# Patient Record
Sex: MALE | Race: WHITE | NOT HISPANIC OR LATINO | URBAN - METROPOLITAN AREA
[De-identification: names, ages, dates, MRNs, and addresses within clinical notes are randomized per-mention and may not be internally consistent; named-entity substitution may affect disease eponyms.]

---

## 2016-06-28 RX ORDER — CARBAMAZEPINE 200 MG
2 TABLET ORAL
Qty: 0 | Refills: 0 | COMMUNITY
Start: 2016-06-28

## 2017-01-02 ENCOUNTER — EMERGENCY (EMERGENCY)
Facility: HOSPITAL | Age: 52
LOS: 1 days | Discharge: HOME | End: 2017-01-02
Attending: EMERGENCY MEDICINE | Admitting: EMERGENCY MEDICINE
Payer: MEDICARE

## 2017-01-02 VITALS
RESPIRATION RATE: 18 BRPM | DIASTOLIC BLOOD PRESSURE: 74 MMHG | HEART RATE: 100 BPM | SYSTOLIC BLOOD PRESSURE: 123 MMHG | WEIGHT: 188.05 LBS | TEMPERATURE: 99 F | OXYGEN SATURATION: 98 % | HEIGHT: 63 IN

## 2017-01-02 DIAGNOSIS — D64.9 ANEMIA, UNSPECIFIED: ICD-10-CM

## 2017-01-02 DIAGNOSIS — G40.909 EPILEPSY, UNSPECIFIED, NOT INTRACTABLE, WITHOUT STATUS EPILEPTICUS: ICD-10-CM

## 2017-01-02 DIAGNOSIS — R56.9 UNSPECIFIED CONVULSIONS: ICD-10-CM

## 2017-01-02 DIAGNOSIS — Z79.82 LONG TERM (CURRENT) USE OF ASPIRIN: ICD-10-CM

## 2017-01-02 DIAGNOSIS — F72 SEVERE INTELLECTUAL DISABILITIES: ICD-10-CM

## 2017-01-02 DIAGNOSIS — M81.0 AGE-RELATED OSTEOPOROSIS WITHOUT CURRENT PATHOLOGICAL FRACTURE: ICD-10-CM

## 2017-01-02 DIAGNOSIS — R32 UNSPECIFIED URINARY INCONTINENCE: ICD-10-CM

## 2017-01-02 PROCEDURE — 99285 EMERGENCY DEPT VISIT HI MDM: CPT | Mod: 25

## 2017-01-02 NOTE — ED PROVIDER NOTE - PROGRESS NOTE DETAILS
pt reevalauted, no seizures witnessed in the ER, pt is therapeutic in phenytoin but needs tegretol, pt to be d/c home, follow up with neuro and return if any symptmoms sergio

## 2017-01-02 NOTE — ED ADULT NURSE NOTE - PMH
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
## 2017-01-02 NOTE — ED PROVIDER NOTE - OBJECTIVE STATEMENT
52yo male bib ems s/p 2 seizures tonite. according to the nurse at the nursing home, pt had witnessed seizure twice in the bed, pt had refused his meds today, no fever, no vomiting, no other change in behavior. last seizure was march 2016

## 2017-01-03 LAB
ANION GAP SERPL CALC-SCNC: 10 MMOL/L — SIGNIFICANT CHANGE UP (ref 5–17)
BUN SERPL-MCNC: 19 MG/DL — SIGNIFICANT CHANGE UP (ref 7–23)
CALCIUM SERPL-MCNC: 8.5 MG/DL — SIGNIFICANT CHANGE UP (ref 8.5–10.1)
CARBAMAZEPINE SERPL-MCNC: 1 UG/ML — LOW (ref 4–12)
CHLORIDE SERPL-SCNC: 105 MMOL/L — SIGNIFICANT CHANGE UP (ref 96–108)
CO2 SERPL-SCNC: 25 MMOL/L — SIGNIFICANT CHANGE UP (ref 22–31)
CREAT SERPL-MCNC: 0.91 MG/DL — SIGNIFICANT CHANGE UP (ref 0.5–1.3)
GLUCOSE SERPL-MCNC: 86 MG/DL — SIGNIFICANT CHANGE UP (ref 70–99)
HCT VFR BLD CALC: 42.3 % — SIGNIFICANT CHANGE UP (ref 39–50)
HGB BLD-MCNC: 14.1 G/DL — SIGNIFICANT CHANGE UP (ref 13–17)
MCHC RBC-ENTMCNC: 30.3 PG — SIGNIFICANT CHANGE UP (ref 27–34)
MCHC RBC-ENTMCNC: 33.3 GM/DL — SIGNIFICANT CHANGE UP (ref 32–36)
MCV RBC AUTO: 90.8 FL — SIGNIFICANT CHANGE UP (ref 80–100)
PHENYTOIN FREE SERPL-MCNC: 24.6 UG/ML — HIGH (ref 10–20)
PLATELET # BLD AUTO: 301 K/UL — SIGNIFICANT CHANGE UP (ref 150–400)
POTASSIUM SERPL-MCNC: 4.7 MMOL/L — SIGNIFICANT CHANGE UP (ref 3.5–5.3)
POTASSIUM SERPL-SCNC: 4.7 MMOL/L — SIGNIFICANT CHANGE UP (ref 3.5–5.3)
RBC # BLD: 4.66 M/UL — SIGNIFICANT CHANGE UP (ref 4.2–5.8)
RBC # FLD: 12 % — SIGNIFICANT CHANGE UP (ref 10.3–14.5)
SODIUM SERPL-SCNC: 140 MMOL/L — SIGNIFICANT CHANGE UP (ref 135–145)
WBC # BLD: 6.9 K/UL — SIGNIFICANT CHANGE UP (ref 3.8–10.5)
WBC # FLD AUTO: 6.9 K/UL — SIGNIFICANT CHANGE UP (ref 3.8–10.5)

## 2017-01-03 PROCEDURE — 80048 BASIC METABOLIC PNL TOTAL CA: CPT

## 2017-01-03 PROCEDURE — 80185 ASSAY OF PHENYTOIN TOTAL: CPT

## 2017-01-03 PROCEDURE — 80156 ASSAY CARBAMAZEPINE TOTAL: CPT

## 2017-01-03 PROCEDURE — 99283 EMERGENCY DEPT VISIT LOW MDM: CPT

## 2017-01-03 PROCEDURE — 85027 COMPLETE CBC AUTOMATED: CPT

## 2017-01-03 RX ORDER — CARBAMAZEPINE 200 MG
600 TABLET ORAL ONCE
Qty: 0 | Refills: 0 | Status: COMPLETED | OUTPATIENT
Start: 2017-01-03 | End: 2017-01-03

## 2017-01-03 RX ADMIN — Medication 600 MILLIGRAM(S): at 02:00

## 2017-01-15 ENCOUNTER — EMERGENCY (EMERGENCY)
Facility: HOSPITAL | Age: 52
LOS: 1 days | Discharge: HOME | End: 2017-01-15
Attending: EMERGENCY MEDICINE | Admitting: EMERGENCY MEDICINE
Payer: MEDICARE

## 2017-01-15 VITALS
OXYGEN SATURATION: 98 % | SYSTOLIC BLOOD PRESSURE: 138 MMHG | RESPIRATION RATE: 16 BRPM | HEART RATE: 88 BPM | DIASTOLIC BLOOD PRESSURE: 65 MMHG

## 2017-01-15 VITALS
WEIGHT: 179.9 LBS | OXYGEN SATURATION: 97 % | HEART RATE: 92 BPM | SYSTOLIC BLOOD PRESSURE: 112 MMHG | TEMPERATURE: 99 F | RESPIRATION RATE: 14 BRPM | DIASTOLIC BLOOD PRESSURE: 78 MMHG

## 2017-01-15 DIAGNOSIS — Z91.018 ALLERGY TO OTHER FOODS: ICD-10-CM

## 2017-01-15 DIAGNOSIS — R56.9 UNSPECIFIED CONVULSIONS: ICD-10-CM

## 2017-01-15 DIAGNOSIS — Z91.5 PERSONAL HISTORY OF SELF-HARM: ICD-10-CM

## 2017-01-15 DIAGNOSIS — G40.909 EPILEPSY, UNSPECIFIED, NOT INTRACTABLE, WITHOUT STATUS EPILEPTICUS: ICD-10-CM

## 2017-01-15 DIAGNOSIS — F03.90 UNSPECIFIED DEMENTIA, UNSPECIFIED SEVERITY, WITHOUT BEHAVIORAL DISTURBANCE, PSYCHOTIC DISTURBANCE, MOOD DISTURBANCE, AND ANXIETY: ICD-10-CM

## 2017-01-15 DIAGNOSIS — Z88.8 ALLERGY STATUS TO OTHER DRUGS, MEDICAMENTS AND BIOLOGICAL SUBSTANCES STATUS: ICD-10-CM

## 2017-01-15 DIAGNOSIS — Z79.82 LONG TERM (CURRENT) USE OF ASPIRIN: ICD-10-CM

## 2017-01-15 DIAGNOSIS — F72 SEVERE INTELLECTUAL DISABILITIES: ICD-10-CM

## 2017-01-15 LAB
ALBUMIN SERPL ELPH-MCNC: 3.5 G/DL — SIGNIFICANT CHANGE UP (ref 3.3–5)
ALP SERPL-CCNC: 88 U/L — SIGNIFICANT CHANGE UP (ref 40–120)
ALT FLD-CCNC: 30 U/L — SIGNIFICANT CHANGE UP (ref 12–78)
ANION GAP SERPL CALC-SCNC: 7 MMOL/L — SIGNIFICANT CHANGE UP (ref 5–17)
AST SERPL-CCNC: 22 U/L — SIGNIFICANT CHANGE UP (ref 15–37)
BASOPHILS # BLD AUTO: 0 K/UL — SIGNIFICANT CHANGE UP (ref 0–0.2)
BASOPHILS NFR BLD AUTO: 0.6 % — SIGNIFICANT CHANGE UP (ref 0–2)
BILIRUB SERPL-MCNC: 0.3 MG/DL — SIGNIFICANT CHANGE UP (ref 0.2–1.2)
BUN SERPL-MCNC: 18 MG/DL — SIGNIFICANT CHANGE UP (ref 7–23)
CALCIUM SERPL-MCNC: 8.5 MG/DL — SIGNIFICANT CHANGE UP (ref 8.5–10.1)
CARBAMAZEPINE SERPL-MCNC: 5.2 UG/ML — SIGNIFICANT CHANGE UP (ref 4–12)
CHLORIDE SERPL-SCNC: 103 MMOL/L — SIGNIFICANT CHANGE UP (ref 96–108)
CO2 SERPL-SCNC: 28 MMOL/L — SIGNIFICANT CHANGE UP (ref 22–31)
CREAT SERPL-MCNC: 0.7 MG/DL — SIGNIFICANT CHANGE UP (ref 0.5–1.3)
EOSINOPHIL # BLD AUTO: 0 K/UL — SIGNIFICANT CHANGE UP (ref 0–0.5)
EOSINOPHIL NFR BLD AUTO: 0.2 % — SIGNIFICANT CHANGE UP (ref 0–6)
GLUCOSE SERPL-MCNC: 123 MG/DL — HIGH (ref 70–99)
HCT VFR BLD CALC: 43.6 % — SIGNIFICANT CHANGE UP (ref 39–50)
HGB BLD-MCNC: 14.1 G/DL — SIGNIFICANT CHANGE UP (ref 13–17)
LYMPHOCYTES # BLD AUTO: 1 K/UL — SIGNIFICANT CHANGE UP (ref 1–3.3)
LYMPHOCYTES # BLD AUTO: 14.9 % — SIGNIFICANT CHANGE UP (ref 13–44)
MCHC RBC-ENTMCNC: 29.6 PG — SIGNIFICANT CHANGE UP (ref 27–34)
MCHC RBC-ENTMCNC: 32.4 GM/DL — SIGNIFICANT CHANGE UP (ref 32–36)
MCV RBC AUTO: 91.5 FL — SIGNIFICANT CHANGE UP (ref 80–100)
MONOCYTES # BLD AUTO: 0.4 K/UL — SIGNIFICANT CHANGE UP (ref 0–0.9)
MONOCYTES NFR BLD AUTO: 6.3 % — SIGNIFICANT CHANGE UP (ref 1–9)
NEUTROPHILS # BLD AUTO: 5 K/UL — SIGNIFICANT CHANGE UP (ref 1.8–7.4)
NEUTROPHILS NFR BLD AUTO: 78 % — HIGH (ref 43–77)
PHENYTOIN FREE SERPL-MCNC: 6.2 UG/ML — LOW (ref 10–20)
PLATELET # BLD AUTO: 284 K/UL — SIGNIFICANT CHANGE UP (ref 150–400)
POTASSIUM SERPL-MCNC: 3.8 MMOL/L — SIGNIFICANT CHANGE UP (ref 3.5–5.3)
POTASSIUM SERPL-SCNC: 3.8 MMOL/L — SIGNIFICANT CHANGE UP (ref 3.5–5.3)
PROT SERPL-MCNC: 8.2 G/DL — SIGNIFICANT CHANGE UP (ref 6–8.3)
RBC # BLD: 4.77 M/UL — SIGNIFICANT CHANGE UP (ref 4.2–5.8)
RBC # FLD: 11.9 % — SIGNIFICANT CHANGE UP (ref 10.3–14.5)
SODIUM SERPL-SCNC: 138 MMOL/L — SIGNIFICANT CHANGE UP (ref 135–145)
WBC # BLD: 6.5 K/UL — SIGNIFICANT CHANGE UP (ref 3.8–10.5)
WBC # FLD AUTO: 6.5 K/UL — SIGNIFICANT CHANGE UP (ref 3.8–10.5)

## 2017-01-15 PROCEDURE — 99284 EMERGENCY DEPT VISIT MOD MDM: CPT

## 2017-01-15 PROCEDURE — 80053 COMPREHEN METABOLIC PANEL: CPT

## 2017-01-15 PROCEDURE — 80156 ASSAY CARBAMAZEPINE TOTAL: CPT

## 2017-01-15 PROCEDURE — 80185 ASSAY OF PHENYTOIN TOTAL: CPT

## 2017-01-15 PROCEDURE — 99282 EMERGENCY DEPT VISIT SF MDM: CPT

## 2017-01-15 PROCEDURE — 85027 COMPLETE CBC AUTOMATED: CPT

## 2017-01-15 RX ORDER — CARBAMAZEPINE 200 MG
400 TABLET ORAL ONCE
Qty: 0 | Refills: 0 | Status: COMPLETED | OUTPATIENT
Start: 2017-01-15 | End: 2017-01-15

## 2017-01-15 RX ORDER — LEVETIRACETAM 250 MG/1
1000 TABLET, FILM COATED ORAL ONCE
Qty: 0 | Refills: 0 | Status: COMPLETED | OUTPATIENT
Start: 2017-01-15 | End: 2017-01-15

## 2017-01-15 RX ADMIN — LEVETIRACETAM 1000 MILLIGRAM(S): 250 TABLET, FILM COATED ORAL at 11:42

## 2017-01-15 RX ADMIN — Medication 400 MILLIGRAM(S): at 11:28

## 2017-01-15 RX ADMIN — Medication 300 MILLIGRAM(S): at 11:28

## 2017-01-15 NOTE — ED PROVIDER NOTE - PROGRESS NOTE DETAILS
pt took all his meds here will dc pt was being dischargd when the nurse from the group home called and asked for the routine labs to be drawn.  we will butterfly them out so that he has them for next doctors appointment

## 2017-01-15 NOTE — ED PROVIDER NOTE - OBJECTIVE STATEMENT
Pt is a 50 [facility] self abusive seizure do osteoarthritis not ambulatory able to stand to transition is now resisting taking his medications because of new management.  he has had a few seizures as a result so they brought pt to er for medication admin  (keppra dilantin and depakote)

## 2017-01-15 NOTE — ED PROVIDER NOTE - MUSCULOSKELETAL, MLM
Spine appears normal, range of motion is not limited, no muscle or joint tenderness contracted legs swings at me during exam fullly clothed exam limited for safety

## 2017-01-16 ENCOUNTER — EMERGENCY (EMERGENCY)
Facility: HOSPITAL | Age: 52
LOS: 1 days | Discharge: HOME | End: 2017-01-16
Attending: EMERGENCY MEDICINE | Admitting: EMERGENCY MEDICINE
Payer: MEDICARE

## 2017-01-16 VITALS
HEART RATE: 82 BPM | DIASTOLIC BLOOD PRESSURE: 61 MMHG | RESPIRATION RATE: 16 BRPM | SYSTOLIC BLOOD PRESSURE: 120 MMHG | TEMPERATURE: 98 F | OXYGEN SATURATION: 97 %

## 2017-01-16 VITALS
DIASTOLIC BLOOD PRESSURE: 72 MMHG | SYSTOLIC BLOOD PRESSURE: 120 MMHG | RESPIRATION RATE: 16 BRPM | HEART RATE: 86 BPM | TEMPERATURE: 98 F | OXYGEN SATURATION: 98 %

## 2017-01-16 DIAGNOSIS — M81.0 AGE-RELATED OSTEOPOROSIS WITHOUT CURRENT PATHOLOGICAL FRACTURE: ICD-10-CM

## 2017-01-16 DIAGNOSIS — Z91.19 PATIENT'S NONCOMPLIANCE WITH OTHER MEDICAL TREATMENT AND REGIMEN: ICD-10-CM

## 2017-01-16 DIAGNOSIS — G40.909 EPILEPSY, UNSPECIFIED, NOT INTRACTABLE, WITHOUT STATUS EPILEPTICUS: ICD-10-CM

## 2017-01-16 DIAGNOSIS — R56.9 UNSPECIFIED CONVULSIONS: ICD-10-CM

## 2017-01-16 DIAGNOSIS — Z79.82 LONG TERM (CURRENT) USE OF ASPIRIN: ICD-10-CM

## 2017-01-16 DIAGNOSIS — F72 SEVERE INTELLECTUAL DISABILITIES: ICD-10-CM

## 2017-01-16 LAB
ALBUMIN SERPL ELPH-MCNC: 3.5 G/DL — SIGNIFICANT CHANGE UP (ref 3.3–5)
ALP SERPL-CCNC: 90 U/L — SIGNIFICANT CHANGE UP (ref 40–120)
ALT FLD-CCNC: 47 U/L — SIGNIFICANT CHANGE UP (ref 12–78)
ANION GAP SERPL CALC-SCNC: 6 MMOL/L — SIGNIFICANT CHANGE UP (ref 5–17)
APPEARANCE UR: CLEAR — SIGNIFICANT CHANGE UP
APTT BLD: 33.2 SEC — SIGNIFICANT CHANGE UP (ref 27.5–37.4)
AST SERPL-CCNC: 30 U/L — SIGNIFICANT CHANGE UP (ref 15–37)
BACTERIA # UR AUTO: ABNORMAL
BASOPHILS # BLD AUTO: 0 K/UL — SIGNIFICANT CHANGE UP (ref 0–0.2)
BASOPHILS NFR BLD AUTO: 0.5 % — SIGNIFICANT CHANGE UP (ref 0–2)
BILIRUB SERPL-MCNC: 0.3 MG/DL — SIGNIFICANT CHANGE UP (ref 0.2–1.2)
BILIRUB UR-MCNC: NEGATIVE — SIGNIFICANT CHANGE UP
BUN SERPL-MCNC: 21 MG/DL — SIGNIFICANT CHANGE UP (ref 7–23)
CALCIUM SERPL-MCNC: 8.7 MG/DL — SIGNIFICANT CHANGE UP (ref 8.5–10.1)
CARBAMAZEPINE SERPL-MCNC: 1.2 UG/ML — LOW (ref 4–12)
CHLORIDE SERPL-SCNC: 102 MMOL/L — SIGNIFICANT CHANGE UP (ref 96–108)
CO2 SERPL-SCNC: 30 MMOL/L — SIGNIFICANT CHANGE UP (ref 22–31)
COLOR SPEC: YELLOW — SIGNIFICANT CHANGE UP
COMMENT - URINE: SIGNIFICANT CHANGE UP
COMMENT - URINE: SIGNIFICANT CHANGE UP
CREAT SERPL-MCNC: 0.74 MG/DL — SIGNIFICANT CHANGE UP (ref 0.5–1.3)
DIFF PNL FLD: ABNORMAL
EOSINOPHIL # BLD AUTO: 0 K/UL — SIGNIFICANT CHANGE UP (ref 0–0.5)
EOSINOPHIL NFR BLD AUTO: 0.5 % — SIGNIFICANT CHANGE UP (ref 0–6)
EPI CELLS # UR: SIGNIFICANT CHANGE UP
GLUCOSE SERPL-MCNC: 128 MG/DL — HIGH (ref 70–99)
GLUCOSE UR QL: NEGATIVE — SIGNIFICANT CHANGE UP
HCT VFR BLD CALC: 41.5 % — SIGNIFICANT CHANGE UP (ref 39–50)
HGB BLD-MCNC: 14 G/DL — SIGNIFICANT CHANGE UP (ref 13–17)
INR BLD: 1.15 RATIO — SIGNIFICANT CHANGE UP (ref 0.88–1.16)
KETONES UR-MCNC: NEGATIVE — SIGNIFICANT CHANGE UP
LEUKOCYTE ESTERASE UR-ACNC: ABNORMAL
LYMPHOCYTES # BLD AUTO: 0.8 K/UL — LOW (ref 1–3.3)
LYMPHOCYTES # BLD AUTO: 14.9 % — SIGNIFICANT CHANGE UP (ref 13–44)
MCHC RBC-ENTMCNC: 30.2 PG — SIGNIFICANT CHANGE UP (ref 27–34)
MCHC RBC-ENTMCNC: 33.6 GM/DL — SIGNIFICANT CHANGE UP (ref 32–36)
MCV RBC AUTO: 89.7 FL — SIGNIFICANT CHANGE UP (ref 80–100)
MONOCYTES # BLD AUTO: 0.4 K/UL — SIGNIFICANT CHANGE UP (ref 0–0.9)
MONOCYTES NFR BLD AUTO: 6.6 % — SIGNIFICANT CHANGE UP (ref 1–9)
NEUTROPHILS # BLD AUTO: 4.2 K/UL — SIGNIFICANT CHANGE UP (ref 1.8–7.4)
NEUTROPHILS NFR BLD AUTO: 77.5 % — HIGH (ref 43–77)
NITRITE UR-MCNC: NEGATIVE — SIGNIFICANT CHANGE UP
PH UR: 5 — SIGNIFICANT CHANGE UP (ref 4.8–8)
PHENYTOIN FREE SERPL-MCNC: 3.1 UG/ML — LOW (ref 10–20)
PLATELET # BLD AUTO: 293 K/UL — SIGNIFICANT CHANGE UP (ref 150–400)
POTASSIUM SERPL-MCNC: 4 MMOL/L — SIGNIFICANT CHANGE UP (ref 3.5–5.3)
POTASSIUM SERPL-SCNC: 4 MMOL/L — SIGNIFICANT CHANGE UP (ref 3.5–5.3)
PROT SERPL-MCNC: 8.1 G/DL — SIGNIFICANT CHANGE UP (ref 6–8.3)
PROT UR-MCNC: 25 MG/DL
PROTHROM AB SERPL-ACNC: 12.8 SEC — SIGNIFICANT CHANGE UP (ref 10–13.1)
RBC # BLD: 4.63 M/UL — SIGNIFICANT CHANGE UP (ref 4.2–5.8)
RBC # FLD: 11.8 % — SIGNIFICANT CHANGE UP (ref 10.3–14.5)
RBC CASTS # UR COMP ASSIST: SIGNIFICANT CHANGE UP /HPF (ref 0–4)
SODIUM SERPL-SCNC: 138 MMOL/L — SIGNIFICANT CHANGE UP (ref 135–145)
SP GR SPEC: 1.01 — SIGNIFICANT CHANGE UP (ref 1.01–1.02)
UROBILINOGEN FLD QL: 1
WBC # BLD: 5.5 K/UL — SIGNIFICANT CHANGE UP (ref 3.8–10.5)
WBC # FLD AUTO: 5.5 K/UL — SIGNIFICANT CHANGE UP (ref 3.8–10.5)
WBC UR QL: SIGNIFICANT CHANGE UP

## 2017-01-16 PROCEDURE — 85730 THROMBOPLASTIN TIME PARTIAL: CPT

## 2017-01-16 PROCEDURE — 87086 URINE CULTURE/COLONY COUNT: CPT

## 2017-01-16 PROCEDURE — 96374 THER/PROPH/DIAG INJ IV PUSH: CPT

## 2017-01-16 PROCEDURE — 85027 COMPLETE CBC AUTOMATED: CPT

## 2017-01-16 PROCEDURE — 85610 PROTHROMBIN TIME: CPT

## 2017-01-16 PROCEDURE — 80177 DRUG SCRN QUAN LEVETIRACETAM: CPT

## 2017-01-16 PROCEDURE — 80156 ASSAY CARBAMAZEPINE TOTAL: CPT

## 2017-01-16 PROCEDURE — 71045 X-RAY EXAM CHEST 1 VIEW: CPT

## 2017-01-16 PROCEDURE — 81001 URINALYSIS AUTO W/SCOPE: CPT

## 2017-01-16 PROCEDURE — 80185 ASSAY OF PHENYTOIN TOTAL: CPT

## 2017-01-16 PROCEDURE — 99284 EMERGENCY DEPT VISIT MOD MDM: CPT

## 2017-01-16 PROCEDURE — 80053 COMPREHEN METABOLIC PANEL: CPT

## 2017-01-16 PROCEDURE — 99284 EMERGENCY DEPT VISIT MOD MDM: CPT | Mod: 25

## 2017-01-16 PROCEDURE — 71010: CPT | Mod: 26

## 2017-01-16 RX ORDER — CARBAMAZEPINE 200 MG
400 TABLET ORAL ONCE
Qty: 0 | Refills: 0 | Status: COMPLETED | OUTPATIENT
Start: 2017-01-16 | End: 2017-01-16

## 2017-01-16 RX ORDER — SODIUM CHLORIDE 9 MG/ML
3 INJECTION INTRAMUSCULAR; INTRAVENOUS; SUBCUTANEOUS ONCE
Qty: 0 | Refills: 0 | Status: COMPLETED | OUTPATIENT
Start: 2017-01-16 | End: 2017-01-16

## 2017-01-16 RX ORDER — LEVETIRACETAM 250 MG/1
1000 TABLET, FILM COATED ORAL ONCE
Qty: 0 | Refills: 0 | Status: COMPLETED | OUTPATIENT
Start: 2017-01-16 | End: 2017-01-16

## 2017-01-16 RX ADMIN — LEVETIRACETAM 440 MILLIGRAM(S): 250 TABLET, FILM COATED ORAL at 13:33

## 2017-01-16 RX ADMIN — SODIUM CHLORIDE 3 MILLILITER(S): 9 INJECTION INTRAMUSCULAR; INTRAVENOUS; SUBCUTANEOUS at 13:22

## 2017-01-16 RX ADMIN — Medication 300 MILLIGRAM(S): at 13:46

## 2017-01-16 RX ADMIN — Medication 400 MILLIGRAM(S): at 13:34

## 2017-01-16 NOTE — ED PROVIDER NOTE - OBJECTIVE STATEMENT
52 yo M p/w from [facility] - pt has not been compliant with taking his medication at [facility] - as a result pt has sz yesterday x 3. Pt seen in ed and given meds. Pt was not given any further meds at [facility] - now has again had seizure today. no known fever. No vomiting / diarrhea. Pt otherwise in nl state of health.

## 2017-01-16 NOTE — ED ADULT NURSE NOTE - OBJECTIVE STATEMENT
pt from group home, nonverbal, pt not taking meds at group home and having seizures, pt seen in yesterday in ED for same, pt alert and at baseline, maew, follows simple commands

## 2017-01-16 NOTE — ED PROVIDER NOTE - ENMT, MLM
Airway patent, Nasal mucosa clear. Mouth with normal mucosa. Throat has no vesicles, no oropharyngeal exudates and uvula is midline. MM  Moist. Non-toxic, well appearing. Neck supple. no signs of trauma.

## 2017-01-16 NOTE — ED PROVIDER NOTE - CONDUCTED A DETAILED DISCUSSION WITH PATIENT AND/OR GUARDIAN REGARDING, MDM
return to ED if symptoms worsen, persist or questions arise/lab results/radiology results/need for outpatient follow-up

## 2017-01-16 NOTE — ED PROVIDER NOTE - PROGRESS NOTE DETAILS
Attempted to call Dr Yesenia Angel - number states to call [phone] - That number states to call [phone]. That states to call [phone] - No way to get person on phone . Unable to contact. Rn able to give pt meds PO without issue. Daryl Bonilla - at [facility] - told her how to give meds. She states ok to sent pt back.  Daryl Jones - will deal with admin tomorrow. Pt doing well, no acute changes, pt will fu as outpt with MD.

## 2017-01-17 LAB
CULTURE RESULTS: NO GROWTH — SIGNIFICANT CHANGE UP
SPECIMEN SOURCE: SIGNIFICANT CHANGE UP

## 2017-01-18 LAB — LEVETIRACETAM SERPL-MCNC: 28 MCG/ML — SIGNIFICANT CHANGE UP (ref 12–46)

## 2017-01-25 ENCOUNTER — EMERGENCY (EMERGENCY)
Facility: HOSPITAL | Age: 52
LOS: 1 days | Discharge: HOME | End: 2017-01-25
Attending: EMERGENCY MEDICINE | Admitting: EMERGENCY MEDICINE
Payer: MEDICARE

## 2017-01-25 VITALS
DIASTOLIC BLOOD PRESSURE: 81 MMHG | OXYGEN SATURATION: 99 % | TEMPERATURE: 98 F | HEART RATE: 81 BPM | RESPIRATION RATE: 18 BRPM | SYSTOLIC BLOOD PRESSURE: 115 MMHG

## 2017-01-25 VITALS
SYSTOLIC BLOOD PRESSURE: 130 MMHG | HEIGHT: 67 IN | HEART RATE: 80 BPM | TEMPERATURE: 98 F | OXYGEN SATURATION: 95 % | RESPIRATION RATE: 15 BRPM | WEIGHT: 149.91 LBS | DIASTOLIC BLOOD PRESSURE: 80 MMHG

## 2017-01-25 DIAGNOSIS — F89 UNSPECIFIED DISORDER OF PSYCHOLOGICAL DEVELOPMENT: ICD-10-CM

## 2017-01-25 DIAGNOSIS — Z79.82 LONG TERM (CURRENT) USE OF ASPIRIN: ICD-10-CM

## 2017-01-25 DIAGNOSIS — G40.909 EPILEPSY, UNSPECIFIED, NOT INTRACTABLE, WITHOUT STATUS EPILEPTICUS: ICD-10-CM

## 2017-01-25 DIAGNOSIS — R56.9 UNSPECIFIED CONVULSIONS: ICD-10-CM

## 2017-01-25 DIAGNOSIS — F72 SEVERE INTELLECTUAL DISABILITIES: ICD-10-CM

## 2017-01-25 DIAGNOSIS — M81.0 AGE-RELATED OSTEOPOROSIS WITHOUT CURRENT PATHOLOGICAL FRACTURE: ICD-10-CM

## 2017-01-25 DIAGNOSIS — D64.9 ANEMIA, UNSPECIFIED: ICD-10-CM

## 2017-01-25 DIAGNOSIS — Z91.19 PATIENT'S NONCOMPLIANCE WITH OTHER MEDICAL TREATMENT AND REGIMEN: ICD-10-CM

## 2017-01-25 LAB
ALBUMIN SERPL ELPH-MCNC: 3.3 G/DL — SIGNIFICANT CHANGE UP (ref 3.3–5)
ALP SERPL-CCNC: 80 U/L — SIGNIFICANT CHANGE UP (ref 40–120)
ALT FLD-CCNC: 21 U/L — SIGNIFICANT CHANGE UP (ref 12–78)
ANION GAP SERPL CALC-SCNC: 11 MMOL/L — SIGNIFICANT CHANGE UP (ref 5–17)
AST SERPL-CCNC: 17 U/L — SIGNIFICANT CHANGE UP (ref 15–37)
BASOPHILS # BLD AUTO: 0.1 K/UL — SIGNIFICANT CHANGE UP (ref 0–0.2)
BASOPHILS NFR BLD AUTO: 0.8 % — SIGNIFICANT CHANGE UP (ref 0–2)
BILIRUB SERPL-MCNC: 0.2 MG/DL — SIGNIFICANT CHANGE UP (ref 0.2–1.2)
BUN SERPL-MCNC: 20 MG/DL — SIGNIFICANT CHANGE UP (ref 7–23)
CALCIUM SERPL-MCNC: 8.9 MG/DL — SIGNIFICANT CHANGE UP (ref 8.5–10.1)
CHLORIDE SERPL-SCNC: 105 MMOL/L — SIGNIFICANT CHANGE UP (ref 96–108)
CO2 SERPL-SCNC: 23 MMOL/L — SIGNIFICANT CHANGE UP (ref 22–31)
CREAT SERPL-MCNC: 0.71 MG/DL — SIGNIFICANT CHANGE UP (ref 0.5–1.3)
EOSINOPHIL # BLD AUTO: 0.1 K/UL — SIGNIFICANT CHANGE UP (ref 0–0.5)
EOSINOPHIL NFR BLD AUTO: 1.1 % — SIGNIFICANT CHANGE UP (ref 0–6)
GLUCOSE SERPL-MCNC: 106 MG/DL — HIGH (ref 70–99)
HCT VFR BLD CALC: 41.1 % — SIGNIFICANT CHANGE UP (ref 39–50)
HGB BLD-MCNC: 13.1 G/DL — SIGNIFICANT CHANGE UP (ref 13–17)
LYMPHOCYTES # BLD AUTO: 0.7 K/UL — LOW (ref 1–3.3)
LYMPHOCYTES # BLD AUTO: 11.5 % — LOW (ref 13–44)
MCHC RBC-ENTMCNC: 29.8 PG — SIGNIFICANT CHANGE UP (ref 27–34)
MCHC RBC-ENTMCNC: 31.8 GM/DL — LOW (ref 32–36)
MCV RBC AUTO: 93.8 FL — SIGNIFICANT CHANGE UP (ref 80–100)
MONOCYTES # BLD AUTO: 0.2 K/UL — SIGNIFICANT CHANGE UP (ref 0–0.9)
MONOCYTES NFR BLD AUTO: 3.7 % — SIGNIFICANT CHANGE UP (ref 1–9)
NEUTROPHILS # BLD AUTO: 5.1 K/UL — SIGNIFICANT CHANGE UP (ref 1.8–7.4)
NEUTROPHILS NFR BLD AUTO: 82.8 % — HIGH (ref 43–77)
PHENYTOIN FREE SERPL-MCNC: 1.9 UG/ML — LOW (ref 10–20)
PLATELET # BLD AUTO: 272 K/UL — SIGNIFICANT CHANGE UP (ref 150–400)
POTASSIUM SERPL-MCNC: 3.9 MMOL/L — SIGNIFICANT CHANGE UP (ref 3.5–5.3)
POTASSIUM SERPL-SCNC: 3.9 MMOL/L — SIGNIFICANT CHANGE UP (ref 3.5–5.3)
PROT SERPL-MCNC: 7.9 G/DL — SIGNIFICANT CHANGE UP (ref 6–8.3)
RBC # BLD: 4.39 M/UL — SIGNIFICANT CHANGE UP (ref 4.2–5.8)
RBC # FLD: 12 % — SIGNIFICANT CHANGE UP (ref 10.3–14.5)
SODIUM SERPL-SCNC: 139 MMOL/L — SIGNIFICANT CHANGE UP (ref 135–145)
WBC # BLD: 6.2 K/UL — SIGNIFICANT CHANGE UP (ref 3.8–10.5)
WBC # FLD AUTO: 6.2 K/UL — SIGNIFICANT CHANGE UP (ref 3.8–10.5)

## 2017-01-25 PROCEDURE — 71045 X-RAY EXAM CHEST 1 VIEW: CPT

## 2017-01-25 PROCEDURE — 96365 THER/PROPH/DIAG IV INF INIT: CPT

## 2017-01-25 PROCEDURE — 80177 DRUG SCRN QUAN LEVETIRACETAM: CPT

## 2017-01-25 PROCEDURE — 80185 ASSAY OF PHENYTOIN TOTAL: CPT

## 2017-01-25 PROCEDURE — 99284 EMERGENCY DEPT VISIT MOD MDM: CPT | Mod: 25

## 2017-01-25 PROCEDURE — 80053 COMPREHEN METABOLIC PANEL: CPT

## 2017-01-25 PROCEDURE — 99284 EMERGENCY DEPT VISIT MOD MDM: CPT

## 2017-01-25 PROCEDURE — 71010: CPT | Mod: 26

## 2017-01-25 PROCEDURE — 85027 COMPLETE CBC AUTOMATED: CPT

## 2017-01-25 RX ADMIN — Medication 540 MILLIGRAM(S): at 15:34

## 2017-01-25 NOTE — ED PROVIDER NOTE - PLAN OF CARE
Return to the ED for any new or worsening symptoms  Take your medication as prescribed  Follow up with your neurologist as scheduled

## 2017-01-25 NOTE — ED ADULT NURSE NOTE - OBJECTIVE STATEMENT
Pt. received alert/awake and nonverbal w/ chief complaint of multiple seizures as per aide. Aide states pt. had 4 seizures while sitting this morning that lasted 2 min each. Aide denies fall or head trauma. Aide states pt. refused 1/24/2017 night medications, but took all of AM meds. Pt. placed on seizures precautions.

## 2017-01-25 NOTE — ED PROVIDER NOTE - PROGRESS NOTE DETAILS
spoke with Dr. Barajas requesting 1 gm of Dilantin and pt can be sent home with continued home medications.

## 2017-01-25 NOTE — ED PROVIDER NOTE - OBJECTIVE STATEMENT
Pt is a 50 yo male who presents to the ED with a cc of seizure like activity. Pt suffers from developmental delay and is unable to provide history.  Pt has a known seizure disorder. Staff reports that pt refused his medication last night for his seizure disorder.  Today he did take his medication but had 4 seizure like episodes this morning last 1-2 min each.  Staff reports mild shaking and stiff body

## 2017-01-25 NOTE — ED PROVIDER NOTE - CARE PLAN
Principal Discharge DX:	Seizure disorder  Instructions for follow-up, activity and diet:	Return to the ED for any new or worsening symptoms  Take your medication as prescribed  Follow up with your neurologist as scheduled  Secondary Diagnosis:	Medical non-compliance

## 2017-01-27 LAB — LEVETIRACETAM SERPL-MCNC: <2 MCG/ML — LOW (ref 12–46)

## 2017-02-07 ENCOUNTER — OUTPATIENT (OUTPATIENT)
Dept: OUTPATIENT SERVICES | Facility: HOSPITAL | Age: 52
LOS: 1 days | End: 2017-02-07

## 2017-02-15 ENCOUNTER — EMERGENCY (EMERGENCY)
Facility: HOSPITAL | Age: 52
LOS: 1 days | Discharge: HOME | End: 2017-02-15
Attending: EMERGENCY MEDICINE | Admitting: EMERGENCY MEDICINE
Payer: MEDICARE

## 2017-02-15 VITALS
OXYGEN SATURATION: 95 % | WEIGHT: 179.9 LBS | SYSTOLIC BLOOD PRESSURE: 107 MMHG | DIASTOLIC BLOOD PRESSURE: 63 MMHG | RESPIRATION RATE: 16 BRPM | TEMPERATURE: 97 F | HEART RATE: 91 BPM

## 2017-02-15 VITALS
TEMPERATURE: 98 F | SYSTOLIC BLOOD PRESSURE: 118 MMHG | RESPIRATION RATE: 16 BRPM | DIASTOLIC BLOOD PRESSURE: 62 MMHG | HEART RATE: 84 BPM | OXYGEN SATURATION: 97 %

## 2017-02-15 DIAGNOSIS — R05 COUGH: ICD-10-CM

## 2017-02-15 DIAGNOSIS — M81.0 AGE-RELATED OSTEOPOROSIS WITHOUT CURRENT PATHOLOGICAL FRACTURE: ICD-10-CM

## 2017-02-15 DIAGNOSIS — F72 SEVERE INTELLECTUAL DISABILITIES: ICD-10-CM

## 2017-02-15 DIAGNOSIS — G40.909 EPILEPSY, UNSPECIFIED, NOT INTRACTABLE, WITHOUT STATUS EPILEPTICUS: ICD-10-CM

## 2017-02-15 LAB
ALBUMIN SERPL ELPH-MCNC: 2.7 G/DL — LOW (ref 3.3–5)
ALP SERPL-CCNC: 53 U/L — SIGNIFICANT CHANGE UP (ref 40–120)
ALT FLD-CCNC: 15 U/L — SIGNIFICANT CHANGE UP (ref 12–78)
ANION GAP SERPL CALC-SCNC: 11 MMOL/L — SIGNIFICANT CHANGE UP (ref 5–17)
APPEARANCE UR: CLEAR — SIGNIFICANT CHANGE UP
APTT BLD: 25.3 SEC — LOW (ref 27.5–37.4)
AST SERPL-CCNC: 29 U/L — SIGNIFICANT CHANGE UP (ref 15–37)
BASOPHILS # BLD AUTO: 0.1 K/UL — SIGNIFICANT CHANGE UP (ref 0–0.2)
BASOPHILS NFR BLD AUTO: 1.3 % — SIGNIFICANT CHANGE UP (ref 0–2)
BILIRUB SERPL-MCNC: 0.2 MG/DL — SIGNIFICANT CHANGE UP (ref 0.2–1.2)
BILIRUB UR-MCNC: NEGATIVE — SIGNIFICANT CHANGE UP
BUN SERPL-MCNC: 12 MG/DL — SIGNIFICANT CHANGE UP (ref 7–23)
CALCIUM SERPL-MCNC: 8.1 MG/DL — LOW (ref 8.5–10.1)
CHLORIDE SERPL-SCNC: 95 MMOL/L — LOW (ref 96–108)
CO2 SERPL-SCNC: 28 MMOL/L — SIGNIFICANT CHANGE UP (ref 22–31)
COLOR SPEC: YELLOW — SIGNIFICANT CHANGE UP
CREAT SERPL-MCNC: 0.59 MG/DL — SIGNIFICANT CHANGE UP (ref 0.5–1.3)
DIFF PNL FLD: NEGATIVE — SIGNIFICANT CHANGE UP
EOSINOPHIL # BLD AUTO: 0.1 K/UL — SIGNIFICANT CHANGE UP (ref 0–0.5)
EOSINOPHIL NFR BLD AUTO: 0.8 % — SIGNIFICANT CHANGE UP (ref 0–6)
GLUCOSE SERPL-MCNC: 105 MG/DL — HIGH (ref 70–99)
GLUCOSE UR QL: NEGATIVE — SIGNIFICANT CHANGE UP
HCT VFR BLD CALC: 38.2 % — LOW (ref 39–50)
HGB BLD-MCNC: 12.7 G/DL — LOW (ref 13–17)
INR BLD: 1.15 RATIO — SIGNIFICANT CHANGE UP (ref 0.88–1.16)
KETONES UR-MCNC: NEGATIVE — SIGNIFICANT CHANGE UP
LACTATE SERPL-SCNC: 1.4 MMOL/L — SIGNIFICANT CHANGE UP (ref 0.7–2)
LEUKOCYTE ESTERASE UR-ACNC: NEGATIVE — SIGNIFICANT CHANGE UP
LIDOCAIN IGE QN: 86 U/L — SIGNIFICANT CHANGE UP (ref 73–393)
LYMPHOCYTES # BLD AUTO: 1.1 K/UL — SIGNIFICANT CHANGE UP (ref 1–3.3)
LYMPHOCYTES # BLD AUTO: 14.8 % — SIGNIFICANT CHANGE UP (ref 13–44)
MCHC RBC-ENTMCNC: 30.3 PG — SIGNIFICANT CHANGE UP (ref 27–34)
MCHC RBC-ENTMCNC: 33.2 GM/DL — SIGNIFICANT CHANGE UP (ref 32–36)
MCV RBC AUTO: 91.5 FL — SIGNIFICANT CHANGE UP (ref 80–100)
MONOCYTES # BLD AUTO: 0.7 K/UL — SIGNIFICANT CHANGE UP (ref 0–0.9)
MONOCYTES NFR BLD AUTO: 9.5 % — HIGH (ref 1–9)
NEUTROPHILS # BLD AUTO: 5.3 K/UL — SIGNIFICANT CHANGE UP (ref 1.8–7.4)
NEUTROPHILS NFR BLD AUTO: 73.6 % — SIGNIFICANT CHANGE UP (ref 43–77)
NITRITE UR-MCNC: NEGATIVE — SIGNIFICANT CHANGE UP
PH UR: 8 — SIGNIFICANT CHANGE UP (ref 4.8–8)
PLATELET # BLD AUTO: 244 K/UL — SIGNIFICANT CHANGE UP (ref 150–400)
POTASSIUM SERPL-MCNC: 4.7 MMOL/L — SIGNIFICANT CHANGE UP (ref 3.5–5.3)
POTASSIUM SERPL-SCNC: 4.7 MMOL/L — SIGNIFICANT CHANGE UP (ref 3.5–5.3)
PROT SERPL-MCNC: 6.9 G/DL — SIGNIFICANT CHANGE UP (ref 6–8.3)
PROT UR-MCNC: NEGATIVE — SIGNIFICANT CHANGE UP
PROTHROM AB SERPL-ACNC: 12.8 SEC — SIGNIFICANT CHANGE UP (ref 10–13.1)
RBC # BLD: 4.18 M/UL — LOW (ref 4.2–5.8)
RBC # FLD: 12.4 % — SIGNIFICANT CHANGE UP (ref 10.3–14.5)
SODIUM SERPL-SCNC: 134 MMOL/L — LOW (ref 135–145)
SP GR SPEC: 1.01 — SIGNIFICANT CHANGE UP (ref 1.01–1.02)
UROBILINOGEN FLD QL: NEGATIVE — SIGNIFICANT CHANGE UP
WBC # BLD: 7.2 K/UL — SIGNIFICANT CHANGE UP (ref 3.8–10.5)
WBC # FLD AUTO: 7.2 K/UL — SIGNIFICANT CHANGE UP (ref 3.8–10.5)

## 2017-02-15 PROCEDURE — 71010: CPT | Mod: 26

## 2017-02-15 PROCEDURE — 99284 EMERGENCY DEPT VISIT MOD MDM: CPT

## 2017-02-15 RX ORDER — CIPROFLOXACIN LACTATE 400MG/40ML
1 VIAL (ML) INTRAVENOUS
Qty: 7 | Refills: 0 | OUTPATIENT
Start: 2017-02-15 | End: 2017-02-22

## 2017-02-15 NOTE — ED PROVIDER NOTE - OBJECTIVE STATEMENT
52 yo male from [facility] BIB aide c/o cough, vomited once today sent in to r/o aspiration PNA.  Otherwise, well appearing, acting baseline as per aide who knows him well.

## 2017-02-15 NOTE — ED ADULT NURSE NOTE - OBJECTIVE STATEMENT
Pt presents to the ED with the caregiver s/p possible aspiration and cough, no respiratory distress noted, skin warm and dry, + sensation, + capillary refill < 2 sec

## 2017-02-16 ENCOUNTER — INPATIENT (INPATIENT)
Facility: HOSPITAL | Age: 52
LOS: 5 days | Discharge: NURSING, REHAB OR LONG-TERM CARE | DRG: 871 | End: 2017-02-22
Attending: FAMILY MEDICINE | Admitting: FAMILY MEDICINE
Payer: MEDICARE

## 2017-02-16 VITALS
DIASTOLIC BLOOD PRESSURE: 90 MMHG | WEIGHT: 149.91 LBS | TEMPERATURE: 99 F | HEIGHT: 71 IN | RESPIRATION RATE: 16 BRPM | HEART RATE: 69 BPM | SYSTOLIC BLOOD PRESSURE: 115 MMHG | OXYGEN SATURATION: 97 %

## 2017-02-16 DIAGNOSIS — J21.0 ACUTE BRONCHIOLITIS DUE TO RESPIRATORY SYNCYTIAL VIRUS: ICD-10-CM

## 2017-02-16 LAB
ALBUMIN SERPL ELPH-MCNC: 3 G/DL — LOW (ref 3.3–5)
ALP SERPL-CCNC: 57 U/L — SIGNIFICANT CHANGE UP (ref 40–120)
ALT FLD-CCNC: 18 U/L — SIGNIFICANT CHANGE UP (ref 12–78)
ANION GAP SERPL CALC-SCNC: 11 MMOL/L — SIGNIFICANT CHANGE UP (ref 5–17)
APPEARANCE UR: CLEAR — SIGNIFICANT CHANGE UP
AST SERPL-CCNC: 24 U/L — SIGNIFICANT CHANGE UP (ref 15–37)
BACTERIA # UR AUTO: ABNORMAL
BASOPHILS # BLD AUTO: 0.1 K/UL — SIGNIFICANT CHANGE UP (ref 0–0.2)
BASOPHILS NFR BLD AUTO: 1.4 % — SIGNIFICANT CHANGE UP (ref 0–2)
BILIRUB SERPL-MCNC: 0.2 MG/DL — SIGNIFICANT CHANGE UP (ref 0.2–1.2)
BILIRUB UR-MCNC: NEGATIVE — SIGNIFICANT CHANGE UP
BUN SERPL-MCNC: 20 MG/DL — SIGNIFICANT CHANGE UP (ref 7–23)
CALCIUM SERPL-MCNC: 7.9 MG/DL — LOW (ref 8.5–10.1)
CHLORIDE SERPL-SCNC: 102 MMOL/L — SIGNIFICANT CHANGE UP (ref 96–108)
CO2 SERPL-SCNC: 22 MMOL/L — SIGNIFICANT CHANGE UP (ref 22–31)
COLOR SPEC: YELLOW — SIGNIFICANT CHANGE UP
CREAT SERPL-MCNC: 0.84 MG/DL — SIGNIFICANT CHANGE UP (ref 0.5–1.3)
CULTURE RESULTS: NO GROWTH — SIGNIFICANT CHANGE UP
DIFF PNL FLD: ABNORMAL
EOSINOPHIL # BLD AUTO: 0 K/UL — SIGNIFICANT CHANGE UP (ref 0–0.5)
EOSINOPHIL NFR BLD AUTO: 0.1 % — SIGNIFICANT CHANGE UP (ref 0–6)
EPI CELLS # UR: SIGNIFICANT CHANGE UP
GLUCOSE SERPL-MCNC: 94 MG/DL — SIGNIFICANT CHANGE UP (ref 70–99)
GLUCOSE UR QL: NEGATIVE — SIGNIFICANT CHANGE UP
HCT VFR BLD CALC: 39.3 % — SIGNIFICANT CHANGE UP (ref 39–50)
HGB BLD-MCNC: 13 G/DL — SIGNIFICANT CHANGE UP (ref 13–17)
KETONES UR-MCNC: ABNORMAL
LACTATE SERPL-SCNC: 1 MMOL/L — SIGNIFICANT CHANGE UP (ref 0.7–2)
LEUKOCYTE ESTERASE UR-ACNC: NEGATIVE — SIGNIFICANT CHANGE UP
LYMPHOCYTES # BLD AUTO: 1 K/UL — SIGNIFICANT CHANGE UP (ref 1–3.3)
LYMPHOCYTES # BLD AUTO: 12.9 % — LOW (ref 13–44)
MCHC RBC-ENTMCNC: 30.1 PG — SIGNIFICANT CHANGE UP (ref 27–34)
MCHC RBC-ENTMCNC: 33.2 GM/DL — SIGNIFICANT CHANGE UP (ref 32–36)
MCV RBC AUTO: 90.8 FL — SIGNIFICANT CHANGE UP (ref 80–100)
MONOCYTES # BLD AUTO: 1 K/UL — HIGH (ref 0–0.9)
MONOCYTES NFR BLD AUTO: 12.2 % — HIGH (ref 1–9)
NEUTROPHILS # BLD AUTO: 6 K/UL — SIGNIFICANT CHANGE UP (ref 1.8–7.4)
NEUTROPHILS NFR BLD AUTO: 73.4 % — SIGNIFICANT CHANGE UP (ref 43–77)
NITRITE UR-MCNC: NEGATIVE — SIGNIFICANT CHANGE UP
PH UR: 6.5 — SIGNIFICANT CHANGE UP (ref 4.8–8)
PLATELET # BLD AUTO: 180 K/UL — SIGNIFICANT CHANGE UP (ref 150–400)
POTASSIUM SERPL-MCNC: 4.2 MMOL/L — SIGNIFICANT CHANGE UP (ref 3.5–5.3)
POTASSIUM SERPL-SCNC: 4.2 MMOL/L — SIGNIFICANT CHANGE UP (ref 3.5–5.3)
PROT SERPL-MCNC: 8 G/DL — SIGNIFICANT CHANGE UP (ref 6–8.3)
PROT UR-MCNC: 25 MG/DL
RAPID RVP RESULT: DETECTED
RBC # BLD: 4.33 M/UL — SIGNIFICANT CHANGE UP (ref 4.2–5.8)
RBC # FLD: 12.5 % — SIGNIFICANT CHANGE UP (ref 10.3–14.5)
RBC CASTS # UR COMP ASSIST: ABNORMAL /HPF (ref 0–4)
RSV RNA SPEC QL NAA+PROBE: DETECTED
SODIUM SERPL-SCNC: 135 MMOL/L — SIGNIFICANT CHANGE UP (ref 135–145)
SP GR SPEC: 1.01 — SIGNIFICANT CHANGE UP (ref 1.01–1.02)
SPECIMEN SOURCE: SIGNIFICANT CHANGE UP
UROBILINOGEN FLD QL: NEGATIVE — SIGNIFICANT CHANGE UP
WBC # BLD: 8.1 K/UL — SIGNIFICANT CHANGE UP (ref 3.8–10.5)
WBC # FLD AUTO: 8.1 K/UL — SIGNIFICANT CHANGE UP (ref 3.8–10.5)
WBC UR QL: SIGNIFICANT CHANGE UP

## 2017-02-16 PROCEDURE — 71010: CPT | Mod: 26

## 2017-02-16 PROCEDURE — 71250 CT THORAX DX C-: CPT | Mod: 26

## 2017-02-16 PROCEDURE — 99285 EMERGENCY DEPT VISIT HI MDM: CPT

## 2017-02-16 RX ORDER — BENZTROPINE MESYLATE 1 MG
1 TABLET ORAL
Qty: 0 | Refills: 0 | Status: DISCONTINUED | OUTPATIENT
Start: 2017-02-16 | End: 2017-02-22

## 2017-02-16 RX ORDER — TOBRAMYCIN AND DEXAMETHASONE 1; 3 MG/ML; MG/ML
1 SUSPENSION/ DROPS OPHTHALMIC
Qty: 0 | Refills: 0 | Status: DISCONTINUED | OUTPATIENT
Start: 2017-02-16 | End: 2017-02-21

## 2017-02-16 RX ORDER — AZITHROMYCIN 500 MG/1
500 TABLET, FILM COATED ORAL ONCE
Qty: 0 | Refills: 0 | Status: COMPLETED | OUTPATIENT
Start: 2017-02-16 | End: 2017-02-16

## 2017-02-16 RX ORDER — HALOPERIDOL DECANOATE 100 MG/ML
5 INJECTION INTRAMUSCULAR ONCE
Qty: 0 | Refills: 0 | Status: COMPLETED | OUTPATIENT
Start: 2017-02-16 | End: 2017-02-16

## 2017-02-16 RX ORDER — AZITHROMYCIN 500 MG/1
500 TABLET, FILM COATED ORAL EVERY 24 HOURS
Qty: 0 | Refills: 0 | Status: COMPLETED | OUTPATIENT
Start: 2017-02-17 | End: 2017-02-19

## 2017-02-16 RX ORDER — CARBAMAZEPINE 200 MG
600 TABLET ORAL
Qty: 0 | Refills: 0 | Status: DISCONTINUED | OUTPATIENT
Start: 2017-02-16 | End: 2017-02-22

## 2017-02-16 RX ORDER — RISPERIDONE 4 MG/1
3 TABLET ORAL AT BEDTIME
Qty: 0 | Refills: 0 | Status: DISCONTINUED | OUTPATIENT
Start: 2017-02-16 | End: 2017-02-22

## 2017-02-16 RX ORDER — FOLIC ACID 0.8 MG
1 TABLET ORAL DAILY
Qty: 0 | Refills: 0 | Status: DISCONTINUED | OUTPATIENT
Start: 2017-02-16 | End: 2017-02-22

## 2017-02-16 RX ORDER — ENOXAPARIN SODIUM 100 MG/ML
40 INJECTION SUBCUTANEOUS DAILY
Qty: 0 | Refills: 0 | Status: DISCONTINUED | OUTPATIENT
Start: 2017-02-16 | End: 2017-02-22

## 2017-02-16 RX ORDER — PIPERACILLIN AND TAZOBACTAM 4; .5 G/20ML; G/20ML
3.38 INJECTION, POWDER, LYOPHILIZED, FOR SOLUTION INTRAVENOUS ONCE
Qty: 0 | Refills: 0 | Status: COMPLETED | OUTPATIENT
Start: 2017-02-16 | End: 2017-02-16

## 2017-02-16 RX ORDER — FAMOTIDINE 10 MG/ML
20 INJECTION INTRAVENOUS DAILY
Qty: 0 | Refills: 0 | Status: DISCONTINUED | OUTPATIENT
Start: 2017-02-16 | End: 2017-02-22

## 2017-02-16 RX ORDER — ACETAMINOPHEN 500 MG
650 TABLET ORAL ONCE
Qty: 0 | Refills: 0 | Status: COMPLETED | OUTPATIENT
Start: 2017-02-16 | End: 2017-02-16

## 2017-02-16 RX ORDER — IPRATROPIUM/ALBUTEROL SULFATE 18-103MCG
3 AEROSOL WITH ADAPTER (GRAM) INHALATION EVERY 12 HOURS
Qty: 0 | Refills: 0 | Status: DISCONTINUED | OUTPATIENT
Start: 2017-02-16 | End: 2017-02-22

## 2017-02-16 RX ORDER — LEVETIRACETAM 250 MG/1
2000 TABLET, FILM COATED ORAL
Qty: 0 | Refills: 0 | Status: DISCONTINUED | OUTPATIENT
Start: 2017-02-16 | End: 2017-02-20

## 2017-02-16 RX ORDER — SODIUM CHLORIDE 9 MG/ML
1000 INJECTION INTRAMUSCULAR; INTRAVENOUS; SUBCUTANEOUS
Qty: 0 | Refills: 0 | Status: DISCONTINUED | OUTPATIENT
Start: 2017-02-16 | End: 2017-02-21

## 2017-02-16 RX ORDER — ASPIRIN/CALCIUM CARB/MAGNESIUM 324 MG
81 TABLET ORAL DAILY
Qty: 0 | Refills: 0 | Status: DISCONTINUED | OUTPATIENT
Start: 2017-02-16 | End: 2017-02-22

## 2017-02-16 RX ORDER — SODIUM CHLORIDE 9 MG/ML
1000 INJECTION INTRAMUSCULAR; INTRAVENOUS; SUBCUTANEOUS ONCE
Qty: 0 | Refills: 0 | Status: COMPLETED | OUTPATIENT
Start: 2017-02-16 | End: 2017-02-16

## 2017-02-16 RX ADMIN — PIPERACILLIN AND TAZOBACTAM 200 GRAM(S): 4; .5 INJECTION, POWDER, LYOPHILIZED, FOR SOLUTION INTRAVENOUS at 21:12

## 2017-02-16 RX ADMIN — Medication 650 MILLIGRAM(S): at 20:35

## 2017-02-16 RX ADMIN — AZITHROMYCIN 255 MILLIGRAM(S): 500 TABLET, FILM COATED ORAL at 23:08

## 2017-02-16 RX ADMIN — SODIUM CHLORIDE 1000 MILLILITER(S): 9 INJECTION INTRAMUSCULAR; INTRAVENOUS; SUBCUTANEOUS at 20:24

## 2017-02-16 NOTE — ED ADULT NURSE REASSESSMENT NOTE - NS ED NURSE REASSESS COMMENT FT1
patient accepted from day shift awake and non-verbal.  patient has unproductive cough with audible crackles.  patient pending lab results.  rectal temp of 101.8 discovered.  RN will continue to monitor patient closely.

## 2017-02-16 NOTE — ED PROVIDER NOTE - OBJECTIVE STATEMENT
51 male from group home with home health aides at the bedside sent for evaluation of fever 101.5 and cough, started yesterday, information obtained from chart.

## 2017-02-17 DIAGNOSIS — Z01.20 ENCOUNTER FOR DENTAL EXAMINATION AND CLEANING WITHOUT ABNORMAL FINDINGS: ICD-10-CM

## 2017-02-17 DIAGNOSIS — J20.9 ACUTE BRONCHITIS, UNSPECIFIED: ICD-10-CM

## 2017-02-17 LAB
ALBUMIN SERPL ELPH-MCNC: 2.6 G/DL — LOW (ref 3.3–5)
ALP SERPL-CCNC: 45 U/L — SIGNIFICANT CHANGE UP (ref 40–120)
ALT FLD-CCNC: 18 U/L — SIGNIFICANT CHANGE UP (ref 12–78)
ANION GAP SERPL CALC-SCNC: 8 MMOL/L — SIGNIFICANT CHANGE UP (ref 5–17)
AST SERPL-CCNC: 31 U/L — SIGNIFICANT CHANGE UP (ref 15–37)
BASOPHILS # BLD AUTO: 0.1 K/UL — SIGNIFICANT CHANGE UP (ref 0–0.2)
BASOPHILS NFR BLD AUTO: 1.1 % — SIGNIFICANT CHANGE UP (ref 0–2)
BILIRUB SERPL-MCNC: 0.2 MG/DL — SIGNIFICANT CHANGE UP (ref 0.2–1.2)
BUN SERPL-MCNC: 16 MG/DL — SIGNIFICANT CHANGE UP (ref 7–23)
CALCIUM SERPL-MCNC: 7.1 MG/DL — LOW (ref 8.5–10.1)
CHLORIDE SERPL-SCNC: 102 MMOL/L — SIGNIFICANT CHANGE UP (ref 96–108)
CO2 SERPL-SCNC: 26 MMOL/L — SIGNIFICANT CHANGE UP (ref 22–31)
CREAT SERPL-MCNC: 0.62 MG/DL — SIGNIFICANT CHANGE UP (ref 0.5–1.3)
CULTURE RESULTS: NO GROWTH — SIGNIFICANT CHANGE UP
EOSINOPHIL # BLD AUTO: 0 K/UL — SIGNIFICANT CHANGE UP (ref 0–0.5)
EOSINOPHIL NFR BLD AUTO: 0.4 % — SIGNIFICANT CHANGE UP (ref 0–6)
GLUCOSE SERPL-MCNC: 122 MG/DL — HIGH (ref 70–99)
HCT VFR BLD CALC: 37.5 % — LOW (ref 39–50)
HGB BLD-MCNC: 12.2 G/DL — LOW (ref 13–17)
LYMPHOCYTES # BLD AUTO: 1.1 K/UL — SIGNIFICANT CHANGE UP (ref 1–3.3)
LYMPHOCYTES # BLD AUTO: 17.4 % — SIGNIFICANT CHANGE UP (ref 13–44)
MCHC RBC-ENTMCNC: 29.4 PG — SIGNIFICANT CHANGE UP (ref 27–34)
MCHC RBC-ENTMCNC: 32.6 GM/DL — SIGNIFICANT CHANGE UP (ref 32–36)
MCV RBC AUTO: 90 FL — SIGNIFICANT CHANGE UP (ref 80–100)
MONOCYTES # BLD AUTO: 1 K/UL — HIGH (ref 0–0.9)
MONOCYTES NFR BLD AUTO: 15.8 % — HIGH (ref 1–9)
NEUTROPHILS # BLD AUTO: 4.1 K/UL — SIGNIFICANT CHANGE UP (ref 1.8–7.4)
NEUTROPHILS NFR BLD AUTO: 65.2 % — SIGNIFICANT CHANGE UP (ref 43–77)
PLATELET # BLD AUTO: 173 K/UL — SIGNIFICANT CHANGE UP (ref 150–400)
POTASSIUM SERPL-MCNC: 3.9 MMOL/L — SIGNIFICANT CHANGE UP (ref 3.5–5.3)
POTASSIUM SERPL-SCNC: 3.9 MMOL/L — SIGNIFICANT CHANGE UP (ref 3.5–5.3)
PROT SERPL-MCNC: 6.9 G/DL — SIGNIFICANT CHANGE UP (ref 6–8.3)
RBC # BLD: 4.17 M/UL — LOW (ref 4.2–5.8)
RBC # FLD: 12 % — SIGNIFICANT CHANGE UP (ref 10.3–14.5)
SODIUM SERPL-SCNC: 136 MMOL/L — SIGNIFICANT CHANGE UP (ref 135–145)
SPECIMEN SOURCE: SIGNIFICANT CHANGE UP
WBC # BLD: 6.3 K/UL — SIGNIFICANT CHANGE UP (ref 3.8–10.5)
WBC # FLD AUTO: 6.3 K/UL — SIGNIFICANT CHANGE UP (ref 3.8–10.5)

## 2017-02-17 RX ORDER — IBUPROFEN 200 MG
600 TABLET ORAL EVERY 12 HOURS
Qty: 0 | Refills: 0 | Status: DISCONTINUED | OUTPATIENT
Start: 2017-02-17 | End: 2017-02-22

## 2017-02-17 RX ORDER — ACETAMINOPHEN 500 MG
650 TABLET ORAL EVERY 6 HOURS
Qty: 0 | Refills: 0 | Status: DISCONTINUED | OUTPATIENT
Start: 2017-02-17 | End: 2017-02-22

## 2017-02-17 RX ADMIN — Medication 600 MILLIGRAM(S): at 18:02

## 2017-02-17 RX ADMIN — LEVETIRACETAM 2000 MILLIGRAM(S): 250 TABLET, FILM COATED ORAL at 17:59

## 2017-02-17 RX ADMIN — Medication 81 MILLIGRAM(S): at 14:26

## 2017-02-17 RX ADMIN — Medication 650 MILLIGRAM(S): at 02:14

## 2017-02-17 RX ADMIN — TOBRAMYCIN AND DEXAMETHASONE 1 APPLICATION(S): 1; 3 SUSPENSION/ DROPS OPHTHALMIC at 06:09

## 2017-02-17 RX ADMIN — Medication 100 MILLIGRAM(S): at 06:09

## 2017-02-17 RX ADMIN — Medication 100 MILLIGRAM(S): at 14:28

## 2017-02-17 RX ADMIN — SODIUM CHLORIDE 100 MILLILITER(S): 9 INJECTION INTRAMUSCULAR; INTRAVENOUS; SUBCUTANEOUS at 22:04

## 2017-02-17 RX ADMIN — Medication 3 MILLILITER(S): at 07:45

## 2017-02-17 RX ADMIN — Medication 1 MILLIGRAM(S): at 00:25

## 2017-02-17 RX ADMIN — TOBRAMYCIN AND DEXAMETHASONE 1 APPLICATION(S): 1; 3 SUSPENSION/ DROPS OPHTHALMIC at 17:59

## 2017-02-17 RX ADMIN — Medication 600 MILLIGRAM(S): at 15:20

## 2017-02-17 RX ADMIN — SODIUM CHLORIDE 100 MILLILITER(S): 9 INJECTION INTRAMUSCULAR; INTRAVENOUS; SUBCUTANEOUS at 09:39

## 2017-02-17 RX ADMIN — Medication 600 MILLIGRAM(S): at 15:23

## 2017-02-17 RX ADMIN — Medication 100 MILLIGRAM(S): at 18:01

## 2017-02-17 RX ADMIN — Medication 1 MILLIGRAM(S): at 06:09

## 2017-02-17 RX ADMIN — RISPERIDONE 3 MILLIGRAM(S): 4 TABLET ORAL at 21:16

## 2017-02-17 RX ADMIN — Medication 100 MILLIGRAM(S): at 00:25

## 2017-02-17 RX ADMIN — Medication 1 MILLIGRAM(S): at 14:34

## 2017-02-17 RX ADMIN — Medication 1 MILLIGRAM(S): at 17:59

## 2017-02-17 RX ADMIN — Medication 650 MILLIGRAM(S): at 14:33

## 2017-02-17 RX ADMIN — LEVETIRACETAM 2000 MILLIGRAM(S): 250 TABLET, FILM COATED ORAL at 06:09

## 2017-02-17 RX ADMIN — ENOXAPARIN SODIUM 40 MILLIGRAM(S): 100 INJECTION SUBCUTANEOUS at 14:27

## 2017-02-17 RX ADMIN — FAMOTIDINE 20 MILLIGRAM(S): 10 INJECTION INTRAVENOUS at 14:27

## 2017-02-17 RX ADMIN — Medication 600 MILLIGRAM(S): at 01:02

## 2017-02-17 RX ADMIN — Medication 1 MILLIGRAM(S): at 14:26

## 2017-02-17 RX ADMIN — Medication 600 MILLIGRAM(S): at 06:09

## 2017-02-17 NOTE — PATIENT PROFILE ADULT. - VISION (WITH CORRECTIVE LENSES IF THE PATIENT USUALLY WEARS THEM):
Normal vision: sees adequately in most situations; can see medication labels, newsprint/unable to assess

## 2017-02-18 LAB
ALBUMIN SERPL ELPH-MCNC: 2.4 G/DL — LOW (ref 3.3–5)
ALP SERPL-CCNC: 42 U/L — SIGNIFICANT CHANGE UP (ref 40–120)
ALT FLD-CCNC: 20 U/L — SIGNIFICANT CHANGE UP (ref 12–78)
ANION GAP SERPL CALC-SCNC: 9 MMOL/L — SIGNIFICANT CHANGE UP (ref 5–17)
AST SERPL-CCNC: 32 U/L — SIGNIFICANT CHANGE UP (ref 15–37)
BASOPHILS NFR BLD AUTO: 1 % — SIGNIFICANT CHANGE UP (ref 0–2)
BILIRUB SERPL-MCNC: 0.3 MG/DL — SIGNIFICANT CHANGE UP (ref 0.2–1.2)
BUN SERPL-MCNC: 11 MG/DL — SIGNIFICANT CHANGE UP (ref 7–23)
CALCIUM SERPL-MCNC: 7 MG/DL — LOW (ref 8.5–10.1)
CHLORIDE SERPL-SCNC: 102 MMOL/L — SIGNIFICANT CHANGE UP (ref 96–108)
CO2 SERPL-SCNC: 25 MMOL/L — SIGNIFICANT CHANGE UP (ref 22–31)
CREAT SERPL-MCNC: 0.55 MG/DL — SIGNIFICANT CHANGE UP (ref 0.5–1.3)
GLUCOSE SERPL-MCNC: 86 MG/DL — SIGNIFICANT CHANGE UP (ref 70–99)
HCT VFR BLD CALC: 31.3 % — LOW (ref 39–50)
HGB BLD-MCNC: 10.9 G/DL — LOW (ref 13–17)
LYMPHOCYTES # BLD AUTO: 19 % — SIGNIFICANT CHANGE UP (ref 13–44)
MACROCYTES BLD QL: SLIGHT — SIGNIFICANT CHANGE UP
MCHC RBC-ENTMCNC: 31 PG — SIGNIFICANT CHANGE UP (ref 27–34)
MCHC RBC-ENTMCNC: 34.8 GM/DL — SIGNIFICANT CHANGE UP (ref 32–36)
MCV RBC AUTO: 89.1 FL — SIGNIFICANT CHANGE UP (ref 80–100)
MICROCYTES BLD QL: SLIGHT — SIGNIFICANT CHANGE UP
MONOCYTES NFR BLD AUTO: 14 % — HIGH (ref 1–9)
NEUTROPHILS NFR BLD AUTO: 55 % — SIGNIFICANT CHANGE UP (ref 43–77)
NEUTS BAND # BLD: 10 % — HIGH (ref 0–8)
PLAT MORPH BLD: NORMAL — SIGNIFICANT CHANGE UP
PLATELET # BLD AUTO: 151 K/UL — SIGNIFICANT CHANGE UP (ref 150–400)
POTASSIUM SERPL-MCNC: 3.7 MMOL/L — SIGNIFICANT CHANGE UP (ref 3.5–5.3)
POTASSIUM SERPL-SCNC: 3.7 MMOL/L — SIGNIFICANT CHANGE UP (ref 3.5–5.3)
PROT SERPL-MCNC: 6.5 G/DL — SIGNIFICANT CHANGE UP (ref 6–8.3)
RBC # BLD: 3.52 M/UL — LOW (ref 4.2–5.8)
RBC # FLD: 11.8 % — SIGNIFICANT CHANGE UP (ref 10.3–14.5)
RBC BLD AUTO: SIGNIFICANT CHANGE UP
SODIUM SERPL-SCNC: 136 MMOL/L — SIGNIFICANT CHANGE UP (ref 135–145)
VARIANT LYMPHS # BLD: 1 % — SIGNIFICANT CHANGE UP (ref 0–6)
WBC # BLD: 6.1 K/UL — SIGNIFICANT CHANGE UP (ref 3.8–10.5)
WBC # FLD AUTO: 6.1 K/UL — SIGNIFICANT CHANGE UP (ref 3.8–10.5)

## 2017-02-18 PROCEDURE — 71010: CPT | Mod: 26

## 2017-02-18 RX ORDER — ACETAMINOPHEN 500 MG
650 TABLET ORAL EVERY 4 HOURS
Qty: 0 | Refills: 0 | Status: DISCONTINUED | OUTPATIENT
Start: 2017-02-18 | End: 2017-02-22

## 2017-02-18 RX ORDER — PIPERACILLIN AND TAZOBACTAM 4; .5 G/20ML; G/20ML
3.38 INJECTION, POWDER, LYOPHILIZED, FOR SOLUTION INTRAVENOUS ONCE
Qty: 0 | Refills: 0 | Status: COMPLETED | OUTPATIENT
Start: 2017-02-18 | End: 2017-02-18

## 2017-02-18 RX ORDER — ACETAMINOPHEN 500 MG
650 TABLET ORAL ONCE
Qty: 0 | Refills: 0 | Status: COMPLETED | OUTPATIENT
Start: 2017-02-18 | End: 2017-02-18

## 2017-02-18 RX ADMIN — Medication 100 MILLIGRAM(S): at 00:02

## 2017-02-18 RX ADMIN — Medication 1 MILLIGRAM(S): at 11:39

## 2017-02-18 RX ADMIN — Medication 600 MILLIGRAM(S): at 06:10

## 2017-02-18 RX ADMIN — Medication 600 MILLIGRAM(S): at 13:06

## 2017-02-18 RX ADMIN — Medication 650 MILLIGRAM(S): at 13:06

## 2017-02-18 RX ADMIN — AZITHROMYCIN 500 MILLIGRAM(S): 500 TABLET, FILM COATED ORAL at 22:14

## 2017-02-18 RX ADMIN — AZITHROMYCIN 500 MILLIGRAM(S): 500 TABLET, FILM COATED ORAL at 00:02

## 2017-02-18 RX ADMIN — Medication 600 MILLIGRAM(S): at 02:35

## 2017-02-18 RX ADMIN — TOBRAMYCIN AND DEXAMETHASONE 1 APPLICATION(S): 1; 3 SUSPENSION/ DROPS OPHTHALMIC at 06:09

## 2017-02-18 RX ADMIN — Medication 100 MILLIGRAM(S): at 06:09

## 2017-02-18 RX ADMIN — Medication 100 MILLIGRAM(S): at 18:53

## 2017-02-18 RX ADMIN — Medication 650 MILLIGRAM(S): at 19:05

## 2017-02-18 RX ADMIN — Medication 81 MILLIGRAM(S): at 11:39

## 2017-02-18 RX ADMIN — Medication 3 MILLILITER(S): at 07:58

## 2017-02-18 RX ADMIN — LEVETIRACETAM 2000 MILLIGRAM(S): 250 TABLET, FILM COATED ORAL at 06:09

## 2017-02-18 RX ADMIN — PIPERACILLIN AND TAZOBACTAM 200 GRAM(S): 4; .5 INJECTION, POWDER, LYOPHILIZED, FOR SOLUTION INTRAVENOUS at 13:59

## 2017-02-18 RX ADMIN — LEVETIRACETAM 2000 MILLIGRAM(S): 250 TABLET, FILM COATED ORAL at 18:53

## 2017-02-18 RX ADMIN — Medication 1 MILLIGRAM(S): at 13:06

## 2017-02-18 RX ADMIN — Medication 600 MILLIGRAM(S): at 13:30

## 2017-02-18 RX ADMIN — TOBRAMYCIN AND DEXAMETHASONE 1 APPLICATION(S): 1; 3 SUSPENSION/ DROPS OPHTHALMIC at 18:53

## 2017-02-18 RX ADMIN — Medication 100 MILLIGRAM(S): at 11:39

## 2017-02-18 RX ADMIN — Medication 1 MILLIGRAM(S): at 18:33

## 2017-02-18 RX ADMIN — ENOXAPARIN SODIUM 40 MILLIGRAM(S): 100 INJECTION SUBCUTANEOUS at 11:39

## 2017-02-18 RX ADMIN — Medication 40 MILLIGRAM(S): at 13:59

## 2017-02-18 RX ADMIN — Medication 1 MILLIGRAM(S): at 06:10

## 2017-02-18 RX ADMIN — Medication 600 MILLIGRAM(S): at 03:40

## 2017-02-18 RX ADMIN — Medication 600 MILLIGRAM(S): at 18:32

## 2017-02-18 RX ADMIN — Medication 1 MILLIGRAM(S): at 00:02

## 2017-02-18 RX ADMIN — FAMOTIDINE 20 MILLIGRAM(S): 10 INJECTION INTRAVENOUS at 11:39

## 2017-02-18 RX ADMIN — RISPERIDONE 3 MILLIGRAM(S): 4 TABLET ORAL at 22:14

## 2017-02-19 LAB
ALBUMIN SERPL ELPH-MCNC: 2.4 G/DL — LOW (ref 3.3–5)
ALP SERPL-CCNC: 43 U/L — SIGNIFICANT CHANGE UP (ref 40–120)
ALT FLD-CCNC: 23 U/L — SIGNIFICANT CHANGE UP (ref 12–78)
ANION GAP SERPL CALC-SCNC: 8 MMOL/L — SIGNIFICANT CHANGE UP (ref 5–17)
AST SERPL-CCNC: 31 U/L — SIGNIFICANT CHANGE UP (ref 15–37)
BILIRUB SERPL-MCNC: 0.3 MG/DL — SIGNIFICANT CHANGE UP (ref 0.2–1.2)
BUN SERPL-MCNC: 10 MG/DL — SIGNIFICANT CHANGE UP (ref 7–23)
CALCIUM SERPL-MCNC: 7.4 MG/DL — LOW (ref 8.5–10.1)
CHLORIDE SERPL-SCNC: 103 MMOL/L — SIGNIFICANT CHANGE UP (ref 96–108)
CO2 SERPL-SCNC: 25 MMOL/L — SIGNIFICANT CHANGE UP (ref 22–31)
CREAT SERPL-MCNC: 0.49 MG/DL — LOW (ref 0.5–1.3)
GLUCOSE SERPL-MCNC: 88 MG/DL — SIGNIFICANT CHANGE UP (ref 70–99)
HCT VFR BLD CALC: 32.2 % — LOW (ref 39–50)
HGB BLD-MCNC: 10.7 G/DL — LOW (ref 13–17)
INR BLD: 1.09 RATIO — SIGNIFICANT CHANGE UP (ref 0.88–1.16)
MAGNESIUM SERPL-MCNC: 2.4 MG/DL — SIGNIFICANT CHANGE UP (ref 1.8–2.4)
MCHC RBC-ENTMCNC: 29.4 PG — SIGNIFICANT CHANGE UP (ref 27–34)
MCHC RBC-ENTMCNC: 33.2 GM/DL — SIGNIFICANT CHANGE UP (ref 32–36)
MCV RBC AUTO: 88.8 FL — SIGNIFICANT CHANGE UP (ref 80–100)
PHOSPHATE SERPL-MCNC: 2.3 MG/DL — LOW (ref 2.5–4.5)
PLATELET # BLD AUTO: 141 K/UL — LOW (ref 150–400)
POTASSIUM SERPL-MCNC: 3.7 MMOL/L — SIGNIFICANT CHANGE UP (ref 3.5–5.3)
POTASSIUM SERPL-SCNC: 3.7 MMOL/L — SIGNIFICANT CHANGE UP (ref 3.5–5.3)
PROCALCITONIN SERPL-MCNC: <0.05 — SIGNIFICANT CHANGE UP (ref 0–0.05)
PROT SERPL-MCNC: 6.6 G/DL — SIGNIFICANT CHANGE UP (ref 6–8.3)
PROTHROM AB SERPL-ACNC: 12.1 SEC — SIGNIFICANT CHANGE UP (ref 10–13.1)
RBC # BLD: 3.63 M/UL — LOW (ref 4.2–5.8)
RBC # FLD: 12 % — SIGNIFICANT CHANGE UP (ref 10.3–14.5)
SODIUM SERPL-SCNC: 136 MMOL/L — SIGNIFICANT CHANGE UP (ref 135–145)
WBC # BLD: 5.3 K/UL — SIGNIFICANT CHANGE UP (ref 3.8–10.5)
WBC # FLD AUTO: 5.3 K/UL — SIGNIFICANT CHANGE UP (ref 3.8–10.5)

## 2017-02-19 RX ADMIN — Medication 100 MILLIGRAM(S): at 06:01

## 2017-02-19 RX ADMIN — Medication 600 MILLIGRAM(S): at 06:02

## 2017-02-19 RX ADMIN — FAMOTIDINE 20 MILLIGRAM(S): 10 INJECTION INTRAVENOUS at 12:21

## 2017-02-19 RX ADMIN — Medication 600 MILLIGRAM(S): at 01:18

## 2017-02-19 RX ADMIN — Medication 100 MILLIGRAM(S): at 23:12

## 2017-02-19 RX ADMIN — Medication 1 MILLIGRAM(S): at 12:21

## 2017-02-19 RX ADMIN — SODIUM CHLORIDE 100 MILLILITER(S): 9 INJECTION INTRAMUSCULAR; INTRAVENOUS; SUBCUTANEOUS at 22:00

## 2017-02-19 RX ADMIN — Medication 600 MILLIGRAM(S): at 14:20

## 2017-02-19 RX ADMIN — RISPERIDONE 3 MILLIGRAM(S): 4 TABLET ORAL at 21:19

## 2017-02-19 RX ADMIN — AZITHROMYCIN 500 MILLIGRAM(S): 500 TABLET, FILM COATED ORAL at 23:12

## 2017-02-19 RX ADMIN — Medication 100 MILLIGRAM(S): at 00:47

## 2017-02-19 RX ADMIN — Medication 600 MILLIGRAM(S): at 23:12

## 2017-02-19 RX ADMIN — Medication 1 MILLIGRAM(S): at 23:12

## 2017-02-19 RX ADMIN — LEVETIRACETAM 2000 MILLIGRAM(S): 250 TABLET, FILM COATED ORAL at 06:02

## 2017-02-19 RX ADMIN — Medication 1 MILLIGRAM(S): at 06:02

## 2017-02-19 RX ADMIN — Medication 1 MILLIGRAM(S): at 00:48

## 2017-02-19 RX ADMIN — ENOXAPARIN SODIUM 40 MILLIGRAM(S): 100 INJECTION SUBCUTANEOUS at 12:20

## 2017-02-19 RX ADMIN — Medication 100 MILLIGRAM(S): at 12:21

## 2017-02-19 RX ADMIN — Medication 81 MILLIGRAM(S): at 12:21

## 2017-02-19 RX ADMIN — Medication 600 MILLIGRAM(S): at 00:47

## 2017-02-19 RX ADMIN — TOBRAMYCIN AND DEXAMETHASONE 1 APPLICATION(S): 1; 3 SUSPENSION/ DROPS OPHTHALMIC at 06:01

## 2017-02-19 RX ADMIN — Medication 600 MILLIGRAM(S): at 12:50

## 2017-02-19 RX ADMIN — SODIUM CHLORIDE 100 MILLILITER(S): 9 INJECTION INTRAMUSCULAR; INTRAVENOUS; SUBCUTANEOUS at 00:53

## 2017-02-20 LAB
CULTURE RESULTS: NO GROWTH — SIGNIFICANT CHANGE UP
CULTURE RESULTS: SIGNIFICANT CHANGE UP
CULTURE RESULTS: SIGNIFICANT CHANGE UP
SPECIMEN SOURCE: SIGNIFICANT CHANGE UP

## 2017-02-20 RX ORDER — LEVETIRACETAM 250 MG/1
2000 TABLET, FILM COATED ORAL EVERY 12 HOURS
Qty: 0 | Refills: 0 | Status: DISCONTINUED | OUTPATIENT
Start: 2017-02-20 | End: 2017-02-21

## 2017-02-20 RX ADMIN — Medication 81 MILLIGRAM(S): at 14:13

## 2017-02-20 RX ADMIN — LEVETIRACETAM 480 MILLIGRAM(S): 250 TABLET, FILM COATED ORAL at 05:42

## 2017-02-20 RX ADMIN — Medication 1 MILLIGRAM(S): at 14:12

## 2017-02-20 RX ADMIN — Medication 1 MILLIGRAM(S): at 23:13

## 2017-02-20 RX ADMIN — FAMOTIDINE 20 MILLIGRAM(S): 10 INJECTION INTRAVENOUS at 14:12

## 2017-02-20 RX ADMIN — Medication 104 MILLIGRAM(S): at 05:42

## 2017-02-20 RX ADMIN — LEVETIRACETAM 480 MILLIGRAM(S): 250 TABLET, FILM COATED ORAL at 17:12

## 2017-02-20 RX ADMIN — ENOXAPARIN SODIUM 40 MILLIGRAM(S): 100 INJECTION SUBCUTANEOUS at 14:13

## 2017-02-20 RX ADMIN — TOBRAMYCIN AND DEXAMETHASONE 1 APPLICATION(S): 1; 3 SUSPENSION/ DROPS OPHTHALMIC at 17:12

## 2017-02-20 RX ADMIN — Medication 1 MILLIGRAM(S): at 14:13

## 2017-02-20 RX ADMIN — Medication 200 MILLIGRAM(S): at 17:13

## 2017-02-20 RX ADMIN — Medication 0.5 MILLIGRAM(S): at 03:27

## 2017-02-20 RX ADMIN — RISPERIDONE 3 MILLIGRAM(S): 4 TABLET ORAL at 23:12

## 2017-02-20 RX ADMIN — Medication 1 MILLIGRAM(S): at 17:12

## 2017-02-20 RX ADMIN — Medication 600 MILLIGRAM(S): at 00:00

## 2017-02-20 RX ADMIN — Medication 3 MILLILITER(S): at 07:25

## 2017-02-20 RX ADMIN — Medication 100 MILLIGRAM(S): at 14:12

## 2017-02-20 RX ADMIN — Medication 600 MILLIGRAM(S): at 17:12

## 2017-02-21 LAB
ALBUMIN SERPL ELPH-MCNC: 2.5 G/DL — LOW (ref 3.3–5)
ALP SERPL-CCNC: 56 U/L — SIGNIFICANT CHANGE UP (ref 40–120)
ALT FLD-CCNC: 31 U/L — SIGNIFICANT CHANGE UP (ref 12–78)
ANION GAP SERPL CALC-SCNC: 12 MMOL/L — SIGNIFICANT CHANGE UP (ref 5–17)
AST SERPL-CCNC: 31 U/L — SIGNIFICANT CHANGE UP (ref 15–37)
BASOPHILS NFR BLD AUTO: 1 % — SIGNIFICANT CHANGE UP (ref 0–2)
BILIRUB SERPL-MCNC: 0.4 MG/DL — SIGNIFICANT CHANGE UP (ref 0.2–1.2)
BUN SERPL-MCNC: 11 MG/DL — SIGNIFICANT CHANGE UP (ref 7–23)
CALCIUM SERPL-MCNC: 8 MG/DL — LOW (ref 8.5–10.1)
CHLORIDE SERPL-SCNC: 104 MMOL/L — SIGNIFICANT CHANGE UP (ref 96–108)
CO2 SERPL-SCNC: 20 MMOL/L — LOW (ref 22–31)
CREAT SERPL-MCNC: 0.59 MG/DL — SIGNIFICANT CHANGE UP (ref 0.5–1.3)
EOSINOPHIL NFR BLD AUTO: 1 % — SIGNIFICANT CHANGE UP (ref 0–6)
GLUCOSE SERPL-MCNC: 93 MG/DL — SIGNIFICANT CHANGE UP (ref 70–99)
HCT VFR BLD CALC: 34.2 % — LOW (ref 39–50)
HGB BLD-MCNC: 10.1 G/DL — LOW (ref 13–17)
LYMPHOCYTES # BLD AUTO: 22 % — SIGNIFICANT CHANGE UP (ref 13–44)
MCHC RBC-ENTMCNC: 26.7 PG — LOW (ref 27–34)
MCHC RBC-ENTMCNC: 29.5 GM/DL — LOW (ref 32–36)
MCV RBC AUTO: 90.6 FL — SIGNIFICANT CHANGE UP (ref 80–100)
MONOCYTES NFR BLD AUTO: 13 % — HIGH (ref 1–9)
NEUTROPHILS NFR BLD AUTO: 61 % — SIGNIFICANT CHANGE UP (ref 43–77)
NEUTS BAND # BLD: 2 % — SIGNIFICANT CHANGE UP (ref 0–8)
PLAT MORPH BLD: NORMAL — SIGNIFICANT CHANGE UP
PLATELET # BLD AUTO: 206 K/UL — SIGNIFICANT CHANGE UP (ref 150–400)
POTASSIUM SERPL-MCNC: 3.7 MMOL/L — SIGNIFICANT CHANGE UP (ref 3.5–5.3)
POTASSIUM SERPL-SCNC: 3.7 MMOL/L — SIGNIFICANT CHANGE UP (ref 3.5–5.3)
PROT SERPL-MCNC: 7 G/DL — SIGNIFICANT CHANGE UP (ref 6–8.3)
RBC # BLD: 3.78 M/UL — LOW (ref 4.2–5.8)
RBC # FLD: 11.8 % — SIGNIFICANT CHANGE UP (ref 10.3–14.5)
RBC BLD AUTO: SIGNIFICANT CHANGE UP
SODIUM SERPL-SCNC: 136 MMOL/L — SIGNIFICANT CHANGE UP (ref 135–145)
WBC # BLD: 6.2 K/UL — SIGNIFICANT CHANGE UP (ref 3.8–10.5)
WBC # FLD AUTO: 6.2 K/UL — SIGNIFICANT CHANGE UP (ref 3.8–10.5)

## 2017-02-21 RX ORDER — LEVETIRACETAM 250 MG/1
2000 TABLET, FILM COATED ORAL EVERY 12 HOURS
Qty: 0 | Refills: 0 | Status: DISCONTINUED | OUTPATIENT
Start: 2017-02-21 | End: 2017-02-22

## 2017-02-21 RX ADMIN — Medication 600 MILLIGRAM(S): at 02:15

## 2017-02-21 RX ADMIN — Medication 1 MILLIGRAM(S): at 18:44

## 2017-02-21 RX ADMIN — RISPERIDONE 3 MILLIGRAM(S): 4 TABLET ORAL at 21:25

## 2017-02-21 RX ADMIN — Medication 600 MILLIGRAM(S): at 18:44

## 2017-02-21 RX ADMIN — LEVETIRACETAM 480 MILLIGRAM(S): 250 TABLET, FILM COATED ORAL at 07:24

## 2017-02-21 RX ADMIN — Medication 200 MILLIGRAM(S): at 18:44

## 2017-02-21 RX ADMIN — LEVETIRACETAM 480 MILLIGRAM(S): 250 TABLET, FILM COATED ORAL at 18:39

## 2017-02-21 RX ADMIN — Medication 600 MILLIGRAM(S): at 01:15

## 2017-02-21 RX ADMIN — SODIUM CHLORIDE 100 MILLILITER(S): 9 INJECTION INTRAMUSCULAR; INTRAVENOUS; SUBCUTANEOUS at 06:22

## 2017-02-21 RX ADMIN — TOBRAMYCIN AND DEXAMETHASONE 1 APPLICATION(S): 1; 3 SUSPENSION/ DROPS OPHTHALMIC at 06:21

## 2017-02-21 RX ADMIN — Medication 1 MILLIGRAM(S): at 23:13

## 2017-02-21 RX ADMIN — TOBRAMYCIN AND DEXAMETHASONE 1 APPLICATION(S): 1; 3 SUSPENSION/ DROPS OPHTHALMIC at 18:36

## 2017-02-21 RX ADMIN — ENOXAPARIN SODIUM 40 MILLIGRAM(S): 100 INJECTION SUBCUTANEOUS at 11:54

## 2017-02-22 ENCOUNTER — TRANSCRIPTION ENCOUNTER (OUTPATIENT)
Age: 52
End: 2017-02-22

## 2017-02-22 VITALS — OXYGEN SATURATION: 96 %

## 2017-02-22 LAB
CULTURE RESULTS: SIGNIFICANT CHANGE UP
CULTURE RESULTS: SIGNIFICANT CHANGE UP
SPECIMEN SOURCE: SIGNIFICANT CHANGE UP
SPECIMEN SOURCE: SIGNIFICANT CHANGE UP

## 2017-02-22 PROCEDURE — 84100 ASSAY OF PHOSPHORUS: CPT

## 2017-02-22 PROCEDURE — 87486 CHLMYD PNEUM DNA AMP PROBE: CPT

## 2017-02-22 PROCEDURE — 81001 URINALYSIS AUTO W/SCOPE: CPT

## 2017-02-22 PROCEDURE — 87798 DETECT AGENT NOS DNA AMP: CPT

## 2017-02-22 PROCEDURE — 71250 CT THORAX DX C-: CPT

## 2017-02-22 PROCEDURE — 96367 TX/PROPH/DG ADDL SEQ IV INF: CPT

## 2017-02-22 PROCEDURE — 83605 ASSAY OF LACTIC ACID: CPT

## 2017-02-22 PROCEDURE — 81003 URINALYSIS AUTO W/O SCOPE: CPT

## 2017-02-22 PROCEDURE — 85610 PROTHROMBIN TIME: CPT

## 2017-02-22 PROCEDURE — 94640 AIRWAY INHALATION TREATMENT: CPT

## 2017-02-22 PROCEDURE — 87581 M.PNEUMON DNA AMP PROBE: CPT

## 2017-02-22 PROCEDURE — 85027 COMPLETE CBC AUTOMATED: CPT

## 2017-02-22 PROCEDURE — 96365 THER/PROPH/DIAG IV INF INIT: CPT

## 2017-02-22 PROCEDURE — 94760 N-INVAS EAR/PLS OXIMETRY 1: CPT

## 2017-02-22 PROCEDURE — 85730 THROMBOPLASTIN TIME PARTIAL: CPT

## 2017-02-22 PROCEDURE — 84145 PROCALCITONIN (PCT): CPT

## 2017-02-22 PROCEDURE — 87633 RESP VIRUS 12-25 TARGETS: CPT

## 2017-02-22 PROCEDURE — 99284 EMERGENCY DEPT VISIT MOD MDM: CPT | Mod: 25

## 2017-02-22 PROCEDURE — 80053 COMPREHEN METABOLIC PANEL: CPT

## 2017-02-22 PROCEDURE — 83735 ASSAY OF MAGNESIUM: CPT

## 2017-02-22 PROCEDURE — 87086 URINE CULTURE/COLONY COUNT: CPT

## 2017-02-22 PROCEDURE — 99285 EMERGENCY DEPT VISIT HI MDM: CPT | Mod: 25

## 2017-02-22 PROCEDURE — 71045 X-RAY EXAM CHEST 1 VIEW: CPT

## 2017-02-22 PROCEDURE — 83690 ASSAY OF LIPASE: CPT

## 2017-02-22 PROCEDURE — 87040 BLOOD CULTURE FOR BACTERIA: CPT

## 2017-02-22 RX ADMIN — Medication 600 MILLIGRAM(S): at 06:17

## 2017-02-22 RX ADMIN — Medication 200 MILLIGRAM(S): at 06:17

## 2017-02-22 RX ADMIN — LEVETIRACETAM 480 MILLIGRAM(S): 250 TABLET, FILM COATED ORAL at 06:24

## 2017-02-22 RX ADMIN — Medication 600 MILLIGRAM(S): at 01:00

## 2017-02-22 RX ADMIN — Medication 1 MILLIGRAM(S): at 06:17

## 2017-02-22 RX ADMIN — Medication 600 MILLIGRAM(S): at 00:14

## 2017-02-22 NOTE — DISCHARGE NOTE ADULT - MEDICATION SUMMARY - MEDICATIONS TO TAKE
I will START or STAY ON the medications listed below when I get home from the hospital:    aspirin 81 mg oral tablet  -- 1 tab(s) by mouth once a day  -- Indication: For RSV (acute bronchiolitis due to respiratory syncytial virus)    phenytoin 100 mg oral capsule, extended release  -- 1 cap(s) by mouth 4 times a day  -- Indication: For RSV (acute bronchiolitis due to respiratory syncytial virus)    levETIRAcetam 1000 mg oral tablet  -- 2 tab(s) by mouth 2 times a day  -- Indication: For RSV (acute bronchiolitis due to respiratory syncytial virus)    carBAMazepine 200 mg oral tablet  -- 3 tab(s) by mouth 2 times a day  -- Indication: For RSV (acute bronchiolitis due to respiratory syncytial virus)    Cogentin  -- 1 milligram(s) by mouth 4 times a day  -- Indication: For RSV (acute bronchiolitis due to respiratory syncytial virus)    risperiDONE 3 mg oral tablet  -- 1 tab(s) by mouth once a day (at bedtime)  -- Indication: For RSV (acute bronchiolitis due to respiratory syncytial virus)    Pepcid 20 mg oral tablet  -- 1 tab(s) by mouth once a day  -- It is very important that you take or use this exactly as directed.  Do not skip doses or discontinue unless directed by your doctor.  Obtain medical advice before taking any non-prescription drugs as some may affect the action of this medication.    -- Indication: For RSV (acute bronchiolitis due to respiratory syncytial virus)    folic acid 1 mg oral tablet  -- 1 tab(s) by mouth once a day  -- Indication: For RSV (acute bronchiolitis due to respiratory syncytial virus)

## 2017-02-22 NOTE — DISCHARGE NOTE ADULT - PATIENT PORTAL LINK FT
“You can access the Follow[facility] Patient Portal, offered by [facility], by registering with the following website: [email or web address]”

## 2017-02-22 NOTE — DISCHARGE NOTE ADULT - HOSPITAL COURSE
pt came in fevers,,,found to be rsv positive with bl infiltrates ,treated for aspiration /viral pn,,,left conjunctiva was injected ,eye drops placed both eyes,,,all clear,,,back to group home on all previous meds,,stable

## 2017-02-22 NOTE — DISCHARGE NOTE ADULT - CARE PLAN
Principal Discharge DX:	RSV (acute bronchiolitis due to respiratory syncytial virus)  Goal:	given supportive care,,,fluids antabiotics  Instructions for follow-up, activity and diet:	completed antabiotics for viral/aspiration pn  all meds as before hospital stay  Secondary Diagnosis:	Aspiration pneumonia of both lungs, unspecified aspiration pneumonia type, unspecified part of lung  Goal:	completed course in the hospital with antabiotics  Secondary Diagnosis:	Acute bacterial conjunctivitis of left eye  Goal:	completed eye drops in the spital,all clear

## 2017-02-22 NOTE — DISCHARGE NOTE ADULT - MEDICATION SUMMARY - MEDICATIONS TO CHANGE
I will SWITCH the dose or number of times a day I take the medications listed below when I get home from the hospital:    Pepcid 20 mg oral tablet  -- 1 tab(s) by mouth once a day  -- It is very important that you take or use this exactly as directed.  Do not skip doses or discontinue unless directed by your doctor.  Obtain medical advice before taking any non-prescription drugs as some may affect the action of this medication.    aspirin 81 mg oral tablet  -- 1 tab(s) by mouth once a day    Cogentin  -- 1 milligram(s) by mouth 4 times a day    carBAMazepine 200 mg oral tablet  -- 3 tab(s) by mouth 2 times a day    risperiDONE 3 mg oral tablet  -- 1 tab(s) by mouth once a day (at bedtime)    folic acid 1 mg oral tablet  -- 1 tab(s) by mouth once a day    phenytoin 100 mg oral capsule, extended release  -- 1 cap(s) by mouth 4 times a day    levETIRAcetam 1000 mg oral tablet  -- 2 tab(s) by mouth 2 times a day    dexamethasone-tobramycin 0.1%-0.3% ophthalmic ointment  -- 1 application to each affected eye 2 times a day    ocular lubricant ophthalmic solution  -- 1 drop(s) to each affected eye every 8 hours, As needed, Dry Eyes or irritation    Levaquin 500 mg oral tablet  -- 1 tab(s) by mouth once a day  -- Avoid prolonged or excessive exposure to direct and/or artificial sunlight while taking this medication.  Do not take dairy products, antacids, or iron preparations within one hour of this medication.  Finish all this medication unless otherwise directed by prescriber.  May cause drowsiness or dizziness.  Medication should be taken with plenty of water.

## 2017-02-22 NOTE — DISCHARGE NOTE ADULT - PLAN OF CARE
given supportive care,,,fluids antabiotics completed antabiotics for viral/aspiration pn  all meds as before hospital stay completed course in the hospital with hermila completed eye drops in the spital,all clear

## 2017-02-22 NOTE — DISCHARGE NOTE ADULT - SECONDARY DIAGNOSIS.
Aspiration pneumonia of both lungs, unspecified aspiration pneumonia type, unspecified part of lung Acute bacterial conjunctivitis of left eye

## 2017-02-22 NOTE — DISCHARGE NOTE ADULT - ADDITIONAL INSTRUCTIONS
all medications the same before the hospital  diet the same before the hospital  no change treatments  kirt completed eye drops and iv antabiotics

## 2017-02-23 LAB
CULTURE RESULTS: SIGNIFICANT CHANGE UP
SPECIMEN SOURCE: SIGNIFICANT CHANGE UP

## 2017-02-24 DIAGNOSIS — G40.909 EPILEPSY, UNSPECIFIED, NOT INTRACTABLE, WITHOUT STATUS EPILEPTICUS: ICD-10-CM

## 2017-02-24 DIAGNOSIS — I25.10 ATHEROSCLEROTIC HEART DISEASE OF NATIVE CORONARY ARTERY WITHOUT ANGINA PECTORIS: ICD-10-CM

## 2017-02-24 DIAGNOSIS — H26.9 UNSPECIFIED CATARACT: ICD-10-CM

## 2017-02-24 DIAGNOSIS — J20.5 ACUTE BRONCHITIS DUE TO RESPIRATORY SYNCYTIAL VIRUS: ICD-10-CM

## 2017-02-24 DIAGNOSIS — J69.0 PNEUMONITIS DUE TO INHALATION OF FOOD AND VOMIT: ICD-10-CM

## 2017-02-24 DIAGNOSIS — Z88.8 ALLERGY STATUS TO OTHER DRUGS, MEDICAMENTS AND BIOLOGICAL SUBSTANCES STATUS: ICD-10-CM

## 2017-02-24 DIAGNOSIS — D64.9 ANEMIA, UNSPECIFIED: ICD-10-CM

## 2017-02-24 DIAGNOSIS — Z79.82 LONG TERM (CURRENT) USE OF ASPIRIN: ICD-10-CM

## 2017-02-24 DIAGNOSIS — R32 UNSPECIFIED URINARY INCONTINENCE: ICD-10-CM

## 2017-02-24 DIAGNOSIS — Z79.2 LONG TERM (CURRENT) USE OF ANTIBIOTICS: ICD-10-CM

## 2017-02-24 DIAGNOSIS — F72 SEVERE INTELLECTUAL DISABILITIES: ICD-10-CM

## 2017-02-24 DIAGNOSIS — R13.10 DYSPHAGIA, UNSPECIFIED: ICD-10-CM

## 2017-02-24 DIAGNOSIS — M81.0 AGE-RELATED OSTEOPOROSIS WITHOUT CURRENT PATHOLOGICAL FRACTURE: ICD-10-CM

## 2017-02-24 DIAGNOSIS — K64.8 OTHER HEMORRHOIDS: ICD-10-CM

## 2017-02-24 DIAGNOSIS — Z91.018 ALLERGY TO OTHER FOODS: ICD-10-CM

## 2017-02-24 DIAGNOSIS — K29.70 GASTRITIS, UNSPECIFIED, WITHOUT BLEEDING: ICD-10-CM

## 2017-02-24 DIAGNOSIS — J44.9 CHRONIC OBSTRUCTIVE PULMONARY DISEASE, UNSPECIFIED: ICD-10-CM

## 2017-02-24 DIAGNOSIS — H10.32 UNSPECIFIED ACUTE CONJUNCTIVITIS, LEFT EYE: ICD-10-CM

## 2017-03-01 DIAGNOSIS — A41.9 SEPSIS, UNSPECIFIED ORGANISM: ICD-10-CM

## 2017-03-01 DIAGNOSIS — J12.1 RESPIRATORY SYNCYTIAL VIRUS PNEUMONIA: ICD-10-CM

## 2017-04-06 ENCOUNTER — INPATIENT (INPATIENT)
Facility: HOSPITAL | Age: 52
LOS: 2 days | Discharge: HOME | DRG: 101 | End: 2017-04-09
Attending: INTERNAL MEDICINE | Admitting: HOSPITALIST
Payer: MEDICARE

## 2017-04-06 VITALS
HEIGHT: 63 IN | OXYGEN SATURATION: 97 % | HEART RATE: 88 BPM | TEMPERATURE: 98 F | WEIGHT: 177.03 LBS | RESPIRATION RATE: 14 BRPM | SYSTOLIC BLOOD PRESSURE: 108 MMHG | DIASTOLIC BLOOD PRESSURE: 70 MMHG

## 2017-04-06 LAB
ALBUMIN SERPL ELPH-MCNC: 3 G/DL — LOW (ref 3.3–5)
ALP SERPL-CCNC: 44 U/L — SIGNIFICANT CHANGE UP (ref 40–120)
ALT FLD-CCNC: 22 U/L — SIGNIFICANT CHANGE UP (ref 12–78)
ANION GAP SERPL CALC-SCNC: 9 MMOL/L — SIGNIFICANT CHANGE UP (ref 5–17)
AST SERPL-CCNC: 26 U/L — SIGNIFICANT CHANGE UP (ref 15–37)
BASOPHILS # BLD AUTO: 0.1 K/UL — SIGNIFICANT CHANGE UP (ref 0–0.2)
BASOPHILS NFR BLD AUTO: 1.1 % — SIGNIFICANT CHANGE UP (ref 0–2)
BILIRUB SERPL-MCNC: 0.1 MG/DL — LOW (ref 0.2–1.2)
BUN SERPL-MCNC: 23 MG/DL — SIGNIFICANT CHANGE UP (ref 7–23)
CALCIUM SERPL-MCNC: 8.3 MG/DL — LOW (ref 8.5–10.1)
CARBAMAZEPINE SERPL-MCNC: 4.9 UG/ML — SIGNIFICANT CHANGE UP (ref 4–12)
CHLORIDE SERPL-SCNC: 105 MMOL/L — SIGNIFICANT CHANGE UP (ref 96–108)
CO2 SERPL-SCNC: 27 MMOL/L — SIGNIFICANT CHANGE UP (ref 22–31)
CREAT SERPL-MCNC: 0.79 MG/DL — SIGNIFICANT CHANGE UP (ref 0.5–1.3)
EOSINOPHIL # BLD AUTO: 0.1 K/UL — SIGNIFICANT CHANGE UP (ref 0–0.5)
EOSINOPHIL NFR BLD AUTO: 1.4 % — SIGNIFICANT CHANGE UP (ref 0–6)
GLUCOSE SERPL-MCNC: 100 MG/DL — HIGH (ref 70–99)
HCT VFR BLD CALC: 39.6 % — SIGNIFICANT CHANGE UP (ref 39–50)
HGB BLD-MCNC: 12.8 G/DL — LOW (ref 13–17)
LYMPHOCYTES # BLD AUTO: 1.4 K/UL — SIGNIFICANT CHANGE UP (ref 1–3.3)
LYMPHOCYTES # BLD AUTO: 28 % — SIGNIFICANT CHANGE UP (ref 13–44)
MCHC RBC-ENTMCNC: 30.6 PG — SIGNIFICANT CHANGE UP (ref 27–34)
MCHC RBC-ENTMCNC: 32.4 GM/DL — SIGNIFICANT CHANGE UP (ref 32–36)
MCV RBC AUTO: 94.2 FL — SIGNIFICANT CHANGE UP (ref 80–100)
MONOCYTES # BLD AUTO: 0.5 K/UL — SIGNIFICANT CHANGE UP (ref 0–0.9)
MONOCYTES NFR BLD AUTO: 9.3 % — HIGH (ref 1–9)
NEUTROPHILS # BLD AUTO: 3.1 K/UL — SIGNIFICANT CHANGE UP (ref 1.8–7.4)
NEUTROPHILS NFR BLD AUTO: 60.1 % — SIGNIFICANT CHANGE UP (ref 43–77)
PHENYTOIN FREE SERPL-MCNC: 0.6 UG/ML — LOW (ref 10–20)
PLATELET # BLD AUTO: 230 K/UL — SIGNIFICANT CHANGE UP (ref 150–400)
POTASSIUM SERPL-MCNC: 4.3 MMOL/L — SIGNIFICANT CHANGE UP (ref 3.5–5.3)
POTASSIUM SERPL-SCNC: 4.3 MMOL/L — SIGNIFICANT CHANGE UP (ref 3.5–5.3)
PROT SERPL-MCNC: 7.2 G/DL — SIGNIFICANT CHANGE UP (ref 6–8.3)
RBC # BLD: 4.2 M/UL — SIGNIFICANT CHANGE UP (ref 4.2–5.8)
RBC # FLD: 12.7 % — SIGNIFICANT CHANGE UP (ref 10.3–14.5)
SODIUM SERPL-SCNC: 141 MMOL/L — SIGNIFICANT CHANGE UP (ref 135–145)
VALPROATE SERPL-MCNC: 39 UG/ML — LOW (ref 50–100)
WBC # BLD: 5.1 K/UL — SIGNIFICANT CHANGE UP (ref 3.8–10.5)
WBC # FLD AUTO: 5.1 K/UL — SIGNIFICANT CHANGE UP (ref 3.8–10.5)

## 2017-04-06 RX ORDER — VALPROIC ACID (AS SODIUM SALT) 250 MG/5ML
500 SOLUTION, ORAL ORAL ONCE
Qty: 0 | Refills: 0 | Status: COMPLETED | OUTPATIENT
Start: 2017-04-06 | End: 2017-04-06

## 2017-04-06 RX ADMIN — Medication 27.5 MILLIGRAM(S): at 22:27

## 2017-04-06 RX ADMIN — Medication 2 MILLIGRAM(S): at 22:27

## 2017-04-06 RX ADMIN — Medication 200 MILLIGRAM(S): at 23:40

## 2017-04-06 RX ADMIN — Medication 2 MILLIGRAM(S): at 18:22

## 2017-04-06 NOTE — ED ADULT NURSE NOTE - OBJECTIVE STATEMENT
Pt arrived from group home c/o "2 seizures today" Pt alert, baseline mental status, cooperative, calm. No distress. VSS. IV started labs drawn and sent pt tolerated well. Seizure pads in place. Caregiver from group home at bedside. Pt moving all extremities. Resting on stretcher awaiting results.

## 2017-04-06 NOTE — ED PROVIDER NOTE - PROGRESS NOTE DETAILS
had 45 second general tonic clonic seizure, given ativan, seizure resolved discussed case with Dr. Sosa, recommend depakote IV 500mg now and 200 dilantin PO now, may discharge change depakote to 750mg and dilantin 200mg at night. [facility] to hospitalist Dr Acevedo notified

## 2017-04-06 NOTE — ED PROVIDER NOTE - OBJECTIVE STATEMENT
53 yo male hx of seizure disorder 51 yo male hx of seizure disorder from [facility] BIB aides c/o two episodes of general clonic tonic seizures at 12:15pm and 3:04pm.  Was given cogentin as per aide.  Currently acting baseline  as per aides who know him well.  Current seizure regimen is keppra 1g 2 tabs twice a day.  Dilantin 100mg once a day, Tegretol 400mg XR once in the AM, two tabs at night.  Here for further evaluation.

## 2017-04-07 DIAGNOSIS — G40.909 EPILEPSY, UNSPECIFIED, NOT INTRACTABLE, WITHOUT STATUS EPILEPTICUS: ICD-10-CM

## 2017-04-07 DIAGNOSIS — F44.5 CONVERSION DISORDER WITH SEIZURES OR CONVULSIONS: ICD-10-CM

## 2017-04-07 DIAGNOSIS — Z41.8 ENCOUNTER FOR OTHER PROCEDURES FOR PURPOSES OTHER THAN REMEDYING HEALTH STATE: ICD-10-CM

## 2017-04-07 DIAGNOSIS — H26.9 UNSPECIFIED CATARACT: ICD-10-CM

## 2017-04-07 DIAGNOSIS — K21.9 GASTRO-ESOPHAGEAL REFLUX DISEASE WITHOUT ESOPHAGITIS: ICD-10-CM

## 2017-04-07 LAB
ANION GAP SERPL CALC-SCNC: 10 MMOL/L — SIGNIFICANT CHANGE UP (ref 5–17)
BASOPHILS # BLD AUTO: 0.1 K/UL — SIGNIFICANT CHANGE UP (ref 0–0.2)
BASOPHILS NFR BLD AUTO: 1 % — SIGNIFICANT CHANGE UP (ref 0–2)
BUN SERPL-MCNC: 14 MG/DL — SIGNIFICANT CHANGE UP (ref 7–23)
CALCIUM SERPL-MCNC: 8.2 MG/DL — LOW (ref 8.5–10.1)
CHLORIDE SERPL-SCNC: 105 MMOL/L — SIGNIFICANT CHANGE UP (ref 96–108)
CO2 SERPL-SCNC: 26 MMOL/L — SIGNIFICANT CHANGE UP (ref 22–31)
CREAT SERPL-MCNC: 0.64 MG/DL — SIGNIFICANT CHANGE UP (ref 0.5–1.3)
EOSINOPHIL # BLD AUTO: 0.2 K/UL — SIGNIFICANT CHANGE UP (ref 0–0.5)
EOSINOPHIL NFR BLD AUTO: 3.9 % — SIGNIFICANT CHANGE UP (ref 0–6)
GLUCOSE SERPL-MCNC: 87 MG/DL — SIGNIFICANT CHANGE UP (ref 70–99)
HCT VFR BLD CALC: 39.8 % — SIGNIFICANT CHANGE UP (ref 39–50)
HGB BLD-MCNC: 13.1 G/DL — SIGNIFICANT CHANGE UP (ref 13–17)
LYMPHOCYTES # BLD AUTO: 1.8 K/UL — SIGNIFICANT CHANGE UP (ref 1–3.3)
LYMPHOCYTES # BLD AUTO: 31.3 % — SIGNIFICANT CHANGE UP (ref 13–44)
MCHC RBC-ENTMCNC: 31 PG — SIGNIFICANT CHANGE UP (ref 27–34)
MCHC RBC-ENTMCNC: 32.9 GM/DL — SIGNIFICANT CHANGE UP (ref 32–36)
MCV RBC AUTO: 94.2 FL — SIGNIFICANT CHANGE UP (ref 80–100)
MONOCYTES # BLD AUTO: 0.7 K/UL — SIGNIFICANT CHANGE UP (ref 0–0.9)
MONOCYTES NFR BLD AUTO: 11.7 % — HIGH (ref 1–9)
NEUTROPHILS # BLD AUTO: 3 K/UL — SIGNIFICANT CHANGE UP (ref 1.8–7.4)
NEUTROPHILS NFR BLD AUTO: 52.2 % — SIGNIFICANT CHANGE UP (ref 43–77)
PHENYTOIN FREE SERPL-MCNC: 12.8 UG/ML — SIGNIFICANT CHANGE UP (ref 10–20)
PLATELET # BLD AUTO: 194 K/UL — SIGNIFICANT CHANGE UP (ref 150–400)
POTASSIUM SERPL-MCNC: 3.6 MMOL/L — SIGNIFICANT CHANGE UP (ref 3.5–5.3)
POTASSIUM SERPL-SCNC: 3.6 MMOL/L — SIGNIFICANT CHANGE UP (ref 3.5–5.3)
RBC # BLD: 4.22 M/UL — SIGNIFICANT CHANGE UP (ref 4.2–5.8)
RBC # FLD: 12.8 % — SIGNIFICANT CHANGE UP (ref 10.3–14.5)
SODIUM SERPL-SCNC: 141 MMOL/L — SIGNIFICANT CHANGE UP (ref 135–145)
VALPROATE SERPL-MCNC: 24 UG/ML — LOW (ref 50–100)
WBC # BLD: 5.7 K/UL — SIGNIFICANT CHANGE UP (ref 3.8–10.5)
WBC # FLD AUTO: 5.7 K/UL — SIGNIFICANT CHANGE UP (ref 3.8–10.5)

## 2017-04-07 PROCEDURE — 99223 1ST HOSP IP/OBS HIGH 75: CPT | Mod: AI,GC

## 2017-04-07 PROCEDURE — 99285 EMERGENCY DEPT VISIT HI MDM: CPT

## 2017-04-07 RX ORDER — CARBAMAZEPINE 200 MG
200 TABLET ORAL ONCE
Qty: 0 | Refills: 0 | Status: COMPLETED | OUTPATIENT
Start: 2017-04-07 | End: 2017-04-07

## 2017-04-07 RX ORDER — PANTOPRAZOLE SODIUM 20 MG/1
40 TABLET, DELAYED RELEASE ORAL
Qty: 0 | Refills: 0 | Status: DISCONTINUED | OUTPATIENT
Start: 2017-04-07 | End: 2017-04-09

## 2017-04-07 RX ORDER — FOSPHENYTOIN 50 MG/ML
500 INJECTION INTRAMUSCULAR; INTRAVENOUS ONCE
Qty: 0 | Refills: 0 | Status: DISCONTINUED | OUTPATIENT
Start: 2017-04-07 | End: 2017-04-07

## 2017-04-07 RX ORDER — RISPERIDONE 4 MG/1
1 TABLET ORAL DAILY
Qty: 0 | Refills: 0 | Status: DISCONTINUED | OUTPATIENT
Start: 2017-04-07 | End: 2017-04-09

## 2017-04-07 RX ORDER — DIVALPROEX SODIUM 500 MG/1
500 TABLET, DELAYED RELEASE ORAL ONCE
Qty: 0 | Refills: 0 | Status: COMPLETED | OUTPATIENT
Start: 2017-04-07 | End: 2017-04-07

## 2017-04-07 RX ORDER — DIVALPROEX SODIUM 500 MG/1
750 TABLET, DELAYED RELEASE ORAL DAILY
Qty: 0 | Refills: 0 | Status: DISCONTINUED | OUTPATIENT
Start: 2017-04-07 | End: 2017-04-07

## 2017-04-07 RX ORDER — CARBAMAZEPINE 200 MG
400 TABLET ORAL AT BEDTIME
Qty: 0 | Refills: 0 | Status: DISCONTINUED | OUTPATIENT
Start: 2017-04-07 | End: 2017-04-08

## 2017-04-07 RX ORDER — DIVALPROEX SODIUM 500 MG/1
750 TABLET, DELAYED RELEASE ORAL AT BEDTIME
Qty: 0 | Refills: 0 | Status: DISCONTINUED | OUTPATIENT
Start: 2017-04-07 | End: 2017-04-07

## 2017-04-07 RX ORDER — THIOTHIXENE 5 MG
5 CAPSULE ORAL AT BEDTIME
Qty: 0 | Refills: 0 | Status: DISCONTINUED | OUTPATIENT
Start: 2017-04-07 | End: 2017-04-09

## 2017-04-07 RX ORDER — ENOXAPARIN SODIUM 100 MG/ML
40 INJECTION SUBCUTANEOUS EVERY 24 HOURS
Qty: 0 | Refills: 0 | Status: DISCONTINUED | OUTPATIENT
Start: 2017-04-07 | End: 2017-04-09

## 2017-04-07 RX ORDER — DIVALPROEX SODIUM 500 MG/1
750 TABLET, DELAYED RELEASE ORAL
Qty: 0 | Refills: 0 | Status: DISCONTINUED | OUTPATIENT
Start: 2017-04-07 | End: 2017-04-09

## 2017-04-07 RX ORDER — DIVALPROEX SODIUM 500 MG/1
700 TABLET, DELAYED RELEASE ORAL ONCE
Qty: 0 | Refills: 0 | Status: DISCONTINUED | OUTPATIENT
Start: 2017-04-07 | End: 2017-04-07

## 2017-04-07 RX ORDER — SENNA PLUS 8.6 MG/1
2 TABLET ORAL AT BEDTIME
Qty: 0 | Refills: 0 | Status: DISCONTINUED | OUTPATIENT
Start: 2017-04-07 | End: 2017-04-09

## 2017-04-07 RX ORDER — DIVALPROEX SODIUM 500 MG/1
500 TABLET, DELAYED RELEASE ORAL DAILY
Qty: 0 | Refills: 0 | Status: DISCONTINUED | OUTPATIENT
Start: 2017-04-07 | End: 2017-04-07

## 2017-04-07 RX ORDER — BENZTROPINE MESYLATE 1 MG
1 TABLET ORAL
Qty: 0 | Refills: 0 | Status: DISCONTINUED | OUTPATIENT
Start: 2017-04-07 | End: 2017-04-09

## 2017-04-07 RX ORDER — RISPERIDONE 4 MG/1
3 TABLET ORAL AT BEDTIME
Qty: 0 | Refills: 0 | Status: DISCONTINUED | OUTPATIENT
Start: 2017-04-07 | End: 2017-04-09

## 2017-04-07 RX ORDER — CARBAMAZEPINE 200 MG
400 TABLET ORAL DAILY
Qty: 0 | Refills: 0 | Status: DISCONTINUED | OUTPATIENT
Start: 2017-04-07 | End: 2017-04-09

## 2017-04-07 RX ORDER — LEVETIRACETAM 250 MG/1
2000 TABLET, FILM COATED ORAL
Qty: 0 | Refills: 0 | Status: DISCONTINUED | OUTPATIENT
Start: 2017-04-07 | End: 2017-04-09

## 2017-04-07 RX ADMIN — Medication 200 MILLIGRAM(S): at 03:23

## 2017-04-07 RX ADMIN — Medication 1 MILLIGRAM(S): at 05:38

## 2017-04-07 RX ADMIN — RISPERIDONE 3 MILLIGRAM(S): 4 TABLET ORAL at 22:16

## 2017-04-07 RX ADMIN — Medication 540 MILLIGRAM(S): at 03:23

## 2017-04-07 RX ADMIN — LEVETIRACETAM 2000 MILLIGRAM(S): 250 TABLET, FILM COATED ORAL at 18:07

## 2017-04-07 RX ADMIN — ENOXAPARIN SODIUM 40 MILLIGRAM(S): 100 INJECTION SUBCUTANEOUS at 05:22

## 2017-04-07 RX ADMIN — DIVALPROEX SODIUM 750 MILLIGRAM(S): 500 TABLET, DELAYED RELEASE ORAL at 18:06

## 2017-04-07 RX ADMIN — Medication 2 MILLIGRAM(S): at 01:16

## 2017-04-07 RX ADMIN — LEVETIRACETAM 2000 MILLIGRAM(S): 250 TABLET, FILM COATED ORAL at 05:38

## 2017-04-07 RX ADMIN — Medication 1 MILLIGRAM(S): at 18:07

## 2017-04-07 RX ADMIN — Medication 2 MILLIGRAM(S): at 02:00

## 2017-04-07 RX ADMIN — Medication 1 MILLIGRAM(S): at 12:52

## 2017-04-07 RX ADMIN — RISPERIDONE 1 MILLIGRAM(S): 4 TABLET ORAL at 12:52

## 2017-04-07 RX ADMIN — Medication 200 MILLIGRAM(S): at 22:16

## 2017-04-07 RX ADMIN — Medication 400 MILLIGRAM(S): at 22:15

## 2017-04-07 RX ADMIN — Medication 5 MILLIGRAM(S): at 22:16

## 2017-04-07 RX ADMIN — PANTOPRAZOLE SODIUM 40 MILLIGRAM(S): 20 TABLET, DELAYED RELEASE ORAL at 05:38

## 2017-04-07 RX ADMIN — Medication 400 MILLIGRAM(S): at 12:52

## 2017-04-07 NOTE — SWALLOW BEDSIDE ASSESSMENT ADULT - COMMENTS
51 yo male PMHx Seizure disorder, MRDD, Anemia, osteoporosis presented  from [facility] for episode of seizure.dilantin 200mg at night but pt had 3 more episode of seizure while in the ED.  Pt seen awake, alert, non verbal c group home aide bedside  Pt c adequate labial seal, premature spillage of liquids 2 rapid rate. timely trigger of swallow, clear breath sounds pre/post deglutition

## 2017-04-07 NOTE — PROGRESS NOTE ADULT - ATTENDING COMMENTS
pt seen and examine see above plan and managements agree  with resident / review all meds , pt need to be  seizure free before dc and check Depakote level , dilantin level. pt was not taking his meds properly at gp home ,   will fu repeat check mag level . speech evaluation today . pt seen and examine see above plan and managements agree  with resident / review all meds , pt need to be  seizure free before dc and check Depakote level , dilantin level. pt was not taking his meds properly at gp home ,  fever sec to stress / seizure .   will fu repeat check mag level . speech evaluation today . pt seen and examine see above plan and managements agree  with resident / review all meds , pt need to be  seizure free before dc and check Depakote level , dilantin level. pt was not taking his meds properly at gp home ,  fever sec to stress / seizure .   hx dysphagia cont diet  .will fu repeat check mag level . speech evaluation today .

## 2017-04-07 NOTE — H&P ADULT - ATTENDING COMMENTS
51 yo male PMHx Seizure disorder, MRDD, Anemia, osteoporosis being admitted for multiple episode of seizure.  Pt had persistent breakthrough seizures. Admitted to monitor for further seizures.  Neuro consulted.

## 2017-04-07 NOTE — H&P ADULT - HISTORY OF PRESENT ILLNESS
51 yo male PMHx Seizure disorder, MRDD, Anemia, osteoporosis presented  from [facility] for episode of seizure. As per Aide at bedside, patient had two episodes of general clonic tonic seizures at 12:15pm and 3:04pm lasting 1-2minuts, pt was given Cogentin at [facility] and then sent to ED for further evaluation. Pt last episode of seizure was three weeks ago that was triggered by pt refusing to take his medication. Patient has 3 more episode of seizure in the ED.     In the ED, patient was found to have valproic acid level 39, phenytoin level 0.6, given ativan, tegretol, dilantin, valproate with improvement. patien   Seen by Dr. Sosa who recommend depakote IV 500mg stat and 200mg dilantin and was supposed to discharge on depakote to 750 and dilantin 200mg at night. 51 yo male PMHx Seizure disorder, MRDD, Anemia, osteoporosis presented  from [facility] for episode of seizure. As per Aide at bedside, patient had two episodes of general clonic tonic seizures at 12:15pm and 3:04pm lasting 1-2minuts, pt was given Cogentin at [facility] and then sent to ED for further evaluation. Pt last episode of seizure was three weeks ago that was triggered by pt refusing to take his medication. Patient has 3 more episode of seizure in the ED.     In the ED, patient was found to have valproic acid level 39, phenytoin level 0.6, given ativan, tegretol, dilantin, valproate with improvement. patien   Seen by Dr. Sosa who recommend depakote IV 500mg stat and 200mg dilantin and was supposed to discharge on depakote to 750 and dilantin 200mg at night but pt had 3 more episode of seizure while in the ED.

## 2017-04-07 NOTE — ED ADULT NURSE REASSESSMENT NOTE - NS ED NURSE REASSESS COMMENT FT1
pt having a seizure O2 on via NRB and Ativan given seizure last approx 30 seconds
Pt had seizure, no injury, caregivers at bedside. Dr Lr at bedside, Ativan given per order. Pt moved to bed 13, pt on cardiac monitor, continuous pulse on in place, suction as bedside.
incontinence care given   seizure precautions maintained   awaiting bed assignment
pt having a seizure, o2 on via NRB, ativan given as ordered
pt's aide came out of the room stating the pt was having a seizure. Pt removed his NRB off his face and opened his eyes and looked at RN. Pt did not appear like he was having a seizure. MD notified . As per aide, pt had 7 seizures here today

## 2017-04-07 NOTE — PROGRESS NOTE ADULT - SUBJECTIVE AND OBJECTIVE BOX
Chief Complaint: Seizure    HPI:  51 yo male PMHx Seizure disorder, MRDD, Anemia, osteoporosis presented  from [facility] for episode of seizure. As per Aide at bedside, patient had two episodes of general clonic tonic seizures at 12:15pm and 3:04pm lasting 1-2minuts, pt was given Cogentin at [facility] and then sent to ED for further evaluation. Pt last episode of seizure was three weeks ago that was triggered by pt refusing to take his medication.     In the ED, patient was found to have valproic acid level 39, phenytoin level 0.6, given ativan, tegretol, dilantin, valproate with improvement. patien   Seen by Dr. Sosa who recommend depakote IV 500mg stat and 200mg dilantin and was supposed to discharge on depakote to 750 and dilantin 200mg at night but pt had 3 more episode of seizure while in the ED. (07 Apr 2017 04:29). Admitted for persistent seizure.       REVIEW OF SYSTEMS:    unable to assess as pt is nonverbal.    T(C): 37.6, Max: 37.6 (04-07 @ 03:00)  HR: 76 (75 - 95)  BP: 128/74 (124/74 - 140/90)  RR: 16 (16 - 16)  SpO2: 98% (97% - 99%)  Wt(kg): --    PHYSICAL EXAM:     GENERAL: no acute distress  HEENT: NC/AT, EOMI, neck supple, MMM  RESPIRATORY: LCTAB/L, no rhonchi, rales, or wheezing  CARDIOVASCULAR: RRR, no murmurs, gallops, rubs  ABDOMINAL: soft, non-tender, non-distended, positive bowel sounds   EXTREMITIES: no clubbing, cyanosis, or edema  NEUROLOGICAL: alert and oriented x 3, non-focal  SKIN: no rashes or lesions   MUSCULOSKELETAL: no gross joint deformity  : intact                        13.1   5.7   )-----------( 194      ( 07 Apr 2017 08:45 )             39.8     04-07    141  |  105  |  14  ----------------------------<  87  3.6   |  26  |  0.64    Ca    8.2<L>      07 Apr 2017 08:45    TPro  7.2  /  Alb  3.0<L>  /  TBili  0.1<L>  /  DBili  x   /  AST  26  /  ALT  22  /  AlkPhos  44  04-06          MEDICATIONS  (STANDING):  levETIRAcetam 2000milliGRAM(s) Oral two times a day  risperiDONE   Tablet 3milliGRAM(s) Oral at bedtime  risperiDONE   Tablet 1milliGRAM(s) Oral daily  thiothixene 5milliGRAM(s) Oral at bedtime  benztropine 1milliGRAM(s) Oral four times a day  carBAMazepine XR Tablet 400milliGRAM(s) Oral at bedtime  carBAMazepine XR Tablet 400milliGRAM(s) Oral daily  phenytoin   Capsule 200milliGRAM(s) Oral at bedtime  enoxaparin Injectable 40milliGRAM(s) SubCutaneous every 24 hours  pantoprazole    Tablet 40milliGRAM(s) Oral before breakfast  diVALproex Sprinkle 750milliGRAM(s) Oral two times a day

## 2017-04-07 NOTE — H&P ADULT - ASSESSMENT
51 yo male PMHx Seizure disorder, MRDD, Anemia, osteoporosis presented  from [facility] for episode of seizure being admitted for multiple episode of seizure.

## 2017-04-07 NOTE — H&P ADULT - PROBLEM SELECTOR PLAN 1
-admit to F  -continue benztropine 1mg four times daily, Tegretol XR 400mg BID, Depakote sprinkle 500mg daily and 750mg at bedtime, Keppra 2000mg BID, Dilantin 200mg daily, seizure precaution, aspiration precaution, fall protocol.  -f/u dr. Sosa recs appreciated.

## 2017-04-07 NOTE — H&P ADULT - RS GEN PE MLT RESP DETAILS PC
clear to auscultation bilaterally/no rhonchi/no rales/no intercostal retractions/no wheezes/respirations non-labored

## 2017-04-07 NOTE — SWALLOW BEDSIDE ASSESSMENT ADULT - SWALLOW EVAL: RECOMMENDED FEEDING/EATING TECHNIQUES
hard swallow w/ each bite or sip/crush medication (when feasible)/maintain upright posture during/after eating for 30 mins/alternate food with liquid/position upright (90 degrees)/oral hygiene

## 2017-04-07 NOTE — PROGRESS NOTE ADULT - SUBJECTIVE AND OBJECTIVE BOX
Neurology follow up note    TIGRE [id number]      Interval History:    seen with aid doing well     MEDICATIONS    levETIRAcetam 2000milliGRAM(s) Oral two times a day  risperiDONE   Tablet 3milliGRAM(s) Oral at bedtime  risperiDONE   Tablet 1milliGRAM(s) Oral daily  thiothixene 5milliGRAM(s) Oral at bedtime  benztropine 1milliGRAM(s) Oral four times a day  carBAMazepine XR Tablet 400milliGRAM(s) Oral at bedtime  carBAMazepine XR Tablet 400milliGRAM(s) Oral daily  diVALproex Sprinkle 500milliGRAM(s) Oral daily  diVALproex Sprinkle 750milliGRAM(s) Oral at bedtime  phenytoin   Capsule 200milliGRAM(s) Oral at bedtime  enoxaparin Injectable 40milliGRAM(s) SubCutaneous every 24 hours  bisacodyl 5milliGRAM(s) Oral daily PRN  senna 2Tablet(s) Oral at bedtime PRN  pantoprazole    Tablet 40milliGRAM(s) Oral before breakfast      Allergies    Lamisil (Unknown)  peas, carrots, corn, beans (Diarrhea)  strawberry (Unknown)    Intolerances        Height (cm): 160 (04-06 @ 16:58)  Weight (kg): 80.3 (04-06 @ 16:58)  BMI (kg/m2): 31.4 (04-06 @ 16:58)    Vital Signs Last 24 Hrs  T(C): 37.6, Max: 37.8 (04-06 @ 21:07)  T(F): 99.6, Max: 100.1 (04-06 @ 21:07)  HR: 76 (75 - 95)  BP: 128/74 (108/70 - 140/90)  BP(mean): --  RR: 16 (14 - 16)  SpO2: 98% (97% - 99%)      REVIEW OF SYSTEMS: Non Verbal     On Neurological Examination:    Mental Status - Patient is alert       Does not follow commands    Speech -    Non Verbal                         Cranial Nerves - Pupils 3 mm equal and reactive to light,   extraocular eye movements intact.   smile symmetric  intact bilateral NLF    Motor Exam -     With stimuli positive movement of all 4 extremities    Muscle tone - is normal all over.  No asymmetry is seen.          GENERAL Exam: Nontoxic , No Acute Distress   	  HEENT:  normocephalic, atraumatic  		  LUNGS: Clear bilaterally  	  HEART: Normal S1S2   No murmur RRR        	  GI/ ABDOMEN:  Soft  Non tender    EXTREMITIES:   No Edema  No Clubbing  No Cyanosis No Edema    MUSCULOSKELETAL: Normal Range of Motion  	   SKIN: Normal  No Ecchymosis               LABS:  CBC Full  -  ( 06 Apr 2017 18:11 )  WBC Count : 5.1 K/uL  Hemoglobin : 12.8 g/dL  Hematocrit : 39.6 %  Platelet Count - Automated : 230 K/uL  Mean Cell Volume : 94.2 fl  Mean Cell Hemoglobin : 30.6 pg  Mean Cell Hemoglobin Concentration : 32.4 gm/dL  Auto Neutrophil # : 3.1 K/uL  Auto Lymphocyte # : 1.4 K/uL  Auto Monocyte # : 0.5 K/uL  Auto Eosinophil # : 0.1 K/uL  Auto Basophil # : 0.1 K/uL  Auto Neutrophil % : 60.1 %  Auto Lymphocyte % : 28.0 %  Auto Monocyte % : 9.3 %  Auto Eosinophil % : 1.4 %  Auto Basophil % : 1.1 %      04-06    141  |  105  |  23  ----------------------------<  100<H>  4.3   |  27  |  0.79    Ca    8.3<L>      06 Apr 2017 18:11    TPro  7.2  /  Alb  3.0<L>  /  TBili  0.1<L>  /  DBili  x   /  AST  26  /  ALT  22  /  AlkPhos  44  04-06    Hemoglobin A1C:     LIVER FUNCTIONS - ( 06 Apr 2017 18:11 )  Alb: 3.0 g/dL / Pro: 7.2 g/dL / ALK PHOS: 44 U/L / ALT: 22 U/L / AST: 26 U/L / GGT: x           Vitamin B12         RADIOLOGY                32 minutes spent on total encounter; more than 50% of the visit was spent counseling and/or coordinating care by the attending physician.

## 2017-04-08 DIAGNOSIS — Z02.9 ENCOUNTER FOR ADMINISTRATIVE EXAMINATIONS, UNSPECIFIED: ICD-10-CM

## 2017-04-08 DIAGNOSIS — F79 UNSPECIFIED INTELLECTUAL DISABILITIES: ICD-10-CM

## 2017-04-08 DIAGNOSIS — R13.10 DYSPHAGIA, UNSPECIFIED: ICD-10-CM

## 2017-04-08 LAB
ALBUMIN SERPL ELPH-MCNC: 3 G/DL — LOW (ref 3.3–5)
ALP SERPL-CCNC: 50 U/L — SIGNIFICANT CHANGE UP (ref 40–120)
ALT FLD-CCNC: 21 U/L — SIGNIFICANT CHANGE UP (ref 12–78)
ANION GAP SERPL CALC-SCNC: 8 MMOL/L — SIGNIFICANT CHANGE UP (ref 5–17)
AST SERPL-CCNC: 20 U/L — SIGNIFICANT CHANGE UP (ref 15–37)
BILIRUB SERPL-MCNC: 0.4 MG/DL — SIGNIFICANT CHANGE UP (ref 0.2–1.2)
BUN SERPL-MCNC: 13 MG/DL — SIGNIFICANT CHANGE UP (ref 7–23)
CALCIUM SERPL-MCNC: 8.5 MG/DL — SIGNIFICANT CHANGE UP (ref 8.5–10.1)
CHLORIDE SERPL-SCNC: 102 MMOL/L — SIGNIFICANT CHANGE UP (ref 96–108)
CO2 SERPL-SCNC: 27 MMOL/L — SIGNIFICANT CHANGE UP (ref 22–31)
CREAT SERPL-MCNC: 0.77 MG/DL — SIGNIFICANT CHANGE UP (ref 0.5–1.3)
GLUCOSE SERPL-MCNC: 79 MG/DL — SIGNIFICANT CHANGE UP (ref 70–99)
HCT VFR BLD CALC: 40.5 % — SIGNIFICANT CHANGE UP (ref 39–50)
HGB BLD-MCNC: 13.5 G/DL — SIGNIFICANT CHANGE UP (ref 13–17)
LEVETIRACETAM SERPL-MCNC: 6 MCG/ML — LOW (ref 12–46)
MAGNESIUM SERPL-MCNC: 1.9 MG/DL — SIGNIFICANT CHANGE UP (ref 1.8–2.4)
MCHC RBC-ENTMCNC: 31.2 PG — SIGNIFICANT CHANGE UP (ref 27–34)
MCHC RBC-ENTMCNC: 33.2 GM/DL — SIGNIFICANT CHANGE UP (ref 32–36)
MCV RBC AUTO: 93.8 FL — SIGNIFICANT CHANGE UP (ref 80–100)
PHENYTOIN FREE SERPL-MCNC: 8.7 UG/ML — LOW (ref 10–20)
PHOSPHATE SERPL-MCNC: 3.9 MG/DL — SIGNIFICANT CHANGE UP (ref 2.5–4.5)
PLATELET # BLD AUTO: 182 K/UL — SIGNIFICANT CHANGE UP (ref 150–400)
POTASSIUM SERPL-MCNC: 4.1 MMOL/L — SIGNIFICANT CHANGE UP (ref 3.5–5.3)
POTASSIUM SERPL-SCNC: 4.1 MMOL/L — SIGNIFICANT CHANGE UP (ref 3.5–5.3)
PROT SERPL-MCNC: 7.3 G/DL — SIGNIFICANT CHANGE UP (ref 6–8.3)
RBC # BLD: 4.32 M/UL — SIGNIFICANT CHANGE UP (ref 4.2–5.8)
RBC # FLD: 13 % — SIGNIFICANT CHANGE UP (ref 10.3–14.5)
SODIUM SERPL-SCNC: 137 MMOL/L — SIGNIFICANT CHANGE UP (ref 135–145)
VALPROATE SERPL-MCNC: 75 UG/ML — SIGNIFICANT CHANGE UP (ref 50–100)
WBC # BLD: 5.9 K/UL — SIGNIFICANT CHANGE UP (ref 3.8–10.5)
WBC # FLD AUTO: 5.9 K/UL — SIGNIFICANT CHANGE UP (ref 3.8–10.5)

## 2017-04-08 PROCEDURE — 99233 SBSQ HOSP IP/OBS HIGH 50: CPT

## 2017-04-08 RX ORDER — DIVALPROEX SODIUM 500 MG/1
6 TABLET, DELAYED RELEASE ORAL
Qty: 0 | Refills: 0 | COMMUNITY
Start: 2017-04-08

## 2017-04-08 RX ORDER — CARBAMAZEPINE 200 MG
800 TABLET ORAL AT BEDTIME
Qty: 0 | Refills: 0 | Status: DISCONTINUED | OUTPATIENT
Start: 2017-04-08 | End: 2017-04-09

## 2017-04-08 RX ORDER — DIVALPROEX SODIUM 500 MG/1
4 TABLET, DELAYED RELEASE ORAL
Qty: 0 | Refills: 0 | COMMUNITY

## 2017-04-08 RX ORDER — THIOTHIXENE 5 MG
5 CAPSULE ORAL
Qty: 0 | Refills: 0 | COMMUNITY

## 2017-04-08 RX ORDER — SODIUM CHLORIDE 9 MG/ML
2 INJECTION INTRAMUSCULAR; INTRAVENOUS; SUBCUTANEOUS
Qty: 0 | Refills: 0 | COMMUNITY

## 2017-04-08 RX ORDER — DIVALPROEX SODIUM 500 MG/1
6 TABLET, DELAYED RELEASE ORAL
Qty: 0 | Refills: 0 | COMMUNITY

## 2017-04-08 RX ORDER — BENZTROPINE MESYLATE 1 MG
1 TABLET ORAL
Qty: 0 | Refills: 0 | COMMUNITY

## 2017-04-08 RX ADMIN — DIVALPROEX SODIUM 750 MILLIGRAM(S): 500 TABLET, DELAYED RELEASE ORAL at 05:41

## 2017-04-08 RX ADMIN — LEVETIRACETAM 2000 MILLIGRAM(S): 250 TABLET, FILM COATED ORAL at 18:40

## 2017-04-08 RX ADMIN — Medication 1 MILLIGRAM(S): at 12:12

## 2017-04-08 RX ADMIN — Medication 400 MILLIGRAM(S): at 12:12

## 2017-04-08 RX ADMIN — Medication 1 MILLIGRAM(S): at 18:44

## 2017-04-08 RX ADMIN — PANTOPRAZOLE SODIUM 40 MILLIGRAM(S): 20 TABLET, DELAYED RELEASE ORAL at 05:42

## 2017-04-08 RX ADMIN — Medication 200 MILLIGRAM(S): at 21:31

## 2017-04-08 RX ADMIN — DIVALPROEX SODIUM 750 MILLIGRAM(S): 500 TABLET, DELAYED RELEASE ORAL at 18:43

## 2017-04-08 RX ADMIN — Medication 1 MILLIGRAM(S): at 00:33

## 2017-04-08 RX ADMIN — RISPERIDONE 1 MILLIGRAM(S): 4 TABLET ORAL at 12:12

## 2017-04-08 RX ADMIN — Medication 1 MILLIGRAM(S): at 05:39

## 2017-04-08 RX ADMIN — LEVETIRACETAM 2000 MILLIGRAM(S): 250 TABLET, FILM COATED ORAL at 05:42

## 2017-04-08 RX ADMIN — Medication 5 MILLIGRAM(S): at 21:31

## 2017-04-08 RX ADMIN — Medication 800 MILLIGRAM(S): at 21:31

## 2017-04-08 RX ADMIN — RISPERIDONE 3 MILLIGRAM(S): 4 TABLET ORAL at 21:31

## 2017-04-08 RX ADMIN — ENOXAPARIN SODIUM 40 MILLIGRAM(S): 100 INJECTION SUBCUTANEOUS at 05:39

## 2017-04-08 NOTE — PROGRESS NOTE ADULT - ASSESSMENT
51 yo male PMHx Seizure disorder, MRDD, Anemia, osteoporosis presented  from [facility] for episode of seizure being admitted for multiple episodes of seizure.

## 2017-04-08 NOTE — PROGRESS NOTE ADULT - SUBJECTIVE AND OBJECTIVE BOX
Neurology follow up note    TIGRE [id number]      Interval History:    Patient feels ok no new complaints.    MEDICATIONS    levETIRAcetam 2000milliGRAM(s) Oral two times a day  risperiDONE   Tablet 3milliGRAM(s) Oral at bedtime  risperiDONE   Tablet 1milliGRAM(s) Oral daily  thiothixene 5milliGRAM(s) Oral at bedtime  benztropine 1milliGRAM(s) Oral four times a day  carBAMazepine XR Tablet 400milliGRAM(s) Oral daily  phenytoin   Capsule 200milliGRAM(s) Oral at bedtime  enoxaparin Injectable 40milliGRAM(s) SubCutaneous every 24 hours  bisacodyl 5milliGRAM(s) Oral daily PRN  senna 2Tablet(s) Oral at bedtime PRN  pantoprazole    Tablet 40milliGRAM(s) Oral before breakfast  diVALproex Sprinkle 750milliGRAM(s) Oral two times a day  carBAMazepine XR Tablet 800milliGRAM(s) Oral at bedtime      Allergies    Lamisil (Unknown)  peas, carrots, corn, beans (Diarrhea)  strawberry (Unknown)    Intolerances            Vital Signs Last 24 Hrs  T(C): 36.7, Max: 36.7 (04-07 @ 18:20)  T(F): 98, Max: 98 (04-07 @ 18:20)  HR: 72 (68 - 83)  BP: 107/71 (97/60 - 141/67)  BP(mean): --  RR: 16 (16 - 16)  SpO2: 99% (97% - 99%)      REVIEW OF SYSTEMS: Non Verbal     On Neurological Examination:    Mental Status - Patient is alert       Does not follow commands    Speech -    Non Verbal                         Cranial Nerves - Pupils 3 mm equal and reactive to light,   extraocular eye movements intact.   smile symmetric  intact bilateral NLF    Motor Exam -     With stimuli positive movement of all 4 extremities    Muscle tone - is normal all over.  No asymmetry is seen.          GENERAL Exam: Nontoxic , No Acute Distress   	  HEENT:  normocephalic, atraumatic  		  LUNGS: Clear bilaterally  	  HEART: Normal S1S2   No murmur RRR        	  GI/ ABDOMEN:  Soft  Non tender    EXTREMITIES:   No Edema  No Clubbing  No Cyanosis No Edema    MUSCULOSKELETAL: Normal Range of Motion  	   SKIN: Normal  No Ecchymosis                  LABS:  CBC Full  -  ( 08 Apr 2017 08:36 )  WBC Count : 5.9 K/uL  Hemoglobin : 13.5 g/dL  Hematocrit : 40.5 %  Platelet Count - Automated : 182 K/uL  Mean Cell Volume : 93.8 fl  Mean Cell Hemoglobin : 31.2 pg  Mean Cell Hemoglobin Concentration : 33.2 gm/dL  Auto Neutrophil # : x  Auto Lymphocyte # : x  Auto Monocyte # : x  Auto Eosinophil # : x  Auto Basophil # : x  Auto Neutrophil % : x  Auto Lymphocyte % : x  Auto Monocyte % : x  Auto Eosinophil % : x  Auto Basophil % : x      04-08    137  |  102  |  13  ----------------------------<  79  4.1   |  27  |  0.77    Ca    8.5      08 Apr 2017 08:36  Phos  3.9     04-08  Mg     1.9     04-08    TPro  7.3  /  Alb  3.0<L>  /  TBili  0.4  /  DBili  x   /  AST  20  /  ALT  21  /  AlkPhos  50  04-08    Hemoglobin A1C:     LIVER FUNCTIONS - ( 08 Apr 2017 08:36 )  Alb: 3.0 g/dL / Pro: 7.3 g/dL / ALK PHOS: 50 U/L / ALT: 21 U/L / AST: 20 U/L / GGT: x           Vitamin B12         RADIOLOGY          .

## 2017-04-08 NOTE — PROGRESS NOTE ADULT - SUBJECTIVE AND OBJECTIVE BOX
HPI:  53 yo male PMHx Seizure disorder, MRDD, Anemia, osteoporosis presented  [facility] seizures. Had 2 at group home and 3 more after arrival to ED. Likely secondary to patient's poor compliance at group home. Patient's anticonvulsants given at regular doses and Dilantin dosage increased based on level, and patient has remained seizure free since arrival to the general medical floor. Per aide at bedside, patient at baseline and no seizure activity overnight.       REVIEW OF SYSTEMS: unable to obtain    VITAL SIGNS:  Vital Signs Last 24 Hrs  T(C): 36.8, Max: 36.8 (04-08 @ 13:38)  T(F): 98.2, Max: 98.2 (04-08 @ 13:38)  HR: 79 (68 - 83)  BP: 94/58 (94/58 - 141/67)  BP(mean): --  RR: 17 (16 - 17)  SpO2: 97% (97% - 99%)      PHYSICAL EXAM:     GENERAL: no acute distress  HEENT: NC/AT, EOMI, neck supple, MMM  RESPIRATORY: LCTAB/L, no rhonchi, rales, or wheezing  CARDIOVASCULAR: RRR, no murmurs, gallops, rubs  ABDOMINAL: soft, non-tender, non-distended, positive bowel sounds   EXTREMITIES: no clubbing, cyanosis, or edema  NEUROLOGICAL: Somnolent but arousable  SKIN: no rashes or lesions   MUSCULOSKELETAL: no gross joint deformity                          13.5   5.9   )-----------( 182      ( 08 Apr 2017 08:36 )             40.5     04-08    137  |  102  |  13  ----------------------------<  79  4.1   |  27  |  0.77    Ca    8.5      08 Apr 2017 08:36  Phos  3.9     04-08  Mg     1.9     04-08    TPro  7.3  /  Alb  3.0<L>  /  TBili  0.4  /  DBili  x   /  AST  20  /  ALT  21  /  AlkPhos  50  04-08          MEDICATIONS  (STANDING):  levETIRAcetam 2000milliGRAM(s) Oral two times a day  risperiDONE   Tablet 3milliGRAM(s) Oral at bedtime  risperiDONE   Tablet 1milliGRAM(s) Oral daily  thiothixene 5milliGRAM(s) Oral at bedtime  benztropine 1milliGRAM(s) Oral four times a day  carBAMazepine XR Tablet 400milliGRAM(s) Oral daily  phenytoin   Capsule 200milliGRAM(s) Oral at bedtime  enoxaparin Injectable 40milliGRAM(s) SubCutaneous every 24 hours  pantoprazole    Tablet 40milliGRAM(s) Oral before breakfast  diVALproex Sprinkle 750milliGRAM(s) Oral two times a day  carBAMazepine XR Tablet 800milliGRAM(s) Oral at bedtime

## 2017-04-08 NOTE — PROGRESS NOTE ADULT - PROBLEM SELECTOR PLAN 6
Patient medically stable for d/c. Social work notified and will get approval from [facility] for patient to return.

## 2017-04-09 ENCOUNTER — TRANSCRIPTION ENCOUNTER (OUTPATIENT)
Age: 52
End: 2017-04-09

## 2017-04-09 VITALS
HEART RATE: 71 BPM | RESPIRATION RATE: 17 BRPM | TEMPERATURE: 98 F | DIASTOLIC BLOOD PRESSURE: 68 MMHG | SYSTOLIC BLOOD PRESSURE: 103 MMHG | OXYGEN SATURATION: 98 %

## 2017-04-09 PROCEDURE — 84100 ASSAY OF PHOSPHORUS: CPT

## 2017-04-09 PROCEDURE — 96375 TX/PRO/DX INJ NEW DRUG ADDON: CPT

## 2017-04-09 PROCEDURE — 85027 COMPLETE CBC AUTOMATED: CPT

## 2017-04-09 PROCEDURE — 96365 THER/PROPH/DIAG IV INF INIT: CPT

## 2017-04-09 PROCEDURE — 80053 COMPREHEN METABOLIC PANEL: CPT

## 2017-04-09 PROCEDURE — 99239 HOSP IP/OBS DSCHRG MGMT >30: CPT

## 2017-04-09 PROCEDURE — 96372 THER/PROPH/DIAG INJ SC/IM: CPT | Mod: 59

## 2017-04-09 PROCEDURE — 80164 ASSAY DIPROPYLACETIC ACD TOT: CPT

## 2017-04-09 PROCEDURE — 80185 ASSAY OF PHENYTOIN TOTAL: CPT

## 2017-04-09 PROCEDURE — 80048 BASIC METABOLIC PNL TOTAL CA: CPT

## 2017-04-09 PROCEDURE — 80156 ASSAY CARBAMAZEPINE TOTAL: CPT

## 2017-04-09 PROCEDURE — 96376 TX/PRO/DX INJ SAME DRUG ADON: CPT

## 2017-04-09 PROCEDURE — 80177 DRUG SCRN QUAN LEVETIRACETAM: CPT

## 2017-04-09 PROCEDURE — 83735 ASSAY OF MAGNESIUM: CPT

## 2017-04-09 PROCEDURE — 99285 EMERGENCY DEPT VISIT HI MDM: CPT | Mod: 25

## 2017-04-09 RX ADMIN — DIVALPROEX SODIUM 750 MILLIGRAM(S): 500 TABLET, DELAYED RELEASE ORAL at 05:38

## 2017-04-09 RX ADMIN — RISPERIDONE 1 MILLIGRAM(S): 4 TABLET ORAL at 11:34

## 2017-04-09 RX ADMIN — Medication 1 MILLIGRAM(S): at 11:34

## 2017-04-09 RX ADMIN — LEVETIRACETAM 2000 MILLIGRAM(S): 250 TABLET, FILM COATED ORAL at 05:34

## 2017-04-09 RX ADMIN — ENOXAPARIN SODIUM 40 MILLIGRAM(S): 100 INJECTION SUBCUTANEOUS at 05:38

## 2017-04-09 RX ADMIN — Medication 1 MILLIGRAM(S): at 00:58

## 2017-04-09 RX ADMIN — Medication 1 MILLIGRAM(S): at 05:34

## 2017-04-09 RX ADMIN — PANTOPRAZOLE SODIUM 40 MILLIGRAM(S): 20 TABLET, DELAYED RELEASE ORAL at 05:34

## 2017-04-09 RX ADMIN — Medication 400 MILLIGRAM(S): at 11:34

## 2017-04-09 NOTE — DISCHARGE NOTE ADULT - HOSPITAL COURSE
53 yo male PMHx Seizure disorder, MRDD, Anemia, osteoporosis presented  from [facility] for episode of seizure. As per Aide at bedside, patient had two episodes of general clonic tonic seizures at 12:15pm and 3:04pm lasting 1-2minuts, pt was given Cogentin at [facility] and then sent to ED for further evaluation. Pt last episode of seizure was three weeks ago that was triggered by pt refusing to take his medication. Patient has 3 more episode of seizure in the ED.     In the ED, patient was found to have valproic acid level 39, phenytoin level 0.6, given ativan, tegretol, dilantin, valproate with improvement. Seen by Dr. Sosa who recommend depakote IV 500mg stat and 200mg dilantin and was supposed to discharge on depakote to 750 and dilantin 200mg at night but pt had 3 more episode of seizure while in the ED.         Patient had dose of dilantin increased to 200 mg at bedtime. He remained seizure free following the aforementioned seizures.     Patient was cleared for discharge back to group home. A prescription was sent to [facility] for the increased dosage of Dilantin.     Patient seen and examined on day of discharge:  VS: T98.1, HR71, /68, RR17, F8srh85%RA  Gen: Awake, alert, minimally interactive  HEENT: NC/AT, neck supple, MMM  Resp: LCTAB/L, no R/R/W  Card: RRR, no M/G/R  Abd: soft, NT, ND, +BS  Ext: no C/C/E  Neuro: non-focal    Discharge plan discussed with .    Time spent: 35 minutes.

## 2017-04-09 NOTE — PROGRESS NOTE ADULT - PROBLEM SELECTOR PROBLEM 3
GERD (gastroesophageal reflux disease)
GERD (gastroesophageal reflux disease)
Need for prophylactic measure
Need for prophylactic measure
GERD (gastroesophageal reflux disease)

## 2017-04-09 NOTE — DISCHARGE NOTE ADULT - CARE PLAN
Principal Discharge DX:	Seizure disorder  Goal:	prevention of seizure activity  Instructions for follow-up, activity and diet:	Continue seizure meds as prescribed. A new prescription has been sent to [facility] for the increased strength of Phenytoin. It is very important patient take meds as prescribed, and if he is not, his primary care physician should be notified promptly so that alternative plans for administration can be made. He should not miss doses.     Additionally, he should have medication levels checked again soon after discharge.  Secondary Diagnosis:	Cataract  Instructions for follow-up, activity and diet:	Supportive care  Secondary Diagnosis:	Dysphagia  Instructions for follow-up, activity and diet:	Mechanical soft diet with nectar thick liquids.  Secondary Diagnosis:	Mental retardation  Instructions for follow-up, activity and diet:	Supportive care in group home. May resume all activities as previously involved. Principal Discharge DX:	Seizure disorder  Goal:	prevention of seizure activity  Instructions for follow-up, activity and diet:	Continue seizure meds as prescribed. A new prescription has been sent to [facility] for the increased strength of Phenytoin. It is very important patient take meds as prescribed, and if he is not, his primary care physician should be notified promptly so that alternative plans for administration can be made. He should not miss doses.     Additionally, he should have medication levels checked again soon after discharge.  Secondary Diagnosis:	Cataract  Instructions for follow-up, activity and diet:	Supportive care  Secondary Diagnosis:	Dysphagia  Instructions for follow-up, activity and diet:	Mechanical soft diet with nectar thick liquids.  Secondary Diagnosis:	Mental retardation  Instructions for follow-up, activity and diet:	Supportive care in group home. May resume all activities as previously involved.

## 2017-04-09 NOTE — DISCHARGE NOTE ADULT - INSTRUCTIONS
Patient may resume previous activities, medications (with the exceptions noted in this document...Phenytoin increased to 200 mg at bedtime)    He should have a mechanical soft diet with nectar thick liquids at all times.

## 2017-04-09 NOTE — PROGRESS NOTE ADULT - PROBLEM SELECTOR PLAN 1
follow up levels of depakote and dilantin for now continue current medication at current doses  adjust as needed  continue other AED  ativan 1mg ivp q6h prn for seizures  seizure precautions  neurology wise no new seizures ok for discharge planning
follow up levels of depakote and dilantin for now continue current medication at current doses  adjust as needed  no new seizures  ok to discharge from neurology   continue other AED  ativan 1mg ivp q6h prn for seizures  seizure precautions  neurology wise no new seizures ok for discharge planning
Persistent seizures, valproic acid level and phenytoin level low in ED  Continue Benztropine, Tegretol XR 400mg BID, Depakote sprinkle 500mg daily and 750mg at bedtime, Keppra 2000mg BID, Dilantin 200mg daily  F/u Depakote and dilantin levels  Continue ativan PRN for seizures  F/u Neurology Dr. Sosa recommendations  Seizure precaution, aspiration precaution, fall protocol.
Persistent seizures, valproic acid level and phenytoin level low in ED  Continue Benztropine, Tegretol XR 400mg BID, Depakote sprinkle 750mg BID, Keppra 2000mg BID, Dilantin 200mg qHS  Continue ativan PRN for seizures  Patient seizure free since arrival to [facility].   Stable for d/c to group home per Neuro.   Seizure precaution, aspiration precaution, fall protocol.
follow up levels of depakote and dilantin   adjust as needed  continue other AED  ativan 1mg ivp q6h prn for seizures  seizure precautions

## 2017-04-09 NOTE — DISCHARGE NOTE ADULT - CONDITIONS AT DISCHARGE
instructions given to staff from [facility], patient sent home in his own wheelchair with discharge instructions

## 2017-04-09 NOTE — DISCHARGE NOTE ADULT - PLAN OF CARE
prevention of seizure activity Continue seizure meds as prescribed. A new prescription has been sent to [facility] for the increased strength of Phenytoin. It is very important patient take meds as prescribed, and if he is not, his primary care physician should be notified promptly so that alternative plans for administration can be made. He should not miss doses.     Additionally, he should have medication levels checked again soon after discharge. Supportive care Mechanical soft diet with nectar thick liquids. Supportive care in group home. May resume all activities as previously involved.

## 2017-04-09 NOTE — PROGRESS NOTE ADULT - SUBJECTIVE AND OBJECTIVE BOX
Neurology follow up note    TIGRE [id number]      Interval History:    seen with aid no new events had breakfast     MEDICATIONS    levETIRAcetam 2000milliGRAM(s) Oral two times a day  risperiDONE   Tablet 3milliGRAM(s) Oral at bedtime  risperiDONE   Tablet 1milliGRAM(s) Oral daily  thiothixene 5milliGRAM(s) Oral at bedtime  benztropine 1milliGRAM(s) Oral four times a day  carBAMazepine XR Tablet 400milliGRAM(s) Oral daily  phenytoin   Capsule 200milliGRAM(s) Oral at bedtime  enoxaparin Injectable 40milliGRAM(s) SubCutaneous every 24 hours  bisacodyl 5milliGRAM(s) Oral daily PRN  senna 2Tablet(s) Oral at bedtime PRN  pantoprazole    Tablet 40milliGRAM(s) Oral before breakfast  diVALproex Sprinkle 750milliGRAM(s) Oral two times a day  carBAMazepine XR Tablet 800milliGRAM(s) Oral at bedtime      Allergies    Lamisil (Unknown)  peas, carrots, corn, beans (Diarrhea)  strawberry (Unknown)    Intolerances            Vital Signs Last 24 Hrs  T(C): 36.7, Max: 36.8 (04-08 @ 13:38)  T(F): 98.1, Max: 98.2 (04-08 @ 13:38)  HR: 71 (71 - 80)  BP: 103/68 (94/58 - 114/72)  BP(mean): --  RR: 17 (17 - 17)  SpO2: 98% (97% - 98%)      REVIEW OF SYSTEMS: Non Verbal     On Neurological Examination:    Mental Status - Patient is alert       Does not follow commands    Speech -    Non Verbal                         Cranial Nerves - Pupils 3 mm equal and reactive to light,   extraocular eye movements intact.   smile symmetric  intact bilateral NLF    Motor Exam -     With stimuli positive movement of all 4 extremities    Muscle tone - is normal all over.  No asymmetry is seen.          GENERAL Exam: Nontoxic , No Acute Distress   	  HEENT:  normocephalic, atraumatic  		  LUNGS: Clear bilaterally  	  HEART: Normal S1S2   No murmur RRR        	  GI/ ABDOMEN:  Soft  Non tender    EXTREMITIES:   No Edema  No Clubbing  No Cyanosis No Edema    MUSCULOSKELETAL: Normal Range of Motion  	   SKIN: Normal  No Ecchymosis                  LABS:  CBC Full  -  ( 08 Apr 2017 08:36 )  WBC Count : 5.9 K/uL  Hemoglobin : 13.5 g/dL  Hematocrit : 40.5 %  Platelet Count - Automated : 182 K/uL  Mean Cell Volume : 93.8 fl  Mean Cell Hemoglobin : 31.2 pg  Mean Cell Hemoglobin Concentration : 33.2 gm/dL  Auto Neutrophil # : x  Auto Lymphocyte # : x  Auto Monocyte # : x  Auto Eosinophil # : x  Auto Basophil # : x  Auto Neutrophil % : x  Auto Lymphocyte % : x  Auto Monocyte % : x  Auto Eosinophil % : x  Auto Basophil % : x      04-08    137  |  102  |  13  ----------------------------<  79  4.1   |  27  |  0.77    Ca    8.5      08 Apr 2017 08:36  Phos  3.9     04-08  Mg     1.9     04-08    TPro  7.3  /  Alb  3.0<L>  /  TBili  0.4  /  DBili  x   /  AST  20  /  ALT  21  /  AlkPhos  50  04-08    Hemoglobin A1C:     LIVER FUNCTIONS - ( 08 Apr 2017 08:36 )  Alb: 3.0 g/dL / Pro: 7.3 g/dL / ALK PHOS: 50 U/L / ALT: 21 U/L / AST: 20 U/L / GGT: x           Vitamin B12         RADIOLOGY          Physical therapy evaluation.  OOB to chair/ambulation with assistance only.  Advanced care planning was discussed with family.  Pain is accessed and addressed.  Pt was screened for signs of clinical depression. Appropriate referral made.  Risk of falls accessed. Fall prevention and plan of care was discussed with family.  Pt is screened for tobacco and alcohol use. Counseling was done for smoking and alcohol cessation.  Use of narcotic pain meds was dicussed. Pt is advised to use narcotic meds wisely and to refrain from over using them.  Plan of care was discussed with family. Questions answered.  Would continue to follow.  32 minutes spent on total encounter; more than 50% of the visit was spent counseling and/or coordinating care by the attending physician.

## 2017-04-09 NOTE — PROGRESS NOTE ADULT - PROBLEM SELECTOR PROBLEM 2
Cataract
Cataract
GERD (gastroesophageal reflux disease)
GERD (gastroesophageal reflux disease)
Cataract

## 2017-04-09 NOTE — DISCHARGE NOTE ADULT - MEDICATION SUMMARY - MEDICATIONS TO TAKE
I will START or STAY ON the medications listed below when I get home from the hospital:    phenytoin 200 mg oral capsule, extended release  -- 1 cap(s) by mouth once a day (at bedtime)  -- Indication: For Seizure disorder    divalproex sodium 125 mg oral delayed release capsule  -- 6 cap(s) by mouth 2 times a day  -- Indication: For Seizure disorder    levETIRAcetam 1000 mg oral tablet  -- 2 tab(s) by mouth 2 times a day  -- Indication: For Seizure disorder    carBAMazepine 400 mg oral tablet, extended release  -- 2 tab(s) by mouth once a day (at bedtime)  -- Indication: For Seizure disorder    carBAMazepine 400 mg oral tablet, extended release  -- 1 tab(s) by mouth once a day  -- Indication: For Seizure disorder    clonazePAM 1 mg oral tablet  -- 1  by mouth 2 times a day  -- Indication: For Behavior    benztropine 1 mg oral tablet  -- 1 tab(s) by mouth 4 times a day  -- Indication: For Seizure disorder    RisperDAL 1 mg oral tablet  -- 1 tab(s) by mouth once a day  -- Indication: For Behavior    thiothixene 5 mg oral capsule  -- 1 cap(s) by mouth once a day (at bedtime)  -- Indication: For Behavior    risperiDONE 3 mg oral tablet  -- 1 tab(s) by mouth once a day (at bedtime)  -- Indication: For Behavior    Senokot S 50 mg-8.6 mg oral tablet  -- 1 dose(s) by mouth once a day (at bedtime)  -- Indication: For Constipation    bisacodyl 10 mg rectal suppository  -- 1 suppository(ies) rectally once a day (if no bowel movement on 3rd day)  -- Indication: For Constipation    sodium chloride 1 g oral tablet  -- 2 gram(s) by mouth once a day  -- Indication: For Hyponatremia    folic acid 1 mg oral tablet  -- 1 tab(s) by mouth once a day  -- Indication: For Supplement

## 2017-04-09 NOTE — DISCHARGE NOTE ADULT - MEDICATION SUMMARY - MEDICATIONS TO CHANGE
I will SWITCH the dose or number of times a day I take the medications listed below when I get home from the hospital:    phenytoin 100 mg oral capsule, extended release  -- 1 cap(s) by mouth once a day (at bedtime)

## 2017-04-12 DIAGNOSIS — H26.9 UNSPECIFIED CATARACT: ICD-10-CM

## 2017-04-12 DIAGNOSIS — D64.9 ANEMIA, UNSPECIFIED: ICD-10-CM

## 2017-04-12 DIAGNOSIS — F72 SEVERE INTELLECTUAL DISABILITIES: ICD-10-CM

## 2017-04-12 DIAGNOSIS — R56.9 UNSPECIFIED CONVULSIONS: ICD-10-CM

## 2017-04-12 DIAGNOSIS — K21.9 GASTRO-ESOPHAGEAL REFLUX DISEASE WITHOUT ESOPHAGITIS: ICD-10-CM

## 2017-04-12 DIAGNOSIS — R13.10 DYSPHAGIA, UNSPECIFIED: ICD-10-CM

## 2017-04-12 DIAGNOSIS — G40.909 EPILEPSY, UNSPECIFIED, NOT INTRACTABLE, WITHOUT STATUS EPILEPTICUS: ICD-10-CM

## 2017-04-12 DIAGNOSIS — M81.0 AGE-RELATED OSTEOPOROSIS WITHOUT CURRENT PATHOLOGICAL FRACTURE: ICD-10-CM

## 2017-04-28 ENCOUNTER — EMERGENCY (EMERGENCY)
Facility: HOSPITAL | Age: 52
LOS: 1 days | Discharge: HOME | End: 2017-04-28
Attending: EMERGENCY MEDICINE | Admitting: EMERGENCY MEDICINE
Payer: MEDICARE

## 2017-04-28 VITALS
TEMPERATURE: 98 F | DIASTOLIC BLOOD PRESSURE: 71 MMHG | HEART RATE: 87 BPM | OXYGEN SATURATION: 95 % | SYSTOLIC BLOOD PRESSURE: 113 MMHG | HEIGHT: 67 IN | WEIGHT: 199.96 LBS | RESPIRATION RATE: 14 BRPM

## 2017-04-28 VITALS
DIASTOLIC BLOOD PRESSURE: 74 MMHG | RESPIRATION RATE: 14 BRPM | OXYGEN SATURATION: 95 % | SYSTOLIC BLOOD PRESSURE: 114 MMHG | HEART RATE: 86 BPM

## 2017-04-28 DIAGNOSIS — G40.909 EPILEPSY, UNSPECIFIED, NOT INTRACTABLE, WITHOUT STATUS EPILEPTICUS: ICD-10-CM

## 2017-04-28 DIAGNOSIS — Z91.018 ALLERGY TO OTHER FOODS: ICD-10-CM

## 2017-04-28 DIAGNOSIS — Z88.8 ALLERGY STATUS TO OTHER DRUGS, MEDICAMENTS AND BIOLOGICAL SUBSTANCES STATUS: ICD-10-CM

## 2017-04-28 LAB
ANION GAP SERPL CALC-SCNC: 13 MMOL/L — SIGNIFICANT CHANGE UP (ref 5–17)
BUN SERPL-MCNC: 17 MG/DL — SIGNIFICANT CHANGE UP (ref 7–23)
CALCIUM SERPL-MCNC: 8.7 MG/DL — SIGNIFICANT CHANGE UP (ref 8.5–10.1)
CHLORIDE SERPL-SCNC: 102 MMOL/L — SIGNIFICANT CHANGE UP (ref 96–108)
CO2 SERPL-SCNC: 22 MMOL/L — SIGNIFICANT CHANGE UP (ref 22–31)
CREAT SERPL-MCNC: 0.88 MG/DL — SIGNIFICANT CHANGE UP (ref 0.5–1.3)
GLUCOSE SERPL-MCNC: 109 MG/DL — HIGH (ref 70–99)
HCT VFR BLD CALC: 41.8 % — SIGNIFICANT CHANGE UP (ref 39–50)
HGB BLD-MCNC: 13.9 G/DL — SIGNIFICANT CHANGE UP (ref 13–17)
MCHC RBC-ENTMCNC: 31 PG — SIGNIFICANT CHANGE UP (ref 27–34)
MCHC RBC-ENTMCNC: 33.1 GM/DL — SIGNIFICANT CHANGE UP (ref 32–36)
MCV RBC AUTO: 93.7 FL — SIGNIFICANT CHANGE UP (ref 80–100)
PHENYTOIN FREE SERPL-MCNC: 4.3 UG/ML — LOW (ref 10–20)
PLATELET # BLD AUTO: 252 K/UL — SIGNIFICANT CHANGE UP (ref 150–400)
POTASSIUM SERPL-MCNC: 3.6 MMOL/L — SIGNIFICANT CHANGE UP (ref 3.5–5.3)
POTASSIUM SERPL-SCNC: 3.6 MMOL/L — SIGNIFICANT CHANGE UP (ref 3.5–5.3)
RBC # BLD: 4.46 M/UL — SIGNIFICANT CHANGE UP (ref 4.2–5.8)
RBC # FLD: 13.1 % — SIGNIFICANT CHANGE UP (ref 10.3–14.5)
SODIUM SERPL-SCNC: 137 MMOL/L — SIGNIFICANT CHANGE UP (ref 135–145)
VALPROATE SERPL-MCNC: 44 UG/ML — LOW (ref 50–100)
WBC # BLD: 4.9 K/UL — SIGNIFICANT CHANGE UP (ref 3.8–10.5)
WBC # FLD AUTO: 4.9 K/UL — SIGNIFICANT CHANGE UP (ref 3.8–10.5)

## 2017-04-28 PROCEDURE — 96361 HYDRATE IV INFUSION ADD-ON: CPT

## 2017-04-28 PROCEDURE — 80164 ASSAY DIPROPYLACETIC ACD TOT: CPT

## 2017-04-28 PROCEDURE — 99284 EMERGENCY DEPT VISIT MOD MDM: CPT | Mod: 25

## 2017-04-28 PROCEDURE — 80185 ASSAY OF PHENYTOIN TOTAL: CPT

## 2017-04-28 PROCEDURE — 99284 EMERGENCY DEPT VISIT MOD MDM: CPT

## 2017-04-28 PROCEDURE — 96374 THER/PROPH/DIAG INJ IV PUSH: CPT

## 2017-04-28 PROCEDURE — 80048 BASIC METABOLIC PNL TOTAL CA: CPT

## 2017-04-28 PROCEDURE — 85027 COMPLETE CBC AUTOMATED: CPT

## 2017-04-28 RX ORDER — SODIUM CHLORIDE 9 MG/ML
1000 INJECTION INTRAMUSCULAR; INTRAVENOUS; SUBCUTANEOUS ONCE
Qty: 0 | Refills: 0 | Status: COMPLETED | OUTPATIENT
Start: 2017-04-28 | End: 2017-04-28

## 2017-04-28 RX ADMIN — Medication 1 MILLIGRAM(S): at 18:55

## 2017-04-28 RX ADMIN — SODIUM CHLORIDE 1000 MILLILITER(S): 9 INJECTION INTRAMUSCULAR; INTRAVENOUS; SUBCUTANEOUS at 19:20

## 2017-04-28 NOTE — ED ADULT NURSE REASSESSMENT NOTE - NS ED NURSE REASSESS COMMENT FT1
Pt had incontinent episode of urine and stool.  Pt cleaned & dried, new diaper and gown given.  Pt to be D/C back to facility, awaiting van pickup.  Asked caregiver to advise house to send new clothes; will also check with OR for disposable scrubs.

## 2017-04-28 NOTE — ED ADULT NURSE NOTE - OBJECTIVE STATEMENT
Pt to ED via ambulance sent by facility for evaluation of seizures.  Pt has history of MR/DD, nonverbal.  Per paperwork from facility and report from caregiver at bedside, pt had generalized seizure with full body shaking for several minutes.

## 2017-04-28 NOTE — ED ADULT TRIAGE NOTE - AS TEMP SITE
Nikolay Kapadia Md  [address], [address] [facility]       06/25/20        Patient: Cj Goodwin   YOB: 1991   Date of Visit: 6/25/2020       Dear  Dr. Samia Bacon MD,      Thank you skin

## 2017-04-28 NOTE — ED PROVIDER NOTE - OBJECTIVE STATEMENT
51 y/o M with h/o MR and seizure d/o sent in from [facility] s/p seizure today.  Per aide - pt had episode of shaking of his extremities while seated in a chair.  Did not fall out of the chair. Resolved on its own after 1-2 minutes.  Currently USOH.

## 2017-04-29 ENCOUNTER — EMERGENCY (EMERGENCY)
Facility: HOSPITAL | Age: 52
LOS: 1 days | Discharge: HOME | End: 2017-04-29
Attending: EMERGENCY MEDICINE | Admitting: EMERGENCY MEDICINE
Payer: MEDICARE

## 2017-04-29 VITALS
SYSTOLIC BLOOD PRESSURE: 124 MMHG | WEIGHT: 164.91 LBS | TEMPERATURE: 98 F | HEART RATE: 89 BPM | DIASTOLIC BLOOD PRESSURE: 81 MMHG | OXYGEN SATURATION: 95 % | RESPIRATION RATE: 16 BRPM

## 2017-04-29 VITALS
OXYGEN SATURATION: 96 % | HEART RATE: 80 BPM | DIASTOLIC BLOOD PRESSURE: 78 MMHG | TEMPERATURE: 98 F | SYSTOLIC BLOOD PRESSURE: 117 MMHG | RESPIRATION RATE: 14 BRPM

## 2017-04-29 DIAGNOSIS — F03.90 UNSPECIFIED DEMENTIA, UNSPECIFIED SEVERITY, WITHOUT BEHAVIORAL DISTURBANCE, PSYCHOTIC DISTURBANCE, MOOD DISTURBANCE, AND ANXIETY: ICD-10-CM

## 2017-04-29 DIAGNOSIS — G40.909 EPILEPSY, UNSPECIFIED, NOT INTRACTABLE, WITHOUT STATUS EPILEPTICUS: ICD-10-CM

## 2017-04-29 DIAGNOSIS — Z91.018 ALLERGY TO OTHER FOODS: ICD-10-CM

## 2017-04-29 DIAGNOSIS — Z88.8 ALLERGY STATUS TO OTHER DRUGS, MEDICAMENTS AND BIOLOGICAL SUBSTANCES STATUS: ICD-10-CM

## 2017-04-29 LAB
ANION GAP SERPL CALC-SCNC: 9 MMOL/L — SIGNIFICANT CHANGE UP (ref 5–17)
BUN SERPL-MCNC: 14 MG/DL — SIGNIFICANT CHANGE UP (ref 7–23)
CALCIUM SERPL-MCNC: 8.6 MG/DL — SIGNIFICANT CHANGE UP (ref 8.5–10.1)
CARBAMAZEPINE SERPL-MCNC: 4 UG/ML — SIGNIFICANT CHANGE UP (ref 4–12)
CHLORIDE SERPL-SCNC: 106 MMOL/L — SIGNIFICANT CHANGE UP (ref 96–108)
CO2 SERPL-SCNC: 26 MMOL/L — SIGNIFICANT CHANGE UP (ref 22–31)
CREAT SERPL-MCNC: 0.97 MG/DL — SIGNIFICANT CHANGE UP (ref 0.5–1.3)
GLUCOSE SERPL-MCNC: 93 MG/DL — SIGNIFICANT CHANGE UP (ref 70–99)
HCT VFR BLD CALC: 40.9 % — SIGNIFICANT CHANGE UP (ref 39–50)
HGB BLD-MCNC: 13.3 G/DL — SIGNIFICANT CHANGE UP (ref 13–17)
MCHC RBC-ENTMCNC: 30.2 PG — SIGNIFICANT CHANGE UP (ref 27–34)
MCHC RBC-ENTMCNC: 32.4 GM/DL — SIGNIFICANT CHANGE UP (ref 32–36)
MCV RBC AUTO: 93 FL — SIGNIFICANT CHANGE UP (ref 80–100)
PHENYTOIN FREE SERPL-MCNC: 3.6 UG/ML — LOW (ref 10–20)
PLATELET # BLD AUTO: 238 K/UL — SIGNIFICANT CHANGE UP (ref 150–400)
POTASSIUM SERPL-MCNC: 4.4 MMOL/L — SIGNIFICANT CHANGE UP (ref 3.5–5.3)
POTASSIUM SERPL-SCNC: 4.4 MMOL/L — SIGNIFICANT CHANGE UP (ref 3.5–5.3)
RBC # BLD: 4.4 M/UL — SIGNIFICANT CHANGE UP (ref 4.2–5.8)
RBC # FLD: 12.9 % — SIGNIFICANT CHANGE UP (ref 10.3–14.5)
SODIUM SERPL-SCNC: 141 MMOL/L — SIGNIFICANT CHANGE UP (ref 135–145)
VALPROATE SERPL-MCNC: 40 UG/ML — LOW (ref 50–100)
WBC # BLD: 6.5 K/UL — SIGNIFICANT CHANGE UP (ref 3.8–10.5)
WBC # FLD AUTO: 6.5 K/UL — SIGNIFICANT CHANGE UP (ref 3.8–10.5)

## 2017-04-29 PROCEDURE — 36416 COLLJ CAPILLARY BLOOD SPEC: CPT

## 2017-04-29 PROCEDURE — 80164 ASSAY DIPROPYLACETIC ACD TOT: CPT

## 2017-04-29 PROCEDURE — 36000 PLACE NEEDLE IN VEIN: CPT

## 2017-04-29 PROCEDURE — 99284 EMERGENCY DEPT VISIT MOD MDM: CPT

## 2017-04-29 PROCEDURE — 99283 EMERGENCY DEPT VISIT LOW MDM: CPT

## 2017-04-29 PROCEDURE — 80048 BASIC METABOLIC PNL TOTAL CA: CPT

## 2017-04-29 PROCEDURE — 80156 ASSAY CARBAMAZEPINE TOTAL: CPT

## 2017-04-29 PROCEDURE — 80185 ASSAY OF PHENYTOIN TOTAL: CPT

## 2017-04-29 PROCEDURE — 85027 COMPLETE CBC AUTOMATED: CPT

## 2017-04-29 RX ORDER — CARBAMAZEPINE 200 MG
400 TABLET ORAL ONCE
Qty: 0 | Refills: 0 | Status: COMPLETED | OUTPATIENT
Start: 2017-04-29 | End: 2017-04-29

## 2017-04-29 RX ADMIN — Medication 400 MILLIGRAM(S): at 12:44

## 2017-04-29 RX ADMIN — Medication 200 MILLIGRAM(S): at 12:16

## 2017-04-29 NOTE — ED PROVIDER NOTE - OBJECTIVE STATEMENT
51 yo white male from [facility] for seizure again this morning. Seen here yesterday for same and noted to have low anticonvulsant levels. Per aide, patient at baseline at this time and has no H/O recent fever, cough or other infectious symptomatology.

## 2017-04-29 NOTE — ED ADULT NURSE NOTE - OBJECTIVE STATEMENT
SEBASTIÁN pt with caregiver at bedside, presenting to ED for evaluation s/p seizure. pt sitting on stretcher, appears drowsy. will continue to monitor. seizure precaution ongoing.

## 2017-05-02 RX ORDER — CARBAMAZEPINE 200 MG
2 TABLET ORAL
Qty: 0 | Refills: 0 | COMMUNITY

## 2017-05-02 RX ORDER — CARBAMAZEPINE 200 MG
3 TABLET ORAL
Qty: 30 | Refills: 0 | OUTPATIENT
Start: 2017-05-02 | End: 2017-05-12

## 2017-05-04 ENCOUNTER — EMERGENCY (EMERGENCY)
Facility: HOSPITAL | Age: 52
LOS: 0 days | Discharge: HOME | End: 2017-05-04
Attending: EMERGENCY MEDICINE | Admitting: EMERGENCY MEDICINE
Payer: MEDICARE

## 2017-05-04 VITALS
HEART RATE: 86 BPM | WEIGHT: 177.91 LBS | RESPIRATION RATE: 17 BRPM | DIASTOLIC BLOOD PRESSURE: 80 MMHG | SYSTOLIC BLOOD PRESSURE: 119 MMHG | OXYGEN SATURATION: 97 % | TEMPERATURE: 98 F

## 2017-05-04 VITALS
DIASTOLIC BLOOD PRESSURE: 65 MMHG | RESPIRATION RATE: 18 BRPM | HEART RATE: 73 BPM | OXYGEN SATURATION: 98 % | SYSTOLIC BLOOD PRESSURE: 118 MMHG | TEMPERATURE: 99 F

## 2017-05-04 DIAGNOSIS — G40.909 EPILEPSY, UNSPECIFIED, NOT INTRACTABLE, WITHOUT STATUS EPILEPTICUS: ICD-10-CM

## 2017-05-04 LAB
ALBUMIN SERPL ELPH-MCNC: 3 G/DL — LOW (ref 3.3–5)
ALP SERPL-CCNC: 57 U/L — SIGNIFICANT CHANGE UP (ref 40–120)
ALT FLD-CCNC: 20 U/L — SIGNIFICANT CHANGE UP (ref 12–78)
ANION GAP SERPL CALC-SCNC: 6 MMOL/L — SIGNIFICANT CHANGE UP (ref 5–17)
AST SERPL-CCNC: 15 U/L — SIGNIFICANT CHANGE UP (ref 15–37)
BASOPHILS # BLD AUTO: 0.1 K/UL — SIGNIFICANT CHANGE UP (ref 0–0.2)
BASOPHILS NFR BLD AUTO: 1.8 % — SIGNIFICANT CHANGE UP (ref 0–2)
BILIRUB SERPL-MCNC: 0.3 MG/DL — SIGNIFICANT CHANGE UP (ref 0.2–1.2)
BUN SERPL-MCNC: 19 MG/DL — SIGNIFICANT CHANGE UP (ref 7–23)
CALCIUM SERPL-MCNC: 8.6 MG/DL — SIGNIFICANT CHANGE UP (ref 8.5–10.1)
CARBAMAZEPINE SERPL-MCNC: 2 UG/ML — LOW (ref 4–12)
CHLORIDE SERPL-SCNC: 107 MMOL/L — SIGNIFICANT CHANGE UP (ref 96–108)
CO2 SERPL-SCNC: 27 MMOL/L — SIGNIFICANT CHANGE UP (ref 22–31)
CREAT SERPL-MCNC: 0.66 MG/DL — SIGNIFICANT CHANGE UP (ref 0.5–1.3)
EOSINOPHIL # BLD AUTO: 0.2 K/UL — SIGNIFICANT CHANGE UP (ref 0–0.5)
EOSINOPHIL NFR BLD AUTO: 3.3 % — SIGNIFICANT CHANGE UP (ref 0–6)
GLUCOSE SERPL-MCNC: 88 MG/DL — SIGNIFICANT CHANGE UP (ref 70–99)
HCT VFR BLD CALC: 40.5 % — SIGNIFICANT CHANGE UP (ref 39–50)
HGB BLD-MCNC: 13.3 G/DL — SIGNIFICANT CHANGE UP (ref 13–17)
LYMPHOCYTES # BLD AUTO: 1.5 K/UL — SIGNIFICANT CHANGE UP (ref 1–3.3)
LYMPHOCYTES # BLD AUTO: 29.9 % — SIGNIFICANT CHANGE UP (ref 13–44)
MCHC RBC-ENTMCNC: 30 PG — SIGNIFICANT CHANGE UP (ref 27–34)
MCHC RBC-ENTMCNC: 32.9 GM/DL — SIGNIFICANT CHANGE UP (ref 32–36)
MCV RBC AUTO: 91.1 FL — SIGNIFICANT CHANGE UP (ref 80–100)
MONOCYTES # BLD AUTO: 0.5 K/UL — SIGNIFICANT CHANGE UP (ref 0–0.9)
MONOCYTES NFR BLD AUTO: 9.7 % — SIGNIFICANT CHANGE UP (ref 2–14)
NEUTROPHILS # BLD AUTO: 2.8 K/UL — SIGNIFICANT CHANGE UP (ref 1.8–7.4)
NEUTROPHILS NFR BLD AUTO: 55.3 % — SIGNIFICANT CHANGE UP (ref 43–77)
PHENYTOIN FREE SERPL-MCNC: 3.9 UG/ML — LOW (ref 10–20)
PLATELET # BLD AUTO: 180 K/UL — SIGNIFICANT CHANGE UP (ref 150–400)
POTASSIUM SERPL-MCNC: 4 MMOL/L — SIGNIFICANT CHANGE UP (ref 3.5–5.3)
POTASSIUM SERPL-SCNC: 4 MMOL/L — SIGNIFICANT CHANGE UP (ref 3.5–5.3)
PROT SERPL-MCNC: 7.3 GM/DL — SIGNIFICANT CHANGE UP (ref 6–8.3)
RBC # BLD: 4.45 M/UL — SIGNIFICANT CHANGE UP (ref 4.2–5.8)
RBC # FLD: 13.1 % — SIGNIFICANT CHANGE UP (ref 10.3–14.5)
SODIUM SERPL-SCNC: 140 MMOL/L — SIGNIFICANT CHANGE UP (ref 135–145)
WBC # BLD: 5.1 K/UL — SIGNIFICANT CHANGE UP (ref 3.8–10.5)
WBC # FLD AUTO: 5.1 K/UL — SIGNIFICANT CHANGE UP (ref 3.8–10.5)

## 2017-05-04 PROCEDURE — 99283 EMERGENCY DEPT VISIT LOW MDM: CPT | Mod: 25

## 2017-05-04 RX ORDER — FOSPHENYTOIN 50 MG/ML
1200 INJECTION INTRAMUSCULAR; INTRAVENOUS ONCE
Qty: 0 | Refills: 0 | Status: COMPLETED | OUTPATIENT
Start: 2017-05-04 | End: 2017-05-04

## 2017-05-04 RX ORDER — CARBAMAZEPINE 200 MG
600 TABLET ORAL ONCE
Qty: 0 | Refills: 0 | Status: COMPLETED | OUTPATIENT
Start: 2017-05-04 | End: 2017-05-04

## 2017-05-04 RX ADMIN — Medication 2 MILLIGRAM(S): at 07:35

## 2017-05-04 RX ADMIN — FOSPHENYTOIN 248 MILLIGRAM(S) PE: 50 INJECTION INTRAMUSCULAR; INTRAVENOUS at 09:47

## 2017-05-04 RX ADMIN — Medication 600 MILLIGRAM(S): at 09:56

## 2017-05-04 NOTE — ED PROVIDER NOTE - OBJECTIVE STATEMENT
53 y/o male with PMHx of seizure disorder presents to the ED from group home s/p 2 seizures tonight lasting 1 minute each as documented by the group home nurse. Pt is non verbal, unable to obtain full hx.

## 2017-05-04 NOTE — ED PROVIDER NOTE - CONSTITUTIONAL, MLM
normal... Well appearing, well nourished, awake, alert, at his baseline and in no apparent distress.

## 2017-05-04 NOTE — ED PROVIDER NOTE - NS ED MD SCRIBE ATTENDING SCRIBE SECTIONS
RESULTS/VITAL SIGNS( Pullset)/PHYSICAL EXAM/DISPOSITION/PAST MEDICAL/SURGICAL/SOCIAL HISTORY/PROGRESS NOTE/HISTORY OF PRESENT ILLNESS/REVIEW OF SYSTEMS

## 2017-05-04 NOTE — ED ADULT NURSE NOTE - CHPI ED SYMPTOMS NEG
no weakness/no blurred vision/no change in level of consciousness/no vomiting/no confusion/no dizziness/no numbness/no fever/no nausea

## 2017-05-04 NOTE — ED PROVIDER NOTE - DETAILS:
I, Dee Echols, performed the initial face to face bedside interview with this patient regarding history of present illness, review of symptoms and relevant past medical, social and family history.  I completed an independent physical examination.  I was the initial provider who evaluated this patient. The history, relevant review of systems, past medical and surgical history, medical decision making, and physical examination was documented by the scribe in my presence and I attest to the accuracy of the documentation.

## 2017-05-04 NOTE — ED PROVIDER NOTE - PROGRESS NOTE DETAILS
so Dr. Su, labs and dispo likely d/c AS:  dilantin and tegretol levels both subtherapeutic,  will load with fosphenytoin and give dose of tegretol Attending Payne, pt with seizure.  pt aide at bedside.  Plan ativan and monitor. Attending Payne, [phone] Ton gave report of pt status and will need contact PMD to discuss increasing seizure medication. Attending Payne, pt easily arousable, no more seizure.  plan d/c.

## 2017-05-05 LAB — LEVETIRACETAM SERPL-MCNC: 28.8 MCG/ML — SIGNIFICANT CHANGE UP (ref 12–46)

## 2017-06-09 ENCOUNTER — EMERGENCY (EMERGENCY)
Facility: HOSPITAL | Age: 52
LOS: 1 days | Discharge: HOME | End: 2017-06-09
Attending: INTERNAL MEDICINE | Admitting: INTERNAL MEDICINE
Payer: MEDICARE

## 2017-06-09 VITALS
TEMPERATURE: 98 F | SYSTOLIC BLOOD PRESSURE: 126 MMHG | RESPIRATION RATE: 16 BRPM | OXYGEN SATURATION: 96 % | HEART RATE: 89 BPM | DIASTOLIC BLOOD PRESSURE: 79 MMHG

## 2017-06-09 VITALS
HEART RATE: 88 BPM | OXYGEN SATURATION: 95 % | WEIGHT: 169.98 LBS | DIASTOLIC BLOOD PRESSURE: 101 MMHG | SYSTOLIC BLOOD PRESSURE: 144 MMHG | RESPIRATION RATE: 16 BRPM | HEIGHT: 63 IN | TEMPERATURE: 97 F

## 2017-06-09 DIAGNOSIS — Z91.010 ALLERGY TO PEANUTS: ICD-10-CM

## 2017-06-09 DIAGNOSIS — Z98.890 OTHER SPECIFIED POSTPROCEDURAL STATES: ICD-10-CM

## 2017-06-09 DIAGNOSIS — J40 BRONCHITIS, NOT SPECIFIED AS ACUTE OR CHRONIC: ICD-10-CM

## 2017-06-09 DIAGNOSIS — Z88.8 ALLERGY STATUS TO OTHER DRUGS, MEDICAMENTS AND BIOLOGICAL SUBSTANCES STATUS: ICD-10-CM

## 2017-06-09 DIAGNOSIS — Z91.018 ALLERGY TO OTHER FOODS: ICD-10-CM

## 2017-06-09 LAB
ALBUMIN SERPL ELPH-MCNC: 3.2 G/DL — LOW (ref 3.3–5)
ALP SERPL-CCNC: 45 U/L — SIGNIFICANT CHANGE UP (ref 40–120)
ALT FLD-CCNC: 16 U/L — SIGNIFICANT CHANGE UP (ref 12–78)
ANION GAP SERPL CALC-SCNC: 8 MMOL/L — SIGNIFICANT CHANGE UP (ref 5–17)
AST SERPL-CCNC: 16 U/L — SIGNIFICANT CHANGE UP (ref 15–37)
BILIRUB SERPL-MCNC: 0.2 MG/DL — SIGNIFICANT CHANGE UP (ref 0.2–1.2)
BUN SERPL-MCNC: 23 MG/DL — SIGNIFICANT CHANGE UP (ref 7–23)
CALCIUM SERPL-MCNC: 8.6 MG/DL — SIGNIFICANT CHANGE UP (ref 8.5–10.1)
CHLORIDE SERPL-SCNC: 100 MMOL/L — SIGNIFICANT CHANGE UP (ref 96–108)
CO2 SERPL-SCNC: 28 MMOL/L — SIGNIFICANT CHANGE UP (ref 22–31)
CREAT SERPL-MCNC: 0.87 MG/DL — SIGNIFICANT CHANGE UP (ref 0.5–1.3)
GLUCOSE SERPL-MCNC: 95 MG/DL — SIGNIFICANT CHANGE UP (ref 70–99)
HCT VFR BLD CALC: 39 % — SIGNIFICANT CHANGE UP (ref 39–50)
HGB BLD-MCNC: 13.2 G/DL — SIGNIFICANT CHANGE UP (ref 13–17)
LACTATE SERPL-SCNC: 2 MMOL/L — SIGNIFICANT CHANGE UP (ref 0.7–2)
MCHC RBC-ENTMCNC: 31 PG — SIGNIFICANT CHANGE UP (ref 27–34)
MCHC RBC-ENTMCNC: 33.7 GM/DL — SIGNIFICANT CHANGE UP (ref 32–36)
MCV RBC AUTO: 92 FL — SIGNIFICANT CHANGE UP (ref 80–100)
PLATELET # BLD AUTO: 280 K/UL — SIGNIFICANT CHANGE UP (ref 150–400)
POTASSIUM SERPL-MCNC: 4 MMOL/L — SIGNIFICANT CHANGE UP (ref 3.5–5.3)
POTASSIUM SERPL-SCNC: 4 MMOL/L — SIGNIFICANT CHANGE UP (ref 3.5–5.3)
PROT SERPL-MCNC: 7.7 G/DL — SIGNIFICANT CHANGE UP (ref 6–8.3)
RBC # BLD: 4.24 M/UL — SIGNIFICANT CHANGE UP (ref 4.2–5.8)
RBC # FLD: 12.3 % — SIGNIFICANT CHANGE UP (ref 10.3–14.5)
SODIUM SERPL-SCNC: 136 MMOL/L — SIGNIFICANT CHANGE UP (ref 135–145)
WBC # BLD: 12.2 K/UL — HIGH (ref 3.8–10.5)
WBC # FLD AUTO: 12.2 K/UL — HIGH (ref 3.8–10.5)

## 2017-06-09 PROCEDURE — 83605 ASSAY OF LACTIC ACID: CPT

## 2017-06-09 PROCEDURE — 99285 EMERGENCY DEPT VISIT HI MDM: CPT | Mod: 25

## 2017-06-09 PROCEDURE — 99284 EMERGENCY DEPT VISIT MOD MDM: CPT | Mod: 25

## 2017-06-09 PROCEDURE — 96375 TX/PRO/DX INJ NEW DRUG ADDON: CPT

## 2017-06-09 PROCEDURE — 71045 X-RAY EXAM CHEST 1 VIEW: CPT

## 2017-06-09 PROCEDURE — 71250 CT THORAX DX C-: CPT | Mod: 26

## 2017-06-09 PROCEDURE — 85027 COMPLETE CBC AUTOMATED: CPT

## 2017-06-09 PROCEDURE — 71010: CPT | Mod: 26

## 2017-06-09 PROCEDURE — 80053 COMPREHEN METABOLIC PANEL: CPT

## 2017-06-09 PROCEDURE — 96365 THER/PROPH/DIAG IV INF INIT: CPT

## 2017-06-09 PROCEDURE — 94640 AIRWAY INHALATION TREATMENT: CPT

## 2017-06-09 PROCEDURE — 87040 BLOOD CULTURE FOR BACTERIA: CPT

## 2017-06-09 PROCEDURE — 71250 CT THORAX DX C-: CPT

## 2017-06-09 RX ORDER — SODIUM CHLORIDE 9 MG/ML
1000 INJECTION INTRAMUSCULAR; INTRAVENOUS; SUBCUTANEOUS ONCE
Qty: 0 | Refills: 0 | Status: COMPLETED | OUTPATIENT
Start: 2017-06-09 | End: 2017-06-09

## 2017-06-09 RX ORDER — IPRATROPIUM/ALBUTEROL SULFATE 18-103MCG
3 AEROSOL WITH ADAPTER (GRAM) INHALATION ONCE
Qty: 0 | Refills: 0 | Status: COMPLETED | OUTPATIENT
Start: 2017-06-09 | End: 2017-06-09

## 2017-06-09 RX ADMIN — Medication 1 MILLIGRAM(S): at 04:45

## 2017-06-09 RX ADMIN — Medication 3 MILLILITER(S): at 02:05

## 2017-06-09 RX ADMIN — SODIUM CHLORIDE 1000 MILLILITER(S): 9 INJECTION INTRAMUSCULAR; INTRAVENOUS; SUBCUTANEOUS at 04:45

## 2017-06-09 RX ADMIN — Medication 60 MILLIGRAM(S): at 02:15

## 2017-06-09 NOTE — ED PROVIDER NOTE - SIGNIFICANT NEGATIVE FINDINGS
no headache, no chest pain, no SOB, no palpitations, no abdominal pain,  no n/v/d, no urinary symptoms, no bleeding. no neuro changes.

## 2017-06-09 NOTE — ED PROVIDER NOTE - OBJECTIVE STATEMENT
51 y/o w/m with cough x 1 day and bronchial spasm, no fever, no chills, No chest pain, No SOB. Other residents with URI symptoms

## 2017-06-09 NOTE — ED ADULT NURSE REASSESSMENT NOTE - NS ED NURSE REASSESS COMMENT FT1
report received from Usman Aleman, assumed pt care. awaiting ride home. pt in [facility], resting comfortably. no cough noted at this time, caregiver at bs

## 2017-06-13 ENCOUNTER — TRANSCRIPTION ENCOUNTER (OUTPATIENT)
Age: 52
End: 2017-06-13

## 2017-06-14 RX ORDER — IBANDRONATE SODIUM 150 MG/1
1 TABLET ORAL
Qty: 0 | Refills: 0 | COMMUNITY
Start: 2017-06-14

## 2017-06-15 ENCOUNTER — INPATIENT (INPATIENT)
Facility: HOSPITAL | Age: 52
LOS: 4 days | Discharge: HOME | End: 2017-06-20
Attending: INTERNAL MEDICINE | Admitting: INTERNAL MEDICINE
Payer: MEDICARE

## 2017-06-15 VITALS
WEIGHT: 220.02 LBS | RESPIRATION RATE: 15 BRPM | HEART RATE: 69 BPM | SYSTOLIC BLOOD PRESSURE: 120 MMHG | OXYGEN SATURATION: 97 % | DIASTOLIC BLOOD PRESSURE: 65 MMHG | HEIGHT: 68 IN

## 2017-06-15 LAB
ALBUMIN SERPL ELPH-MCNC: 2.8 G/DL — LOW (ref 3.3–5)
ALP SERPL-CCNC: 39 U/L — LOW (ref 40–120)
ALT FLD-CCNC: 10 U/L — LOW (ref 12–78)
ANION GAP SERPL CALC-SCNC: 7 MMOL/L — SIGNIFICANT CHANGE UP (ref 5–17)
AST SERPL-CCNC: 12 U/L — LOW (ref 15–37)
BASOPHILS # BLD AUTO: 0.1 K/UL — SIGNIFICANT CHANGE UP (ref 0–0.2)
BASOPHILS NFR BLD AUTO: 1.5 % — SIGNIFICANT CHANGE UP (ref 0–2)
BILIRUB SERPL-MCNC: 0.2 MG/DL — SIGNIFICANT CHANGE UP (ref 0.2–1.2)
BUN SERPL-MCNC: 21 MG/DL — SIGNIFICANT CHANGE UP (ref 7–23)
CALCIUM SERPL-MCNC: 8.2 MG/DL — LOW (ref 8.5–10.1)
CARBAMAZEPINE SERPL-MCNC: 7.3 UG/ML — SIGNIFICANT CHANGE UP (ref 4–12)
CHLORIDE SERPL-SCNC: 99 MMOL/L — SIGNIFICANT CHANGE UP (ref 96–108)
CO2 SERPL-SCNC: 30 MMOL/L — SIGNIFICANT CHANGE UP (ref 22–31)
CREAT SERPL-MCNC: 0.8 MG/DL — SIGNIFICANT CHANGE UP (ref 0.5–1.3)
EOSINOPHIL # BLD AUTO: 0.3 K/UL — SIGNIFICANT CHANGE UP (ref 0–0.5)
EOSINOPHIL NFR BLD AUTO: 4.4 % — SIGNIFICANT CHANGE UP (ref 0–6)
GLUCOSE SERPL-MCNC: 94 MG/DL — SIGNIFICANT CHANGE UP (ref 70–99)
HCT VFR BLD CALC: 35.9 % — LOW (ref 39–50)
HGB BLD-MCNC: 12.2 G/DL — LOW (ref 13–17)
LYMPHOCYTES # BLD AUTO: 2 K/UL — SIGNIFICANT CHANGE UP (ref 1–3.3)
LYMPHOCYTES # BLD AUTO: 33.4 % — SIGNIFICANT CHANGE UP (ref 13–44)
MCHC RBC-ENTMCNC: 30.7 PG — SIGNIFICANT CHANGE UP (ref 27–34)
MCHC RBC-ENTMCNC: 33.9 GM/DL — SIGNIFICANT CHANGE UP (ref 32–36)
MCV RBC AUTO: 90.6 FL — SIGNIFICANT CHANGE UP (ref 80–100)
MONOCYTES # BLD AUTO: 0.6 K/UL — SIGNIFICANT CHANGE UP (ref 0–0.9)
MONOCYTES NFR BLD AUTO: 10.3 % — SIGNIFICANT CHANGE UP (ref 2–14)
NEUTROPHILS # BLD AUTO: 3 K/UL — SIGNIFICANT CHANGE UP (ref 1.8–7.4)
NEUTROPHILS NFR BLD AUTO: 50.4 % — SIGNIFICANT CHANGE UP (ref 43–77)
PLATELET # BLD AUTO: 248 K/UL — SIGNIFICANT CHANGE UP (ref 150–400)
POTASSIUM SERPL-MCNC: 3.7 MMOL/L — SIGNIFICANT CHANGE UP (ref 3.5–5.3)
POTASSIUM SERPL-SCNC: 3.7 MMOL/L — SIGNIFICANT CHANGE UP (ref 3.5–5.3)
PROT SERPL-MCNC: 6.9 GM/DL — SIGNIFICANT CHANGE UP (ref 6–8.3)
RBC # BLD: 3.96 M/UL — LOW (ref 4.2–5.8)
RBC # FLD: 12.6 % — SIGNIFICANT CHANGE UP (ref 10.3–14.5)
SODIUM SERPL-SCNC: 136 MMOL/L — SIGNIFICANT CHANGE UP (ref 135–145)
WBC # BLD: 5.9 K/UL — SIGNIFICANT CHANGE UP (ref 3.8–10.5)
WBC # FLD AUTO: 5.9 K/UL — SIGNIFICANT CHANGE UP (ref 3.8–10.5)

## 2017-06-15 PROCEDURE — 93010 ELECTROCARDIOGRAM REPORT: CPT

## 2017-06-15 PROCEDURE — 99285 EMERGENCY DEPT VISIT HI MDM: CPT

## 2017-06-15 PROCEDURE — 74177 CT ABD & PELVIS W/CONTRAST: CPT | Mod: 26

## 2017-06-15 RX ORDER — ASPIRIN/CALCIUM CARB/MAGNESIUM 324 MG
81 TABLET ORAL DAILY
Qty: 0 | Refills: 0 | Status: DISCONTINUED | OUTPATIENT
Start: 2017-06-15 | End: 2017-06-20

## 2017-06-15 RX ORDER — PANTOPRAZOLE SODIUM 20 MG/1
40 TABLET, DELAYED RELEASE ORAL
Qty: 0 | Refills: 0 | Status: DISCONTINUED | OUTPATIENT
Start: 2017-06-16 | End: 2017-06-20

## 2017-06-15 RX ORDER — THIOTHIXENE 5 MG
5 CAPSULE ORAL AT BEDTIME
Qty: 0 | Refills: 0 | Status: DISCONTINUED | OUTPATIENT
Start: 2017-06-16 | End: 2017-06-20

## 2017-06-15 RX ORDER — DIVALPROEX SODIUM 500 MG/1
875 TABLET, DELAYED RELEASE ORAL DAILY
Qty: 0 | Refills: 0 | Status: DISCONTINUED | OUTPATIENT
Start: 2017-06-16 | End: 2017-06-20

## 2017-06-15 RX ORDER — RISPERIDONE 4 MG/1
1 TABLET ORAL DAILY
Qty: 0 | Refills: 0 | Status: DISCONTINUED | OUTPATIENT
Start: 2017-06-16 | End: 2017-06-20

## 2017-06-15 RX ORDER — CHOLECALCIFEROL (VITAMIN D3) 125 MCG
2000 CAPSULE ORAL DAILY
Qty: 0 | Refills: 0 | Status: DISCONTINUED | OUTPATIENT
Start: 2017-06-16 | End: 2017-06-20

## 2017-06-15 RX ORDER — DIVALPROEX SODIUM 500 MG/1
750 TABLET, DELAYED RELEASE ORAL AT BEDTIME
Qty: 0 | Refills: 0 | Status: DISCONTINUED | OUTPATIENT
Start: 2017-06-16 | End: 2017-06-20

## 2017-06-15 RX ORDER — SODIUM CHLORIDE 9 MG/ML
1000 INJECTION INTRAMUSCULAR; INTRAVENOUS; SUBCUTANEOUS
Qty: 0 | Refills: 0 | Status: DISCONTINUED | OUTPATIENT
Start: 2017-06-15 | End: 2017-06-16

## 2017-06-15 RX ORDER — SODIUM CHLORIDE 9 MG/ML
2 INJECTION INTRAMUSCULAR; INTRAVENOUS; SUBCUTANEOUS DAILY
Qty: 0 | Refills: 0 | Status: DISCONTINUED | OUTPATIENT
Start: 2017-06-16 | End: 2017-06-20

## 2017-06-15 RX ORDER — LEVETIRACETAM 250 MG/1
2000 TABLET, FILM COATED ORAL
Qty: 0 | Refills: 0 | Status: DISCONTINUED | OUTPATIENT
Start: 2017-06-16 | End: 2017-06-20

## 2017-06-15 RX ORDER — PIPERACILLIN AND TAZOBACTAM 4; .5 G/20ML; G/20ML
3.38 INJECTION, POWDER, LYOPHILIZED, FOR SOLUTION INTRAVENOUS ONCE
Qty: 0 | Refills: 0 | Status: COMPLETED | OUTPATIENT
Start: 2017-06-15 | End: 2017-06-15

## 2017-06-15 RX ORDER — CLONAZEPAM 1 MG
1 TABLET ORAL
Qty: 0 | Refills: 0 | Status: DISCONTINUED | OUTPATIENT
Start: 2017-06-15 | End: 2017-06-20

## 2017-06-15 RX ORDER — CIPROFLOXACIN LACTATE 400MG/40ML
400 VIAL (ML) INTRAVENOUS ONCE
Qty: 0 | Refills: 0 | Status: COMPLETED | OUTPATIENT
Start: 2017-06-15 | End: 2017-06-15

## 2017-06-15 RX ORDER — BENZTROPINE MESYLATE 1 MG
1 TABLET ORAL
Qty: 0 | Refills: 0 | Status: DISCONTINUED | OUTPATIENT
Start: 2017-06-16 | End: 2017-06-20

## 2017-06-15 RX ORDER — CARBAMAZEPINE 200 MG
400 TABLET ORAL DAILY
Qty: 0 | Refills: 0 | Status: DISCONTINUED | OUTPATIENT
Start: 2017-06-16 | End: 2017-06-20

## 2017-06-15 RX ORDER — FERROUS SULFATE 325(65) MG
325 TABLET ORAL DAILY
Qty: 0 | Refills: 0 | Status: DISCONTINUED | OUTPATIENT
Start: 2017-06-16 | End: 2017-06-20

## 2017-06-15 RX ORDER — CARBAMAZEPINE 200 MG
800 TABLET ORAL AT BEDTIME
Qty: 0 | Refills: 0 | Status: DISCONTINUED | OUTPATIENT
Start: 2017-06-16 | End: 2017-06-20

## 2017-06-15 RX ORDER — RISPERIDONE 4 MG/1
3 TABLET ORAL AT BEDTIME
Qty: 0 | Refills: 0 | Status: DISCONTINUED | OUTPATIENT
Start: 2017-06-16 | End: 2017-06-20

## 2017-06-15 RX ADMIN — SODIUM CHLORIDE 500 MILLILITER(S): 9 INJECTION INTRAMUSCULAR; INTRAVENOUS; SUBCUTANEOUS at 18:42

## 2017-06-15 RX ADMIN — Medication 300 MILLIGRAM(S): at 23:35

## 2017-06-15 RX ADMIN — Medication 81 MILLIGRAM(S): at 23:35

## 2017-06-15 RX ADMIN — Medication 200 MILLIGRAM(S): at 21:51

## 2017-06-15 RX ADMIN — Medication 1 MILLIGRAM(S): at 23:35

## 2017-06-15 RX ADMIN — PIPERACILLIN AND TAZOBACTAM 200 GRAM(S): 4; .5 INJECTION, POWDER, LYOPHILIZED, FOR SOLUTION INTRAVENOUS at 20:56

## 2017-06-15 NOTE — ED PROVIDER NOTE - NEUROLOGICAL, MLM
Alert and oriented, no focal deficits, no motor or sensory deficits. No focal deficits, no motor or sensory deficits.

## 2017-06-15 NOTE — H&P ADULT - NSHPPHYSICALEXAM_GEN_ALL_CORE
Vital Signs Last 24 Hrs  T(C): 36.9, Max: 36.9 (06-15 @ 20:04)  T(F): 98.4, Max: 98.4 (06-15 @ 20:04)  HR: 68 (68 - 69)  BP: 117/71 (117/71 - 142/70)  RR: 20 (15 - 22)  SpO2: 97% (96% - 97%)

## 2017-06-15 NOTE — H&P ADULT - PMH
Seizure disorder Bowel and bladder incontinence    Cataract    GERD (gastroesophageal reflux disease)    Hearing impaired person    Hyperprolactinemia    Hyponatremia    Intellectual disability    Osteoporosis    Seizure disorder

## 2017-06-15 NOTE — ED ADULT NURSE NOTE - CHPI ED SYMPTOMS NEG
no tingling/no pain/no chills/no decreased eating/drinking/no vomiting/no numbness/no dizziness/no fever/no nausea

## 2017-06-15 NOTE — ED ADULT NURSE REASSESSMENT NOTE - NS ED NURSE REASSESS COMMENT FT1
pt resting w/ eyes closed and audible snoring noted. NS infusion initiated. pending CT scan. aide remains at bedside. will cont to monitor.

## 2017-06-15 NOTE — H&P ADULT - HISTORY OF PRESENT ILLNESS
53 y/o M PMHx significant for seizure disorder (on multiple AEDs), GERD, severe intellectual disability, nonverbal at baseline, osteoporosis, prior hx of aspiration pneumonia, medication-induced hyponatremia and hyperprolactinemia, B/L cataracts who presents to the ED from his group home for symptoms increased lethargy, malaise, and decreased PO intake. Hx obtained from Group home records and the patients' aide at the bedside. In the ED CT Chest revealed a focal consolidation at the left lung base, and a very small pleural-based density at the right lung base. In the ED the patient was given Zosyn 3.375g IV x 1, and Ciprofloxacin 400mg IV x 1.

## 2017-06-15 NOTE — H&P ADULT - PROBLEM SELECTOR PLAN 1
~Admit to Medicine  ~due to IDSA guidelines pt likely has CAP vs. ASP PNA  ~will cont. IV abx  ~f/u PAN C+S  ~cont. IV fluids  ~aspiration precautions  ~speech and swallow evaluation

## 2017-06-15 NOTE — ED PROVIDER NOTE - CONSTITUTIONAL, MLM
normal... Well appearing, well nourished, awake, alert, oriented to person, place, time/situation and in no apparent distress. Well appearing, well nourished, awake, alert.

## 2017-06-15 NOTE — H&P ADULT - PROBLEM SELECTOR PLAN 2
~pt is on multiple AEDs will recommend Neurology consultation to further clarify  ~f/u Levetriacetam levels  ~f/u Carbamazepine levels  ~f/u Phenytoin levels  ~cont. seizure precautions

## 2017-06-16 DIAGNOSIS — Z98.49 CATARACT EXTRACTION STATUS, UNSPECIFIED EYE: Chronic | ICD-10-CM

## 2017-06-16 DIAGNOSIS — M81.0 AGE-RELATED OSTEOPOROSIS WITHOUT CURRENT PATHOLOGICAL FRACTURE: ICD-10-CM

## 2017-06-16 DIAGNOSIS — Z29.9 ENCOUNTER FOR PROPHYLACTIC MEASURES, UNSPECIFIED: ICD-10-CM

## 2017-06-16 DIAGNOSIS — E87.1 HYPO-OSMOLALITY AND HYPONATREMIA: ICD-10-CM

## 2017-06-16 DIAGNOSIS — G40.909 EPILEPSY, UNSPECIFIED, NOT INTRACTABLE, WITHOUT STATUS EPILEPTICUS: ICD-10-CM

## 2017-06-16 DIAGNOSIS — J18.9 PNEUMONIA, UNSPECIFIED ORGANISM: ICD-10-CM

## 2017-06-16 LAB
ALBUMIN SERPL ELPH-MCNC: 2.8 G/DL — LOW (ref 3.3–5)
ALP SERPL-CCNC: 40 U/L — SIGNIFICANT CHANGE UP (ref 40–120)
ALT FLD-CCNC: 9 U/L — LOW (ref 12–78)
ANION GAP SERPL CALC-SCNC: 7 MMOL/L — SIGNIFICANT CHANGE UP (ref 5–17)
AST SERPL-CCNC: 13 U/L — LOW (ref 15–37)
BASOPHILS # BLD AUTO: 0.1 K/UL — SIGNIFICANT CHANGE UP (ref 0–0.2)
BASOPHILS NFR BLD AUTO: 1.5 % — SIGNIFICANT CHANGE UP (ref 0–2)
BILIRUB SERPL-MCNC: 0.4 MG/DL — SIGNIFICANT CHANGE UP (ref 0.2–1.2)
BUN SERPL-MCNC: 16 MG/DL — SIGNIFICANT CHANGE UP (ref 7–23)
CALCIUM SERPL-MCNC: 8.2 MG/DL — LOW (ref 8.5–10.1)
CHLORIDE SERPL-SCNC: 100 MMOL/L — SIGNIFICANT CHANGE UP (ref 96–108)
CO2 SERPL-SCNC: 28 MMOL/L — SIGNIFICANT CHANGE UP (ref 22–31)
CREAT SERPL-MCNC: 0.7 MG/DL — SIGNIFICANT CHANGE UP (ref 0.5–1.3)
EOSINOPHIL # BLD AUTO: 0.3 K/UL — SIGNIFICANT CHANGE UP (ref 0–0.5)
EOSINOPHIL NFR BLD AUTO: 4.3 % — SIGNIFICANT CHANGE UP (ref 0–6)
GLUCOSE SERPL-MCNC: 96 MG/DL — SIGNIFICANT CHANGE UP (ref 70–99)
HCT VFR BLD CALC: 37.2 % — LOW (ref 39–50)
HGB BLD-MCNC: 12.7 G/DL — LOW (ref 13–17)
LYMPHOCYTES # BLD AUTO: 1.9 K/UL — SIGNIFICANT CHANGE UP (ref 1–3.3)
LYMPHOCYTES # BLD AUTO: 32 % — SIGNIFICANT CHANGE UP (ref 13–44)
MAGNESIUM SERPL-MCNC: 2 MG/DL — SIGNIFICANT CHANGE UP (ref 1.6–2.6)
MCHC RBC-ENTMCNC: 30.7 PG — SIGNIFICANT CHANGE UP (ref 27–34)
MCHC RBC-ENTMCNC: 34.2 GM/DL — SIGNIFICANT CHANGE UP (ref 32–36)
MCV RBC AUTO: 89.8 FL — SIGNIFICANT CHANGE UP (ref 80–100)
MONOCYTES # BLD AUTO: 0.6 K/UL — SIGNIFICANT CHANGE UP (ref 0–0.9)
MONOCYTES NFR BLD AUTO: 10.2 % — SIGNIFICANT CHANGE UP (ref 2–14)
NEUTROPHILS # BLD AUTO: 3.1 K/UL — SIGNIFICANT CHANGE UP (ref 1.8–7.4)
NEUTROPHILS NFR BLD AUTO: 51.9 % — SIGNIFICANT CHANGE UP (ref 43–77)
PHENYTOIN FREE SERPL-MCNC: 18.9 UG/ML — SIGNIFICANT CHANGE UP (ref 10–20)
PLATELET # BLD AUTO: 248 K/UL — SIGNIFICANT CHANGE UP (ref 150–400)
POTASSIUM SERPL-MCNC: 3.7 MMOL/L — SIGNIFICANT CHANGE UP (ref 3.5–5.3)
POTASSIUM SERPL-SCNC: 3.7 MMOL/L — SIGNIFICANT CHANGE UP (ref 3.5–5.3)
PROT SERPL-MCNC: 6.8 GM/DL — SIGNIFICANT CHANGE UP (ref 6–8.3)
RBC # BLD: 4.14 M/UL — LOW (ref 4.2–5.8)
RBC # FLD: 12.4 % — SIGNIFICANT CHANGE UP (ref 10.3–14.5)
SODIUM SERPL-SCNC: 135 MMOL/L — SIGNIFICANT CHANGE UP (ref 135–145)
WBC # BLD: 6 K/UL — SIGNIFICANT CHANGE UP (ref 3.8–10.5)
WBC # FLD AUTO: 6 K/UL — SIGNIFICANT CHANGE UP (ref 3.8–10.5)

## 2017-06-16 PROCEDURE — 71250 CT THORAX DX C-: CPT | Mod: 26

## 2017-06-16 RX ORDER — HEPARIN SODIUM 5000 [USP'U]/ML
5000 INJECTION INTRAVENOUS; SUBCUTANEOUS EVERY 8 HOURS
Qty: 0 | Refills: 0 | Status: DISCONTINUED | OUTPATIENT
Start: 2017-06-16 | End: 2017-06-20

## 2017-06-16 RX ORDER — POLYETHYLENE GLYCOL 3350 17 G/17G
17 POWDER, FOR SOLUTION ORAL DAILY
Qty: 0 | Refills: 0 | Status: DISCONTINUED | OUTPATIENT
Start: 2017-06-16 | End: 2017-06-20

## 2017-06-16 RX ORDER — DOCUSATE SODIUM 100 MG
100 CAPSULE ORAL THREE TIMES A DAY
Qty: 0 | Refills: 0 | Status: DISCONTINUED | OUTPATIENT
Start: 2017-06-16 | End: 2017-06-20

## 2017-06-16 RX ORDER — VANCOMYCIN HCL 1 G
1000 VIAL (EA) INTRAVENOUS ONCE
Qty: 0 | Refills: 0 | Status: COMPLETED | OUTPATIENT
Start: 2017-06-16 | End: 2017-06-16

## 2017-06-16 RX ORDER — ONDANSETRON 8 MG/1
4 TABLET, FILM COATED ORAL EVERY 6 HOURS
Qty: 0 | Refills: 0 | Status: DISCONTINUED | OUTPATIENT
Start: 2017-06-16 | End: 2017-06-20

## 2017-06-16 RX ORDER — RISPERIDONE 4 MG/1
3 TABLET ORAL ONCE
Qty: 0 | Refills: 0 | Status: COMPLETED | OUTPATIENT
Start: 2017-06-16 | End: 2017-06-16

## 2017-06-16 RX ORDER — PIPERACILLIN AND TAZOBACTAM 4; .5 G/20ML; G/20ML
3.38 INJECTION, POWDER, LYOPHILIZED, FOR SOLUTION INTRAVENOUS EVERY 8 HOURS
Qty: 0 | Refills: 0 | Status: DISCONTINUED | OUTPATIENT
Start: 2017-06-16 | End: 2017-06-20

## 2017-06-16 RX ORDER — SODIUM CHLORIDE 9 MG/ML
1000 INJECTION INTRAMUSCULAR; INTRAVENOUS; SUBCUTANEOUS
Qty: 0 | Refills: 0 | Status: DISCONTINUED | OUTPATIENT
Start: 2017-06-16 | End: 2017-06-18

## 2017-06-16 RX ORDER — SENNA PLUS 8.6 MG/1
2 TABLET ORAL AT BEDTIME
Qty: 0 | Refills: 0 | Status: DISCONTINUED | OUTPATIENT
Start: 2017-06-16 | End: 2017-06-20

## 2017-06-16 RX ADMIN — Medication 2000 UNIT(S): at 11:35

## 2017-06-16 RX ADMIN — RISPERIDONE 3 MILLIGRAM(S): 4 TABLET ORAL at 02:56

## 2017-06-16 RX ADMIN — PIPERACILLIN AND TAZOBACTAM 25 GRAM(S): 4; .5 INJECTION, POWDER, LYOPHILIZED, FOR SOLUTION INTRAVENOUS at 05:01

## 2017-06-16 RX ADMIN — Medication 1 MILLIGRAM(S): at 17:09

## 2017-06-16 RX ADMIN — Medication 325 MILLIGRAM(S): at 11:36

## 2017-06-16 RX ADMIN — DIVALPROEX SODIUM 875 MILLIGRAM(S): 500 TABLET, DELAYED RELEASE ORAL at 11:35

## 2017-06-16 RX ADMIN — HEPARIN SODIUM 5000 UNIT(S): 5000 INJECTION INTRAVENOUS; SUBCUTANEOUS at 13:14

## 2017-06-16 RX ADMIN — Medication 1 MILLIGRAM(S): at 06:21

## 2017-06-16 RX ADMIN — LEVETIRACETAM 2000 MILLIGRAM(S): 250 TABLET, FILM COATED ORAL at 06:21

## 2017-06-16 RX ADMIN — RISPERIDONE 1 MILLIGRAM(S): 4 TABLET ORAL at 12:12

## 2017-06-16 RX ADMIN — PIPERACILLIN AND TAZOBACTAM 25 GRAM(S): 4; .5 INJECTION, POWDER, LYOPHILIZED, FOR SOLUTION INTRAVENOUS at 22:36

## 2017-06-16 RX ADMIN — Medication 81 MILLIGRAM(S): at 11:33

## 2017-06-16 RX ADMIN — SODIUM CHLORIDE 75 MILLILITER(S): 9 INJECTION INTRAMUSCULAR; INTRAVENOUS; SUBCUTANEOUS at 04:51

## 2017-06-16 RX ADMIN — Medication 250 MILLIGRAM(S): at 05:01

## 2017-06-16 RX ADMIN — Medication 400 MILLIGRAM(S): at 11:37

## 2017-06-16 RX ADMIN — PANTOPRAZOLE SODIUM 40 MILLIGRAM(S): 20 TABLET, DELAYED RELEASE ORAL at 06:21

## 2017-06-16 RX ADMIN — PIPERACILLIN AND TAZOBACTAM 25 GRAM(S): 4; .5 INJECTION, POWDER, LYOPHILIZED, FOR SOLUTION INTRAVENOUS at 13:14

## 2017-06-16 RX ADMIN — Medication 1 MILLIGRAM(S): at 11:34

## 2017-06-16 RX ADMIN — SODIUM CHLORIDE 75 MILLILITER(S): 9 INJECTION INTRAMUSCULAR; INTRAVENOUS; SUBCUTANEOUS at 22:57

## 2017-06-16 RX ADMIN — SODIUM CHLORIDE 2 GRAM(S): 9 INJECTION INTRAMUSCULAR; INTRAVENOUS; SUBCUTANEOUS at 11:37

## 2017-06-16 RX ADMIN — Medication 30 MILLIGRAM(S): at 06:21

## 2017-06-16 RX ADMIN — Medication 1 MILLIGRAM(S): at 17:13

## 2017-06-16 RX ADMIN — POLYETHYLENE GLYCOL 3350 17 GRAM(S): 17 POWDER, FOR SOLUTION ORAL at 11:37

## 2017-06-16 RX ADMIN — LEVETIRACETAM 2000 MILLIGRAM(S): 250 TABLET, FILM COATED ORAL at 17:09

## 2017-06-16 RX ADMIN — HEPARIN SODIUM 5000 UNIT(S): 5000 INJECTION INTRAVENOUS; SUBCUTANEOUS at 06:20

## 2017-06-16 NOTE — CONSULT NOTE ADULT - ASSESSMENT
51 y/o Male with h/o seizure disorder (on multiple AEDs), GERD, severe intellectual disability, nonverbal at baseline, osteoporosis, prior aspiration pneumonia, medication-induced hyponatremia and hyperprolactinemia, B/L cataracts was admitted on 6/15 from his group home for increased lethargy, malaise, and decreased PO intake. In the ED CT Chest revealed a focal consolidation at the left lung base, and a very small pleural-based density at the right lung base and the patient was given Zosyn 3.375g IV x 1, and Ciprofloxacin 400mg IV x 1.     1. Aspiration pneumonia. Encephalopathy.  -obtain BC x 2, urine c/s  -agree with zosyn 3.375 gm IV q8h  -monitor BMP  -aspiration precautions  -monitor temps  -f/u CBC  -supportive care  2. Other issues:   -care per medicine

## 2017-06-16 NOTE — SWALLOW BEDSIDE ASSESSMENT ADULT - NS ASR SWALLOW FINDINGS DISCUS
Spoke with aid from group home. Note that pt's cognitive status is too limited to comprehend verbal counseling./Patient/Nursing

## 2017-06-16 NOTE — SWALLOW BEDSIDE ASSESSMENT ADULT - SWALLOW EVAL: FEEDING ASSISTANCE
Assist with tray setu up, cutting up food and feeding as needed. Pt has aid from group home at bedside for all meals. Assist with tray set up, cutting up food and feeding as needed. Pt has aid from group home at bedside for all meals.

## 2017-06-16 NOTE — SWALLOW BEDSIDE ASSESSMENT ADULT - COMMENTS
Pt admitted from Group Home with lethargy and reduced PO intake, Hosp course is notable for left PNA and hyponatremia. This profile is superimposed upon a history of profound mental retardation, seizure disorder, hearing loss, OP, bilateral cataracts, prior PNA, and past medication induced hyponatremia/hyperprolactinemia, Pt admitted from Group Home with lethargy and reduced PO intake, Hosp course is notable for left PNA and hyponatremia. This profile is superimposed upon a history of profound mental retardation, seizure disorder, hearing loss, OP, bilateral cataracts s/p sx, prior PNA, and past medication induced hyponatremia/hyperprolactinemia,

## 2017-06-16 NOTE — SWALLOW BEDSIDE ASSESSMENT ADULT - SLP GENERAL OBSERVATIONS
Pt seen at bedside, On encounter, rocking behaviors were evident which seemed to be a self stimulatory behavior. Pt was alert . His affect .was flat, Eye gaze was often distant and joint attention was fleeting. Pt seemed [place] and demonstrated severe Cognitive Dysfunction in setting of known mental retardation. Pt did not follow commands and was non verbal which is reportedly chronic. His needs must be anticipated. At suspected communicative baseline..

## 2017-06-16 NOTE — SWALLOW BEDSIDE ASSESSMENT ADULT - SWALLOW EVAL: RECOMMENDED DIET
Suggest a regular texture diet with thin liquid consistencies as tolerated. All solids foods should be cut up as was the case at Group Home [facility]. There will be an aid from [facility] [place] with him at all meals and aids are adept at cutting up his foods/setting up his meals trays. Suggest a regular texture diet with thin liquid consistencies as tolerated. All solids foods should be cut up as was the case at Group [facility]. There will be an aid from Group Home with him at all meals and aids are reportedly adept at cutting up his foods/setting up his meals trays.

## 2017-06-16 NOTE — SWALLOW BEDSIDE ASSESSMENT ADULT - SWALLOW EVAL: CRITERIA FOR SKILLED INTERVENTION MET
no problems identified which require skilled intervention/Acute speech path intervention is not clinically indicated nor would it . Pt is not a therapy candidate nonetheless given the severity of his chronic cognitive limitations associated with MR. Thus, will NOT actively follow. Reconsult PRN as needed.

## 2017-06-16 NOTE — CONSULT NOTE ADULT - SUBJECTIVE AND OBJECTIVE BOX
Patient is a 52y old  Male who presents with a chief complaint of 'Lethargy and decreased PO intake' (15 Ronny 2017 21:17)    HPI:  51 y/o Male with h/o seizure disorder (on multiple AEDs), GERD, severe intellectual disability, nonverbal at baseline, osteoporosis, prior aspiration pneumonia, medication-induced hyponatremia and hyperprolactinemia, B/L cataracts was admitted on 6/15 from his group home for increased lethargy, malaise, and decreased PO intake. In the ED CT Chest revealed a focal consolidation at the left lung base, and a very small pleural-based density at the right lung base and the patient was given Zosyn 3.375g IV x 1, and Ciprofloxacin 400mg IV x 1.       PMH: as above  PSH: as above  Meds: per reconciliation sheet, noted below  MEDICATIONS  (STANDING):  predniSONE   Tablet 30milliGRAM(s) Oral daily  aspirin  chewable 81milliGRAM(s) Oral daily  phenytoin   Capsule 300milliGRAM(s) Oral at bedtime  clonazePAM Tablet 1milliGRAM(s) Oral two times a day  diVALproex Sprinkle 875milliGRAM(s) Oral daily  diVALproex Sprinkle 750milliGRAM(s) Oral at bedtime  levETIRAcetam 2000milliGRAM(s) Oral two times a day  Clobazam 20milliGRAM(s) Oral at bedtime  carBAMazepine XR Tablet 400milliGRAM(s) Oral daily  carBAMazepine XR Tablet 800milliGRAM(s) Oral at bedtime  benztropine 1milliGRAM(s) Oral four times a day  risperiDONE   Tablet 1milliGRAM(s) Oral daily  risperiDONE   Tablet 3milliGRAM(s) Oral at bedtime  thiothixene 5milliGRAM(s) Oral at bedtime  ferrous    sulfate 325milliGRAM(s) Oral daily  sodium chloride 2Gram(s) Oral daily  pantoprazole    Tablet 40milliGRAM(s) Oral before breakfast  cholecalciferol 2000Unit(s) Oral daily  heparin  Injectable 5000Unit(s) SubCutaneous every 8 hours  sodium chloride 0.9%. 1000milliLiter(s) IV Continuous <Continuous>  piperacillin/tazobactam IVPB. 3.375Gram(s) IV Intermittent every 8 hours  polyethylene glycol 3350 17Gram(s) Oral daily    MEDICATIONS  (PRN):  ondansetron Injectable 4milliGRAM(s) IV Push every 6 hours PRN Nausea  senna 2Tablet(s) Oral at bedtime PRN Constipation  docusate sodium 100milliGRAM(s) Oral three times a day PRN Constipation    Allergies    Lamisil (Unknown)    Intolerances    Carrots (Other)  Corn (Other)  Dislikes Beans (Other)  Dislikes Peas (Other)    Social: no smoking, no alcohol, no illegal drugs; no recent travel, no exposure to TB  FAMILY HISTORY:    ROS: the patient is confused; unobtainable    Vital Signs Last 24 Hrs  T(C): 36.7, Max: 36.9 (06-15 @ 20:04)  T(F): 98, Max: 98.5 (06-15 @ 23:35)  HR: 77 (68 - 99)  BP: 100/54 (100/54 - 142/70)  BP(mean): --  RR: 17 (15 - 22)  SpO2: 100% (96% - 100%)  Daily Height in cm: 172.72 (15 Ronny 2017 15:39)    Daily     PE:    Constitutional: frail looking  HEENT: NC/AT, EOMI, PERRLA  Neck: supple  Back: no tenderness  Respiratory: crackles at bases  Cardiovascular: S1S2 regular, no murmurs  Abdomen: soft, not tender, not distended, positive BS  Genitourinary: deferred  Rectal: deferred  Musculoskeletal: no muscle tenderness, no joint swelling or tenderness  Extremities: no pedal edema  Neurological: confused, moving all extremities, no focal deficits  Skin: no rashes    Labs:                        12.7   6.0   )-----------( 248      ( 16 Jun 2017 07:06 )             37.2     06-16    135  |  100  |  16  ----------------------------<  96  3.7   |  28  |  0.70    Ca    8.2<L>      16 Jun 2017 07:06  Mg     2.0     06-16    TPro  6.8  /  Alb  2.8<L>  /  TBili  0.4  /  DBili  x   /  AST  13<L>  /  ALT  9<L>  /  AlkPhos  40  06-16     LIVER FUNCTIONS - ( 16 Jun 2017 07:06 )  Alb: 2.8 g/dL / Pro: 6.8 gm/dL / ALK PHOS: 40 U/L / ALT: 9 U/L / AST: 13 U/L / GGT: x                 Radiology:    CT chest:  Left lower lobe atelectasis. Superimposed pneumonia is not   definitively excluded.    Proximal esophageal wall thickening and adjacent air filled outpouching.   Differential diagnosis includes neoplasm, inflammation and diverticulum.   Recommend further evaluation with esophagram.    Advanced directives addressed: full resuscitation

## 2017-06-16 NOTE — SWALLOW BEDSIDE ASSESSMENT ADULT - SWALLOW EVAL: CURRENT DIET
On regular texture diet with thin liquids. An aid from Group Home is present for all meals and cuts his food for him.

## 2017-06-16 NOTE — SWALLOW BEDSIDE ASSESSMENT ADULT - ORAL PREPARATORY PHASE
Food was cut by aid prior to administration. Pt's self monitoring abilities were variably reduced and he tended to rapidly shovel large volumes of food in mouth. His aid was adept at ensuring that he accepted bolus volumes in manageable volumes at a slower acceptable pace. labial grading on utensils was functional. Food was cut by aid prior to administration. Pt's self monitoring abilities were variably reduced and he tended to rapidly shovel large volumes of food in mouth. His aid was adept at ensuring that he accepted bolus volumes in manageable volumes at a slower acceptable pace. Labial grading on utensils was functional.

## 2017-06-16 NOTE — SWALLOW BEDSIDE ASSESSMENT ADULT - PHARYNGEAL PHASE
Swallow triggered in an acceptable time frame for age and laryngeal lift on palpation during swallow trials was within functional parameters. No behavioral aspiration signs exhibited on exam.

## 2017-06-16 NOTE — PATIENT PROFILE ADULT. - PMH
Bowel and bladder incontinence    Cataract    GERD (gastroesophageal reflux disease)    Hearing impaired person    Hyperprolactinemia    Hyponatremia    Intellectual disability    Osteoporosis    Seizure disorder

## 2017-06-16 NOTE — CONSULT NOTE ADULT - SUBJECTIVE AND OBJECTIVE BOX
HPI:  HISTORY FROM CHART - PATIENT NONVERBAL  51 y/o M PMHx significant for seizure disorder (on multiple AEDs), GERD, severe intellectual disability, nonverbal at baseline, osteoporosis, prior hx of aspiration pneumonia, medication-induced hyponatremia and hyperprolactinemia, B/L cataracts who presents to the ED from his group home for symptoms increased lethargy, malaise, and decreased PO intake. Hx obtained from Group home records and the patients' aide at the bedside. In the ED CT Chest revealed a focal consolidation at the left lung base, and a very small pleural-based density at the right lung base. In the ED the patient was given Zosyn 3.375g IV x 1, and Ciprofloxacin 400mg IV x 1. (15 Ronny 2017 21:17)  Also noted to have air filled outpouching of upper esophagus -- neoplasm vs. diverticulum      PAST MEDICAL & SURGICAL HISTORY:  Bowel and bladder incontinence  Hearing impaired person  Intellectual disability  Hyperprolactinemia  Hyponatremia  Cataract  GERD (gastroesophageal reflux disease)  Osteoporosis  Seizure disorder  H/O cataract extraction      MEDICATIONS  (STANDING):  predniSONE   Tablet 30milliGRAM(s) Oral daily  aspirin  chewable 81milliGRAM(s) Oral daily  phenytoin   Capsule 300milliGRAM(s) Oral at bedtime  clonazePAM Tablet 1milliGRAM(s) Oral two times a day  diVALproex Sprinkle 875milliGRAM(s) Oral daily  diVALproex Sprinkle 750milliGRAM(s) Oral at bedtime  levETIRAcetam 2000milliGRAM(s) Oral two times a day  Clobazam 20milliGRAM(s) Oral at bedtime  carBAMazepine XR Tablet 400milliGRAM(s) Oral daily  carBAMazepine XR Tablet 800milliGRAM(s) Oral at bedtime  benztropine 1milliGRAM(s) Oral four times a day  risperiDONE   Tablet 1milliGRAM(s) Oral daily  risperiDONE   Tablet 3milliGRAM(s) Oral at bedtime  thiothixene 5milliGRAM(s) Oral at bedtime  ferrous    sulfate 325milliGRAM(s) Oral daily  sodium chloride 2Gram(s) Oral daily  pantoprazole    Tablet 40milliGRAM(s) Oral before breakfast  cholecalciferol 2000Unit(s) Oral daily  heparin  Injectable 5000Unit(s) SubCutaneous every 8 hours  sodium chloride 0.9%. 1000milliLiter(s) IV Continuous <Continuous>  piperacillin/tazobactam IVPB. 3.375Gram(s) IV Intermittent every 8 hours  polyethylene glycol 3350 17Gram(s) Oral daily    MEDICATIONS  (PRN):  ondansetron Injectable 4milliGRAM(s) IV Push every 6 hours PRN Nausea  senna 2Tablet(s) Oral at bedtime PRN Constipation  docusate sodium 100milliGRAM(s) Oral three times a day PRN Constipation      Allergies    Lamisil (Unknown)    Intolerances    Carrots (Other)  Corn (Other)  Dislikes Beans (Other)  Dislikes Peas (Other)    ROS  As above  Otherwise unremarkable    Vital Signs Last 24 Hrs  T(C): 36.7, Max: 36.9 (06-15 @ 20:04)  T(F): 98, Max: 98.5 (06-15 @ 23:35)  HR: 77 (68 - 99)  BP: 100/54 (100/54 - 142/70)  BP(mean): --  RR: 17 (17 - 22)  SpO2: 100% (96% - 100%)    Constitutional: NAD, non-verbal  Respiratory: CTAB  Cardiovascular: S1 and S2, RRR  Gastrointestinal: BS+, soft, NT/ND  Extremities: No peripheral edema  Skin: No rashes    LABS:                        12.7   6.0   )-----------( 248      ( 16 Jun 2017 07:06 )             37.2     06-16    135  |  100  |  16  ----------------------------<  96  3.7   |  28  |  0.70    Ca    8.2<L>      16 Jun 2017 07:06  Mg     2.0     06-16    TPro  6.8  /  Alb  2.8<L>  /  TBili  0.4  /  DBili  x   /  AST  13<L>  /  ALT  9<L>  /  AlkPhos  40  06-16      LIVER FUNCTIONS - ( 16 Jun 2017 07:06 )  Alb: 2.8 g/dL / Pro: 6.8 gm/dL / ALK PHOS: 40 U/L / ALT: 9 U/L / AST: 13 U/L / GGT: x

## 2017-06-16 NOTE — SWALLOW BEDSIDE ASSESSMENT ADULT - ORAL PHASE
Bolus formation/transfer were mildly prolonged and disorganized but mechanically functional. Piecemeal deglutition was evident. Pt cleared oral debris on on own given mildly increased time.

## 2017-06-16 NOTE — SWALLOW BEDSIDE ASSESSMENT ADULT - SWALLOW EVAL: DIAGNOSIS
1) Pt demonstrates reduced self monitoring abilities for feeding with concomitant mild functional Oral Dysphagia atop underlying pharyngeal integrity which subjectively appears to be within functional parameters for age. No behavioral aspiration signs exhibited on exam. Suspect that reduced self monitoring for feeding/Oral Dysphagia are chronic pre-existing issues associated with profound mental retardation. Note that swallow status is felt to be stable despite the above and likely at usual state.  2) Pt is [place] and demonstrates severe Cognitive Dysfunction in setting of known mental retardation. Pt does not follow commands and is non verbal which is reportedly chronic. His needs must be anticipated. At suspected communicative baseline.

## 2017-06-16 NOTE — PROVIDER CONTACT NOTE (OTHER) - SITUATION
Patient received from ED via stretcher. Current peripheral IV on the left arm not working. Pt refused to put new one. Pt due for Sodium chloride 0.9 % @75 ml/hr now and Zocyn at 6 am.

## 2017-06-16 NOTE — SWALLOW BEDSIDE ASSESSMENT ADULT - NS SPL SWALLOW CLINIC TRIAL FT
Note that pt appeared to thoroughly enjoy oral feeding and no overt signs of pain were evident when feeding/eating.

## 2017-06-16 NOTE — SWALLOW BEDSIDE ASSESSMENT ADULT - ADDITIONAL RECOMMENDATIONS
Pt's solid foods must be cut for him as is the case at Group Home. Aid should be at his side for all meals.

## 2017-06-16 NOTE — SWALLOW BEDSIDE ASSESSMENT ADULT - SWALLOW EVAL: RECOMMENDED FEEDING/EATING TECHNIQUES
alternate food with liquid/check mouth frequently for oral residue/pocketing/position upright (90 degrees)

## 2017-06-16 NOTE — SWALLOW BEDSIDE ASSESSMENT ADULT - ASR SWALLOW RECOMMEND DIAG
Defer MBS as pt appears clinically tolerant of suggested PO textures and test would be technically difficult nonetheless given his altered mentation/"rocking behavior".

## 2017-06-16 NOTE — SWALLOW BEDSIDE ASSESSMENT ADULT - DIET PRIOR TO ADMI
On a regular texture diet with thin liquids at Group Home with stipulation that solid foods be cut into half inch portions.

## 2017-06-17 LAB — LEVETIRACETAM SERPL-MCNC: 13.1 MCG/ML — SIGNIFICANT CHANGE UP (ref 12–46)

## 2017-06-17 RX ORDER — LEVETIRACETAM 250 MG/1
2000 TABLET, FILM COATED ORAL ONCE
Qty: 0 | Refills: 0 | Status: COMPLETED | OUTPATIENT
Start: 2017-06-17 | End: 2017-06-17

## 2017-06-17 RX ADMIN — SODIUM CHLORIDE 2 GRAM(S): 9 INJECTION INTRAMUSCULAR; INTRAVENOUS; SUBCUTANEOUS at 11:02

## 2017-06-17 RX ADMIN — RISPERIDONE 1 MILLIGRAM(S): 4 TABLET ORAL at 11:02

## 2017-06-17 RX ADMIN — DIVALPROEX SODIUM 750 MILLIGRAM(S): 500 TABLET, DELAYED RELEASE ORAL at 22:35

## 2017-06-17 RX ADMIN — PIPERACILLIN AND TAZOBACTAM 25 GRAM(S): 4; .5 INJECTION, POWDER, LYOPHILIZED, FOR SOLUTION INTRAVENOUS at 06:17

## 2017-06-17 RX ADMIN — Medication 325 MILLIGRAM(S): at 11:02

## 2017-06-17 RX ADMIN — Medication 400 MILLIGRAM(S): at 11:03

## 2017-06-17 RX ADMIN — HEPARIN SODIUM 5000 UNIT(S): 5000 INJECTION INTRAVENOUS; SUBCUTANEOUS at 21:48

## 2017-06-17 RX ADMIN — Medication 2000 UNIT(S): at 11:02

## 2017-06-17 RX ADMIN — HEPARIN SODIUM 5000 UNIT(S): 5000 INJECTION INTRAVENOUS; SUBCUTANEOUS at 15:17

## 2017-06-17 RX ADMIN — Medication 1 MILLIGRAM(S): at 18:25

## 2017-06-17 RX ADMIN — LEVETIRACETAM 400 MILLIGRAM(S): 250 TABLET, FILM COATED ORAL at 07:58

## 2017-06-17 RX ADMIN — DIVALPROEX SODIUM 875 MILLIGRAM(S): 500 TABLET, DELAYED RELEASE ORAL at 11:01

## 2017-06-17 RX ADMIN — Medication 300 MILLIGRAM(S): at 21:49

## 2017-06-17 RX ADMIN — Medication 1 MILLIGRAM(S): at 11:02

## 2017-06-17 RX ADMIN — Medication 1 MILLIGRAM(S): at 23:53

## 2017-06-17 RX ADMIN — LEVETIRACETAM 2000 MILLIGRAM(S): 250 TABLET, FILM COATED ORAL at 18:25

## 2017-06-17 RX ADMIN — RISPERIDONE 3 MILLIGRAM(S): 4 TABLET ORAL at 22:35

## 2017-06-17 RX ADMIN — PIPERACILLIN AND TAZOBACTAM 25 GRAM(S): 4; .5 INJECTION, POWDER, LYOPHILIZED, FOR SOLUTION INTRAVENOUS at 15:16

## 2017-06-17 RX ADMIN — Medication 81 MILLIGRAM(S): at 11:03

## 2017-06-17 RX ADMIN — Medication 1 MILLIGRAM(S): at 18:24

## 2017-06-17 RX ADMIN — Medication 5 MILLIGRAM(S): at 21:49

## 2017-06-17 RX ADMIN — PIPERACILLIN AND TAZOBACTAM 25 GRAM(S): 4; .5 INJECTION, POWDER, LYOPHILIZED, FOR SOLUTION INTRAVENOUS at 21:48

## 2017-06-17 RX ADMIN — POLYETHYLENE GLYCOL 3350 17 GRAM(S): 17 POWDER, FOR SOLUTION ORAL at 11:03

## 2017-06-17 RX ADMIN — Medication 800 MILLIGRAM(S): at 22:35

## 2017-06-18 RX ADMIN — PIPERACILLIN AND TAZOBACTAM 25 GRAM(S): 4; .5 INJECTION, POWDER, LYOPHILIZED, FOR SOLUTION INTRAVENOUS at 13:37

## 2017-06-18 RX ADMIN — HEPARIN SODIUM 5000 UNIT(S): 5000 INJECTION INTRAVENOUS; SUBCUTANEOUS at 13:40

## 2017-06-18 RX ADMIN — Medication 2000 UNIT(S): at 11:54

## 2017-06-18 RX ADMIN — Medication 1 MILLIGRAM(S): at 05:51

## 2017-06-18 RX ADMIN — LEVETIRACETAM 2000 MILLIGRAM(S): 250 TABLET, FILM COATED ORAL at 05:51

## 2017-06-18 RX ADMIN — SODIUM CHLORIDE 2 GRAM(S): 9 INJECTION INTRAMUSCULAR; INTRAVENOUS; SUBCUTANEOUS at 11:55

## 2017-06-18 RX ADMIN — Medication 1 MILLIGRAM(S): at 11:54

## 2017-06-18 RX ADMIN — DIVALPROEX SODIUM 875 MILLIGRAM(S): 500 TABLET, DELAYED RELEASE ORAL at 11:55

## 2017-06-18 RX ADMIN — Medication 325 MILLIGRAM(S): at 11:55

## 2017-06-18 RX ADMIN — SODIUM CHLORIDE 75 MILLILITER(S): 9 INJECTION INTRAMUSCULAR; INTRAVENOUS; SUBCUTANEOUS at 06:03

## 2017-06-18 RX ADMIN — RISPERIDONE 1 MILLIGRAM(S): 4 TABLET ORAL at 11:56

## 2017-06-18 RX ADMIN — PANTOPRAZOLE SODIUM 40 MILLIGRAM(S): 20 TABLET, DELAYED RELEASE ORAL at 06:01

## 2017-06-18 RX ADMIN — Medication 1 MILLIGRAM(S): at 17:29

## 2017-06-18 RX ADMIN — Medication 400 MILLIGRAM(S): at 11:56

## 2017-06-18 RX ADMIN — LEVETIRACETAM 2000 MILLIGRAM(S): 250 TABLET, FILM COATED ORAL at 17:29

## 2017-06-18 RX ADMIN — Medication 1 MILLIGRAM(S): at 17:32

## 2017-06-18 RX ADMIN — PIPERACILLIN AND TAZOBACTAM 25 GRAM(S): 4; .5 INJECTION, POWDER, LYOPHILIZED, FOR SOLUTION INTRAVENOUS at 05:51

## 2017-06-18 RX ADMIN — HEPARIN SODIUM 5000 UNIT(S): 5000 INJECTION INTRAVENOUS; SUBCUTANEOUS at 05:51

## 2017-06-18 RX ADMIN — Medication 30 MILLIGRAM(S): at 05:51

## 2017-06-18 RX ADMIN — PIPERACILLIN AND TAZOBACTAM 25 GRAM(S): 4; .5 INJECTION, POWDER, LYOPHILIZED, FOR SOLUTION INTRAVENOUS at 22:24

## 2017-06-18 RX ADMIN — Medication 81 MILLIGRAM(S): at 11:54

## 2017-06-19 LAB
ANION GAP SERPL CALC-SCNC: 8 MMOL/L — SIGNIFICANT CHANGE UP (ref 5–17)
BUN SERPL-MCNC: 20 MG/DL — SIGNIFICANT CHANGE UP (ref 7–23)
CALCIUM SERPL-MCNC: 8 MG/DL — LOW (ref 8.5–10.1)
CHLORIDE SERPL-SCNC: 104 MMOL/L — SIGNIFICANT CHANGE UP (ref 96–108)
CO2 SERPL-SCNC: 27 MMOL/L — SIGNIFICANT CHANGE UP (ref 22–31)
CREAT SERPL-MCNC: 0.83 MG/DL — SIGNIFICANT CHANGE UP (ref 0.5–1.3)
GLUCOSE SERPL-MCNC: 90 MG/DL — SIGNIFICANT CHANGE UP (ref 70–99)
HCT VFR BLD CALC: 36.5 % — LOW (ref 39–50)
HGB BLD-MCNC: 12.3 G/DL — LOW (ref 13–17)
MCHC RBC-ENTMCNC: 30.6 PG — SIGNIFICANT CHANGE UP (ref 27–34)
MCHC RBC-ENTMCNC: 33.7 GM/DL — SIGNIFICANT CHANGE UP (ref 32–36)
MCV RBC AUTO: 90.8 FL — SIGNIFICANT CHANGE UP (ref 80–100)
PLATELET # BLD AUTO: 266 K/UL — SIGNIFICANT CHANGE UP (ref 150–400)
POTASSIUM SERPL-MCNC: 4.2 MMOL/L — SIGNIFICANT CHANGE UP (ref 3.5–5.3)
POTASSIUM SERPL-SCNC: 4.2 MMOL/L — SIGNIFICANT CHANGE UP (ref 3.5–5.3)
RBC # BLD: 4.02 M/UL — LOW (ref 4.2–5.8)
RBC # FLD: 12.8 % — SIGNIFICANT CHANGE UP (ref 10.3–14.5)
SODIUM SERPL-SCNC: 139 MMOL/L — SIGNIFICANT CHANGE UP (ref 135–145)
WBC # BLD: 4.9 K/UL — SIGNIFICANT CHANGE UP (ref 3.8–10.5)
WBC # FLD AUTO: 4.9 K/UL — SIGNIFICANT CHANGE UP (ref 3.8–10.5)

## 2017-06-19 RX ADMIN — LEVETIRACETAM 2000 MILLIGRAM(S): 250 TABLET, FILM COATED ORAL at 20:42

## 2017-06-19 RX ADMIN — Medication 5 MILLIGRAM(S): at 21:15

## 2017-06-19 RX ADMIN — Medication 1 MILLIGRAM(S): at 20:42

## 2017-06-19 RX ADMIN — Medication 1 MILLIGRAM(S): at 00:04

## 2017-06-19 RX ADMIN — RISPERIDONE 3 MILLIGRAM(S): 4 TABLET ORAL at 21:15

## 2017-06-19 RX ADMIN — Medication 325 MILLIGRAM(S): at 13:51

## 2017-06-19 RX ADMIN — Medication 400 MILLIGRAM(S): at 13:50

## 2017-06-19 RX ADMIN — Medication 800 MILLIGRAM(S): at 21:15

## 2017-06-19 RX ADMIN — DIVALPROEX SODIUM 750 MILLIGRAM(S): 500 TABLET, DELAYED RELEASE ORAL at 21:15

## 2017-06-19 RX ADMIN — DIVALPROEX SODIUM 750 MILLIGRAM(S): 500 TABLET, DELAYED RELEASE ORAL at 00:02

## 2017-06-19 RX ADMIN — PIPERACILLIN AND TAZOBACTAM 25 GRAM(S): 4; .5 INJECTION, POWDER, LYOPHILIZED, FOR SOLUTION INTRAVENOUS at 05:16

## 2017-06-19 RX ADMIN — Medication 1 MILLIGRAM(S): at 05:15

## 2017-06-19 RX ADMIN — Medication 300 MILLIGRAM(S): at 00:02

## 2017-06-19 RX ADMIN — RISPERIDONE 1 MILLIGRAM(S): 4 TABLET ORAL at 13:51

## 2017-06-19 RX ADMIN — Medication 81 MILLIGRAM(S): at 13:50

## 2017-06-19 RX ADMIN — Medication 300 MILLIGRAM(S): at 21:15

## 2017-06-19 RX ADMIN — SODIUM CHLORIDE 2 GRAM(S): 9 INJECTION INTRAMUSCULAR; INTRAVENOUS; SUBCUTANEOUS at 13:51

## 2017-06-19 RX ADMIN — HEPARIN SODIUM 5000 UNIT(S): 5000 INJECTION INTRAVENOUS; SUBCUTANEOUS at 05:15

## 2017-06-19 RX ADMIN — DIVALPROEX SODIUM 875 MILLIGRAM(S): 500 TABLET, DELAYED RELEASE ORAL at 13:52

## 2017-06-19 RX ADMIN — Medication 2000 UNIT(S): at 13:51

## 2017-06-19 RX ADMIN — HEPARIN SODIUM 5000 UNIT(S): 5000 INJECTION INTRAVENOUS; SUBCUTANEOUS at 21:15

## 2017-06-19 RX ADMIN — PANTOPRAZOLE SODIUM 40 MILLIGRAM(S): 20 TABLET, DELAYED RELEASE ORAL at 06:27

## 2017-06-19 RX ADMIN — RISPERIDONE 3 MILLIGRAM(S): 4 TABLET ORAL at 00:03

## 2017-06-19 RX ADMIN — LEVETIRACETAM 2000 MILLIGRAM(S): 250 TABLET, FILM COATED ORAL at 05:15

## 2017-06-19 RX ADMIN — HEPARIN SODIUM 5000 UNIT(S): 5000 INJECTION INTRAVENOUS; SUBCUTANEOUS at 00:02

## 2017-06-19 RX ADMIN — Medication 5 MILLIGRAM(S): at 00:03

## 2017-06-19 RX ADMIN — Medication 800 MILLIGRAM(S): at 00:00

## 2017-06-19 RX ADMIN — Medication 1 MILLIGRAM(S): at 13:50

## 2017-06-19 RX ADMIN — HEPARIN SODIUM 5000 UNIT(S): 5000 INJECTION INTRAVENOUS; SUBCUTANEOUS at 13:52

## 2017-06-19 RX ADMIN — Medication 30 MILLIGRAM(S): at 05:15

## 2017-06-19 RX ADMIN — PIPERACILLIN AND TAZOBACTAM 25 GRAM(S): 4; .5 INJECTION, POWDER, LYOPHILIZED, FOR SOLUTION INTRAVENOUS at 13:49

## 2017-06-19 RX ADMIN — PIPERACILLIN AND TAZOBACTAM 25 GRAM(S): 4; .5 INJECTION, POWDER, LYOPHILIZED, FOR SOLUTION INTRAVENOUS at 21:16

## 2017-06-20 ENCOUNTER — RESULT REVIEW (OUTPATIENT)
Age: 52
End: 2017-06-20

## 2017-06-20 VITALS
SYSTOLIC BLOOD PRESSURE: 136 MMHG | DIASTOLIC BLOOD PRESSURE: 72 MMHG | HEART RATE: 103 BPM | TEMPERATURE: 97 F | OXYGEN SATURATION: 96 % | RESPIRATION RATE: 16 BRPM

## 2017-06-20 PROCEDURE — 88305 TISSUE EXAM BY PATHOLOGIST: CPT | Mod: 26

## 2017-06-20 PROCEDURE — 88312 SPECIAL STAINS GROUP 1: CPT | Mod: 26

## 2017-06-20 PROCEDURE — 88342 IMHCHEM/IMCYTCHM 1ST ANTB: CPT | Mod: 26

## 2017-06-20 RX ADMIN — Medication 1 MILLIGRAM(S): at 11:26

## 2017-06-20 RX ADMIN — Medication 400 MILLIGRAM(S): at 11:27

## 2017-06-20 RX ADMIN — RISPERIDONE 1 MILLIGRAM(S): 4 TABLET ORAL at 11:26

## 2017-06-20 RX ADMIN — LEVETIRACETAM 2000 MILLIGRAM(S): 250 TABLET, FILM COATED ORAL at 05:56

## 2017-06-20 RX ADMIN — Medication 1 MILLIGRAM(S): at 05:55

## 2017-06-20 RX ADMIN — Medication 325 MILLIGRAM(S): at 11:25

## 2017-06-20 RX ADMIN — PANTOPRAZOLE SODIUM 40 MILLIGRAM(S): 20 TABLET, DELAYED RELEASE ORAL at 09:20

## 2017-06-20 RX ADMIN — DIVALPROEX SODIUM 875 MILLIGRAM(S): 500 TABLET, DELAYED RELEASE ORAL at 11:27

## 2017-06-20 RX ADMIN — POLYETHYLENE GLYCOL 3350 17 GRAM(S): 17 POWDER, FOR SOLUTION ORAL at 11:25

## 2017-06-20 RX ADMIN — HEPARIN SODIUM 5000 UNIT(S): 5000 INJECTION INTRAVENOUS; SUBCUTANEOUS at 13:30

## 2017-06-20 RX ADMIN — PIPERACILLIN AND TAZOBACTAM 25 GRAM(S): 4; .5 INJECTION, POWDER, LYOPHILIZED, FOR SOLUTION INTRAVENOUS at 05:56

## 2017-06-20 RX ADMIN — HEPARIN SODIUM 5000 UNIT(S): 5000 INJECTION INTRAVENOUS; SUBCUTANEOUS at 05:56

## 2017-06-20 RX ADMIN — SODIUM CHLORIDE 2 GRAM(S): 9 INJECTION INTRAMUSCULAR; INTRAVENOUS; SUBCUTANEOUS at 11:26

## 2017-06-20 RX ADMIN — Medication 2000 UNIT(S): at 11:25

## 2017-06-20 RX ADMIN — PIPERACILLIN AND TAZOBACTAM 25 GRAM(S): 4; .5 INJECTION, POWDER, LYOPHILIZED, FOR SOLUTION INTRAVENOUS at 13:30

## 2017-06-20 RX ADMIN — Medication 81 MILLIGRAM(S): at 11:25

## 2017-06-20 NOTE — DISCHARGE NOTE ADULT - MEDICATION SUMMARY - MEDICATIONS TO TAKE
I will START or STAY ON the medications listed below when I get home from the hospital:    aspirin 81 mg oral tablet, chewable  -- 1 tab(s) by mouth once a day  -- Indication: For Home meds    phenytoin 100 mg oral capsule  -- 3 cap(s) by mouth once a day (at bedtime)  -- Indication: For Seizure disorder    TEGretol  mg oral tablet, extended release  -- 1 tab(s) by mouth once a day  -- Indication: For Seizure disorder    TEGretol  mg oral tablet, extended release  -- 2 tab(s) by mouth once a day (at bedtime)  -- Indication: For Seizure disorder    Onfi 10 mg oral tablet  -- 2 tab(s) by mouth once a day (at bedtime)  -- Indication: For Seizure disorder    clonazePAM 1 mg oral tablet  -- 1 milligram(s) by mouth 2 times a day  -- Indication: For Home meds    Depakote Sprinkles 125 mg oral delayed release capsule  -- 7 cap(s) by mouth once a day  -- Indication: For Seizure disorder    Depakote Sprinkles 125 mg oral delayed release capsule  -- 6 cap(s) by mouth once a day (at bedtime)  -- Indication: For Seizure disorder    Keppra 1000 mg oral tablet  -- 2 tab(s) by mouth 2 times a day  -- Indication: For Seizure disorder    benztropine 1 mg oral tablet  -- 1 tab(s) by mouth 4 times a day  -- Indication: For Home meds    RisperDAL 1 mg oral tablet  -- 1 tab(s) by mouth once a day  -- Indication: For Home meds    RisperDAL 3 mg oral tablet  -- 1 tab(s) by mouth once a day (at bedtime)  -- Indication: For Home meds    thiothixene 5 mg oral capsule  -- 1 cap(s) by mouth once a day (at bedtime)  -- Indication: For Home meds    ibandronate 150 mg oral tablet  -- 1 tab(s) by mouth once a month  -- 14th of the month  -- Indication: For Home meds    hydroCHLOROthiazide 25 mg oral tablet  -- 1 tab(s) by mouth once a day  -- Indication: For HTN    multivitamin with iron  -- 1 tab(s) by mouth once a day  -- Indication: For Supplement    ferrous sulfate 325 mg (65 mg elemental iron) oral delayed release tablet  -- 1 tab(s) by mouth once a day  -- Indication: For Supplements    Senokot S 50 mg-8.6 mg oral tablet  -- 1 tab(s) by mouth once a day (at bedtime)  -- Indication: For Supplements    sodium chloride 1 g oral tablet  -- 2 tab(s) by mouth once a day  -- Indication: For Home meds    PriLOSEC 20 mg oral delayed release capsule  -- 1 cap(s) by mouth once a day  -- Indication: For Home meds    Calcium 600+D oral tablet  -- 1 tab(s) by mouth 2 times a day  -- Indication: For Home meds    cholecalciferol 2000 intl units oral capsule  -- 1 cap(s) by mouth once a day  -- Indication: For Home meds

## 2017-06-20 NOTE — DISCHARGE NOTE ADULT - PROVIDER TOKENS
FREE:[LAST:[Dr Yesenia Angel],PHONE:[(   )    -],FAX:[(   )    -],ADDRESS:[[facility] in the community- f/u within a week of discharge]],TOKEN:'[id number]'

## 2017-06-20 NOTE — DISCHARGE NOTE ADULT - CARE PROVIDER_API CALL
richard Shelton PCP in the community- f/u within a week of discharge  Phone: (   )    -  Fax: (   )    -    Maximiliano Gandhi), Gastroenterology; Internal Medicine  [address] [place] [address]  Phone: [phone]  Fax: [phone]

## 2017-06-20 NOTE — DISCHARGE NOTE ADULT - CARE PLAN
Principal Discharge DX:	Seizure disorder  Goal:	prevent worsening  Instructions for follow-up, activity and diet:	continue antiepileptic medication regimen  f/u with your neurologist as an outpatient  Secondary Diagnosis:	Gastritis without bleeding, unspecified chronicity, unspecified gastritis type  Goal:	symptom free  Instructions for follow-up, activity and diet:	continue prilosec  Secondary Diagnosis:	Intellectual disability  Goal:	symptom free  Instructions for follow-up, activity and diet:	supportive care  Secondary Diagnosis:	HCAP (healthcare-associated pneumonia)  Goal:	symptom free  Instructions for follow-up, activity and diet:	completed IV antibiotic as an inpatient- no further antibiotic requirement   s/p endoscopy which showed gastritis Principal Discharge DX:	Seizure disorder  Goal:	prevent worsening  Instructions for follow-up, activity and diet:	continue antiepileptic medication regimen  f/u with your neurologist as an outpatient  Secondary Diagnosis:	Gastritis without bleeding, unspecified chronicity, unspecified gastritis type  Goal:	symptom free  Instructions for follow-up, activity and diet:	continue prilosec  Secondary Diagnosis:	Intellectual disability  Goal:	symptom free  Instructions for follow-up, activity and diet:	supportive care

## 2017-06-20 NOTE — PROGRESS NOTE ADULT - ASSESSMENT
51 y/o M PMHx significant for seizure disorder (on multiple AEDs), GERD, severe intellectual disability, nonverbal at baseline, osteoporosis, prior hx of aspiration pneumonia, medication-induced hyponatremia and hyperprolactinemia, B/L cataracts who presents to the ED from his group home for symptoms increased lethargy, malaise, and decreased PO intake. Hx obtained from Group home records and the patients' aide at the bedside. In the ED CT A/P revealed a focal consolidation vs atelectasis at the left lung base.  In the ED the patient was given Zosyn 3.375g IV x 1, and Ciprofloxacin 400mg IV x 1.  CT Chest w esophageal abnormality possibly diverticlum vs neoplasm vs infectious also w atelectasis w possibly pneumonia in the left base.     # Metabolic encephalopathy ontop of baseline mental retardation  - 2/2 pneumonia vs seizure    # Esophageal abnormality on CT: Neoplasm vs infectious vs deiverticulum  - GI eval appreicated, likely endoscopic eval     # Coprophagia  - constant observation    Problem/Plan - 1:  ·  Aspiration pneumonia suspect gram negative and anaerobic bacteria Plan:Plan: ~Admit to Medicine  ~c/w zosyn, appreciate ID  ~f/u PAN C+S  ~cont. IV fluids  ~aspiration precautions  ~speech and swallow evaluation appreciated- ok for regular diet/liquids.     Problem/Plan - 2:  ·  Problem: Seizure disorder.  Plan:Plan: ~pt is on multiple AEDs will recommend Neurology consultation to further clarify  ~f/u Levetriacetam levels  ~f/u Carbamazepine levels  ~f/u Phenytoin levels  ~cont. seizure precautions.     Problem/Plan - 3:  ·  Problem: Hyponatremia.  Plan:Plan: ~cont. Na tabs  ~f/u repeat labs in the am.     Problem/Plan - 4:  ·  Problem: Osteoporosis.  Plan:Plan: ~takes Ibendronate.     Problem/Plan - 5:  ·  Problem: Prophylactic measure.  Plan:Plan: ~Vte ppx; cont. Heparin.
Patient is seen and consult dictated   AMS slowly improving likely medication induced   left lower lobe atelectasis noted in the ct abdomen of pelvis with elevated left hemidiaphragm ,minimal round atelectasis right base . patient has no symptoms or  signs of pneumonia   severe constipation with stool filling the rectum and colon   seizure disorder     PLAN   - discontinue antibiotics   - monitor for fever and WBC   - obtain baseline cxr.  - treatment of constipation   - follow up drug levels .  - follow up cultures   - dvt prophylaxis.  - consider obtaining baseline ABG if no clinical improvement in mental status and if the patient is cooperative
51 y/o Male with h/o seizure disorder (on multiple AEDs), GERD, severe intellectual disability, nonverbal at baseline, osteoporosis, prior aspiration pneumonia, medication-induced hyponatremia and hyperprolactinemia, B/L cataracts was admitted on 6/15 from his group home for increased lethargy, malaise, and decreased PO intake. In the ED CT Chest revealed a focal consolidation at the left lung base, and a very small pleural-based density at the right lung base and the patient was given Zosyn 3.375g IV x 1, and Ciprofloxacin 400mg IV x 1.     1. Aspiration pneumonia. Encephalopathy.  -more alert  -f/u BC x 2  -on zosyn 3.375 gm IV q8h # 2  -tolerating abx well so far; no side effects noted  -continue IV abx coverage  -monitor BMP  -aspiration precautions  -monitor temps  -f/u CBC  -supportive care  2. Other issues:   -care per medicine
53 y/o M PMHx significant for seizure disorder (on multiple AEDs), GERD, severe intellectual disability, nonverbal at baseline, osteoporosis, prior hx of aspiration pneumonia, medication-induced hyponatremia and hyperprolactinemia, B/L cataracts who presents to the ED from his group home for symptoms increased lethargy, malaise, and decreased PO intake. Hx obtained from Group home records and the patients' aide at the bedside. In the ED CT A/P revealed a focal consolidation vs atelectasis at the left lung base.  In the ED the patient was given Zosyn 3.375g IV x 1, and Ciprofloxacin 400mg IV x 1.  CT Chest w esophageal abnormality possibly diverticlum vs neoplasm vs infectious also w atelectasis w possibly pneumonia in the left base.     # Metabolic encephalopathy ontop of baseline mental retardation  - 2/2 pneumonia vs seizure    # Esophageal abnormality on CT: Neoplasm vs infectious vs deiverticulum  - GI eval    Problem/Plan - 1:  ·  Aspiration pneumonia suspect gram negative and anaerobic bacteria Plan:Plan: ~Admit to Medicine  ~c/w zosyn, appreciate ID  ~f/u PAN C+S  ~cont. IV fluids  ~aspiration precautions  ~speech and swallow evaluation appreciated- ok for regular diet/liquids.     Problem/Plan - 2:  ·  Problem: Seizure disorder.  Plan:Plan: ~pt is on multiple AEDs will recommend Neurology consultation to further clarify  ~f/u Levetriacetam levels  ~f/u Carbamazepine levels  ~f/u Phenytoin levels  ~cont. seizure precautions.     Problem/Plan - 3:  ·  Problem: Hyponatremia.  Plan:Plan: ~cont. Na tabs  ~f/u repeat labs in the am.     Problem/Plan - 4:  ·  Problem: Osteoporosis.  Plan:Plan: ~takes Ibendronate.     Problem/Plan - 5:  ·  Problem: Prophylactic measure.  Plan:Plan: ~Vte ppx; cont. Heparin.
53 y/o Male with h/o seizure disorder (on multiple AEDs), GERD, severe intellectual disability, nonverbal at baseline, osteoporosis, prior aspiration pneumonia, medication-induced hyponatremia and hyperprolactinemia, B/L cataracts was admitted on 6/15 from his group home for increased lethargy, malaise, and decreased PO intake. In the ED CT Chest revealed a focal consolidation at the left lung base, and a very small pleural-based density at the right lung base and the patient was given Zosyn 3.375g IV x 1, and Ciprofloxacin 400mg IV x 1.     1. Aspiration pneumonia. Encephalopathy.  -respiratory function is improved  -encephalopathy is likely at baseline  -f/u BC x 2  -on zosyn 3.375 gm IV q8h # 3  -tolerating abx well so far; no side effects noted  -continue IV abx coverage for now  -for EGD for possible esophageal lesion  -monitor BMP  -aspiration precautions  -monitor temps  -f/u CBC  -supportive care  2. Other issues:   -care per medicine
53 y/o Male with h/o seizure disorder (on multiple AEDs), GERD, severe intellectual disability, nonverbal at baseline, osteoporosis, prior aspiration pneumonia, medication-induced hyponatremia and hyperprolactinemia, B/L cataracts was admitted on 6/15 from his group home for increased lethargy, malaise, and decreased PO intake. In the ED CT Chest revealed a focal consolidation at the left lung base, and a very small pleural-based density at the right lung base and the patient was given Zosyn 3.375g IV x 1, and Ciprofloxacin 400mg IV x 1.     1. Aspiration pneumonia. Encephalopathy.  -respiratory function is improved  -encephalopathy is likely at baseline  -f/u BC x 2  -on zosyn 3.375 gm IV q8h # 4  -tolerating abx well so far; no side effects noted  -continue IV abx coverage for now  -for EGD to assess for possible esophageal lesion  -monitor BMP  -aspiration precautions  -monitor temps  -f/u CBC  -supportive care  2. Other issues:   -care per medicine
53 y/o Male with h/o seizure disorder (on multiple AEDs), GERD, severe intellectual disability, nonverbal at baseline, osteoporosis, prior aspiration pneumonia, medication-induced hyponatremia and hyperprolactinemia, B/L cataracts was admitted on 6/15 from his group home for increased lethargy, malaise, and decreased PO intake. In the ED CT Chest revealed a focal consolidation at the left lung base, and a very small pleural-based density at the right lung base and the patient was given Zosyn 3.375g IV x 1, and Ciprofloxacin 400mg IV x 1.     1. Aspiration pneumonia. Encephalopathy.  -respiratory function is improved  -encephalopathy is likely at baseline  -f/u BC x 2  -on zosyn 3.375 gm IV q8h # 5  -tolerating abx well so far; no side effects noted  -complete abx therapy today  -monitor BMP  -aspiration precautions  -monitor temps  -f/u CBC  -supportive care  2. Other issues:   -care per medicine
AMS resolved and patent back to baseline   bilateral lower lobe atelectasis and patient has no clinical signs and symptoms of pneumonia   severe constipation with stool filling the rectum and colon   seizure disorder  oesophageal diverticulum with air filled proximal oesophagus      PLAN     - as the findings on the ct scan more of atelectasia  with no fever or high WBC indicative of more atelectasis than pneumonia   - consider discontinue antibiotics after the I.D follow up   - aspiration precautions   - G.I follow up   - monitor WBC and fever spikes
AMS resolved and patent back to baseline   bilateral lower lobe atelectasis and patient has no clinical signs and symptoms of pneumonia on empirical antibiotic therapy   behavioural issues   seizure disorder  oesophageal diverticulum with air filled proximal oesophagus      PLAN     - monitor closely with aspiration precautions .  - follow up WBC and fever spikes .  - patient is not a candidate for the sleep studies due to behavioural issues .  - on zosyn as per the I.D for suspected pneumonia which could be atelectasis as well.
AMS resolved and patent back to baseline   bilateral lower lobe atelectasis and patient has no clinical signs and symptoms of pneumonia on empirical antibiotic therapy with zosyn   behavioural issues   seizure disorder  oesophageal diverticulum with air filled proximal oesophagus      PLAN     - - complete antibiotics as per the I.D .   - patient currently has no new respiratory symptoms   - aspiration precautions and discharge planning as per the medicine
AMS resolved and patent back to baseline   bilateral lower lobe atelectasis and patient has no clinical signs and symptoms of pneumonia on empirical antibiotic therapy with zosyn   behavioural issues   seizure disorder  oesophageal diverticulum with air filled proximal oesophagus      PLAN     - monitor closely with aspiration precautions .  - follow up WBC and fever spikes .  - patient is not a candidate for the sleep studies due to behavioural issues .  - on zosyn as per the I.D for suspected pneumonia which could be atelectasis as well.   - patient was kept on npo for the possible endoscopy
Imp:  Possible esophageal mass or diverticulum.    Rec:  WIll reach out to family again today -- favor EGD but need HCP consent

## 2017-06-20 NOTE — DISCHARGE NOTE ADULT - HOSPITAL COURSE
51 y/o M PMHx significant for seizure disorder (on multiple AEDs), GERD, severe intellectual disability, nonverbal at baseline, osteoporosis, prior hx of aspiration pneumonia, medication-induced hyponatremia and hyperprolactinemia, B/L cataracts who presents to the ED from his group home for symptoms increased lethargy, malaise, and decreased PO intake. Hx obtained from Group home records and the patients' aide at the bedside. In the ED CT A/P revealed a focal consolidation vs atelectasis at the left lung base.  In the ED the patient was given Zosyn 3.375g IV x 1, and Ciprofloxacin 400mg IV x 1.  CT Chest w esophageal abnormality possibly diverticulum vs neoplasm vs infectious also w atelectasis w possibly pneumonia in the left base. Patient s/p EGD 6/20- which revealed gastritis. Patient has completed IV antibiotic for possible Pneumonia.    Vital Signs Last 24 Hrs  T(C): 36.3, Max: 36.8 (06-19 @ 21:09)  T(F): 97.3, Max: 98.3 (06-19 @ 21:09)  HR: 103 (70 - 103)  BP: 136/72 (105/56 - 136/72)  BP(mean): --  RR: 16 (16 - 18)  SpO2: 96% (95% - 100%)    ROS:   All 10 systems reviewed and found to be negative with the exception of what has been described above.    PE:  Constitutional: NAD, laying in bed  HEENT: normocephalic  Neck: supple  Back: no tenderness  Respiratory: LCTA  Cardiovascular: S1S2 regular, no murmurs  Abdomen: soft, not tender, not distended, positive BS  Genitourinary: voiding  Rectal: deferred  Musculoskeletal: no muscle tenderness, no joint swelling or tenderness  Extremities: no pedal edema   Neurological: no focal deficits    Medication: Reviewed  Laboratory: Reviewed    Plan    *Metabolic encephalopathy on top of baseline mental retardation  - 2/2 possible pneumonia- back to baseline    * Esophageal abnormality on CT: Neoplasm vs infectious vs diverticulum s/p EGD which showed gastritis. Normal examined duodenum.  - continue prilosec    * Coprophagia  - constant observation      *aspiration pneumonia suspect gram negative and anaerobic bacteria   - completed zosyn as an inpatient, as per ID no further antibiotic as an outpatient  - egd completed - showed gastritis  - dc IVF-aspiration precautions  - speech and swallow evaluation appreciated- ok for regular diet/liquids.     *Seizure disorder.   - c/w AEDs- levels wnl  - cont. seizure precautions.     *Hyponatremia.  Plan:Plan:   - cont. Na tabs  - repeat labs as an outpatient    *Osteoporosis.  Plan:Plan:   - continue Ibendronate.     Case discussed with Dr Andres. Plan is for discharge back to group home today with instructions to f/u with his PCP in the community. 53 y/o M PMHx significant for seizure disorder (on multiple AEDs), GERD, severe intellectual disability, nonverbal at baseline, osteoporosis, prior hx of aspiration pneumonia, medication-induced hyponatremia and hyperprolactinemia, B/L cataracts who presents to the ED from his group home for symptoms increased lethargy, malaise, and decreased PO intake. Hx obtained from Group home records and the patients' aide at the bedside. In the ED CT A/P revealed a focal consolidation vs atelectasis at the left lung base.  In the ED the patient was given Zosyn 3.375g IV x 1, and Ciprofloxacin 400mg IV x 1.  CT Chest w esophageal abnormality possibly diverticulum vs neoplasm vs infectious also w atelectasis w possibly pneumonia in the left base. Patient s/p EGD 6/20- which revealed gastritis. Patient has completed IV antibiotic for possible Pneumonia.    Vital Signs Last 24 Hrs  T(C): 36.3, Max: 36.8 (06-19 @ 21:09)  T(F): 97.3, Max: 98.3 (06-19 @ 21:09)  HR: 103 (70 - 103)  BP: 136/72 (105/56 - 136/72)  BP(mean): --  RR: 16 (16 - 18)  SpO2: 96% (95% - 100%)    ROS:   All 10 systems reviewed and found to be negative with the exception of what has been described above.    PE:  Constitutional: NAD, laying in bed  HEENT: normocephalic  Neck: supple  Back: no tenderness  Respiratory: LCTA  Cardiovascular: S1S2 regular, no murmurs  Abdomen: soft, not tender, not distended, positive BS  Genitourinary: voiding  Rectal: deferred  Musculoskeletal: no muscle tenderness, no joint swelling or tenderness  Extremities: no pedal edema   Neurological: no focal deficits    Medication: Reviewed  Laboratory: Reviewed    Plan    *Metabolic encephalopathy on top of baseline mental retardation  - 2/2 possible pneumonia- back to baseline    * Esophageal abnormality on CT: Neoplasm vs infectious vs diverticulum s/p EGD which showed gastritis. Normal examined duodenum.  - continue prilosec    * Coprophagia  - constant observation      *aspiration pneumonia ruled out with EGD/swallow evaluation  - zosyn as an inpatient, as per ID no further antibiotic as an outpatient given EGD findings  - egd completed - showed gastritis  - dc IVF  - speech and swallow evaluation appreciated- ok for regular diet/liquids.     *Seizure disorder.   - c/w AEDs- levels wnl  - cont. seizure precautions.     *Hyponatremia.  Plan:Plan:   - cont. Na tabs  - repeat labs as an outpatient    *Osteoporosis.  Plan:Plan:   - continue Ibendronate.     Case discussed with Dr Andres. Plan is for discharge back to group [facility] today with instructions to f/u with his PCP in the community.     attending addendum: agree with above with edits  45 minutes spent on time of care.   medically stable for discharge    discussed with nurse from group home.

## 2017-06-20 NOTE — DISCHARGE NOTE ADULT - SECONDARY DIAGNOSIS.
Gastritis without bleeding, unspecified chronicity, unspecified gastritis type Intellectual disability HCAP (healthcare-associated pneumonia)

## 2017-06-20 NOTE — DISCHARGE NOTE ADULT - PLAN OF CARE
prevent worsening continue antiepileptic medication regimen  f/u with your neurologist as an outpatient symptom free continue prilosec supportive care completed IV antibiotic as an inpatient- no further antibiotic requirement   s/p endoscopy which showed gastritis

## 2017-06-20 NOTE — PROGRESS NOTE ADULT - SUBJECTIVE AND OBJECTIVE BOX
CHIEF COMPLAINT:  aspiration    SUBJECTIVE:   nonverbal. stable per nursing.    REVIEW OF SYSTEMS:  ltd due to mental status    Vital Signs Last 24 Hrs  T(C): 36.7, Max: 37.2 (06-18 @ 21:35)  T(F): 98.1, Max: 980.3 (06-19 @ 05:37)  HR: 71 (71 - 90)  BP: 105/56 (99/74 - 123/63)  BP(mean): --  RR: 17 (16 - 17)  SpO2: 96% (93% - 97%)    I&O's Summary  I & Os for 24h ending 19 Jun 2017 07:00  =============================================  IN: 888 ml / OUT: 0 ml / NET: 888 ml    I & Os for current day (as of 19 Jun 2017 16:27)  =============================================  IN: 100 ml / OUT: 0 ml / NET: 100 ml      CAPILLARY BLOOD GLUCOSE      PHYSICAL EXAM:  Constitutional: NAD, nonverbal  HEENT: EOMI, Normal Hearing, MMM  Neck: No JVD  Respiratory: BS decreased in bases, poor resp effort  Cardiovascular: S1 and S2, RRR  Gastrointestinal: BS+, soft, NT/ND  Extremities: No peripheral edema  Vascular: 2+ peripheral pulses  Neurological: nonverbal, uncooperative to exam, unable to properly address  Musculoskeletal: moving all ext  Skin: No rashes    MEDICATIONS:  MEDICATIONS  (STANDING):  aspirin  chewable 81milliGRAM(s) Oral daily  phenytoin   Capsule 300milliGRAM(s) Oral at bedtime  clonazePAM Tablet 1milliGRAM(s) Oral two times a day  diVALproex Sprinkle 875milliGRAM(s) Oral daily  diVALproex Sprinkle 750milliGRAM(s) Oral at bedtime  levETIRAcetam 2000milliGRAM(s) Oral two times a day  Clobazam 20milliGRAM(s) Oral at bedtime  carBAMazepine XR Tablet 400milliGRAM(s) Oral daily  carBAMazepine XR Tablet 800milliGRAM(s) Oral at bedtime  benztropine 1milliGRAM(s) Oral four times a day  risperiDONE   Tablet 1milliGRAM(s) Oral daily  risperiDONE   Tablet 3milliGRAM(s) Oral at bedtime  thiothixene 5milliGRAM(s) Oral at bedtime  ferrous    sulfate 325milliGRAM(s) Oral daily  sodium chloride 2Gram(s) Oral daily  pantoprazole    Tablet 40milliGRAM(s) Oral before breakfast  cholecalciferol 2000Unit(s) Oral daily  heparin  Injectable 5000Unit(s) SubCutaneous every 8 hours  piperacillin/tazobactam IVPB. 3.375Gram(s) IV Intermittent every 8 hours  polyethylene glycol 3350 17Gram(s) Oral daily      LABS: All Labs Reviewed:                        12.3   4.9   )-----------( 266      ( 19 Jun 2017 07:37 )             36.5     06-19    139  |  104  |  20  ----------------------------<  90  4.2   |  27  |  0.83    Ca    8.0<L>      19 Jun 2017 07:37        Assessment and Plan:   · Assessment		  51 y/o M PMHx significant for seizure disorder (on multiple AEDs), GERD, severe intellectual disability, nonverbal at baseline, osteoporosis, prior hx of aspiration pneumonia, medication-induced hyponatremia and hyperprolactinemia, B/L cataracts who presents to the ED from his group home for symptoms increased lethargy, malaise, and decreased PO intake. Hx obtained from Group home records and the patients' aide at the bedside. In the ED CT A/P revealed a focal consolidation vs atelectasis at the left lung base.  In the ED the patient was given Zosyn 3.375g IV x 1, and Ciprofloxacin 400mg IV x 1.  CT Chest w esophageal abnormality possibly diverticlum vs neoplasm vs infectious also w atelectasis w possibly pneumonia in the left base.     # Metabolic encephalopathy on top of baseline mental retardation  - 2/2 possible pneumonia    # Esophageal abnormality on CT: Neoplasm vs infectious vs deiverticulum  - GI eval appreciated, likely endoscopic eval however difficulty obtaining consent. I tried calling a family member Doris Rhodes at [phone] number obtained by group home staff- no answer, message left with callback number.  - NPO after MN for EGD tomorrow.    # Coprophagia  - constant observation      aspiration pneumonia suspect gram negative and anaerobic bacteria   ~c/w zosyn, appreciate ID and pulm  - eval for esophageal abnormality that would predispose to aspiration  - dc IVF  ~aspiration precautions  ~speech and swallow evaluation appreciated- ok for regular diet/liquids.     Seizure disorder.   - c/w AEDs- levels wnl  ~cont. seizure precautions.     Hyponatremia.  Plan:Plan: ~cont. Na tabs  ~f/u repeat labs in the am.      Osteoporosis.  Plan:Plan: ~takes Ibendronate.     Problem/Plan - 5:  ·  Problem: Prophylactic measure.  Plan:Plan: ~Vte ppx; cont. Heparin.         chart review this PM
Chief Complaint/Reason for Admission: 'Lethargy and decreased PO intake'	    Subjective: nonverbal, does not provide any history.      Review of Systems:  Unable to obtain due to the patients' medical condition.	    Vital Signs Last 24 Hrs  T(C): 36.7, Max: 36.9 (06-15 @ 20:04)  T(F): 98, Max: 98.5 (06-15 @ 23:35)  HR: 77 (68 - 99)  BP: 100/54 (100/54 - 142/70)  BP(mean): --  RR: 17 (15 - 22)  SpO2: 100% (96% - 100%)    PHYSICAL EXAM:  Constitutional: NAD, nonverbal  HEENT: EOMI, Normal Hearing, MMM  Neck: No JVD  Respiratory: BS decreased in bases, poor resp effort  Cardiovascular: S1 and S2, RRR  Gastrointestinal: BS+, soft, NT/ND  Extremities: No peripheral edema  Vascular: 2+ peripheral pulses  Neurological: nonverbal, uncooperative to exam, unable to properly address  Musculoskeletal: moving all ext  Skin: No rashes      MEDICATIONS:  MEDICATIONS  (STANDING):  predniSONE   Tablet 30milliGRAM(s) Oral daily  aspirin  chewable 81milliGRAM(s) Oral daily  phenytoin   Capsule 300milliGRAM(s) Oral at bedtime  clonazePAM Tablet 1milliGRAM(s) Oral two times a day  diVALproex Sprinkle 875milliGRAM(s) Oral daily  diVALproex Sprinkle 750milliGRAM(s) Oral at bedtime  levETIRAcetam 2000milliGRAM(s) Oral two times a day  Clobazam 20milliGRAM(s) Oral at bedtime  carBAMazepine XR Tablet 400milliGRAM(s) Oral daily  carBAMazepine XR Tablet 800milliGRAM(s) Oral at bedtime  benztropine 1milliGRAM(s) Oral four times a day  risperiDONE   Tablet 1milliGRAM(s) Oral daily  risperiDONE   Tablet 3milliGRAM(s) Oral at bedtime  thiothixene 5milliGRAM(s) Oral at bedtime  ferrous    sulfate 325milliGRAM(s) Oral daily  sodium chloride 2Gram(s) Oral daily  pantoprazole    Tablet 40milliGRAM(s) Oral before breakfast  cholecalciferol 2000Unit(s) Oral daily  heparin  Injectable 5000Unit(s) SubCutaneous every 8 hours  sodium chloride 0.9%. 1000milliLiter(s) IV Continuous <Continuous>  piperacillin/tazobactam IVPB. 3.375Gram(s) IV Intermittent every 8 hours  polyethylene glycol 3350 17Gram(s) Oral daily      LABS: All Labs Reviewed:                        12.7   6.0   )-----------( 248      ( 16 Jun 2017 07:06 )             37.2     06-16    135  |  100  |  16  ----------------------------<  96  3.7   |  28  |  0.70    Ca    8.2<L>      16 Jun 2017 07:06  Mg     2.0     06-16    TPro  6.8  /  Alb  2.8<L>  /  TBili  0.4  /  DBili  x   /  AST  13<L>  /  ALT  9<L>  /  AlkPhos  40  06-16
Chief Complaint/Reason for Admission: 'Lethargy and decreased PO intake'	    Subjective: nonverbal, does not provide any history.  No events.    Review of Systems:  Unable to obtain due to the patients' medical condition.	    Vital Signs Last 24 Hrs  T(C): 36.7, Max: 36.9 (06-17 @ 21:05)  T(F): 98, Max: 98.5 (06-17 @ 21:05)  HR: 71 (71 - 87)  BP: 104/66 (104/66 - 139/65)  BP(mean): --  RR: 16 (16 - 18)  SpO2: 96% (95% - 96%)    Physical exam  Constitutional: NAD, nonverbal  HEENT: EOMI, Normal Hearing, MMM  Neck: No JVD  Respiratory: BS decreased in bases, poor resp effort  Cardiovascular: S1 and S2, RRR  Gastrointestinal: BS+, soft, NT/ND  Extremities: No peripheral edema  Vascular: 2+ peripheral pulses  Neurological: nonverbal, uncooperative to exam, unable to properly address  Musculoskeletal: moving all ext  Skin: No rashes    MEDICATIONS:  MEDICATIONS  (STANDING):  predniSONE   Tablet 30milliGRAM(s) Oral daily  aspirin  chewable 81milliGRAM(s) Oral daily  phenytoin   Capsule 300milliGRAM(s) Oral at bedtime  clonazePAM Tablet 1milliGRAM(s) Oral two times a day  diVALproex Sprinkle 875milliGRAM(s) Oral daily  diVALproex Sprinkle 750milliGRAM(s) Oral at bedtime  levETIRAcetam 2000milliGRAM(s) Oral two times a day  Clobazam 20milliGRAM(s) Oral at bedtime  carBAMazepine XR Tablet 400milliGRAM(s) Oral daily  carBAMazepine XR Tablet 800milliGRAM(s) Oral at bedtime  benztropine 1milliGRAM(s) Oral four times a day  risperiDONE   Tablet 1milliGRAM(s) Oral daily  risperiDONE   Tablet 3milliGRAM(s) Oral at bedtime  thiothixene 5milliGRAM(s) Oral at bedtime  ferrous    sulfate 325milliGRAM(s) Oral daily  sodium chloride 2Gram(s) Oral daily  pantoprazole    Tablet 40milliGRAM(s) Oral before breakfast  cholecalciferol 2000Unit(s) Oral daily  heparin  Injectable 5000Unit(s) SubCutaneous every 8 hours  sodium chloride 0.9%. 1000milliLiter(s) IV Continuous <Continuous>  piperacillin/tazobactam IVPB. 3.375Gram(s) IV Intermittent every 8 hours  polyethylene glycol 3350 17Gram(s) Oral daily    LABS: All Labs Reviewed:        Assessment and Plan:   · Assessment		  53 y/o M PMHx significant for seizure disorder (on multiple AEDs), GERD, severe intellectual disability, nonverbal at baseline, osteoporosis, prior hx of aspiration pneumonia, medication-induced hyponatremia and hyperprolactinemia, B/L cataracts who presents to the ED from his group home for symptoms increased lethargy, malaise, and decreased PO intake. Hx obtained from Group home records and the patients' aide at the bedside. In the ED CT A/P revealed a focal consolidation vs atelectasis at the left lung base.  In the ED the patient was given Zosyn 3.375g IV x 1, and Ciprofloxacin 400mg IV x 1.  CT Chest w esophageal abnormality possibly diverticlum vs neoplasm vs infectious also w atelectasis w possibly pneumonia in the left base.     # Metabolic encephalopathy ontop of baseline mental retardation  - 2/2 possible pneumonia    # Esophageal abnormality on CT: Neoplasm vs infectious vs deiverticulum  - GI eval appreciated, likely endoscopic eval however difficulty obtaining consent. I tried calling a family member Doris Rhodes at [phone] number obtained by group home staff- no answer, message left with callback number.  - Will keep NPO after MN incase pt is able to go for EGD tomorrow.    # Coprophagia  - constant observation      Problem/Plan - 1:  ·  Possible aspiration pneumonia suspect gram negative and anaerobic bacteria   ~c/w zosyn, appreciate ID and pulm  - eval for esophageal abnormality that would predispose to aspiration  - dc IVF  ~aspiration precautions  ~speech and swallow evaluation appreciated- ok for regular diet/liquids.     Problem/Plan - 2:  ·  Problem: Seizure disorder.   - c/w AEDs- levels wnl  ~cont. seizure precautions.     Problem/Plan - 3:  ·  Problem: Hyponatremia.  Plan:Plan: ~cont. Na tabs  ~f/u repeat labs in the am.     Problem/Plan - 4:  ·  Problem: Osteoporosis.  Plan:Plan: ~takes Ibendronate.     Problem/Plan - 5:  ·  Problem: Prophylactic measure.  Plan:Plan: ~Vte ppx; cont. Heparin.
EGD:  Gastritis  No esophageal mass or diverticulum    Rec:  Advance diet  Discussed with sister
Informed by RN that pt is refusing PO medications despite encouragement. Pt only responds to 1 particular aid from group home. Pt allows RN to administer IV antibiotics. Pt is due for Keppra at this time as per RN. Will give stat dose of IV Keppra 2000 mg. Neurology consult for clarifications of antiepileptic medications. Will continue to encourage pt and request help from aid at group home. Seizure precaution.
Patient is a 52y old  Male who presents with a chief complaint of 'Lethargy and decreased PO intake' (15 Ronny 2017 21:17)      Subective:  Non-verbal. appears comfortable.    PAST MEDICAL & SURGICAL HISTORY:  Bowel and bladder incontinence  Hearing impaired person  Intellectual disability  Hyperprolactinemia  Hyponatremia  Cataract  GERD (gastroesophageal reflux disease)  Osteoporosis  Seizure disorder  H/O cataract extraction      MEDICATIONS  (STANDING):  predniSONE   Tablet 30milliGRAM(s) Oral daily  aspirin  chewable 81milliGRAM(s) Oral daily  phenytoin   Capsule 300milliGRAM(s) Oral at bedtime  clonazePAM Tablet 1milliGRAM(s) Oral two times a day  diVALproex Sprinkle 875milliGRAM(s) Oral daily  diVALproex Sprinkle 750milliGRAM(s) Oral at bedtime  levETIRAcetam 2000milliGRAM(s) Oral two times a day  Clobazam 20milliGRAM(s) Oral at bedtime  carBAMazepine XR Tablet 400milliGRAM(s) Oral daily  carBAMazepine XR Tablet 800milliGRAM(s) Oral at bedtime  benztropine 1milliGRAM(s) Oral four times a day  risperiDONE   Tablet 1milliGRAM(s) Oral daily  risperiDONE   Tablet 3milliGRAM(s) Oral at bedtime  thiothixene 5milliGRAM(s) Oral at bedtime  ferrous    sulfate 325milliGRAM(s) Oral daily  sodium chloride 2Gram(s) Oral daily  pantoprazole    Tablet 40milliGRAM(s) Oral before breakfast  cholecalciferol 2000Unit(s) Oral daily  heparin  Injectable 5000Unit(s) SubCutaneous every 8 hours  piperacillin/tazobactam IVPB. 3.375Gram(s) IV Intermittent every 8 hours  polyethylene glycol 3350 17Gram(s) Oral daily    MEDICATIONS  (PRN):  ondansetron Injectable 4milliGRAM(s) IV Push every 6 hours PRN Nausea  senna 2Tablet(s) Oral at bedtime PRN Constipation  docusate sodium 100milliGRAM(s) Oral three times a day PRN Constipation      REVIEW OF SYSTEMS:    RESPIRATORY: No shortness of breath  CARDIOVASCULAR: No chest pain  All other review of systems is negative unless indicated above.    Vital Signs Last 24 Hrs  T(C): 37.2, Max: 37.2 (06-18 @ 21:35)  T(F): 980.3, Max: 980.3 (06-19 @ 05:37)  HR: 84 (71 - 90)  BP: 123/63 (104/66 - 123/63)  BP(mean): --  RR: 17 (16 - 17)  SpO2: 95% (93% - 96%)    PHYSICAL EXAM:    Constitutional: NAD, well-developed, non-verbal.  Respiratory: CTAB  Cardiovascular: S1 and S2, RRR  Gastrointestinal: BS+, soft, NT/ND  Extremities: No peripheral edema
Patient is a 52y old  Male who presents with a chief complaint of 'Lethargy and decreased PO intake' (15 Ronny 2017 21:17)    HPI:  51 y/o Male with h/o seizure disorder (on multiple AEDs), GERD, severe intellectual disability, nonverbal at baseline, osteoporosis, prior aspiration pneumonia, medication-induced hyponatremia and hyperprolactinemia, B/L cataracts was admitted on 6/15 from his group home for increased lethargy, malaise, and decreased PO intake. In the ED CT Chest revealed a focal consolidation at the left lung base, and a very small pleural-based density at the right lung base and the patient was given Zosyn 3.375g IV x 1, and Ciprofloxacin 400mg IV x 1.     Sitting in bed in NAD  No SOB at rest  No fever or chills  s/p EGD    MEDICATIONS  (STANDING):  aspirin  chewable 81milliGRAM(s) Oral daily  phenytoin   Capsule 300milliGRAM(s) Oral at bedtime  clonazePAM Tablet 1milliGRAM(s) Oral two times a day  diVALproex Sprinkle 875milliGRAM(s) Oral daily  diVALproex Sprinkle 750milliGRAM(s) Oral at bedtime  levETIRAcetam 2000milliGRAM(s) Oral two times a day  Clobazam 20milliGRAM(s) Oral at bedtime  carBAMazepine XR Tablet 400milliGRAM(s) Oral daily  carBAMazepine XR Tablet 800milliGRAM(s) Oral at bedtime  benztropine 1milliGRAM(s) Oral four times a day  risperiDONE   Tablet 1milliGRAM(s) Oral daily  risperiDONE   Tablet 3milliGRAM(s) Oral at bedtime  thiothixene 5milliGRAM(s) Oral at bedtime  ferrous    sulfate 325milliGRAM(s) Oral daily  sodium chloride 2Gram(s) Oral daily  pantoprazole    Tablet 40milliGRAM(s) Oral before breakfast  cholecalciferol 2000Unit(s) Oral daily  heparin  Injectable 5000Unit(s) SubCutaneous every 8 hours  piperacillin/tazobactam IVPB. 3.375Gram(s) IV Intermittent every 8 hours  polyethylene glycol 3350 17Gram(s) Oral daily    MEDICATIONS  (PRN):  ondansetron Injectable 4milliGRAM(s) IV Push every 6 hours PRN Nausea  senna 2Tablet(s) Oral at bedtime PRN Constipation  docusate sodium 100milliGRAM(s) Oral three times a day PRN Constipation      Vital Signs Last 24 Hrs  T(C): 36.3, Max: 36.8 (06-19 @ 21:09)  T(F): 97.3, Max: 98.3 (06-19 @ 21:09)  HR: 103 (70 - 103)  BP: 136/72 (105/56 - 136/72)  BP(mean): --  RR: 16 (16 - 18)  SpO2: 96% (95% - 100%)    Physical Exam:    Constitutional: frail looking  HEENT: NC/AT, EOMI, PERRLA  Neck: supple  Back: no tenderness  Respiratory: crackles at bases  Cardiovascular: S1S2 regular, no murmurs  Abdomen: soft, not tender, not distended, positive BS  Genitourinary: deferred  Rectal: deferred  Musculoskeletal: no muscle tenderness, no joint swelling or tenderness  Extremities: no pedal edema  Neurological: confused, moving all extremities, no focal deficits  Skin: no rashes      Labs:                        12.3   4.9   )-----------( 266      ( 19 Jun 2017 07:37 )             36.5     06-19    139  |  104  |  20  ----------------------------<  90  4.2   |  27  |  0.83    Ca    8.0<L>      19 Jun 2017 07:37                 12.7   6.0   )-----------( 248      ( 16 Jun 2017 07:06 )             37.2     06-16    135  |  100  |  16  ----------------------------<  96  3.7   |  28  |  0.70    Ca    8.2<L>      16 Jun 2017 07:06  Mg     2.0     06-16    TPro  6.8  /  Alb  2.8<L>  /  TBili  0.4  /  DBili  x   /  AST  13<L>  /  ALT  9<L>  /  AlkPhos  40  06-16     LIVER FUNCTIONS - ( 16 Jun 2017 07:06 )  Alb: 2.8 g/dL / Pro: 6.8 gm/dL / ALK PHOS: 40 U/L / ALT: 9 U/L / AST: 13 U/L / GGT: x                 Radiology:    CT chest:  Left lower lobe atelectasis. Superimposed pneumonia is not   definitively excluded.  Proximal esophageal wall thickening and adjacent air filled outpouching.   Differential diagnosis includes neoplasm, inflammation and diverticulum.   Recommend further evaluation with esophagram.    Advanced directives addressed: full resuscitation
Patient is a 52y old  Male who presents with a chief complaint of 'Lethargy and decreased PO intake' (15 Ronny 2017 21:17)    HPI:  53 y/o Male with h/o seizure disorder (on multiple AEDs), GERD, severe intellectual disability, nonverbal at baseline, osteoporosis, prior aspiration pneumonia, medication-induced hyponatremia and hyperprolactinemia, B/L cataracts was admitted on 6/15 from his group home for increased lethargy, malaise, and decreased PO intake. In the ED CT Chest revealed a focal consolidation at the left lung base, and a very small pleural-based density at the right lung base and the patient was given Zosyn 3.375g IV x 1, and Ciprofloxacin 400mg IV x 1.     Sitting in bed in NAD  No SOB at rest  No fever or chills    MEDICATIONS  (STANDING):  predniSONE   Tablet 30milliGRAM(s) Oral daily  aspirin  chewable 81milliGRAM(s) Oral daily  phenytoin   Capsule 300milliGRAM(s) Oral at bedtime  clonazePAM Tablet 1milliGRAM(s) Oral two times a day  diVALproex Sprinkle 875milliGRAM(s) Oral daily  diVALproex Sprinkle 750milliGRAM(s) Oral at bedtime  levETIRAcetam 2000milliGRAM(s) Oral two times a day  Clobazam 20milliGRAM(s) Oral at bedtime  carBAMazepine XR Tablet 400milliGRAM(s) Oral daily  carBAMazepine XR Tablet 800milliGRAM(s) Oral at bedtime  benztropine 1milliGRAM(s) Oral four times a day  risperiDONE   Tablet 1milliGRAM(s) Oral daily  risperiDONE   Tablet 3milliGRAM(s) Oral at bedtime  thiothixene 5milliGRAM(s) Oral at bedtime  ferrous    sulfate 325milliGRAM(s) Oral daily  sodium chloride 2Gram(s) Oral daily  pantoprazole    Tablet 40milliGRAM(s) Oral before breakfast  cholecalciferol 2000Unit(s) Oral daily  heparin  Injectable 5000Unit(s) SubCutaneous every 8 hours  sodium chloride 0.9%. 1000milliLiter(s) IV Continuous <Continuous>  piperacillin/tazobactam IVPB. 3.375Gram(s) IV Intermittent every 8 hours  polyethylene glycol 3350 17Gram(s) Oral daily    MEDICATIONS  (PRN):  ondansetron Injectable 4milliGRAM(s) IV Push every 6 hours PRN Nausea  senna 2Tablet(s) Oral at bedtime PRN Constipation  docusate sodium 100milliGRAM(s) Oral three times a day PRN Constipation      Vital Signs Last 24 Hrs  T(C): 36.6, Max: 37.1 (06-16 @ 21:00)  T(F): 97.9, Max: 98.8 (06-16 @ 21:00)  HR: 79 (74 - 86)  BP: 119/65 (92/73 - 132/64)  BP(mean): --  RR: 16 (16 - 18)  SpO2: 96% (96% - 96%)    Physical Exam:        Constitutional: frail looking  HEENT: NC/AT, EOMI, PERRLA  Neck: supple  Back: no tenderness  Respiratory: crackles at bases  Cardiovascular: S1S2 regular, no murmurs  Abdomen: soft, not tender, not distended, positive BS  Genitourinary: deferred  Rectal: deferred  Musculoskeletal: no muscle tenderness, no joint swelling or tenderness  Extremities: no pedal edema  Neurological: confused, moving all extremities, no focal deficits  Skin: no rashes    Labs:                        12.7   6.0   )-----------( 248      ( 16 Jun 2017 07:06 )             37.2     06-16    135  |  100  |  16  ----------------------------<  96  3.7   |  28  |  0.70    Ca    8.2<L>      16 Jun 2017 07:06  Mg     2.0     06-16    TPro  6.8  /  Alb  2.8<L>  /  TBili  0.4  /  DBili  x   /  AST  13<L>  /  ALT  9<L>  /  AlkPhos  40  06-16     LIVER FUNCTIONS - ( 16 Jun 2017 07:06 )  Alb: 2.8 g/dL / Pro: 6.8 gm/dL / ALK PHOS: 40 U/L / ALT: 9 U/L / AST: 13 U/L / GGT: x                 Radiology:    CT chest:  Left lower lobe atelectasis. Superimposed pneumonia is not   definitively excluded.    Proximal esophageal wall thickening and adjacent air filled outpouching.   Differential diagnosis includes neoplasm, inflammation and diverticulum.   Recommend further evaluation with esophagram.    Advanced directives addressed: full resuscitation
Patient is a 52y old  Male who presents with a chief complaint of 'Lethargy and decreased PO intake' (15 Ronny 2017 21:17)    HPI:  53 y/o Male with h/o seizure disorder (on multiple AEDs), GERD, severe intellectual disability, nonverbal at baseline, osteoporosis, prior aspiration pneumonia, medication-induced hyponatremia and hyperprolactinemia, B/L cataracts was admitted on 6/15 from his group home for increased lethargy, malaise, and decreased PO intake. In the ED CT Chest revealed a focal consolidation at the left lung base, and a very small pleural-based density at the right lung base and the patient was given Zosyn 3.375g IV x 1, and Ciprofloxacin 400mg IV x 1.     Sitting in bed in NAD  No SOB at rest  No fever or chills  Has dry cough    MEDICATIONS  (STANDING):  aspirin  chewable 81milliGRAM(s) Oral daily  phenytoin   Capsule 300milliGRAM(s) Oral at bedtime  clonazePAM Tablet 1milliGRAM(s) Oral two times a day  diVALproex Sprinkle 875milliGRAM(s) Oral daily  diVALproex Sprinkle 750milliGRAM(s) Oral at bedtime  levETIRAcetam 2000milliGRAM(s) Oral two times a day  Clobazam 20milliGRAM(s) Oral at bedtime  carBAMazepine XR Tablet 400milliGRAM(s) Oral daily  carBAMazepine XR Tablet 800milliGRAM(s) Oral at bedtime  benztropine 1milliGRAM(s) Oral four times a day  risperiDONE   Tablet 1milliGRAM(s) Oral daily  risperiDONE   Tablet 3milliGRAM(s) Oral at bedtime  thiothixene 5milliGRAM(s) Oral at bedtime  ferrous    sulfate 325milliGRAM(s) Oral daily  sodium chloride 2Gram(s) Oral daily  pantoprazole    Tablet 40milliGRAM(s) Oral before breakfast  cholecalciferol 2000Unit(s) Oral daily  heparin  Injectable 5000Unit(s) SubCutaneous every 8 hours  piperacillin/tazobactam IVPB. 3.375Gram(s) IV Intermittent every 8 hours  polyethylene glycol 3350 17Gram(s) Oral daily    MEDICATIONS  (PRN):  ondansetron Injectable 4milliGRAM(s) IV Push every 6 hours PRN Nausea  senna 2Tablet(s) Oral at bedtime PRN Constipation  docusate sodium 100milliGRAM(s) Oral three times a day PRN Constipation      Vital Signs Last 24 Hrs  T(C): 37.1, Max: 37.2 (06-18 @ 21:35)  T(F): 98.7, Max: 980.3 (06-19 @ 05:37)  HR: 75 (75 - 90)  BP: 99/74 (99/74 - 123/63)  BP(mean): --  RR: 16 (16 - 17)  SpO2: 97% (93% - 97%)    Physical Exam:    Constitutional: frail looking  HEENT: NC/AT, EOMI, PERRLA  Neck: supple  Back: no tenderness  Respiratory: crackles at bases  Cardiovascular: S1S2 regular, no murmurs  Abdomen: soft, not tender, not distended, positive BS  Genitourinary: deferred  Rectal: deferred  Musculoskeletal: no muscle tenderness, no joint swelling or tenderness  Extremities: no pedal edema  Neurological: confused, moving all extremities, no focal deficits  Skin: no rashes      Labs:                        12.3   4.9   )-----------( 266      ( 19 Jun 2017 07:37 )             36.5     06-19    139  |  104  |  20  ----------------------------<  90  4.2   |  27  |  0.83    Ca    8.0<L>      19 Jun 2017 07:37                 12.7   6.0   )-----------( 248      ( 16 Jun 2017 07:06 )             37.2     06-16    135  |  100  |  16  ----------------------------<  96  3.7   |  28  |  0.70    Ca    8.2<L>      16 Jun 2017 07:06  Mg     2.0     06-16    TPro  6.8  /  Alb  2.8<L>  /  TBili  0.4  /  DBili  x   /  AST  13<L>  /  ALT  9<L>  /  AlkPhos  40  06-16     LIVER FUNCTIONS - ( 16 Jun 2017 07:06 )  Alb: 2.8 g/dL / Pro: 6.8 gm/dL / ALK PHOS: 40 U/L / ALT: 9 U/L / AST: 13 U/L / GGT: x                 Radiology:    CT chest:  Left lower lobe atelectasis. Superimposed pneumonia is not   definitively excluded.  Proximal esophageal wall thickening and adjacent air filled outpouching.   Differential diagnosis includes neoplasm, inflammation and diverticulum.   Recommend further evaluation with esophagram.    Advanced directives addressed: full resuscitation
Patient is a 52y old  Male who presents with a chief complaint of 'Lethargy and decreased PO intake' (15 Ronny 2017 21:17)    HPI:  53 y/o Male with h/o seizure disorder (on multiple AEDs), GERD, severe intellectual disability, nonverbal at baseline, osteoporosis, prior aspiration pneumonia, medication-induced hyponatremia and hyperprolactinemia, B/L cataracts was admitted on 6/15 from his group home for increased lethargy, malaise, and decreased PO intake. In the ED CT Chest revealed a focal consolidation at the left lung base, and a very small pleural-based density at the right lung base and the patient was given Zosyn 3.375g IV x 1, and Ciprofloxacin 400mg IV x 1.     Sitting in bed in NAD  No SOB at rest  No fever or chills  Has dry cough    MEDICATIONS  (STANDING):  predniSONE   Tablet 30milliGRAM(s) Oral daily  aspirin  chewable 81milliGRAM(s) Oral daily  phenytoin   Capsule 300milliGRAM(s) Oral at bedtime  clonazePAM Tablet 1milliGRAM(s) Oral two times a day  diVALproex Sprinkle 875milliGRAM(s) Oral daily  diVALproex Sprinkle 750milliGRAM(s) Oral at bedtime  levETIRAcetam 2000milliGRAM(s) Oral two times a day  Clobazam 20milliGRAM(s) Oral at bedtime  carBAMazepine XR Tablet 400milliGRAM(s) Oral daily  carBAMazepine XR Tablet 800milliGRAM(s) Oral at bedtime  benztropine 1milliGRAM(s) Oral four times a day  risperiDONE   Tablet 1milliGRAM(s) Oral daily  risperiDONE   Tablet 3milliGRAM(s) Oral at bedtime  thiothixene 5milliGRAM(s) Oral at bedtime  ferrous    sulfate 325milliGRAM(s) Oral daily  sodium chloride 2Gram(s) Oral daily  pantoprazole    Tablet 40milliGRAM(s) Oral before breakfast  cholecalciferol 2000Unit(s) Oral daily  heparin  Injectable 5000Unit(s) SubCutaneous every 8 hours  sodium chloride 0.9%. 1000milliLiter(s) IV Continuous <Continuous>  piperacillin/tazobactam IVPB. 3.375Gram(s) IV Intermittent every 8 hours  polyethylene glycol 3350 17Gram(s) Oral daily    MEDICATIONS  (PRN):  ondansetron Injectable 4milliGRAM(s) IV Push every 6 hours PRN Nausea  senna 2Tablet(s) Oral at bedtime PRN Constipation  docusate sodium 100milliGRAM(s) Oral three times a day PRN Constipation      Vital Signs Last 24 Hrs  T(C): 36.4, Max: 36.9 (06-17 @ 21:05)  T(F): 97.6, Max: 98.5 (06-17 @ 21:05)  HR: 77 (77 - 87)  BP: 118/71 (118/71 - 139/65)  BP(mean): --  RR: 17 (16 - 18)  SpO2: 95% (95% - 95%)    Physical Exam:        Constitutional: frail looking  HEENT: NC/AT, EOMI, PERRLA  Neck: supple  Back: no tenderness  Respiratory: crackles at bases  Cardiovascular: S1S2 regular, no murmurs  Abdomen: soft, not tender, not distended, positive BS  Genitourinary: deferred  Rectal: deferred  Musculoskeletal: no muscle tenderness, no joint swelling or tenderness  Extremities: no pedal edema  Neurological: confused, moving all extremities, no focal deficits  Skin: no rashes    Labs:                          12.7   6.0   )-----------( 248      ( 16 Jun 2017 07:06 )             37.2     06-16    135  |  100  |  16  ----------------------------<  96  3.7   |  28  |  0.70    Ca    8.2<L>      16 Jun 2017 07:06  Mg     2.0     06-16    TPro  6.8  /  Alb  2.8<L>  /  TBili  0.4  /  DBili  x   /  AST  13<L>  /  ALT  9<L>  /  AlkPhos  40  06-16     LIVER FUNCTIONS - ( 16 Jun 2017 07:06 )  Alb: 2.8 g/dL / Pro: 6.8 gm/dL / ALK PHOS: 40 U/L / ALT: 9 U/L / AST: 13 U/L / GGT: x                 Radiology:    CT chest:  Left lower lobe atelectasis. Superimposed pneumonia is not   definitively excluded.  Proximal esophageal wall thickening and adjacent air filled outpouching.   Differential diagnosis includes neoplasm, inflammation and diverticulum.   Recommend further evaluation with esophagram.    Advanced directives addressed: full resuscitation
SUBJECTIVE    Patient currently sleeping and wakes up intermittently has some behavioural issues yesterday. while sleeping noted to snoring and witnessed apnea indicating probability of sleep apnea .  no reported fever spikes and on empirical antibiotic therapy for suspected pneumonia       OBJECTIVE     Vital Signs Last 24 Hrs  T(C): 36.4, Max: 36.9 (06-17 @ 21:05)  T(F): 97.6, Max: 98.5 (06-17 @ 21:05)  HR: 77 (77 - 87)  BP: 118/71 (118/71 - 139/65)  BP(mean): --  RR: 17 (16 - 18)  SpO2: 95% (95% - 95%)    PHYSICAL EXAMINATION:    GENERAL: The patient comfortable with no apparent distress not on 02 noted to be sleeping     HEENT: Head is normocephalic and atraumatic.      NECK: Supple with no elevated JVP.    LUNGS: Bilateral air entry with no wheeze and rhonchi has poor inspiratory effort .    HEART: S1 and S2 present without murmur.    ABDOMEN: Soft, nontender, and nondistended.     EXTREMITIES: No edema or calf tenderness.    NEUROLOGIC: Grossly intact.      MEDICATIONS     MEDICATIONS  (STANDING):  predniSONE   Tablet 30milliGRAM(s) Oral daily  aspirin  chewable 81milliGRAM(s) Oral daily  phenytoin   Capsule 300milliGRAM(s) Oral at bedtime  clonazePAM Tablet 1milliGRAM(s) Oral two times a day  diVALproex Sprinkle 875milliGRAM(s) Oral daily  diVALproex Sprinkle 750milliGRAM(s) Oral at bedtime  levETIRAcetam 2000milliGRAM(s) Oral two times a day  Clobazam 20milliGRAM(s) Oral at bedtime  carBAMazepine XR Tablet 400milliGRAM(s) Oral daily  carBAMazepine XR Tablet 800milliGRAM(s) Oral at bedtime  benztropine 1milliGRAM(s) Oral four times a day  risperiDONE   Tablet 1milliGRAM(s) Oral daily  risperiDONE   Tablet 3milliGRAM(s) Oral at bedtime  thiothixene 5milliGRAM(s) Oral at bedtime  ferrous    sulfate 325milliGRAM(s) Oral daily  sodium chloride 2Gram(s) Oral daily  pantoprazole    Tablet 40milliGRAM(s) Oral before breakfast  cholecalciferol 2000Unit(s) Oral daily  heparin  Injectable 5000Unit(s) SubCutaneous every 8 hours  sodium chloride 0.9%. 1000milliLiter(s) IV Continuous <Continuous>  piperacillin/tazobactam IVPB. 3.375Gram(s) IV Intermittent every 8 hours  polyethylene glycol 3350 17Gram(s) Oral daily      no recent labs
SUBJECTIVE    Patient is non verbal and has endoscopy today with no reported sob or cough or wheezing and was eating as per attendant no diverticulum or mass on endoscopy       OBJECTIVE     Vital Signs Last 24 Hrs  T(C): 36.3, Max: 37.1 (06-19 @ 11:00)  T(F): 97.4, Max: 98.7 (06-19 @ 11:00)  HR: 72 (70 - 75)  BP: 128/73 (99/74 - 128/73)  BP(mean): --  RR: 18 (16 - 18)  SpO2: 100% (95% - 100%)    PHYSICAL EXAMINATION:    GENERAL: The patient comfortable with no apparent distress and non verbal     HEENT: Head is normocephalic and atraumatic.      NECK: Supple with no elevated JVP.    LUNGS: Bilateral air entry with no wheeze and rhonchi.    HEART: S1 and S2 present without murmur.    ABDOMEN: Soft, nontender, and nondistended.     EXTREMITIES: No edema or calf tenderness.    NEUROLOGIC: Grossly intact.      MEDICATIONS     MEDICATIONS  (STANDING):  aspirin  chewable 81milliGRAM(s) Oral daily  phenytoin   Capsule 300milliGRAM(s) Oral at bedtime  clonazePAM Tablet 1milliGRAM(s) Oral two times a day  diVALproex Sprinkle 875milliGRAM(s) Oral daily  diVALproex Sprinkle 750milliGRAM(s) Oral at bedtime  levETIRAcetam 2000milliGRAM(s) Oral two times a day  Clobazam 20milliGRAM(s) Oral at bedtime  carBAMazepine XR Tablet 400milliGRAM(s) Oral daily  carBAMazepine XR Tablet 800milliGRAM(s) Oral at bedtime  benztropine 1milliGRAM(s) Oral four times a day  risperiDONE   Tablet 1milliGRAM(s) Oral daily  risperiDONE   Tablet 3milliGRAM(s) Oral at bedtime  thiothixene 5milliGRAM(s) Oral at bedtime  ferrous    sulfate 325milliGRAM(s) Oral daily  sodium chloride 2Gram(s) Oral daily  pantoprazole    Tablet 40milliGRAM(s) Oral before breakfast  cholecalciferol 2000Unit(s) Oral daily  heparin  Injectable 5000Unit(s) SubCutaneous every 8 hours  piperacillin/tazobactam IVPB. 3.375Gram(s) IV Intermittent every 8 hours  polyethylene glycol 3350 17Gram(s) Oral daily                              12.3   4.9   )-----------( 266      ( 19 Jun 2017 07:37 )             36.5     06-19    139  |  104  |  20  ----------------------------<  90  4.2   |  27  |  0.83    Ca    8.0<L>      19 Jun 2017 07:37
SUBJECTIVE    patient non verbal and no reported fevers, partially takes medications and some times refuses medications and has ct scan of the chest yesterday with no cough or sob and shows thickened oesophageal wall with diverticulum seen by the WILLIAN       OBJECTIVE     Vital Signs Last 24 Hrs  T(C): 36.4, Max: 37.1 (06-16 @ 21:00)  T(F): 97.6, Max: 98.8 (06-16 @ 21:00)  HR: 74 (74 - 86)  BP: 132/64 (92/73 - 132/64)  RR: 17 (17 - 18)  SpO2: 96% (96% - 100%)    PHYSICAL EXAMINATION:    GENERAL: The patient comfortable with no apparent distress not on 02     HEENT: Head is normocephalic and atraumatic.      NECK: Supple with no elevated JVP.    LUNGS: Bilateral air entry with no wheeze and rhonchi.    HEART: S1 and S2 present without murmur.    ABDOMEN: Soft, nontender, and nondistended.     EXTREMITIES: No edema or calf tenderness.    NEUROLOGIC: moves all extremities and non verbal       MEDICATIONS     MEDICATIONS  (STANDING):  predniSONE   Tablet 30milliGRAM(s) Oral daily  aspirin  chewable 81milliGRAM(s) Oral daily  phenytoin   Capsule 300milliGRAM(s) Oral at bedtime  clonazePAM Tablet 1milliGRAM(s) Oral two times a day  diVALproex Sprinkle 875milliGRAM(s) Oral daily  diVALproex Sprinkle 750milliGRAM(s) Oral at bedtime  levETIRAcetam 2000milliGRAM(s) Oral two times a day  Clobazam 20milliGRAM(s) Oral at bedtime  carBAMazepine XR Tablet 400milliGRAM(s) Oral daily  carBAMazepine XR Tablet 800milliGRAM(s) Oral at bedtime  benztropine 1milliGRAM(s) Oral four times a day  risperiDONE   Tablet 1milliGRAM(s) Oral daily  risperiDONE   Tablet 3milliGRAM(s) Oral at bedtime  thiothixene 5milliGRAM(s) Oral at bedtime  ferrous    sulfate 325milliGRAM(s) Oral daily  sodium chloride 2Gram(s) Oral daily  pantoprazole    Tablet 40milliGRAM(s) Oral before breakfast  cholecalciferol 2000Unit(s) Oral daily  heparin  Injectable 5000Unit(s) SubCutaneous every 8 hours  sodium chloride 0.9%. 1000milliLiter(s) IV Continuous <Continuous>  piperacillin/tazobactam IVPB. 3.375Gram(s) IV Intermittent every 8 hours  polyethylene glycol 3350 17Gram(s) Oral daily      LABS:                        12.7   6.0   )-----------( 248      ( 16 Jun 2017 07:06 )             37.2     06-16    135  |  100  |  16  ----------------------------<  96  3.7   |  28  |  0.70    Ca    8.2<L>      16 Jun 2017 07:06  Mg     2.0     06-16    TPro  6.8  /  Alb  2.8<L>  /  TBili  0.4  /  DBili  x   /  AST  13<L>  /  ALT  9<L>  /  AlkPhos  40  06-16      RADIOLOGY       Clinical information: Evaluate pneumonia    COMPARISON: No prior chest CTs are available for comparison. The   abdominal CT from one day prior was reviewed.    PROCEDURE:   CT of the Chest was performed without intravenous contrast.  Sagittal and coronal reformats were performed.      FINDINGS:    LOWER NECK: Within normal limits.  AXILLA, MEDIASTINUM AND CHELSEA: No lymphadenopathy. Thickening of the   proximal esophagus and air-filled outpouching just distal to this area.  VESSELS: Coronary artery calcifications.  HEART: Heart size is normal.No pericardial effusion.  PLEURA: No pleural effusion.  LUNGS AND LARGE AIRWAYS: Patent central airways. No pulmonary nodules.   Elevated left diaphragm with resultant partial left lower lobe   atelectasis. Mild atelectatic change at the right lung base.  VISUALIZED UPPER ABDOMEN: Vicarious contrast excretion in the gallbladder.  BONES: No acute abnormality.  CHEST WALL:  Unremarkable    IMPRESSION: Left lower lobe atelectasis. Superimposed pneumonia is not   definitively excluded.    Proximal esophageal wall thickening and adjacent air filled outpouching.   Differential diagnosis includes neoplasm, inflammation and diverticulum.   Recommend further evaluation with esophagram.
SUBJECTIVE  Patient seen sitting in chair, no reported sob or fever non verbal.         OBJECTIVE     Vital Signs Last 24 Hrs  T(C): 37.2, Max: 37.2 (06-18 @ 21:35)  T(F): 980.3, Max: 980.3 (06-19 @ 05:37)  HR: 84 (71 - 90)  BP: 123/63 (104/66 - 123/63)  BP(mean): --  RR: 17 (16 - 17)  SpO2: 95% (93% - 96%)    PHYSICAL EXAMINATION:    GENERAL: The patient comfortable with no apparent distress and not on 02     HEENT: Head is normocephalic and atraumatic.      NECK: Supple with no elevated JVP.    LUNGS: Bilateral air entry with no wheeze and rhonchi with poor inspiratory effort     HEART: S1 and S2 present without murmur.    ABDOMEN: Soft, nontender, and nondistended.     EXTREMITIES: No edema or calf tenderness.    NEUROLOGIC: Grossly intact and non verbal      MEDICATIONS     MEDICATIONS  (STANDING):  predniSONE   Tablet 30milliGRAM(s) Oral daily  aspirin  chewable 81milliGRAM(s) Oral daily  phenytoin   Capsule 300milliGRAM(s) Oral at bedtime  clonazePAM Tablet 1milliGRAM(s) Oral two times a day  diVALproex Sprinkle 875milliGRAM(s) Oral daily  diVALproex Sprinkle 750milliGRAM(s) Oral at bedtime  levETIRAcetam 2000milliGRAM(s) Oral two times a day  Clobazam 20milliGRAM(s) Oral at bedtime  carBAMazepine XR Tablet 400milliGRAM(s) Oral daily  carBAMazepine XR Tablet 800milliGRAM(s) Oral at bedtime  benztropine 1milliGRAM(s) Oral four times a day  risperiDONE   Tablet 1milliGRAM(s) Oral daily  risperiDONE   Tablet 3milliGRAM(s) Oral at bedtime  thiothixene 5milliGRAM(s) Oral at bedtime  ferrous    sulfate 325milliGRAM(s) Oral daily  sodium chloride 2Gram(s) Oral daily  pantoprazole    Tablet 40milliGRAM(s) Oral before breakfast  cholecalciferol 2000Unit(s) Oral daily  heparin  Injectable 5000Unit(s) SubCutaneous every 8 hours  piperacillin/tazobactam IVPB. 3.375Gram(s) IV Intermittent every 8 hours  polyethylene glycol 3350 17Gram(s) Oral daily      LABS:                        12.3   4.9   )-----------( 266      ( 19 Jun 2017 07:37 )             36.5     06-19    139  |  104  |  20  ----------------------------<  90  4.2   |  27  |  0.83    Ca    8.0<L>      19 Jun 2017 07:37
Chief Complaint/Reason for Admission: 'Lethargy and decreased PO intake'	    Subjective: nonverbal, does not provide any history.  Noted by staff, pt was consuming his feces in the room, his aide from the group home was apparently sleeping and states that this is not a new behavior for him.    Review of Systems:  Unable to obtain due to the patients' medical condition.	    Vital Signs Last 24 Hrs  T(C): 36.6, Max: 37.1 (06-16 @ 21:00)  T(F): 97.9, Max: 98.8 (06-16 @ 21:00)  HR: 79 (74 - 86)  BP: 119/65 (92/73 - 132/64)  BP(mean): --  RR: 16 (16 - 18)  SpO2: 96% (96% - 96%)    PHYSICAL EXAM:  Constitutional: NAD, nonverbal  HEENT: EOMI, Normal Hearing, MMM  Neck: No JVD  Respiratory: BS decreased in bases, poor resp effort  Cardiovascular: S1 and S2, RRR  Gastrointestinal: BS+, soft, NT/ND  Extremities: No peripheral edema  Vascular: 2+ peripheral pulses  Neurological: nonverbal, uncooperative to exam, unable to properly address  Musculoskeletal: moving all ext  Skin: No rashes    MEDICATIONS:  MEDICATIONS  (STANDING):  predniSONE   Tablet 30milliGRAM(s) Oral daily  aspirin  chewable 81milliGRAM(s) Oral daily  phenytoin   Capsule 300milliGRAM(s) Oral at bedtime  clonazePAM Tablet 1milliGRAM(s) Oral two times a day  diVALproex Sprinkle 875milliGRAM(s) Oral daily  diVALproex Sprinkle 750milliGRAM(s) Oral at bedtime  levETIRAcetam 2000milliGRAM(s) Oral two times a day  Clobazam 20milliGRAM(s) Oral at bedtime  carBAMazepine XR Tablet 400milliGRAM(s) Oral daily  carBAMazepine XR Tablet 800milliGRAM(s) Oral at bedtime  benztropine 1milliGRAM(s) Oral four times a day  risperiDONE   Tablet 1milliGRAM(s) Oral daily  risperiDONE   Tablet 3milliGRAM(s) Oral at bedtime  thiothixene 5milliGRAM(s) Oral at bedtime  ferrous    sulfate 325milliGRAM(s) Oral daily  sodium chloride 2Gram(s) Oral daily  pantoprazole    Tablet 40milliGRAM(s) Oral before breakfast  cholecalciferol 2000Unit(s) Oral daily  heparin  Injectable 5000Unit(s) SubCutaneous every 8 hours  sodium chloride 0.9%. 1000milliLiter(s) IV Continuous <Continuous>  piperacillin/tazobactam IVPB. 3.375Gram(s) IV Intermittent every 8 hours  polyethylene glycol 3350 17Gram(s) Oral daily      LABS: All Labs Reviewed:                        12.7   6.0   )-----------( 248      ( 16 Jun 2017 07:06 )             37.2     06-16    135  |  100  |  16  ----------------------------<  96  3.7   |  28  |  0.70    Ca    8.2<L>      16 Jun 2017 07:06  Mg     2.0     06-16    TPro  6.8  /  Alb  2.8<L>  /  TBili  0.4  /  DBili  x   /  AST  13<L>  /  ALT  9<L>  /  AlkPhos  40  06-16

## 2017-06-22 LAB — SURGICAL PATHOLOGY FINAL REPORT - CH: SIGNIFICANT CHANGE UP

## 2017-06-23 DIAGNOSIS — F50.89 OTHER SPECIFIED EATING DISORDER: ICD-10-CM

## 2017-06-23 DIAGNOSIS — K29.70 GASTRITIS, UNSPECIFIED, WITHOUT BLEEDING: ICD-10-CM

## 2017-06-23 DIAGNOSIS — F79 UNSPECIFIED INTELLECTUAL DISABILITIES: ICD-10-CM

## 2017-06-23 DIAGNOSIS — G40.909 EPILEPSY, UNSPECIFIED, NOT INTRACTABLE, WITHOUT STATUS EPILEPTICUS: ICD-10-CM

## 2017-06-23 DIAGNOSIS — R53.1 WEAKNESS: ICD-10-CM

## 2017-06-23 DIAGNOSIS — K21.9 GASTRO-ESOPHAGEAL REFLUX DISEASE WITHOUT ESOPHAGITIS: ICD-10-CM

## 2017-06-23 DIAGNOSIS — E87.1 HYPO-OSMOLALITY AND HYPONATREMIA: ICD-10-CM

## 2017-06-23 DIAGNOSIS — Z79.52 LONG TERM (CURRENT) USE OF SYSTEMIC STEROIDS: ICD-10-CM

## 2017-06-23 DIAGNOSIS — Z79.82 LONG TERM (CURRENT) USE OF ASPIRIN: ICD-10-CM

## 2017-06-23 DIAGNOSIS — K59.00 CONSTIPATION, UNSPECIFIED: ICD-10-CM

## 2017-06-23 DIAGNOSIS — Z79.2 LONG TERM (CURRENT) USE OF ANTIBIOTICS: ICD-10-CM

## 2017-06-23 DIAGNOSIS — M81.0 AGE-RELATED OSTEOPOROSIS WITHOUT CURRENT PATHOLOGICAL FRACTURE: ICD-10-CM

## 2017-06-23 DIAGNOSIS — J98.11 ATELECTASIS: ICD-10-CM

## 2017-06-23 DIAGNOSIS — Z88.8 ALLERGY STATUS TO OTHER DRUGS, MEDICAMENTS AND BIOLOGICAL SUBSTANCES: ICD-10-CM

## 2017-06-23 DIAGNOSIS — G93.41 METABOLIC ENCEPHALOPATHY: ICD-10-CM

## 2017-06-23 DIAGNOSIS — H91.90 UNSPECIFIED HEARING LOSS, UNSPECIFIED EAR: ICD-10-CM

## 2017-06-23 DIAGNOSIS — J18.9 PNEUMONIA, UNSPECIFIED ORGANISM: ICD-10-CM

## 2017-06-23 DIAGNOSIS — E22.1 HYPERPROLACTINEMIA: ICD-10-CM

## 2017-06-26 DIAGNOSIS — F72 SEVERE INTELLECTUAL DISABILITIES: ICD-10-CM

## 2017-06-26 DIAGNOSIS — J15.6 PNEUMONIA DUE TO OTHER GRAM-NEGATIVE BACTERIA: ICD-10-CM
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
## 2017-07-17 NOTE — ED PROVIDER NOTE - OBJECTIVE STATEMENT
53 y/o male with PMhx of seizure disorder, GERD, mental disability, nonverbal presents to the ED with aid for evaluation of decreased PO intake. Aid at bedside states that the patient has not been eating well and has been sleeping more then usual. Further hx of events is limited secondary to patients mental status. no

## 2017-08-15 ENCOUNTER — OUTPATIENT (OUTPATIENT)
Dept: OUTPATIENT SERVICES | Facility: HOSPITAL | Age: 52
LOS: 1 days | End: 2017-08-15

## 2017-08-15 DIAGNOSIS — Z98.49 CATARACT EXTRACTION STATUS, UNSPECIFIED EYE: Chronic | ICD-10-CM

## 2017-08-17 DIAGNOSIS — Z01.20 ENCOUNTER FOR DENTAL EXAMINATION AND CLEANING WITHOUT ABNORMAL FINDINGS: ICD-10-CM

## 2017-08-19 ENCOUNTER — EMERGENCY (EMERGENCY)
Facility: HOSPITAL | Age: 52
LOS: 0 days | Discharge: HOME | End: 2017-08-20
Attending: EMERGENCY MEDICINE | Admitting: EMERGENCY MEDICINE
Payer: MEDICARE

## 2017-08-19 VITALS
SYSTOLIC BLOOD PRESSURE: 178 MMHG | TEMPERATURE: 99 F | WEIGHT: 160.06 LBS | HEART RATE: 102 BPM | RESPIRATION RATE: 25 BRPM | OXYGEN SATURATION: 97 % | DIASTOLIC BLOOD PRESSURE: 95 MMHG

## 2017-08-19 VITALS
HEART RATE: 81 BPM | OXYGEN SATURATION: 96 % | SYSTOLIC BLOOD PRESSURE: 135 MMHG | DIASTOLIC BLOOD PRESSURE: 75 MMHG | TEMPERATURE: 99 F | RESPIRATION RATE: 16 BRPM

## 2017-08-19 DIAGNOSIS — Z98.49 CATARACT EXTRACTION STATUS, UNSPECIFIED EYE: ICD-10-CM

## 2017-08-19 DIAGNOSIS — H91.90 UNSPECIFIED HEARING LOSS, UNSPECIFIED EAR: ICD-10-CM

## 2017-08-19 DIAGNOSIS — E22.1 HYPERPROLACTINEMIA: ICD-10-CM

## 2017-08-19 DIAGNOSIS — E87.1 HYPO-OSMOLALITY AND HYPONATREMIA: ICD-10-CM

## 2017-08-19 DIAGNOSIS — K21.9 GASTRO-ESOPHAGEAL REFLUX DISEASE WITHOUT ESOPHAGITIS: ICD-10-CM

## 2017-08-19 DIAGNOSIS — M81.0 AGE-RELATED OSTEOPOROSIS WITHOUT CURRENT PATHOLOGICAL FRACTURE: ICD-10-CM

## 2017-08-19 DIAGNOSIS — F79 UNSPECIFIED INTELLECTUAL DISABILITIES: ICD-10-CM

## 2017-08-19 DIAGNOSIS — R56.9 UNSPECIFIED CONVULSIONS: ICD-10-CM

## 2017-08-19 DIAGNOSIS — Z79.82 LONG TERM (CURRENT) USE OF ASPIRIN: ICD-10-CM

## 2017-08-19 DIAGNOSIS — Z98.49 CATARACT EXTRACTION STATUS, UNSPECIFIED EYE: Chronic | ICD-10-CM

## 2017-08-19 PROCEDURE — 71010: CPT | Mod: 26

## 2017-08-19 PROCEDURE — 93010 ELECTROCARDIOGRAM REPORT: CPT

## 2017-08-19 PROCEDURE — 99285 EMERGENCY DEPT VISIT HI MDM: CPT | Mod: 25

## 2017-08-19 RX ORDER — FOSPHENYTOIN 50 MG/ML
1000 INJECTION INTRAMUSCULAR; INTRAVENOUS ONCE
Qty: 0 | Refills: 0 | Status: COMPLETED | OUTPATIENT
Start: 2017-08-19 | End: 2017-08-19

## 2017-08-19 RX ADMIN — FOSPHENYTOIN 140 MILLIGRAM(S) PE: 50 INJECTION INTRAMUSCULAR; INTRAVENOUS at 22:42

## 2017-08-19 RX ADMIN — Medication 2 MILLIGRAM(S): at 20:28

## 2017-08-19 NOTE — ED ADULT NURSE REASSESSMENT NOTE - NS ED NURSE REASSESS COMMENT FT1
Pt given pillow for comfort. Pt aid from group home at pt bedside at this time and aware as to plan of care. Pt remains on bedside monitor and is awake and at pt baseline mental status. Will continue to monitor.

## 2017-08-19 NOTE — ED ADULT NURSE REASSESSMENT NOTE - NS ED NURSE REASSESS COMMENT FT1
Pt medicated as per MD order, pt aide from [facility] updated as to current plan of care and pt status. Pt remains at baseline mental status at this time. Awaiting re-evaluation, pt remains on bedside monitor in no apparent distress. Will continue to monitor.

## 2017-08-19 NOTE — ED PROVIDER NOTE - OBJECTIVE STATEMENT
53 y/o male pmhx sz disorder (on Dilantin and Keppra), osteoporosis presents to ED from [facility] due to 4 sz at ~6 pm. Pt arrived to ED having brief sz. pt is nonverbal, limited ros

## 2017-08-19 NOTE — ED PROVIDER NOTE - CONSTITUTIONAL, MLM
normal... pt post ictal Well appearing, well nourished, awake, alert, oriented to person, place, time/situation and in no apparent distress.

## 2017-08-19 NOTE — ED PROVIDER NOTE - PROGRESS NOTE DETAILS
patient returned to baseline  dilantin level was low at 2.3, loaded with cerebyx and will return to facility

## 2017-08-20 LAB
ALBUMIN SERPL ELPH-MCNC: 3 G/DL — LOW (ref 3.3–5)
ALP SERPL-CCNC: 42 U/L — SIGNIFICANT CHANGE UP (ref 40–120)
ALT FLD-CCNC: 18 U/L — SIGNIFICANT CHANGE UP (ref 12–78)
ANION GAP SERPL CALC-SCNC: 11 MMOL/L — SIGNIFICANT CHANGE UP (ref 5–17)
APTT BLD: 31.2 SEC — SIGNIFICANT CHANGE UP (ref 27.5–37.4)
AST SERPL-CCNC: 19 U/L — SIGNIFICANT CHANGE UP (ref 15–37)
BASOPHILS # BLD AUTO: 0 K/UL — SIGNIFICANT CHANGE UP (ref 0–0.2)
BASOPHILS NFR BLD AUTO: 0.5 % — SIGNIFICANT CHANGE UP (ref 0–2)
BILIRUB SERPL-MCNC: 0.2 MG/DL — SIGNIFICANT CHANGE UP (ref 0.2–1.2)
BUN SERPL-MCNC: 18 MG/DL — SIGNIFICANT CHANGE UP (ref 7–23)
CALCIUM SERPL-MCNC: 8.8 MG/DL — SIGNIFICANT CHANGE UP (ref 8.5–10.1)
CHLORIDE SERPL-SCNC: 103 MMOL/L — SIGNIFICANT CHANGE UP (ref 96–108)
CO2 SERPL-SCNC: 22 MMOL/L — SIGNIFICANT CHANGE UP (ref 22–31)
CREAT SERPL-MCNC: 0.89 MG/DL — SIGNIFICANT CHANGE UP (ref 0.5–1.3)
EOSINOPHIL # BLD AUTO: 0 K/UL — SIGNIFICANT CHANGE UP (ref 0–0.5)
EOSINOPHIL NFR BLD AUTO: 0.5 % — SIGNIFICANT CHANGE UP (ref 0–6)
GLUCOSE SERPL-MCNC: 94 MG/DL — SIGNIFICANT CHANGE UP (ref 70–99)
HCT VFR BLD CALC: 45 % — SIGNIFICANT CHANGE UP (ref 39–50)
HGB BLD-MCNC: 15.5 G/DL — SIGNIFICANT CHANGE UP (ref 13–17)
INR BLD: 1.2 RATIO — HIGH (ref 0.88–1.16)
LYMPHOCYTES # BLD AUTO: 1.5 K/UL — SIGNIFICANT CHANGE UP (ref 1–3.3)
LYMPHOCYTES # BLD AUTO: 19.8 % — SIGNIFICANT CHANGE UP (ref 13–44)
MCHC RBC-ENTMCNC: 30.7 PG — SIGNIFICANT CHANGE UP (ref 27–34)
MCHC RBC-ENTMCNC: 34.4 GM/DL — SIGNIFICANT CHANGE UP (ref 32–36)
MCV RBC AUTO: 89.4 FL — SIGNIFICANT CHANGE UP (ref 80–100)
MONOCYTES # BLD AUTO: 0.8 K/UL — SIGNIFICANT CHANGE UP (ref 0–0.9)
MONOCYTES NFR BLD AUTO: 10.6 % — SIGNIFICANT CHANGE UP (ref 2–14)
NEUTROPHILS # BLD AUTO: 5.4 K/UL — SIGNIFICANT CHANGE UP (ref 1.8–7.4)
NEUTROPHILS NFR BLD AUTO: 68.7 % — SIGNIFICANT CHANGE UP (ref 43–77)
PHENYTOIN FREE SERPL-MCNC: 2.3 UG/ML — LOW (ref 10–20)
PLATELET # BLD AUTO: 179 K/UL — SIGNIFICANT CHANGE UP (ref 150–400)
POTASSIUM SERPL-MCNC: 4.3 MMOL/L — SIGNIFICANT CHANGE UP (ref 3.5–5.3)
POTASSIUM SERPL-SCNC: 4.3 MMOL/L — SIGNIFICANT CHANGE UP (ref 3.5–5.3)
PROT SERPL-MCNC: 7.6 GM/DL — SIGNIFICANT CHANGE UP (ref 6–8.3)
PROTHROM AB SERPL-ACNC: 13 SEC — HIGH (ref 9.8–12.7)
RBC # BLD: 5.03 M/UL — SIGNIFICANT CHANGE UP (ref 4.2–5.8)
RBC # FLD: 12.5 % — SIGNIFICANT CHANGE UP (ref 10.3–14.5)
SODIUM SERPL-SCNC: 136 MMOL/L — SIGNIFICANT CHANGE UP (ref 135–145)
WBC # BLD: 7.8 K/UL — SIGNIFICANT CHANGE UP (ref 3.8–10.5)
WBC # FLD AUTO: 7.8 K/UL — SIGNIFICANT CHANGE UP (ref 3.8–10.5)

## 2017-08-23 LAB — LEVETIRACETAM SERPL-MCNC: 11.1 MCG/ML — LOW (ref 12–46)

## 2017-08-27 ENCOUNTER — TRANSCRIPTION ENCOUNTER (OUTPATIENT)
Age: 52
End: 2017-08-27

## 2017-08-30 ENCOUNTER — EMERGENCY (EMERGENCY)
Facility: HOSPITAL | Age: 52
LOS: 0 days | Discharge: HOME | End: 2017-08-30
Attending: EMERGENCY MEDICINE | Admitting: EMERGENCY MEDICINE
Payer: MEDICARE

## 2017-08-30 VITALS
DIASTOLIC BLOOD PRESSURE: 62 MMHG | RESPIRATION RATE: 18 BRPM | HEART RATE: 72 BPM | TEMPERATURE: 99 F | OXYGEN SATURATION: 94 % | SYSTOLIC BLOOD PRESSURE: 116 MMHG

## 2017-08-30 VITALS
HEART RATE: 72 BPM | TEMPERATURE: 99 F | RESPIRATION RATE: 20 BRPM | OXYGEN SATURATION: 97 % | DIASTOLIC BLOOD PRESSURE: 86 MMHG | SYSTOLIC BLOOD PRESSURE: 138 MMHG

## 2017-08-30 DIAGNOSIS — Z98.49 CATARACT EXTRACTION STATUS, UNSPECIFIED EYE: Chronic | ICD-10-CM

## 2017-08-30 LAB
ALBUMIN SERPL ELPH-MCNC: 2.4 G/DL — LOW (ref 3.3–5)
ALP SERPL-CCNC: 41 U/L — SIGNIFICANT CHANGE UP (ref 40–120)
ALT FLD-CCNC: 19 U/L — SIGNIFICANT CHANGE UP (ref 12–78)
ANION GAP SERPL CALC-SCNC: 9 MMOL/L — SIGNIFICANT CHANGE UP (ref 5–17)
APPEARANCE UR: CLEAR — SIGNIFICANT CHANGE UP
APTT BLD: 23.4 SEC — LOW (ref 27.5–37.4)
AST SERPL-CCNC: 26 U/L — SIGNIFICANT CHANGE UP (ref 15–37)
BACTERIA # UR AUTO: (no result)
BASO STIPL BLD QL SMEAR: SLIGHT — SIGNIFICANT CHANGE UP
BASOPHILS # BLD AUTO: 0.1 K/UL — SIGNIFICANT CHANGE UP (ref 0–0.2)
BILIRUB SERPL-MCNC: 0.2 MG/DL — SIGNIFICANT CHANGE UP (ref 0.2–1.2)
BILIRUB UR-MCNC: (no result)
BUN SERPL-MCNC: 20 MG/DL — SIGNIFICANT CHANGE UP (ref 7–23)
CALCIUM SERPL-MCNC: 8.8 MG/DL — SIGNIFICANT CHANGE UP (ref 8.5–10.1)
CHLORIDE SERPL-SCNC: 105 MMOL/L — SIGNIFICANT CHANGE UP (ref 96–108)
CO2 SERPL-SCNC: 25 MMOL/L — SIGNIFICANT CHANGE UP (ref 22–31)
COLOR SPEC: (no result)
CREAT SERPL-MCNC: 0.59 MG/DL — SIGNIFICANT CHANGE UP (ref 0.5–1.3)
DIFF PNL FLD: (no result)
EOSINOPHIL # BLD AUTO: 0.1 K/UL — SIGNIFICANT CHANGE UP (ref 0–0.5)
EOSINOPHIL NFR BLD AUTO: 2 % — SIGNIFICANT CHANGE UP (ref 0–6)
GLUCOSE SERPL-MCNC: 89 MG/DL — SIGNIFICANT CHANGE UP (ref 70–99)
GLUCOSE UR QL: NEGATIVE MG/DL — SIGNIFICANT CHANGE UP
HCT VFR BLD CALC: 33 % — LOW (ref 39–50)
HGB BLD-MCNC: 11.4 G/DL — LOW (ref 13–17)
INR BLD: 1.18 RATIO — HIGH (ref 0.88–1.16)
KETONES UR-MCNC: (no result)
LACTATE SERPL-SCNC: 0.8 MMOL/L — SIGNIFICANT CHANGE UP (ref 0.7–2)
LEUKOCYTE ESTERASE UR-ACNC: (no result)
LIDOCAIN IGE QN: 71 U/L — LOW (ref 73–393)
LYMPHOCYTES # BLD AUTO: 1.4 K/UL — SIGNIFICANT CHANGE UP (ref 1–3.3)
LYMPHOCYTES # BLD AUTO: 26 % — SIGNIFICANT CHANGE UP (ref 13–44)
MANUAL DIF COMMENT BLD-IMP: SIGNIFICANT CHANGE UP
MCHC RBC-ENTMCNC: 30.9 PG — SIGNIFICANT CHANGE UP (ref 27–34)
MCHC RBC-ENTMCNC: 34.7 GM/DL — SIGNIFICANT CHANGE UP (ref 32–36)
MCV RBC AUTO: 89.2 FL — SIGNIFICANT CHANGE UP (ref 80–100)
MICROCYTES BLD QL: SLIGHT — SIGNIFICANT CHANGE UP
MONOCYTES # BLD AUTO: 1.2 K/UL — HIGH (ref 0–0.9)
MONOCYTES NFR BLD AUTO: 14 % — SIGNIFICANT CHANGE UP (ref 2–14)
NEUTROPHILS # BLD AUTO: 3.2 K/UL — SIGNIFICANT CHANGE UP (ref 1.8–7.4)
NEUTROPHILS NFR BLD AUTO: 54 % — SIGNIFICANT CHANGE UP (ref 43–77)
NEUTS BAND # BLD: 3 % — SIGNIFICANT CHANGE UP (ref 0–8)
NITRITE UR-MCNC: POSITIVE
PH UR: 6.5 — SIGNIFICANT CHANGE UP (ref 5–8)
PLAT MORPH BLD: NORMAL — SIGNIFICANT CHANGE UP
PLATELET # BLD AUTO: 213 K/UL — SIGNIFICANT CHANGE UP (ref 150–400)
POLYCHROMASIA BLD QL SMEAR: SLIGHT — SIGNIFICANT CHANGE UP
POTASSIUM SERPL-MCNC: 3.8 MMOL/L — SIGNIFICANT CHANGE UP (ref 3.5–5.3)
POTASSIUM SERPL-SCNC: 3.8 MMOL/L — SIGNIFICANT CHANGE UP (ref 3.5–5.3)
PROT SERPL-MCNC: 7.1 GM/DL — SIGNIFICANT CHANGE UP (ref 6–8.3)
PROT UR-MCNC: 30 MG/DL
PROTHROM AB SERPL-ACNC: 12.8 SEC — HIGH (ref 9.8–12.7)
RBC # BLD: 3.7 M/UL — LOW (ref 4.2–5.8)
RBC # FLD: 12.5 % — SIGNIFICANT CHANGE UP (ref 10.3–14.5)
RBC BLD AUTO: SIGNIFICANT CHANGE UP
RBC CASTS # UR COMP ASSIST: (no result) /HPF (ref 0–4)
SODIUM SERPL-SCNC: 139 MMOL/L — SIGNIFICANT CHANGE UP (ref 135–145)
SP GR SPEC: 1.01 — SIGNIFICANT CHANGE UP (ref 1.01–1.02)
UROBILINOGEN FLD QL: 12 MG/DL
VARIANT LYMPHS # BLD: 1 % — SIGNIFICANT CHANGE UP (ref 0–6)
WBC # BLD: 6.3 K/UL — SIGNIFICANT CHANGE UP (ref 3.8–10.5)
WBC # FLD AUTO: 6.3 K/UL — SIGNIFICANT CHANGE UP (ref 3.8–10.5)
WBC UR QL: (no result)

## 2017-08-30 PROCEDURE — 71010: CPT | Mod: 26

## 2017-08-30 PROCEDURE — 93010 ELECTROCARDIOGRAM REPORT: CPT

## 2017-08-30 PROCEDURE — 99285 EMERGENCY DEPT VISIT HI MDM: CPT | Mod: 25

## 2017-08-30 RX ORDER — CEFDINIR 250 MG/5ML
1 POWDER, FOR SUSPENSION ORAL
Qty: 20 | Refills: 0 | OUTPATIENT
Start: 2017-08-30 | End: 2017-09-09

## 2017-08-30 RX ORDER — SODIUM CHLORIDE 9 MG/ML
2000 INJECTION INTRAMUSCULAR; INTRAVENOUS; SUBCUTANEOUS ONCE
Qty: 0 | Refills: 0 | Status: COMPLETED | OUTPATIENT
Start: 2017-08-30 | End: 2017-08-30

## 2017-08-30 RX ORDER — SODIUM CHLORIDE 9 MG/ML
1000 INJECTION INTRAMUSCULAR; INTRAVENOUS; SUBCUTANEOUS ONCE
Qty: 0 | Refills: 0 | Status: COMPLETED | OUTPATIENT
Start: 2017-08-30 | End: 2017-08-30

## 2017-08-30 RX ORDER — CEFTRIAXONE 500 MG/1
1 INJECTION, POWDER, FOR SOLUTION INTRAMUSCULAR; INTRAVENOUS ONCE
Qty: 0 | Refills: 0 | Status: COMPLETED | OUTPATIENT
Start: 2017-08-30 | End: 2017-08-30

## 2017-08-30 RX ORDER — SODIUM CHLORIDE 9 MG/ML
3 INJECTION INTRAMUSCULAR; INTRAVENOUS; SUBCUTANEOUS ONCE
Qty: 0 | Refills: 0 | Status: COMPLETED | OUTPATIENT
Start: 2017-08-30 | End: 2017-08-30

## 2017-08-30 RX ADMIN — CEFTRIAXONE 100 GRAM(S): 500 INJECTION, POWDER, FOR SOLUTION INTRAMUSCULAR; INTRAVENOUS at 20:28

## 2017-08-30 RX ADMIN — SODIUM CHLORIDE 3 MILLILITER(S): 9 INJECTION INTRAMUSCULAR; INTRAVENOUS; SUBCUTANEOUS at 19:09

## 2017-08-30 RX ADMIN — SODIUM CHLORIDE 1000 MILLILITER(S): 9 INJECTION INTRAMUSCULAR; INTRAVENOUS; SUBCUTANEOUS at 20:28

## 2017-08-30 RX ADMIN — SODIUM CHLORIDE 1000 MILLILITER(S): 9 INJECTION INTRAMUSCULAR; INTRAVENOUS; SUBCUTANEOUS at 18:30

## 2017-08-30 NOTE — ED PROVIDER NOTE - PROGRESS NOTE DETAILS
Wilner Huerta ([facility]) for Dr. Jackson: Spoke with pt's living facility. Pt can be sent back to the facility with the aide at bedside and start abx tomorrow. Attending Renetta: +UTI, neg wbc and lactic, VSS. Pt given 1st dose IV abx in ED with IVF, NAD. Will start oral regimen tomorrow, RTED precautions given to facility rep. Pt tolerating PO, at baseline per aide.

## 2017-08-30 NOTE — ED ADULT NURSE NOTE - CHIEF COMPLAINT QUOTE
[facility] group [facility] recent hyponatremia, and adjustment of antiseizure medications, now lethargic and intermittent fever

## 2017-08-30 NOTE — ED ADULT NURSE NOTE - OBJECTIVE STATEMENT
Pt from group home, per noteshas been sleeping all day, refusing po, hx pna on cxr from 8/28/17, urine dark brown, intermittent fevers T max 100.1.  Also recent change in meds due to increase in seizure activity

## 2017-08-30 NOTE — ED ADULT TRIAGE NOTE - CHIEF COMPLAINT QUOTE
[facility] recent hyponatremia, and adjustment of antiseizure medications, now lethargic and intermittent fever

## 2017-08-30 NOTE — ED ADULT NURSE REASSESSMENT NOTE - COMFORT CARE
assisted with changing patients sheets and brief. patient is clean and dry at this time. hourly rounding completed/repositioned/assisted to bathroom/side rails up
assisted RN changing patients brief, and dressing patient before discharge. hourly rounding completed/repositioned

## 2017-08-30 NOTE — ED PROVIDER NOTE - OBJECTIVE STATEMENT
51 yo male h/o pneumonia, seizures, presents from group home with AMS and fever of 100.1F. As per papers that came with patient, he has been overly lethargic and refusing food and liquids. Had recent change in anti-seizure meds - weaning off of dilantin and increase in onfi. Has dark urine. On CXR 2 days ago has worsening scarring or atelectasis. As per aide he seems more out of it than usual.

## 2017-08-31 DIAGNOSIS — R41.82 ALTERED MENTAL STATUS, UNSPECIFIED: ICD-10-CM

## 2017-08-31 DIAGNOSIS — M81.0 AGE-RELATED OSTEOPOROSIS WITHOUT CURRENT PATHOLOGICAL FRACTURE: ICD-10-CM

## 2017-08-31 DIAGNOSIS — H91.90 UNSPECIFIED HEARING LOSS, UNSPECIFIED EAR: ICD-10-CM

## 2017-08-31 DIAGNOSIS — N39.0 URINARY TRACT INFECTION, SITE NOT SPECIFIED: ICD-10-CM

## 2017-08-31 DIAGNOSIS — Z98.49 CATARACT EXTRACTION STATUS, UNSPECIFIED EYE: ICD-10-CM

## 2017-08-31 DIAGNOSIS — E22.1 HYPERPROLACTINEMIA: ICD-10-CM

## 2017-08-31 DIAGNOSIS — G40.909 EPILEPSY, UNSPECIFIED, NOT INTRACTABLE, WITHOUT STATUS EPILEPTICUS: ICD-10-CM

## 2017-08-31 DIAGNOSIS — H26.9 UNSPECIFIED CATARACT: ICD-10-CM

## 2017-08-31 DIAGNOSIS — K21.9 GASTRO-ESOPHAGEAL REFLUX DISEASE WITHOUT ESOPHAGITIS: ICD-10-CM

## 2017-08-31 DIAGNOSIS — F79 UNSPECIFIED INTELLECTUAL DISABILITIES: ICD-10-CM

## 2017-08-31 DIAGNOSIS — Z79.82 LONG TERM (CURRENT) USE OF ASPIRIN: ICD-10-CM

## 2017-08-31 DIAGNOSIS — E87.1 HYPO-OSMOLALITY AND HYPONATREMIA: ICD-10-CM

## 2017-09-02 RX ORDER — METHENAMINE MANDELATE 1 G
1 TABLET ORAL
Qty: 14 | Refills: 0 | OUTPATIENT
Start: 2017-09-02 | End: 2017-09-09

## 2017-10-24 ENCOUNTER — OUTPATIENT (OUTPATIENT)
Dept: OUTPATIENT SERVICES | Facility: HOSPITAL | Age: 52
LOS: 1 days | End: 2017-10-24

## 2017-10-24 DIAGNOSIS — Z98.49 CATARACT EXTRACTION STATUS, UNSPECIFIED EYE: Chronic | ICD-10-CM

## 2017-10-27 DIAGNOSIS — Z01.20 ENCOUNTER FOR DENTAL EXAMINATION AND CLEANING WITHOUT ABNORMAL FINDINGS: ICD-10-CM

## 2018-02-16 ENCOUNTER — APPOINTMENT (OUTPATIENT)
Dept: DERMATOLOGY | Facility: CLINIC | Age: 53
End: 2018-02-16

## 2018-03-14 NOTE — ED ADULT TRIAGE NOTE - BMI (KG/M2)
"S: Feels well and has no concerns.  Baby active.  Denies uterine cramping, vaginal bleeding or leaking of fluid  O: Vitals: /70  Ht 5' 7\" (1.702 m)  Wt 170 lb (77.1 kg)  LMP 07/27/2017  BMI 26.63 kg/m2  BMI= Body mass index is 26.63 kg/(m^2).  Exam:  Constitutional: healthy, alert and no distress  Respiratory: respirations even and unlabored  Gastrointestinal: Abdomen soft, non-tender. Fundus measures appropriate for gestational age. Fetal heart tones hear without difficulty and within normal limits  : Deferred  Psychiatric: mentation appears normal and affect normal/bright  A:    Diagnosis Comments   1. Encounter for supervision of other normal pregnancy in third trimester  Pregnancy progressing as expected       P: Discussed plans for labor.   Encouraged patient to call with any questions or concerns.  Return to clinic 2 weeks    SHAHIDA Harp, DANNY          " 31.3

## 2018-05-01 ENCOUNTER — APPOINTMENT (OUTPATIENT)
Dept: DERMATOLOGY | Facility: CLINIC | Age: 53
End: 2018-05-01
Payer: MEDICARE

## 2018-05-01 PROCEDURE — 99201 OFFICE OUTPATIENT NEW 10 MINUTES: CPT

## 2018-05-05 ENCOUNTER — INPATIENT (INPATIENT)
Facility: HOSPITAL | Age: 53
LOS: 3 days | Discharge: HOME | End: 2018-05-09
Attending: FAMILY MEDICINE | Admitting: FAMILY MEDICINE
Payer: MEDICARE

## 2018-05-05 VITALS
OXYGEN SATURATION: 96 % | HEART RATE: 105 BPM | WEIGHT: 164.91 LBS | RESPIRATION RATE: 16 BRPM | TEMPERATURE: 99 F | DIASTOLIC BLOOD PRESSURE: 79 MMHG | SYSTOLIC BLOOD PRESSURE: 125 MMHG

## 2018-05-05 DIAGNOSIS — T17.908A UNSPECIFIED FOREIGN BODY IN RESPIRATORY TRACT, PART UNSPECIFIED CAUSING OTHER INJURY, INITIAL ENCOUNTER: ICD-10-CM

## 2018-05-05 DIAGNOSIS — N39.0 URINARY TRACT INFECTION, SITE NOT SPECIFIED: ICD-10-CM

## 2018-05-05 DIAGNOSIS — Z98.49 CATARACT EXTRACTION STATUS, UNSPECIFIED EYE: Chronic | ICD-10-CM

## 2018-05-05 DIAGNOSIS — A41.9 SEPSIS, UNSPECIFIED ORGANISM: ICD-10-CM

## 2018-05-05 DIAGNOSIS — R13.10 DYSPHAGIA, UNSPECIFIED: ICD-10-CM

## 2018-05-05 DIAGNOSIS — G40.909 EPILEPSY, UNSPECIFIED, NOT INTRACTABLE, WITHOUT STATUS EPILEPTICUS: ICD-10-CM

## 2018-05-05 LAB
ALBUMIN SERPL ELPH-MCNC: 2.7 G/DL — LOW (ref 3.3–5)
ALP SERPL-CCNC: 39 U/L — LOW (ref 40–120)
ALT FLD-CCNC: 18 U/L — SIGNIFICANT CHANGE UP (ref 12–78)
ANION GAP SERPL CALC-SCNC: 8 MMOL/L — SIGNIFICANT CHANGE UP (ref 5–17)
APPEARANCE UR: CLEAR — SIGNIFICANT CHANGE UP
APTT BLD: 31.8 SEC — SIGNIFICANT CHANGE UP (ref 27.5–37.4)
AST SERPL-CCNC: 17 U/L — SIGNIFICANT CHANGE UP (ref 15–37)
BACTERIA # UR AUTO: (no result)
BASOPHILS # BLD AUTO: 0.03 K/UL — SIGNIFICANT CHANGE UP (ref 0–0.2)
BASOPHILS # BLD AUTO: 0.04 K/UL — SIGNIFICANT CHANGE UP (ref 0–0.2)
BASOPHILS NFR BLD AUTO: 0.2 % — SIGNIFICANT CHANGE UP (ref 0–2)
BASOPHILS NFR BLD AUTO: 0.2 % — SIGNIFICANT CHANGE UP (ref 0–2)
BILIRUB SERPL-MCNC: 0.2 MG/DL — SIGNIFICANT CHANGE UP (ref 0.2–1.2)
BILIRUB UR-MCNC: NEGATIVE — SIGNIFICANT CHANGE UP
BUN SERPL-MCNC: 22 MG/DL — SIGNIFICANT CHANGE UP (ref 7–23)
CALCIUM SERPL-MCNC: 8.5 MG/DL — SIGNIFICANT CHANGE UP (ref 8.5–10.1)
CARBAMAZEPINE SERPL-MCNC: 8.1 UG/ML — SIGNIFICANT CHANGE UP (ref 4–12)
CHLORIDE SERPL-SCNC: 100 MMOL/L — SIGNIFICANT CHANGE UP (ref 96–108)
CO2 SERPL-SCNC: 25 MMOL/L — SIGNIFICANT CHANGE UP (ref 22–31)
COLOR SPEC: (no result)
CREAT SERPL-MCNC: 0.9 MG/DL — SIGNIFICANT CHANGE UP (ref 0.5–1.3)
DIFF PNL FLD: (no result)
EOSINOPHIL # BLD AUTO: 0 K/UL — SIGNIFICANT CHANGE UP (ref 0–0.5)
EOSINOPHIL # BLD AUTO: 0.08 K/UL — SIGNIFICANT CHANGE UP (ref 0–0.5)
EOSINOPHIL NFR BLD AUTO: 0 % — SIGNIFICANT CHANGE UP (ref 0–6)
EOSINOPHIL NFR BLD AUTO: 0.5 % — SIGNIFICANT CHANGE UP (ref 0–6)
EPI CELLS # UR: SIGNIFICANT CHANGE UP
GLUCOSE SERPL-MCNC: 118 MG/DL — HIGH (ref 70–99)
GLUCOSE UR QL: NEGATIVE MG/DL — SIGNIFICANT CHANGE UP
HCT VFR BLD CALC: 35.2 % — LOW (ref 39–50)
HCT VFR BLD CALC: 37.4 % — LOW (ref 39–50)
HGB BLD-MCNC: 12.1 G/DL — LOW (ref 13–17)
HGB BLD-MCNC: 12.9 G/DL — LOW (ref 13–17)
HMPV RNA SPEC QL NAA+PROBE: DETECTED
IMM GRANULOCYTES NFR BLD AUTO: 0.3 % — SIGNIFICANT CHANGE UP (ref 0–1.5)
IMM GRANULOCYTES NFR BLD AUTO: 0.6 % — SIGNIFICANT CHANGE UP (ref 0–1.5)
INR BLD: 1.25 RATIO — HIGH (ref 0.88–1.16)
KETONES UR-MCNC: (no result)
LACTATE SERPL-SCNC: 1.7 MMOL/L — SIGNIFICANT CHANGE UP (ref 0.7–2)
LEUKOCYTE ESTERASE UR-ACNC: (no result)
LYMPHOCYTES # BLD AUTO: 1.46 K/UL — SIGNIFICANT CHANGE UP (ref 1–3.3)
LYMPHOCYTES # BLD AUTO: 1.46 K/UL — SIGNIFICANT CHANGE UP (ref 1–3.3)
LYMPHOCYTES # BLD AUTO: 10 % — LOW (ref 13–44)
LYMPHOCYTES # BLD AUTO: 6.7 % — LOW (ref 13–44)
MANUAL SMEAR VERIFICATION: SIGNIFICANT CHANGE UP
MCHC RBC-ENTMCNC: 30.4 PG — SIGNIFICANT CHANGE UP (ref 27–34)
MCHC RBC-ENTMCNC: 30.6 PG — SIGNIFICANT CHANGE UP (ref 27–34)
MCHC RBC-ENTMCNC: 34.4 GM/DL — SIGNIFICANT CHANGE UP (ref 32–36)
MCHC RBC-ENTMCNC: 34.5 GM/DL — SIGNIFICANT CHANGE UP (ref 32–36)
MCV RBC AUTO: 88.2 FL — SIGNIFICANT CHANGE UP (ref 80–100)
MCV RBC AUTO: 89.1 FL — SIGNIFICANT CHANGE UP (ref 80–100)
MONOCYTES # BLD AUTO: 1.29 K/UL — HIGH (ref 0–0.9)
MONOCYTES # BLD AUTO: 1.91 K/UL — HIGH (ref 0–0.9)
MONOCYTES NFR BLD AUTO: 8.8 % — SIGNIFICANT CHANGE UP (ref 2–14)
MONOCYTES NFR BLD AUTO: 8.8 % — SIGNIFICANT CHANGE UP (ref 2–14)
NEUTROPHILS # BLD AUTO: 11.67 K/UL — HIGH (ref 1.8–7.4)
NEUTROPHILS # BLD AUTO: 18.26 K/UL — HIGH (ref 1.8–7.4)
NEUTROPHILS NFR BLD AUTO: 80.2 % — HIGH (ref 43–77)
NEUTROPHILS NFR BLD AUTO: 83.7 % — HIGH (ref 43–77)
NITRITE UR-MCNC: NEGATIVE — SIGNIFICANT CHANGE UP
NRBC # BLD: 0 /100 WBCS — SIGNIFICANT CHANGE UP (ref 0–0)
NRBC # BLD: 0 /100 WBCS — SIGNIFICANT CHANGE UP (ref 0–0)
PH UR: 6 — SIGNIFICANT CHANGE UP (ref 5–8)
PHENYTOIN FREE SERPL-MCNC: 5.6 UG/ML — LOW (ref 10–20)
PLAT MORPH BLD: NORMAL — SIGNIFICANT CHANGE UP
PLATELET # BLD AUTO: 194 K/UL — SIGNIFICANT CHANGE UP (ref 150–400)
PLATELET # BLD AUTO: 243 K/UL — SIGNIFICANT CHANGE UP (ref 150–400)
POTASSIUM SERPL-MCNC: 3.9 MMOL/L — SIGNIFICANT CHANGE UP (ref 3.5–5.3)
POTASSIUM SERPL-SCNC: 3.9 MMOL/L — SIGNIFICANT CHANGE UP (ref 3.5–5.3)
PROT SERPL-MCNC: 7.3 GM/DL — SIGNIFICANT CHANGE UP (ref 6–8.3)
PROT UR-MCNC: 30 MG/DL
PROTHROM AB SERPL-ACNC: 13.6 SEC — HIGH (ref 9.8–12.7)
RAPID RVP RESULT: DETECTED
RBC # BLD: 3.95 M/UL — LOW (ref 4.2–5.8)
RBC # BLD: 4.24 M/UL — SIGNIFICANT CHANGE UP (ref 4.2–5.8)
RBC # FLD: 13.1 % — SIGNIFICANT CHANGE UP (ref 10.3–14.5)
RBC # FLD: 13.1 % — SIGNIFICANT CHANGE UP (ref 10.3–14.5)
RBC BLD AUTO: NORMAL — SIGNIFICANT CHANGE UP
RBC CASTS # UR COMP ASSIST: SIGNIFICANT CHANGE UP /HPF (ref 0–4)
SODIUM SERPL-SCNC: 133 MMOL/L — LOW (ref 135–145)
SP GR SPEC: 1.01 — SIGNIFICANT CHANGE UP (ref 1.01–1.02)
UROBILINOGEN FLD QL: 1 MG/DL
VALPROATE SERPL-MCNC: 24 UG/ML — LOW (ref 50–100)
WBC # BLD: 14.58 K/UL — HIGH (ref 3.8–10.5)
WBC # BLD: 21.8 K/UL — HIGH (ref 3.8–10.5)
WBC # FLD AUTO: 14.58 K/UL — HIGH (ref 3.8–10.5)
WBC # FLD AUTO: 21.8 K/UL — HIGH (ref 3.8–10.5)
WBC UR QL: >50

## 2018-05-05 PROCEDURE — 99222 1ST HOSP IP/OBS MODERATE 55: CPT

## 2018-05-05 PROCEDURE — 71045 X-RAY EXAM CHEST 1 VIEW: CPT | Mod: 26

## 2018-05-05 PROCEDURE — 99285 EMERGENCY DEPT VISIT HI MDM: CPT

## 2018-05-05 PROCEDURE — 93010 ELECTROCARDIOGRAM REPORT: CPT

## 2018-05-05 RX ORDER — HEPARIN SODIUM 5000 [USP'U]/ML
5000 INJECTION INTRAVENOUS; SUBCUTANEOUS EVERY 8 HOURS
Qty: 0 | Refills: 0 | Status: DISCONTINUED | OUTPATIENT
Start: 2018-05-05 | End: 2018-05-09

## 2018-05-05 RX ORDER — SODIUM CHLORIDE 9 MG/ML
500 INJECTION INTRAMUSCULAR; INTRAVENOUS; SUBCUTANEOUS
Qty: 0 | Refills: 0 | Status: COMPLETED | OUTPATIENT
Start: 2018-05-05 | End: 2018-05-05

## 2018-05-05 RX ORDER — CEFTRIAXONE 500 MG/1
1000 INJECTION, POWDER, FOR SOLUTION INTRAMUSCULAR; INTRAVENOUS ONCE
Qty: 0 | Refills: 0 | Status: COMPLETED | OUTPATIENT
Start: 2018-05-05 | End: 2018-05-05

## 2018-05-05 RX ORDER — FERROUS SULFATE 325(65) MG
325 TABLET ORAL DAILY
Qty: 0 | Refills: 0 | Status: DISCONTINUED | OUTPATIENT
Start: 2018-05-05 | End: 2018-05-09

## 2018-05-05 RX ORDER — DIVALPROEX SODIUM 500 MG/1
500 TABLET, DELAYED RELEASE ORAL DAILY
Qty: 0 | Refills: 0 | Status: DISCONTINUED | OUTPATIENT
Start: 2018-05-05 | End: 2018-05-09

## 2018-05-05 RX ORDER — THIOTHIXENE 5 MG
5 CAPSULE ORAL
Qty: 0 | Refills: 0 | Status: DISCONTINUED | OUTPATIENT
Start: 2018-05-05 | End: 2018-05-09

## 2018-05-05 RX ORDER — ACETAMINOPHEN 500 MG
650 TABLET ORAL EVERY 6 HOURS
Qty: 0 | Refills: 0 | Status: DISCONTINUED | OUTPATIENT
Start: 2018-05-05 | End: 2018-05-09

## 2018-05-05 RX ORDER — RISPERIDONE 4 MG/1
3 TABLET ORAL AT BEDTIME
Qty: 0 | Refills: 0 | Status: DISCONTINUED | OUTPATIENT
Start: 2018-05-05 | End: 2018-05-09

## 2018-05-05 RX ORDER — SENNA PLUS 8.6 MG/1
1 TABLET ORAL AT BEDTIME
Qty: 0 | Refills: 0 | Status: DISCONTINUED | OUTPATIENT
Start: 2018-05-05 | End: 2018-05-09

## 2018-05-05 RX ORDER — ASPIRIN/CALCIUM CARB/MAGNESIUM 324 MG
81 TABLET ORAL DAILY
Qty: 0 | Refills: 0 | Status: DISCONTINUED | OUTPATIENT
Start: 2018-05-05 | End: 2018-05-09

## 2018-05-05 RX ORDER — RISPERIDONE 4 MG/1
1 TABLET ORAL DAILY
Qty: 0 | Refills: 0 | Status: DISCONTINUED | OUTPATIENT
Start: 2018-05-05 | End: 2018-05-09

## 2018-05-05 RX ORDER — AZITHROMYCIN 500 MG/1
500 TABLET, FILM COATED ORAL ONCE
Qty: 0 | Refills: 0 | Status: COMPLETED | OUTPATIENT
Start: 2018-05-05 | End: 2018-05-05

## 2018-05-05 RX ORDER — LEVETIRACETAM 250 MG/1
2000 TABLET, FILM COATED ORAL
Qty: 0 | Refills: 0 | Status: DISCONTINUED | OUTPATIENT
Start: 2018-05-05 | End: 2018-05-09

## 2018-05-05 RX ORDER — PIPERACILLIN AND TAZOBACTAM 4; .5 G/20ML; G/20ML
3.38 INJECTION, POWDER, LYOPHILIZED, FOR SOLUTION INTRAVENOUS EVERY 8 HOURS
Qty: 0 | Refills: 0 | Status: DISCONTINUED | OUTPATIENT
Start: 2018-05-05 | End: 2018-05-09

## 2018-05-05 RX ORDER — CARBAMAZEPINE 200 MG
400 TABLET ORAL DAILY
Qty: 0 | Refills: 0 | Status: DISCONTINUED | OUTPATIENT
Start: 2018-05-05 | End: 2018-05-09

## 2018-05-05 RX ORDER — CARBAMAZEPINE 200 MG
800 TABLET ORAL AT BEDTIME
Qty: 0 | Refills: 0 | Status: DISCONTINUED | OUTPATIENT
Start: 2018-05-05 | End: 2018-05-09

## 2018-05-05 RX ORDER — ACETAMINOPHEN 500 MG
650 TABLET ORAL ONCE
Qty: 0 | Refills: 0 | Status: COMPLETED | OUTPATIENT
Start: 2018-05-05 | End: 2018-05-05

## 2018-05-05 RX ORDER — THIOTHIXENE 5 MG
5 CAPSULE ORAL ONCE
Qty: 0 | Refills: 0 | Status: COMPLETED | OUTPATIENT
Start: 2018-05-05 | End: 2018-05-05

## 2018-05-05 RX ORDER — KETOROLAC TROMETHAMINE 30 MG/ML
30 SYRINGE (ML) INJECTION ONCE
Qty: 0 | Refills: 0 | Status: DISCONTINUED | OUTPATIENT
Start: 2018-05-05 | End: 2018-05-05

## 2018-05-05 RX ORDER — DIVALPROEX SODIUM 500 MG/1
750 TABLET, DELAYED RELEASE ORAL AT BEDTIME
Qty: 0 | Refills: 0 | Status: DISCONTINUED | OUTPATIENT
Start: 2018-05-05 | End: 2018-05-09

## 2018-05-05 RX ORDER — CLONAZEPAM 1 MG
1 TABLET ORAL
Qty: 0 | Refills: 0 | Status: DISCONTINUED | OUTPATIENT
Start: 2018-05-05 | End: 2018-05-09

## 2018-05-05 RX ORDER — BENZTROPINE MESYLATE 1 MG
1 TABLET ORAL
Qty: 0 | Refills: 0 | Status: DISCONTINUED | OUTPATIENT
Start: 2018-05-05 | End: 2018-05-09

## 2018-05-05 RX ORDER — CHOLECALCIFEROL (VITAMIN D3) 125 MCG
2000 CAPSULE ORAL DAILY
Qty: 0 | Refills: 0 | Status: DISCONTINUED | OUTPATIENT
Start: 2018-05-05 | End: 2018-05-09

## 2018-05-05 RX ORDER — SODIUM CHLORIDE 9 MG/ML
3 INJECTION INTRAMUSCULAR; INTRAVENOUS; SUBCUTANEOUS ONCE
Qty: 0 | Refills: 0 | Status: COMPLETED | OUTPATIENT
Start: 2018-05-05 | End: 2018-05-05

## 2018-05-05 RX ORDER — PANTOPRAZOLE SODIUM 20 MG/1
40 TABLET, DELAYED RELEASE ORAL
Qty: 0 | Refills: 0 | Status: DISCONTINUED | OUTPATIENT
Start: 2018-05-05 | End: 2018-05-09

## 2018-05-05 RX ORDER — SODIUM CHLORIDE 9 MG/ML
2 INJECTION INTRAMUSCULAR; INTRAVENOUS; SUBCUTANEOUS DAILY
Qty: 0 | Refills: 0 | Status: DISCONTINUED | OUTPATIENT
Start: 2018-05-05 | End: 2018-05-09

## 2018-05-05 RX ADMIN — SODIUM CHLORIDE 3 MILLILITER(S): 9 INJECTION INTRAMUSCULAR; INTRAVENOUS; SUBCUTANEOUS at 01:24

## 2018-05-05 RX ADMIN — SODIUM CHLORIDE 2000 MILLILITER(S): 9 INJECTION INTRAMUSCULAR; INTRAVENOUS; SUBCUTANEOUS at 01:00

## 2018-05-05 RX ADMIN — Medication 81 MILLIGRAM(S): at 12:25

## 2018-05-05 RX ADMIN — Medication 325 MILLIGRAM(S): at 12:25

## 2018-05-05 RX ADMIN — Medication 400 MILLIGRAM(S): at 12:48

## 2018-05-05 RX ADMIN — HEPARIN SODIUM 5000 UNIT(S): 5000 INJECTION INTRAVENOUS; SUBCUTANEOUS at 15:46

## 2018-05-05 RX ADMIN — SODIUM CHLORIDE 2000 MILLILITER(S): 9 INJECTION INTRAMUSCULAR; INTRAVENOUS; SUBCUTANEOUS at 00:53

## 2018-05-05 RX ADMIN — Medication 5 MILLIGRAM(S): at 12:48

## 2018-05-05 RX ADMIN — Medication 1 TABLET(S): at 12:26

## 2018-05-05 RX ADMIN — DIVALPROEX SODIUM 500 MILLIGRAM(S): 500 TABLET, DELAYED RELEASE ORAL at 12:48

## 2018-05-05 RX ADMIN — SODIUM CHLORIDE 2 GRAM(S): 9 INJECTION INTRAMUSCULAR; INTRAVENOUS; SUBCUTANEOUS at 12:26

## 2018-05-05 RX ADMIN — PIPERACILLIN AND TAZOBACTAM 25 GRAM(S): 4; .5 INJECTION, POWDER, LYOPHILIZED, FOR SOLUTION INTRAVENOUS at 15:46

## 2018-05-05 RX ADMIN — Medication 1 MILLIGRAM(S): at 12:25

## 2018-05-05 RX ADMIN — CEFTRIAXONE 1000 MILLIGRAM(S): 500 INJECTION, POWDER, FOR SOLUTION INTRAMUSCULAR; INTRAVENOUS at 01:11

## 2018-05-05 RX ADMIN — Medication 1 MILLIGRAM(S): at 17:10

## 2018-05-05 RX ADMIN — Medication 650 MILLIGRAM(S): at 17:05

## 2018-05-05 RX ADMIN — Medication 30 MILLIGRAM(S): at 01:11

## 2018-05-05 RX ADMIN — AZITHROMYCIN 255 MILLIGRAM(S): 500 TABLET, FILM COATED ORAL at 01:16

## 2018-05-05 RX ADMIN — Medication 1 MILLIGRAM(S): at 17:05

## 2018-05-05 RX ADMIN — SODIUM CHLORIDE 2000 MILLILITER(S): 9 INJECTION INTRAMUSCULAR; INTRAVENOUS; SUBCUTANEOUS at 01:08

## 2018-05-05 RX ADMIN — LEVETIRACETAM 2000 MILLIGRAM(S): 250 TABLET, FILM COATED ORAL at 17:05

## 2018-05-05 RX ADMIN — Medication 650 MILLIGRAM(S): at 01:16

## 2018-05-05 RX ADMIN — SODIUM CHLORIDE 2000 MILLILITER(S): 9 INJECTION INTRAMUSCULAR; INTRAVENOUS; SUBCUTANEOUS at 00:45

## 2018-05-05 RX ADMIN — PANTOPRAZOLE SODIUM 40 MILLIGRAM(S): 20 TABLET, DELAYED RELEASE ORAL at 15:46

## 2018-05-05 RX ADMIN — Medication 2000 UNIT(S): at 12:25

## 2018-05-05 RX ADMIN — Medication 30 MILLIGRAM(S): at 01:25

## 2018-05-05 RX ADMIN — RISPERIDONE 1 MILLIGRAM(S): 4 TABLET ORAL at 12:26

## 2018-05-05 RX ADMIN — SODIUM CHLORIDE 2000 MILLILITER(S): 9 INJECTION INTRAMUSCULAR; INTRAVENOUS; SUBCUTANEOUS at 00:59

## 2018-05-05 NOTE — CONSULT NOTE ADULT - SUBJECTIVE AND OBJECTIVE BOX
Consult requested for gerd    Covering Dr. ASHLEY Gandhi  HPI:  The patient is a 54 yo male with hx. of severe mental retardation and hx seizures-admitted with fever and chest congestion/cough  The pt completed a swallow eval-=chronic dysphagia  rec mech soft diet with thin liquids  The pt is non communicative and with a hx mental retardation    history obtained from electronic record    PAST MEDICAL & SURGICAL HISTORY:  Bowel and bladder incontinence  Hearing impaired person  Intellectual disability  Hyperprolactinemia  Hyponatremia  Cataract  GERD (gastroesophageal reflux disease)  Osteoporosis  Seizure disorder  H/O cataract extraction      Allergies    Lamisil (Unknown)    Intolerances    Carrots (Other)  Corn (Other)  Dislikes Beans (Other)  Dislikes Peas (Other)      MEDICATIONS  (STANDING):  aspirin  chewable 81 milliGRAM(s) Oral daily  benztropine 1 milliGRAM(s) Oral four times a day  carBAMazepine XR Tablet 400 milliGRAM(s) Oral daily  carBAMazepine XR Tablet 800 milliGRAM(s) Oral at bedtime  cholecalciferol 2000 Unit(s) Oral daily  Clobazam 30 milliGRAM(s) Oral at bedtime  clonazePAM Tablet 1 milliGRAM(s) Oral two times a day  diVALproex Sprinkle 500 milliGRAM(s) Oral daily  diVALproex Sprinkle 750 milliGRAM(s) Oral at bedtime  ferrous    sulfate 325 milliGRAM(s) Oral daily  heparin  Injectable 5000 Unit(s) SubCutaneous every 8 hours  levETIRAcetam 2000 milliGRAM(s) Oral two times a day  multivitamin 1 Tablet(s) Oral daily  pantoprazole    Tablet 40 milliGRAM(s) Oral before breakfast  phenytoin   Chewable 200 milliGRAM(s) Chew daily  piperacillin/tazobactam IVPB. 3.375 Gram(s) IV Intermittent every 8 hours  risperiDONE   Tablet 1 milliGRAM(s) Oral daily  risperiDONE   Tablet 3 milliGRAM(s) Oral at bedtime  senna 1 Tablet(s) Oral at bedtime  sodium chloride 2 Gram(s) Oral daily  thiothixene 5 milliGRAM(s) Oral <User Schedule>    MEDICATIONS  (PRN):  acetaminophen   Tablet 650 milliGRAM(s) Oral every 6 hours PRN For Temp greater than 38.5 C (101.3 F)      FAMILY HISTORY:  No pertinent family history in first degree relatives      Social History:    REVIEW OF SYSTEMS    limited given hx mental retardation    PHYSICAL EXAM:    Vital Signs Last 24 Hrs  T(C): 37.7 (05 May 2018 16:07), Max: 37.7 (05 May 2018 16:07)  T(F): 99.9 (05 May 2018 16:07), Max: 99.9 (05 May 2018 16:07)  HR: 88 (05 May 2018 15:47) (76 - 105)  BP: 127/73 (05 May 2018 15:47) (101/90 - 127/73)  BP(mean): --  RR: 20 (05 May 2018 15:47) (16 - 20)  SpO2: 94% (05 May 2018 15:47) (94% - 98%)    Constitutional: no acute distress    ENMT: NC/AT scl anicteric opm pink no lesions     Neck: supple. No jvd or LN    Respiratory: pos rhonchi     Cardiovascular: RRR s1s2     Gastrointestinal: Pos bs , soft , not tender, no hepatosplenomegaly,  no mass    Rectal: No palpable rectal lesions, stool brown heme neg    Back: No CVA tenderness    Extremities: NO cce     Neurological: Alert but not communicative because of hx of mental retardation    Skin: No rash or jaundice     Date/Time:05-05 @ 00:36    Albumin: 2.7  ALT/SGPT: 18  Alk Phos: 39  AST/SGOT: 17  Bilirubin Direct: --  Bilirubin Total: 0.2  Ca: 8.5  eGFR : 111  eGFR Non-: 96  Lipase: --  Amylase: --  INR: 1.25  PTT: --      05-05    133<L>  |  100  |  22  ----------------------------<  118<H>  3.9   |  25  |  0.90    Ca    8.5      05 May 2018 00:36    TPro  7.3  /  Alb  2.7<L>  /  TBili  0.2  /  DBili  x   /  AST  17  /  ALT  18  /  AlkPhos  39<L>  05-05                            12.1   14.58 )-----------( 194      ( 05 May 2018 12:04 )             35.2   < from: Xray Chest 1 View AP/PA. (05.05.18 @ 01:56) >    EXAM:  XR CHEST AP OR PA 1V                            PROCEDURE DATE:  05/05/2018          INTERPRETATION:  History: Fever.    Single view of the chest was performed.    Comparison is made to April 30, 2017.    Impression:    There is unchanged marked elevation of the left hemidiaphragm. There is   adjacent left basilar opacity which which is slightly more prominent   compared to the prior examination and may be related to atelectasis or   pneumonia. Right lung is clear. The heart size is grossly preserved.                ORESTES RUSSELL   This document has been electronically signed. May  5 2018  8:56AM        < end of copied text >

## 2018-05-05 NOTE — ED PROVIDER NOTE - OBJECTIVE STATEMENT
52 y/o male from group home with fever and cough today.  adie states pt with Tmax of 103.  aide states pt baseline nonverbal.   no sick contacts or recent travel.  no v/d noted. 52 y/o male from group home with fever and cough today.  aide states pt with Tmax of 103.  aide states pt baseline nonverbal.   no sick contacts or recent travel.  no v/d noted.

## 2018-05-05 NOTE — H&P ADULT - HISTORY OF PRESENT ILLNESS
The patient is a 52 yo male with hx. of severe mental retardation, non verbal, non ambulatory, seizue, impulse contro, chr. anemia, osteoporosis, leg edema, DJD bilat knees, GERD, drug induced hyponatremia, prolactinemia, hearing loss , cataracts, GI bleed, hemorrhoids, gynecomastia, aspiration pneumonia, adrenal mass,failure to thrive, lung granuloma LLL, funcal naies, funtional diarrhea, urine and stool incontinence  who was sent to the ER from [facility] home for fever 103, cough, congestion.  No seizure reported.   His nurse Yamilet Terry knows him well and all his medications tel. [phone].

## 2018-05-05 NOTE — SWALLOW BEDSIDE ASSESSMENT ADULT - ORAL PREPARATORY PHASE
Food was cut by clinician prior to administration. Pt's self monitoring abilities were variably reduced when PO was offered and he tended to aversively turn head/resistively close mouth when PO was offered.  Labial grading on utensils was functionally reduced when food entered mouth. Food was cut by clinician prior to administration. Pt's self monitoring abilities were variably reduced when pt was alert enough to be fed.  When PO was offered, he tended to sometimes aversively turn head/resistively close mouth.  Labial grading on utensils was functionally reduced when willing to accept food.

## 2018-05-05 NOTE — ED PROVIDER NOTE - CARE PLAN
Principal Discharge DX:	Sepsis, due to unspecified organism  Secondary Diagnosis:	Urinary tract infection without hematuria, site unspecified

## 2018-05-05 NOTE — SWALLOW BEDSIDE ASSESSMENT ADULT - SLP GENERAL OBSERVATIONS
Pt seen at bedside, On encounter, rocking behaviors were evident which seemed to be a self stimulatory behavior. He was somewhat congested but not in overt respiratory distress. Pt was arousable but fatigued. His affect was flat.  Eye gaze was often distant when he was awake and joint attention was fleeting. Pt seemed Nikolai and demonstrated severe Cognitive Dysfunction in setting of known mental retardation. Pt did not follow commands and was non verbal which is reportedly chronic in setting of profound cognitive deficits associated with MR. His needs must be anticipated. At suspected communicative baseline..

## 2018-05-05 NOTE — CONSULT NOTE ADULT - ASSESSMENT
52 yo male with PMH of mental retardation, seizures / impulse control, chr. anemia, lung granuloma, urine and stool incontinence, is totally dependent, is non verbal, non-ambulatory, lives in group home, was sent to the ER with fever of 103 associated with cough/congestion, is being treated for sespsis.     Neuro consulted for evaluation of seizures as he has history of seizures; VA level 24, phenytoin 5.6. Review of records show pt is on Keppra 2000 mg bid, -875 mg, Tegretol 400-800 mg, Dilantin 150 mg daily.  As per his aide who is by his bedside and knows him well, he has not had any seizures in the recent past    # History of seizures; no current seizures    - obtain routine EEG  - Obtain Tegretol level; restart Dilantin 50 mg infatabs- increase dose to 4 a day  - continue same dose of Keppra and VA

## 2018-05-05 NOTE — H&P ADULT - NSHPPHYSICALEXAM_GEN_ALL_CORE
Physical Exam: Vital Signs Last 24 Hrs  T(C): 36.8 (05 May 2018 08:10), Max: 37.4 (05 May 2018 00:13)  T(F): 98.2 (05 May 2018 08:10), Max: 99.4 (05 May 2018 00:13)  HR: 80 (05 May 2018 07:29) (80 - 105)  BP: 106/63 (05 May 2018 07:29) (101/90 - 126/72)  BP(mean): --  RR: 18 (05 May 2018 03:26) (16 - 18)  SpO2: 98% (05 May 2018 07:29) (96% - 98%)        HEENT: PRRL , conjunctiva clear     MOUTH/TEETH/GUMS: Clear    NECK: no JVD    LUNGS: upper airway congestion, rhonchi     HEART: S1,S2 RR    ABDOMEN: soft nontender    EXTREMITIES: +1 ;eg marco a,a.     MUSCULOSKELETAL: muscle weakness, not ambulatory    NEURO: no tremor, lower extremities weakness.     SKIN: no rash    : CVA negative,

## 2018-05-05 NOTE — SWALLOW BEDSIDE ASSESSMENT ADULT - SWALLOW EVAL: FEEDING ASSISTANCE
Assist with tray set up, cutting up food and feeding as needed. Pt has aid from group home at bedside for all meals. no

## 2018-05-05 NOTE — SWALLOW BEDSIDE ASSESSMENT ADULT - SWALLOW EVAL: CRITERIA FOR SKILLED INTERVENTION MET
Acute speech path intervention is not clinically indicated nor would it . Pt is not a therapy candidate nonetheless given the severity of his chronic cognitive limitations associated with MR. Note that his Cognitive Dysfunction/Oral Dysphagia are pre-existing/irreversible. Given above, will NOT actively follow. Reconsult PRN as needed./no problems identified which require skilled intervention ACUTE SPEECH PATH INTERVENTION IS NOT CLINICALLY INDICATED NOR WOULD IT . PT'S ORAL DYSPHAGIA/COGNITIVE DYSFUNCTION ARE CHRONIC PRE-EXISTING/IRREVERSIBLE. PT IS NOT A VIABLE THERAPY CANDIDATE REGARDLESS GIVEN THE SEVERITY OF HIS CHRONIC COGNITIVE DYSFUNCTION ASSOCIATED WITH MR. GIVEN ABOVE, WILL NOT ACTIVELY FOLLOW. RECONSULT PRN.

## 2018-05-05 NOTE — SWALLOW BEDSIDE ASSESSMENT ADULT - ORAL PHASE
Bolus formation/transfer were mildly to moderately prolonged and disorganized/discontinuous but felt to be grossly mechanically functional with some modified food textures. Piecemeal deglutition was evident. Mild tongue debris noted with coarse solids only.

## 2018-05-05 NOTE — H&P ADULT - ASSESSMENT
The patient is a 54 yo male with hx. of severe mental retardation, non verbal, non ambulatory, seizue, impulse contro, chr. anemia, osteoporosis, leg edema, DJD bilat knees, GERD, drug induced hyponatremia, prolactinemia, hearing loss , cataracts, GI bleed, hemorrhoids, gynecomastia, aspiration pneumonia, adrenal mass,failure to thrive, lung granuloma LLL, funcal naies, funtional diarrhea, urine and stool incontinence  who was sent to the ER from [facility] home for fever 103, cough, congestion.  No seizure reported.

## 2018-05-05 NOTE — CONSULT NOTE ADULT - ASSESSMENT
Pneumonia  Dysphagia chronic    aspiration precautions  refer to swallow eval  soft mechanical diet with thin liquids consistency  protonix 40 mg daily for now and to be handled per evaluation of his primary doctors upon discharge.   antireflux measures discussed     will sign off

## 2018-05-05 NOTE — SWALLOW BEDSIDE ASSESSMENT ADULT - ASR SWALLOW RECOMMEND DIAG
Defer MBS as pt appears clinically tolerant of suggested PO textures from swallow stance on exam and test would be technically difficult nonetheless given his altered mentation/"rocking behavior".

## 2018-05-05 NOTE — SWALLOW BEDSIDE ASSESSMENT ADULT - SWALLOW EVAL: RECOMMENDED DIET
Suggest a Mechanical Soft Texture Diet with Thin Liquid Consistencies as tolerated. All [facility] soft foods should be cut up as was the case at Group [facility]. There should  be an aid from [facility] with him during meals and aids are reportedly adept at cutting up his foods/setting up his meals trays. Suggest a Mechanical Soft Texture Diet with Thin Liquid Consistencies as tolerated. All [facility] soft foods should be cut up, as needed, as was reportedly the case at Group [facility]. An aid from [facility] is often present with him during meals and aids are reportedly adept at cutting up his foods/setting up his meals trays as needed.

## 2018-05-05 NOTE — CONSULT NOTE ADULT - SUBJECTIVE AND OBJECTIVE BOX
CC: 53 y old  Male who presents with a chief complaint of Pneumonia / UTI, (05 May 2018 09:32)    HPI:  52 yo male with PMH of severe mental retardation, is non verbal, non-ambulatory, lives in group home, has history of seizures / impulse control, chr. anemia, lung granuloma, urine and stool incontinence, is totally dependent,  was sent to the ER from [facility] group home for fever of 103 associated with cough and chest congestion.     Neuro consulted for evaluation of seizures as he has history of seizures; VA level 24, phenytoin 5.6. Review of records show pt is on Keppra 2000 mg bid, -875 mg, Tegretol 400-800 mg, Dilantin 150 mg daily.   As per the aide who is by his bedside and knows him well, he has not had any seizures recently, is at his baseline    PAST MEDICAL & SURGICAL HISTORY:  Bowel and bladder incontinence  Hearing impaired person  Intellectual disability  Hyperprolactinemia  Hyponatremia  Cataract  GERD (gastroesophageal reflux disease)  Osteoporosis  Seizure disorder  H/O cataract extraction    FAMILY HISTORY:  unable to obtain    Social Hx:  Nonsmoker, no drug or alcohol use, dependent    nitrofurantoin macrocrystals 100 mg oral capsule: 1 cap(s) orally 2 times a day  · 	cefdinir 300 mg oral capsule: 1 cap(s) orally 2 times a day  · 	ibandronate 150 mg oral tablet: 1 tab(s) orally once a month  · 	cholecalciferol 2000 intl units oral capsule: 1 cap(s) orally once a day  · 	sodium chloride 1 g oral tablet: 2 tab(s) orally once a day  · 	Onfi 10 mg oral tablet: 3 tab(s) orally once a day (at bedtime)  · 	Dilantin Infatabs 50 mg oral tablet, chewable: 3 tab(s) orally once a day (at bedtime)  · 	TEGretol  mg oral tablet, extended release: 1 tab(s) orally once a day  · 	TEGretol  mg oral tablet, extended release: 2 tab(s) orally once a day (at bedtime)  · 	clonazePAM 1 mg oral tablet: 1 milligram(s) orally 2 times a day  · 	aspirin 81 mg oral tablet, chewable: 1 tab(s) orally once a day  · 	hydroCHLOROthiazide 25 mg oral tablet: 1 tab(s) orally once a day  · 	multivitamin with iron: 1 tab(s) orally once a day  · 	thiothixene 5 mg oral capsule: 1 cap(s) orally once a day (at bedtime)  · 	Senokot S 50 mg-8.6 mg oral tablet: 1 tab(s) orally once a day (at bedtime)  · 	RisperDAL 1 mg oral tablet: 1 tab(s) orally once a day  · 	RisperDAL 3 mg oral tablet: 1 tab(s) orally once a day (at bedtime)  · 	Depakote Sprinkles 125 mg oral delayed release capsule: 7 cap(s) orally once a day  · 	Depakote Sprinkles 125 mg oral delayed release capsule: 6 cap(s) orally once a day (at bedtime)  · 	Keppra 1000 mg oral tablet: 2 tab(s) orally 2 times a day  · 	Calcium 600+D oral tablet: 1 tab(s) orally 2 times a day  · 	benztropine 1 mg oral tablet: 1 tab(s) orally 4 times a day  · 	ferrous sulfate 325 mg (65 mg elemental iron    MEDICATIONS  (STANDING):  aspirin  chewable 81 milliGRAM(s) Oral daily  benztropine 1 milliGRAM(s) Oral four times a day  carBAMazepine XR Tablet 400 milliGRAM(s) Oral daily  carBAMazepine XR Tablet 800 milliGRAM(s) Oral at bedtime  cholecalciferol 2000 Unit(s) Oral daily  Clobazam 30 milliGRAM(s) Oral at bedtime  clonazePAM Tablet 1 milliGRAM(s) Oral two times a day  diVALproex Sprinkle 500 milliGRAM(s) Oral daily  diVALproex Sprinkle 750 milliGRAM(s) Oral at bedtime  ferrous    sulfate 325 milliGRAM(s) Oral daily  heparin  Injectable 5000 Unit(s) SubCutaneous every 8 hours  levETIRAcetam 2000 milliGRAM(s) Oral two times a day  multivitamin 1 Tablet(s) Oral daily  pantoprazole    Tablet 40 milliGRAM(s) Oral before breakfast  phenytoin   Chewable 150 milliGRAM(s) Chew at bedtime  piperacillin/tazobactam IVPB. 3.375 Gram(s) IV Intermittent every 8 hours  risperiDONE   Tablet 1 milliGRAM(s) Oral daily  risperiDONE   Tablet 3 milliGRAM(s) Oral at bedtime  senna 1 Tablet(s) Oral at bedtime  sodium chloride 2 Gram(s) Oral daily  thiothixene 5 milliGRAM(s) Oral <User Schedule>       Allergies  Lamisil (Unknown)    Intolerances  Carrots (Other)  Corn (Other)  Dislikes Beans (Other)  Dislikes Peas (Other)      ROS: Pertinent positives in HPI, all other ROS unable to obtain.      Vital Signs Last 24 Hrs  T(C): 37.7 (05 May 2018 16:07), Max: 37.7 (05 May 2018 16:07)  T(F): 99.9 (05 May 2018 16:07), Max: 99.9 (05 May 2018 16:07)  HR: 88 (05 May 2018 15:47) (76 - 105)  BP: 127/73 (05 May 2018 15:47) (101/90 - 127/73)  BP(mean): --  RR: 20 (05 May 2018 15:47) (16 - 20)  SpO2: 94% (05 May 2018 15:47) (94% - 98%)    GE:  Constitutional: awake but inattentive.  HEENT: no neck masses,   Neck: Supple.  Respiratory: Rhonchi +  Cardiovascular: S1 and S2, regular  Extremities: Mild LE edema  Vascular: -  Musculoskeletal: Mild joint contractures  Skin: No rashes    Neurological exam:  HF: Alert, with childish demeanor, smiles, babbles, no verbal output   CN: Coarse facial features, Right pupil reactive, left corneal opacity, no NLFD, tongue midline, Palate moves equally.  Motor / sen / cereb: Moves and makes purposeful movements of UE, withdraws LE, rest of exam is limited    Reflexes: Hyporeflexic, downgoing toes b/l  Gait/Balance: Cannot test      Labs:   05-05    VA level 24  Phenytoin level 5.6    133<L>  |  100  |  22  ----------------------------<  118<H>  3.9   |  25  |  0.90    Ca    8.5      05 May 2018 00:36    TPro  7.3  /  Alb  2.7<L>  /  TBili  0.2  /  DBili  x   /  AST  17  /  ALT  18  /  AlkPhos  39<L>  05-05                       12.1   14.58 )-----------( 194      ( 05 May 2018 12:04 )             35.2

## 2018-05-05 NOTE — ED ADULT NURSE NOTE - OBJECTIVE STATEMENT
Pt BIBA for increased lethargy and fevers x1day. Pt's aid reports that his fever has been getting better with Tylenol but still comes back when it wears off. Last dose of 650mg Tylenol @8pm. Pt nonverbal at baseline, h/o MR.

## 2018-05-05 NOTE — SWALLOW BEDSIDE ASSESSMENT ADULT - SWALLOW EVAL: DIAGNOSIS
1) Pt demonstrates reduced self monitoring abilities for feeding with concomitant mild to moderate Oral Dysphagia which is a functional condition with a restricted inventory of modified food textures when he is alert/cognizant enough to be fed. This is atop underlying pharyngeal integrity which subjectively appears to be grossly within functional parameters for age. No behavioral aspiration signs exhibited on exam. Note that reduced self monitoring for feeding/Oral Dysphagia are chronic pre-existing issues associated with profound mental retardation. Further note that swallow status is felt to be stable despite the above, providing that pt is in an alert state.  2) Pt is [place] and demonstrates severe Cognitive Dysfunction in setting of known mental retardation. Pt does not follow commands and is non verbal which is reportedly chronic. His needs must be anticipated. At suspected communicative baseline. 1) Pt demonstrates periodically reduced alertness for feeding/decreased self monitoring abilities for feeding with concomitant mild to moderate Oral Dysphagia which is a functional condition with a restricted inventory of modified food textures when he is alert/cognizant enough to be fed. This is atop underlying pharyngeal integrity which subjectively appears to be grossly within functional parameters for age. No behavioral aspiration signs exhibited on exam. Note that reduced self monitoring for feeding/Oral Dysphagia are chronic pre-existing issues associated with profound mental retardation. Further note that swallow status is felt to be stable despite the above, providing that pt is in an alert state.  2) Pt is [place] and demonstrates severe Cognitive Dysfunction in setting of known mental retardation. Pt does not follow commands and is non verbal which is reportedly chronic. His needs must be anticipated. At suspected communicative baseline.

## 2018-05-05 NOTE — SWALLOW BEDSIDE ASSESSMENT ADULT - NS SPL SWALLOW CLINIC TRIAL FT
Note that pt's oral Dysphagia sequel are chronic/irreversible/pre-existing and are apt to fluctuate in severity with changes in alertness/mood/mentation/medical status. Further note that no overt signs of pain were evident when feeding/eating.

## 2018-05-05 NOTE — SWALLOW BEDSIDE ASSESSMENT ADULT - SWALLOW EVAL: RECOMMENDED FEEDING/EATING TECHNIQUES
check mouth frequently for oral residue/pocketing/alternate food with liquid/position upright (90 degrees)

## 2018-05-05 NOTE — H&P ADULT - NSHPLABSRESULTS_GEN_ALL_CORE
12.9   21.80 )-----------( 243      ( 05 May 2018 00:36 )             37.4       05-05    133<L>  |  100  |  22  ----------------------------<  118<H>  3.9   |  25  |  0.90    Ca    8.5      05 May 2018 00:36    TPro  7.3  /  Alb  2.7<L>  /  TBili  0.2  /  DBili  x   /  AST  17  /  ALT  18  /  AlkPhos  39<L>  05-05    U wbc >50,  nitreates neg,   CXRay There is unchanged marked elevation of the left hemidiaphragm. There is   adjacent left basilar opacity which which is slightly more prominent   compared to the prior examination and may be related to atelectasis or   pneumonia. Right lung is clear. The heart size is grossly preserved.

## 2018-05-05 NOTE — ED PROVIDER NOTE - PROGRESS NOTE DETAILS
pt with fever found with  uti and WBC of 22.  will admit for sepsis and uti.  case d/w Leonard and will admit

## 2018-05-05 NOTE — SWALLOW BEDSIDE ASSESSMENT ADULT - COMMENTS
Pt admitted from [facility] with lethargy, fever and congestion. Hospital course is notable for UTI and PNA. This profile is superimposed upon a history of a prior hospitalization at this facility in 6/17 with left PNA, hyponatremia, and finding of Oral Dysphagia by this service with suspected chronic irreversible component. Other past selected medical history is remarkable for profound mental retardation, impulse control disorder, seizure disorder, hearing loss, anemia, OP, DJD of knees, left lung granuloma,  prolactinemia, gynecomastia, GERD, hemorrhoids s/p GIB, bilateral cataracts s/p sx, and past medication induced hyponatremia/hyperprolactinemia. See below for additional prior medical information. The patient was admitted from Group Home with lethargy, fever and congestion. Hospital course is notable for UTI and PNA. This profile is superimposed upon a history of a prior hospitalization at this facility in 6/17 with left PNA, hyponatremia, and finding of Oral Dysphagia by this service with suspected chronic irreversible component. Other past selected medical history is remarkable for profound mental retardation, impulse control disorder, seizure disorder, hearing loss, anemia, OP, DJD of knees, left lung granuloma,  prolactinemia, gynecomastia, GERD, hemorrhoids s/p GIB, bilateral cataracts s/p sx, and past medication induced hyponatremia. See below for additional prior medical information.

## 2018-05-06 DIAGNOSIS — F72 SEVERE INTELLECTUAL DISABILITIES: ICD-10-CM

## 2018-05-06 LAB
ANION GAP SERPL CALC-SCNC: 7 MMOL/L — SIGNIFICANT CHANGE UP (ref 5–17)
BUN SERPL-MCNC: 20 MG/DL — SIGNIFICANT CHANGE UP (ref 7–23)
CALCIUM SERPL-MCNC: 8.5 MG/DL — SIGNIFICANT CHANGE UP (ref 8.5–10.1)
CHLORIDE SERPL-SCNC: 100 MMOL/L — SIGNIFICANT CHANGE UP (ref 96–108)
CO2 SERPL-SCNC: 26 MMOL/L — SIGNIFICANT CHANGE UP (ref 22–31)
CREAT SERPL-MCNC: 0.74 MG/DL — SIGNIFICANT CHANGE UP (ref 0.5–1.3)
CULTURE RESULTS: SIGNIFICANT CHANGE UP
GLUCOSE SERPL-MCNC: 87 MG/DL — SIGNIFICANT CHANGE UP (ref 70–99)
POTASSIUM SERPL-MCNC: 4.5 MMOL/L — SIGNIFICANT CHANGE UP (ref 3.5–5.3)
POTASSIUM SERPL-SCNC: 4.5 MMOL/L — SIGNIFICANT CHANGE UP (ref 3.5–5.3)
SODIUM SERPL-SCNC: 133 MMOL/L — LOW (ref 135–145)
SPECIMEN SOURCE: SIGNIFICANT CHANGE UP

## 2018-05-06 PROCEDURE — 99231 SBSQ HOSP IP/OBS SF/LOW 25: CPT

## 2018-05-06 RX ADMIN — Medication 1 MILLIGRAM(S): at 07:04

## 2018-05-06 RX ADMIN — Medication 650 MILLIGRAM(S): at 13:40

## 2018-05-06 RX ADMIN — Medication 1 MILLIGRAM(S): at 12:11

## 2018-05-06 RX ADMIN — DIVALPROEX SODIUM 500 MILLIGRAM(S): 500 TABLET, DELAYED RELEASE ORAL at 12:11

## 2018-05-06 RX ADMIN — DIVALPROEX SODIUM 750 MILLIGRAM(S): 500 TABLET, DELAYED RELEASE ORAL at 00:27

## 2018-05-06 RX ADMIN — SENNA PLUS 1 TABLET(S): 8.6 TABLET ORAL at 00:29

## 2018-05-06 RX ADMIN — PIPERACILLIN AND TAZOBACTAM 25 GRAM(S): 4; .5 INJECTION, POWDER, LYOPHILIZED, FOR SOLUTION INTRAVENOUS at 23:01

## 2018-05-06 RX ADMIN — LEVETIRACETAM 2000 MILLIGRAM(S): 250 TABLET, FILM COATED ORAL at 07:04

## 2018-05-06 RX ADMIN — Medication 1 MILLIGRAM(S): at 18:12

## 2018-05-06 RX ADMIN — RISPERIDONE 1 MILLIGRAM(S): 4 TABLET ORAL at 12:10

## 2018-05-06 RX ADMIN — Medication 2000 UNIT(S): at 12:10

## 2018-05-06 RX ADMIN — PIPERACILLIN AND TAZOBACTAM 25 GRAM(S): 4; .5 INJECTION, POWDER, LYOPHILIZED, FOR SOLUTION INTRAVENOUS at 07:04

## 2018-05-06 RX ADMIN — Medication 325 MILLIGRAM(S): at 12:10

## 2018-05-06 RX ADMIN — Medication 200 MILLIGRAM(S): at 12:10

## 2018-05-06 RX ADMIN — Medication 5 MILLIGRAM(S): at 00:31

## 2018-05-06 RX ADMIN — LEVETIRACETAM 2000 MILLIGRAM(S): 250 TABLET, FILM COATED ORAL at 18:12

## 2018-05-06 RX ADMIN — PIPERACILLIN AND TAZOBACTAM 25 GRAM(S): 4; .5 INJECTION, POWDER, LYOPHILIZED, FOR SOLUTION INTRAVENOUS at 15:02

## 2018-05-06 RX ADMIN — HEPARIN SODIUM 5000 UNIT(S): 5000 INJECTION INTRAVENOUS; SUBCUTANEOUS at 00:29

## 2018-05-06 RX ADMIN — Medication 400 MILLIGRAM(S): at 12:10

## 2018-05-06 RX ADMIN — Medication 1 TABLET(S): at 12:10

## 2018-05-06 RX ADMIN — Medication 1 MILLIGRAM(S): at 00:29

## 2018-05-06 RX ADMIN — PIPERACILLIN AND TAZOBACTAM 25 GRAM(S): 4; .5 INJECTION, POWDER, LYOPHILIZED, FOR SOLUTION INTRAVENOUS at 00:22

## 2018-05-06 RX ADMIN — HEPARIN SODIUM 5000 UNIT(S): 5000 INJECTION INTRAVENOUS; SUBCUTANEOUS at 15:02

## 2018-05-06 RX ADMIN — SODIUM CHLORIDE 2 GRAM(S): 9 INJECTION INTRAMUSCULAR; INTRAVENOUS; SUBCUTANEOUS at 12:10

## 2018-05-06 RX ADMIN — HEPARIN SODIUM 5000 UNIT(S): 5000 INJECTION INTRAVENOUS; SUBCUTANEOUS at 07:04

## 2018-05-06 RX ADMIN — PANTOPRAZOLE SODIUM 40 MILLIGRAM(S): 20 TABLET, DELAYED RELEASE ORAL at 07:04

## 2018-05-06 RX ADMIN — Medication 800 MILLIGRAM(S): at 00:27

## 2018-05-06 RX ADMIN — Medication 81 MILLIGRAM(S): at 12:10

## 2018-05-06 NOTE — ED ADULT NURSE REASSESSMENT NOTE - NS ED NURSE REASSESS COMMENT FT1
Patient transferred to hospital bed, hygiene care performed. Patient positioned for comfort. Tylenol administered for fever 100.5. Safety & comfort measures in place, aide bedside. Isolation precautions maintained, will continue to monitor.
received from main ED,  at bedside
Patient received from Asiya Li RN. Patient resting comfortably in bed. Safety and comfort maintained. Will continue to monitor.

## 2018-05-06 NOTE — CONSULT NOTE ADULT - ASSESSMENT
The patient is a 52 yo male with hx. of severe mental retardation, non verbal, non ambulatory, seizue, impulse control, chr. anemia, osteoporosis, leg edema, DJD bilat knees, GERD, drug induced hyponatremia, prolactinemia, hearing loss , cataracts, GI bleed, hemorrhoids, gynecomastia, aspiration pneumonia, adrenal mass,failure to thrive, lung granuloma LLL, admitted from group home for evaluation of fever to 103, cough and congestion. Patient sitting on stretcher, awake alert and cooperative. History per medical record as patient unable to provide history.  1. Patient admitted with human metapneumovirus infection superimposed on pneumonia which may represent aspiration pneumonia; leukocytosis most likely reactive to pneumonia and viral URI  - follow up cultures   - iv hydration and supportive care   - continue isolation   - serial cbc and monitor temperature   - reviewed prior medical records to evaluate for resistant or atypical pathogens   - agree with zosyn as ordered  2. other issues: severe mental retardation, non verbal, non ambulatory, seizue, impulse control, chr. anemia, osteoporosis, leg edema, DJD bilat knees, GERD, drug induced hyponatremia, prolactinemia, hearing loss , cataracts, GI bleed, hemorrhoids, gynecomastia, aspiration pneumonia, adrenal mass,failure to thrive, lung granuloma LLL  - per medicine

## 2018-05-06 NOTE — ED ADULT NURSE REASSESSMENT NOTE - COMFORT CARE
side rails up/comp. bed change, turned tv on, call bell given/darkened lights
meal provided/po fluids offered
plan of care explained/wait time explained/side rails up
po fluids offered/repositioned/side rails up
side rails up/linens changed, partial bed bath/repositioned

## 2018-05-06 NOTE — CONSULT NOTE ADULT - SUBJECTIVE AND OBJECTIVE BOX
Patient is a 53y old  Male who presents with a chief complaint of Pneumonia, UTI, (05 May 2018 09:32)    HPI:  The patient is a 54 yo male with hx. of severe mental retardation, non verbal, non ambulatory, seizue, impulse control, chr. anemia, osteoporosis, leg edema, DJD bilat knees, GERD, drug induced hyponatremia, prolactinemia, hearing loss , cataracts, GI bleed, hemorrhoids, gynecomastia, aspiration pneumonia, adrenal mass,failure to thrive, lung granuloma LLL, admitted from group home for evaluation of fever to 103, cough and congestion. Patient sitting on stretcher, awake alert and cooperative. History per medical record as patient unable to provide history.            PMH: as above  PSH: as above  Meds: per reconciliation sheet, noted below  MEDICATIONS  (STANDING):  aspirin  chewable 81 milliGRAM(s) Oral daily  benztropine 1 milliGRAM(s) Oral four times a day  carBAMazepine XR Tablet 400 milliGRAM(s) Oral daily  carBAMazepine XR Tablet 800 milliGRAM(s) Oral at bedtime  cholecalciferol 2000 Unit(s) Oral daily  Clobazam 30 milliGRAM(s) Oral at bedtime  clonazePAM Tablet 1 milliGRAM(s) Oral two times a day  diVALproex Sprinkle 500 milliGRAM(s) Oral daily  diVALproex Sprinkle 750 milliGRAM(s) Oral at bedtime  ferrous    sulfate 325 milliGRAM(s) Oral daily  heparin  Injectable 5000 Unit(s) SubCutaneous every 8 hours  levETIRAcetam 2000 milliGRAM(s) Oral two times a day  multivitamin 1 Tablet(s) Oral daily  pantoprazole    Tablet 40 milliGRAM(s) Oral before breakfast  phenytoin   Chewable 200 milliGRAM(s) Chew daily  piperacillin/tazobactam IVPB. 3.375 Gram(s) IV Intermittent every 8 hours  risperiDONE   Tablet 1 milliGRAM(s) Oral daily  risperiDONE   Tablet 3 milliGRAM(s) Oral at bedtime  senna 1 Tablet(s) Oral at bedtime  sodium chloride 2 Gram(s) Oral daily  thiothixene 5 milliGRAM(s) Oral <User Schedule>    MEDICATIONS  (PRN):  acetaminophen   Tablet 650 milliGRAM(s) Oral every 6 hours PRN For Temp greater than 38.5 C (101.3 F)    Allergies    Lamisil (Unknown)    Intolerances    Carrots (Other)  Corn (Other)  Dislikes Beans (Other)  Dislikes Peas (Other)    Social: no smoking, no alcohol, no illegal drugs; no recent travel, no exposure to TB  FAMILY HISTORY:  No pertinent family history in first degree relatives     no history of premature cardivascular disease in first degree relatives  ROS: unable to obtain secondary to patient medical condition     Vital Signs Last 24 Hrs  T(C): 38.1 (06 May 2018 13:40), Max: 38.1 (06 May 2018 13:40)  T(F): 100.5 (06 May 2018 13:40), Max: 100.5 (06 May 2018 13:40)  HR: 78 (06 May 2018 11:43) (72 - 88)  BP: 102/68 (06 May 2018 11:43) (92/71 - 127/73)  BP(mean): --  RR: 18 (06 May 2018 11:43) (18 - 20)  SpO2: 97% (06 May 2018 11:43) (94% - 97%)  Daily     Daily     PE:    Constitutional: frail looking  HEENT: NC/AT,  ears and nose atraumatic; pharynx clear  Neck: supple  Back: no tenderness  Respiratory: respiratory effort normal; scattered wheeze  Cardiovascular: S1S2 regular, no murmurs  Abdomen: soft, not tender, not distended, positive BS; no liver or spleen organomegaly  Genitourinary: no suprapubic tenderness  Musculoskeletal: no muscle tenderness, no joint swelling or tenderness  Neurological/ Psychiatric:  moving all extremities  Skin: no rashes; no palpable lesions    Labs: all available labs reviewed                        12.1   14.58 )-----------( 194      ( 05 May 2018 12:04 )             35.2     05-06    133<L>  |  100  |  20  ----------------------------<  87  4.5   |  26  |  0.74    Ca    8.5      06 May 2018 05:18    TPro  7.3  /  Alb  2.7<L>  /  TBili  0.2  /  DBili  x   /  AST  17  /  ALT  18  /  AlkPhos  39<L>  05-05     LIVER FUNCTIONS - ( 05 May 2018 00:36 )  Alb: 2.7 g/dL / Pro: 7.3 gm/dL / ALK PHOS: 39 U/L / ALT: 18 U/L / AST: 17 U/L / GGT: x           Urinalysis Basic - ( 05 May 2018 00:36 )    Color: Flor / Appearance: Clear / S.015 / pH: x  Gluc: x / Ketone: Trace  / Bili: Negative / Urobili: 1 mg/dL   Blood: x / Protein: 30 mg/dL / Nitrite: Negative   Leuk Esterase: Moderate / RBC: 0-2 /HPF / WBC >50   Sq Epi: x / Non Sq Epi: Few / Bacteria: Moderate      < from: Xray Chest 1 View AP/PA. (18 @ 01:56) >    EXAM:  XR CHEST AP OR PA 1V                            PROCEDURE DATE:  2018          INTERPRETATION:  History: Fever.    Single view of the chest was performed.    Comparison is made to 2017.    Impression:    There is unchanged marked elevation of the left hemidiaphragm. There is   adjacent left basilar opacity which which is slightly more prominent   compared to the prior examination and may be related to atelectasis or   pneumonia. Right lung is clear. The heart size is grossly preserved.    < end of copied text >      Radiology: all available radiological tests reviewed    Advanced directives addressed: full resuscitation

## 2018-05-06 NOTE — PROGRESS NOTE ADULT - ASSESSMENT
The patient is a 54 yo male with hx. of severe mental retardation, non verbal, non ambulatory, seizue, impulse contro, chr. anemia, osteoporosis, leg edema, DJD bilat knees, GERD, drug induced hyponatremia, prolactinemia, hearing loss , cataracts, GI bleed, hemorrhoids, gynecomastia, aspiration pneumonia, adrenal mass,failure to thrive, lung granuloma LLL, fungal nails, funtional diarrhea, urine and stool incontinence  who was sent to the ER from [facility] home for fever 103, cough, congestion.  No seizure reported. The patient is a 54 yo male with hx. of severe mental retardation, non verbal, non ambulatory, seizure  disorder,  Chronic  anemia, osteoporosis,  DJD bilat knees, GERD, Chronic drug induced hyponatremia, prolactinemia, hearing loss , cataracts, GI bleed, hemorrhoids, gynecomastia, aspiration pneumonia, adrenal mass,   lung granuloma LLL, fungal nails, functional diarrhea, urine and stool incontinence   admitted for:

## 2018-05-06 NOTE — PROGRESS NOTE ADULT - PROBLEM SELECTOR PLAN 3
aspiration precautions , [facility], [facility] , , cont Zosyn

## 2018-05-06 NOTE — PROGRESS NOTE ADULT - PROBLEM SELECTOR PLAN 4
check levels  Dilantin, Depakote, Keppra, obtain neurologgy consult Dilantin and phenytoin level subTx  Keppra level normal  seizure precautions  C/w meds

## 2018-05-06 NOTE — PROGRESS NOTE ADULT - ASSESSMENT
54 yo male with PMH of mental retardation, seizures / impulse control, chr. anemia, lung granuloma, urine and stool incontinence, is totally dependent, is non verbal, non-ambulatory, lives in group home, was sent to the ER with fever of 103 associated with cough/congestion, is being treated for aspiration PNA/sespsis.     Neuro consulted for evaluation of seizures as he has history of seizures; Review of records show pt is on Keppra 2000 mg bid, -875 mg, Tegretol 400-800 mg, Dilantin 150 mg daily.    # History of seizures; no current seizures; Carbamezapine level 8.1 (therapeutic)   VA 24, Phenytoin level 5.6 - low    - continue Tegretol sane dose  - Continue increased dose of Dilantin 50 mg infatabs / 4 a day (follow-up dilantin level in 5 days)  - continue same dose of Keppra and VA - VA most likley being used for mood stabilizer    Call neuro if needed

## 2018-05-06 NOTE — PROGRESS NOTE ADULT - PROBLEM SELECTOR PLAN 2
started on IV  Ceftriaxone , Zosyn , check culture F/u BCX aand UCX  H/o ESBL in urine, on isolation   C/w Zosyn   ID eval

## 2018-05-06 NOTE — PROGRESS NOTE ADULT - SUBJECTIVE AND OBJECTIVE BOX
CC: Pneumonia, UTI, (05 May 2018 09:32)    HPI:  The patient is a 54 yo male with hx. of severe mental retardation, non verbal, non ambulatory, seizue, impulse contro, chr. anemia, osteoporosis, leg edema, DJD bilat knees, GERD, drug induced hyponatremia, prolactinemia, hearing loss , cataracts, GI bleed, hemorrhoids, gynecomastia, aspiration pneumonia, adrenal mass,failure to thrive, lung granuloma LLL, fungal nails, funtional diarrhea, urine and stool incontinence  who was sent to the ER from [facility] home for fever 103, cough, congestion.  No seizure reported.   His nurse Yamilet Ej knows him well and all his medications tel. [phone]. (05 May 2018 09:32)    INTERVAL HPI/OVERNIGHT EVENTS:    Vital Signs Last 24 Hrs  T(C): 37.3 (06 May 2018 07:41), Max: 37.7 (05 May 2018 16:07)  T(F): 99.1 (06 May 2018 07:41), Max: 99.9 (05 May 2018 16:07)  HR: 79 (06 May 2018 07:41) (72 - 88)  BP: 106/70 (06 May 2018 07:41) (92/71 - 127/73)  BP(mean): --  RR: 19 (06 May 2018 07:41) (18 - 20)  SpO2: 96% (06 May 2018 07:41) (94% - 97%)  I&O's Detail    05 May 2018 07:01  -  06 May 2018 07:00  --------------------------------------------------------  IN:    IV PiggyBack: 100 mL  Total IN: 100 mL    OUT:  Total OUT: 0 mL    Total NET: 100 mL        REVIEW OF SYSTEMS:    CONSTITUTIONAL: No weakness, fevers or chills  EYES/ENT: No visual changes;  No vertigo or throat pain   NECK: No pain or stiffness  RESPIRATORY: No cough, wheezing, hemoptysis; No shortness of breath  CARDIOVASCULAR: No chest pain or palpitations  GASTROINTESTINAL: No abdominal or epigastric pain. No nausea, vomiting, or hematemesis; No diarrhea or constipation. No melena or hematochezia.  GENITOURINARY: No dysuria, frequency or hematuria  NEUROLOGICAL: No numbness or weakness  SKIN: No itching, burning, rashes, or lesions   All other review of systems is negative unless indicated above.  PHYSICAL EXAM:    General: Well developed; well nourished; in no acute distress  Eyes: PERRLA, EOMI; conjunctiva and sclera clear  Head: Normocephalic; atraumatic  ENMT: No nasal discharge; airway clear  Neck: Supple; non tender; no masses  Respiratory: No wheezes, rales or rhonchi  Cardiovascular: Regular rate and rhythm. S1 and S2 Normal; No murmurs, gallops or rubs  Gastrointestinal: Soft non-tender non-distended; Normal bowel sounds  Genitourinary: No costovertebral angle tenderness  Extremities: Normal range of motion, No clubbing, cyanosis or edema  Vascular: Peripheral pulses palpable 2+ bilaterally  Neurological: Alert and oriented x4  Skin: Warm and dry. No acute rash  Lymph Nodes: No acute cervical adenopathy  Musculoskeletal: Normal gait, tone, without deformities  Psychiatric: Cooperative and appropriate                            12.1   14.58 )-----------( 194      ( 05 May 2018 12:04 )             35.2     06 May 2018 05:18    133    |  100    |  20     ----------------------------<  87     4.5     |  26     |  0.74     Ca    8.5        06 May 2018 05:18    TPro  7.3    /  Alb  2.7    /  TBili  0.2    /  DBili  x      /  AST  17     /  ALT  18     /  AlkPhos  39     05 May 2018 00:36    PT/INR - ( 05 May 2018 00:36 )   PT: 13.6 sec;   INR: 1.25 ratio         PTT - ( 05 May 2018 00:36 )  PTT:31.8 sec  CAPILLARY BLOOD GLUCOSE        LIVER FUNCTIONS - ( 05 May 2018 00:36 )  Alb: 2.7 g/dL / Pro: 7.3 gm/dL / ALK PHOS: 39 U/L / ALT: 18 U/L / AST: 17 U/L / GGT: x           Urinalysis Basic - ( 05 May 2018 00:36 )    Color: Flor / Appearance: Clear / S.015 / pH: x  Gluc: x / Ketone: Trace  / Bili: Negative / Urobili: 1 mg/dL   Blood: x / Protein: 30 mg/dL / Nitrite: Negative   Leuk Esterase: Moderate / RBC: 0-2 /HPF / WBC >50   Sq Epi: x / Non Sq Epi: Few / Bacteria: Moderate        MEDICATIONS  (STANDING):  aspirin  chewable 81 milliGRAM(s) Oral daily  benztropine 1 milliGRAM(s) Oral four times a day  carBAMazepine XR Tablet 400 milliGRAM(s) Oral daily  carBAMazepine XR Tablet 800 milliGRAM(s) Oral at bedtime  cholecalciferol 2000 Unit(s) Oral daily  Clobazam 30 milliGRAM(s) Oral at bedtime  clonazePAM Tablet 1 milliGRAM(s) Oral two times a day  diVALproex Sprinkle 500 milliGRAM(s) Oral daily  diVALproex Sprinkle 750 milliGRAM(s) Oral at bedtime  ferrous    sulfate 325 milliGRAM(s) Oral daily  heparin  Injectable 5000 Unit(s) SubCutaneous every 8 hours  levETIRAcetam 2000 milliGRAM(s) Oral two times a day  multivitamin 1 Tablet(s) Oral daily  pantoprazole    Tablet 40 milliGRAM(s) Oral before breakfast  phenytoin   Chewable 200 milliGRAM(s) Chew daily  piperacillin/tazobactam IVPB. 3.375 Gram(s) IV Intermittent every 8 hours  risperiDONE   Tablet 1 milliGRAM(s) Oral daily  risperiDONE   Tablet 3 milliGRAM(s) Oral at bedtime  senna 1 Tablet(s) Oral at bedtime  sodium chloride 2 Gram(s) Oral daily  thiothixene 5 milliGRAM(s) Oral <User Schedule>    MEDICATIONS  (PRN):  acetaminophen   Tablet 650 milliGRAM(s) Oral every 6 hours PRN For Temp greater than 38.5 C (101.3 F)      RADIOLOGY & ADDITIONAL TESTS:    EXAM:  XR CHEST AP OR PA 1V                        PROCEDURE DATE:  2018    INTERPRETATION:  History: Fever.      Impression:    There is unchanged marked elevation of the left hemidiaphragm. There is   adjacent left basilar opacity which which is slightly more prominent   compared to the prior examination and may be related to atelectasis or   pneumonia. Right lung is clear. The heart size is grossly preserved. CC: Pneumonia, UTI, (05 May 2018 09:32)    HPI:  The patient is a 52 yo male with hx. of severe mental retardation, non verbal, non ambulatory, seizue, impulse contro, chr. anemia, osteoporosis, leg edema, DJD bilat knees, GERD, drug induced hyponatremia, prolactinemia, hearing loss , cataracts, GI bleed, hemorrhoids, gynecomastia, aspiration pneumonia, adrenal mass,failure to thrive, lung granuloma LLL, fungal nails, funtional diarrhea, urine and stool incontinence  who was sent to the ER from [facility] home for fever 103, cough, congestion.  No seizure reported.   His nurse Yamilet Pagan knows him well and all his medications tel. [phone]. (05 May 2018 09:32)    INTERVAL HPI/ OVERNIGHT EVENTS: Chart reviewed, Pt was seen and examined, awake and alert, baseline non verbal. Aid at bedside states that Pt is close at baseline, Tolerates diet.  No issues reported      Vital Signs Last 24 Hrs  T(C): 37.3 (06 May 2018 07:41), Max: 37.7 (05 May 2018 16:07)  T(F): 99.1 (06 May 2018 07:41), Max: 99.9 (05 May 2018 16:07)  HR: 79 (06 May 2018 07:41) (72 - 88)  BP: 106/70 (06 May 2018 07:41) (92/71 - 127/73)  RR: 19 (06 May 2018 07:41) (18 - 20)  SpO2: 96% (06 May 2018 07:41) (94% - 97%)          REVIEW OF SYSTEMS:  Unable to obtain  as Pt is non verbal due to severe MR     PHYSICAL EXAM:  General: Well developed; well nourished; in no acute distress  Eyes: PERRLA, EOMI; conjunctiva and sclera clear  Head: Normocephalic; atraumatic  ENMT: No nasal discharge; airway clear  Neck: Supple; non tender; no masses  Respiratory:  Decreased BS at bases b/l. No wheezes, rales or rhonchi  Cardiovascular: Regular rate and rhythm. S1 and S2 Normal; No murmurs  Gastrointestinal: Soft non-tender non-distended; Normal bowel sounds  Genitourinary: No costovertebral angle tenderness  Extremities: Preserved ROM, No  edema  Vascular: Peripheral pulses palpable 2+ bilaterally  Neurological:  Alert, non verbal, non focal   Skin: Warm and dry. No acute rash  Lymph Nodes: No acute cervical adenopathy  Musculoskeletal: Normal muscle, tone, without deformities  Psychiatric: Cooperative and appropriate      LABS:                         12.1   14.58 )-----------( 194      ( 05 May 2018 12:04 )             35.2     06 May 2018 05:18    133    |  100    |  20     ----------------------------<  87     4.5     |  26     |  0.74     Ca    8.5        06 May 2018 05:18    TPro  7.3    /  Alb  2.7    /  TBili  0.2    /  DBili  x      /  AST  17     /  ALT  18     /  AlkPhos  39     05 May 2018 00:36  PT/INR - ( 05 May 2018 00:36 )   PT: 13.6 sec;   INR: 1.25 ratio    PTT - ( 05 May 2018 00:36 )  PTT:31.8 sec    LIVER FUNCTIONS - ( 05 May 2018 00:36 )  Alb: 2.7 g/dL / Pro: 7.3 gm/dL / ALK PHOS: 39 U/L / ALT: 18 U/L / AST: 17 U/L / GGT: x           Urinalysis Basic - ( 05 May 2018 00:36 )    Color: Flor / Appearance: Clear / S.015 / pH: x  Gluc: x / Ketone: Trace  / Bili: Negative / Urobili: 1 mg/dL   Blood: x / Protein: 30 mg/dL / Nitrite: Negative   Leuk Esterase: Moderate / RBC: 0-2 /HPF / WBC >50   Sq Epi: x / Non Sq Epi: Few / Bacteria: Moderate    Culture - Blood (18 @ 00:36)    Specimen Source: .Blood None    Culture Results:   No growth to date.    Culture - Blood (18 @ 00:36)    Specimen Source: .Blood None    Culture Results:   No growth to date.        MEDICATIONS  (STANDING):  aspirin  chewable 81 milliGRAM(s) Oral daily  benztropine 1 milliGRAM(s) Oral four times a day  carBAMazepine XR Tablet 400 milliGRAM(s) Oral daily  carBAMazepine XR Tablet 800 milliGRAM(s) Oral at bedtime  cholecalciferol 2000 Unit(s) Oral daily  Clobazam 30 milliGRAM(s) Oral at bedtime  clonazePAM Tablet 1 milliGRAM(s) Oral two times a day  diVALproex Sprinkle 500 milliGRAM(s) Oral daily  diVALproex Sprinkle 750 milliGRAM(s) Oral at bedtime  ferrous    sulfate 325 milliGRAM(s) Oral daily  heparin  Injectable 5000 Unit(s) SubCutaneous every 8 hours  levETIRAcetam 2000 milliGRAM(s) Oral two times a day  multivitamin 1 Tablet(s) Oral daily  pantoprazole    Tablet 40 milliGRAM(s) Oral before breakfast  phenytoin   Chewable 200 milliGRAM(s) Chew daily  piperacillin/tazobactam IVPB. 3.375 Gram(s) IV Intermittent every 8 hours  risperiDONE   Tablet 1 milliGRAM(s) Oral daily  risperiDONE   Tablet 3 milliGRAM(s) Oral at bedtime  senna 1 Tablet(s) Oral at bedtime  sodium chloride 2 Gram(s) Oral daily  thiothixene 5 milliGRAM(s) Oral <User Schedule>    MEDICATIONS  (PRN):  acetaminophen   Tablet 650 milliGRAM(s) Oral every 6 hours PRN For Temp greater than 38.5 C (101.3 F)      RADIOLOGY & ADDITIONAL TESTS:    EXAM:  XR CHEST AP OR PA 1V                        PROCEDURE DATE:  2018    INTERPRETATION:  History: Fever.      Impression:    There is unchanged marked elevation of the left hemidiaphragm. There is   adjacent left basilar opacity which which is slightly more prominent   compared to the prior examination and may be related to atelectasis or   pneumonia. Right lung is clear. The heart size is grossly preserved.

## 2018-05-06 NOTE — PROGRESS NOTE ADULT - SUBJECTIVE AND OBJECTIVE BOX
S&O:  Pt has not had any seizures, is at his baseline, no neuro events    CBZ level 8.1  Dilantin dose increased to 200 mg total daily    ROS: As above, all other ROS unable to obtain.      MEDICATIONS  (STANDING):  aspirin  chewable 81 milliGRAM(s) Oral daily  benztropine 1 milliGRAM(s) Oral four times a day  carBAMazepine XR Tablet 400 milliGRAM(s) Oral daily  carBAMazepine XR Tablet 800 milliGRAM(s) Oral at bedtime  cholecalciferol 2000 Unit(s) Oral daily  Clobazam 30 milliGRAM(s) Oral at bedtime  clonazePAM Tablet 1 milliGRAM(s) Oral two times a day  diVALproex Sprinkle 500 milliGRAM(s) Oral daily  diVALproex Sprinkle 750 milliGRAM(s) Oral at bedtime  ferrous    sulfate 325 milliGRAM(s) Oral daily  heparin  Injectable 5000 Unit(s) SubCutaneous every 8 hours  levETIRAcetam 2000 milliGRAM(s) Oral two times a day  multivitamin 1 Tablet(s) Oral daily  pantoprazole    Tablet 40 milliGRAM(s) Oral before breakfast  phenytoin   Chewable 200 milliGRAM(s) Chew daily  piperacillin/tazobactam IVPB. 3.375 Gram(s) IV Intermittent every 8 hours  risperiDONE   Tablet 1 milliGRAM(s) Oral daily  risperiDONE   Tablet 3 milliGRAM(s) Oral at bedtime  senna 1 Tablet(s) Oral at bedtime  sodium chloride 2 Gram(s) Oral daily  thiothixene 5 milliGRAM(s) Oral <User Schedule>      Vital Signs Last 24 Hrs  T(C): 36.7 (06 May 2018 18:11), Max: 38.1 (06 May 2018 13:40)  T(F): 98 (06 May 2018 18:11), Max: 100.5 (06 May 2018 13:40)  HR: 72 (06 May 2018 18:11) (72 - 79)  BP: 117/68 (06 May 2018 18:11) (99/61 - 117/68)  BP(mean): --  RR: 16 (06 May 2018 18:11) (16 - 19)  SpO2: 95% (06 May 2018 18:11) (95% - 97%)    Neurological exam:  HF: Alert, with childish demeanor, smiles, babbles, no verbal output   CN: Coarse facial features, Right pupil reactive, left corneal opacity, no NLFD, tongue midline, Palate moves equally.  Motor / sen / cereb: Moves and makes purposeful movements of UE, withdraws LE, rest of exam is limited    Reflexes: Hyporeflexic, downgoing toes b/l  Gait/Balance: Cannot test    Labs:    Carbamazepine Level, Serum: 8.1 ug/mL (05.05.18 @ 18:05)   VA 24, Phenytoin level 5.6 - low                         12.1   14.58 )-----------( 194      ( 05 May 2018 12:04 )             35.2     05-06    133<L>  |  100  |  20  ----------------------------<  87  4.5   |  26  |  0.74    Ca    8.5      06 May 2018 05:18    TPro  7.3  /  Alb  2.7<L>  /  TBili  0.2  /  DBili  x   /  AST  17  /  ALT  18  /  AlkPhos  39<L>  05-05

## 2018-05-07 DIAGNOSIS — E87.1 HYPO-OSMOLALITY AND HYPONATREMIA: ICD-10-CM

## 2018-05-07 LAB
ANION GAP SERPL CALC-SCNC: 10 MMOL/L — SIGNIFICANT CHANGE UP (ref 5–17)
BUN SERPL-MCNC: 14 MG/DL — SIGNIFICANT CHANGE UP (ref 7–23)
CALCIUM SERPL-MCNC: 8.5 MG/DL — SIGNIFICANT CHANGE UP (ref 8.5–10.1)
CHLORIDE SERPL-SCNC: 97 MMOL/L — SIGNIFICANT CHANGE UP (ref 96–108)
CO2 SERPL-SCNC: 24 MMOL/L — SIGNIFICANT CHANGE UP (ref 22–31)
CREAT SERPL-MCNC: 0.7 MG/DL — SIGNIFICANT CHANGE UP (ref 0.5–1.3)
GLUCOSE SERPL-MCNC: 92 MG/DL — SIGNIFICANT CHANGE UP (ref 70–99)
HCT VFR BLD CALC: 34.6 % — LOW (ref 39–50)
HGB BLD-MCNC: 11.9 G/DL — LOW (ref 13–17)
LEVETIRACETAM SERPL-MCNC: 14.2 MCG/ML — SIGNIFICANT CHANGE UP (ref 12–46)
MCHC RBC-ENTMCNC: 30.5 PG — SIGNIFICANT CHANGE UP (ref 27–34)
MCHC RBC-ENTMCNC: 34.4 GM/DL — SIGNIFICANT CHANGE UP (ref 32–36)
MCV RBC AUTO: 88.7 FL — SIGNIFICANT CHANGE UP (ref 80–100)
NRBC # BLD: 0 /100 WBCS — SIGNIFICANT CHANGE UP (ref 0–0)
PLATELET # BLD AUTO: 206 K/UL — SIGNIFICANT CHANGE UP (ref 150–400)
POTASSIUM SERPL-MCNC: 4.4 MMOL/L — SIGNIFICANT CHANGE UP (ref 3.5–5.3)
POTASSIUM SERPL-SCNC: 4.4 MMOL/L — SIGNIFICANT CHANGE UP (ref 3.5–5.3)
RBC # BLD: 3.9 M/UL — LOW (ref 4.2–5.8)
RBC # FLD: 12.8 % — SIGNIFICANT CHANGE UP (ref 10.3–14.5)
SODIUM SERPL-SCNC: 131 MMOL/L — LOW (ref 135–145)
WBC # BLD: 5.28 K/UL — SIGNIFICANT CHANGE UP (ref 3.8–10.5)
WBC # FLD AUTO: 5.28 K/UL — SIGNIFICANT CHANGE UP (ref 3.8–10.5)

## 2018-05-07 RX ORDER — SODIUM CHLORIDE 9 MG/ML
1000 INJECTION INTRAMUSCULAR; INTRAVENOUS; SUBCUTANEOUS
Qty: 0 | Refills: 0 | Status: DISCONTINUED | OUTPATIENT
Start: 2018-05-07 | End: 2018-05-09

## 2018-05-07 RX ADMIN — SODIUM CHLORIDE 2 GRAM(S): 9 INJECTION INTRAMUSCULAR; INTRAVENOUS; SUBCUTANEOUS at 10:29

## 2018-05-07 RX ADMIN — RISPERIDONE 1 MILLIGRAM(S): 4 TABLET ORAL at 10:30

## 2018-05-07 RX ADMIN — Medication 1 MILLIGRAM(S): at 17:45

## 2018-05-07 RX ADMIN — Medication 1 MILLIGRAM(S): at 05:15

## 2018-05-07 RX ADMIN — RISPERIDONE 3 MILLIGRAM(S): 4 TABLET ORAL at 23:56

## 2018-05-07 RX ADMIN — HEPARIN SODIUM 5000 UNIT(S): 5000 INJECTION INTRAVENOUS; SUBCUTANEOUS at 05:16

## 2018-05-07 RX ADMIN — Medication 5 MILLIGRAM(S): at 22:28

## 2018-05-07 RX ADMIN — LEVETIRACETAM 2000 MILLIGRAM(S): 250 TABLET, FILM COATED ORAL at 05:16

## 2018-05-07 RX ADMIN — Medication 400 MILLIGRAM(S): at 10:30

## 2018-05-07 RX ADMIN — Medication 81 MILLIGRAM(S): at 10:29

## 2018-05-07 RX ADMIN — Medication 1 TABLET(S): at 10:29

## 2018-05-07 RX ADMIN — PANTOPRAZOLE SODIUM 40 MILLIGRAM(S): 20 TABLET, DELAYED RELEASE ORAL at 05:16

## 2018-05-07 RX ADMIN — PIPERACILLIN AND TAZOBACTAM 25 GRAM(S): 4; .5 INJECTION, POWDER, LYOPHILIZED, FOR SOLUTION INTRAVENOUS at 05:15

## 2018-05-07 RX ADMIN — HEPARIN SODIUM 5000 UNIT(S): 5000 INJECTION INTRAVENOUS; SUBCUTANEOUS at 22:21

## 2018-05-07 RX ADMIN — SENNA PLUS 1 TABLET(S): 8.6 TABLET ORAL at 22:20

## 2018-05-07 RX ADMIN — Medication 2000 UNIT(S): at 10:29

## 2018-05-07 RX ADMIN — DIVALPROEX SODIUM 750 MILLIGRAM(S): 500 TABLET, DELAYED RELEASE ORAL at 22:20

## 2018-05-07 RX ADMIN — PIPERACILLIN AND TAZOBACTAM 25 GRAM(S): 4; .5 INJECTION, POWDER, LYOPHILIZED, FOR SOLUTION INTRAVENOUS at 13:45

## 2018-05-07 RX ADMIN — Medication 200 MILLIGRAM(S): at 10:30

## 2018-05-07 RX ADMIN — Medication 325 MILLIGRAM(S): at 10:29

## 2018-05-07 RX ADMIN — DIVALPROEX SODIUM 500 MILLIGRAM(S): 500 TABLET, DELAYED RELEASE ORAL at 10:30

## 2018-05-07 RX ADMIN — LEVETIRACETAM 2000 MILLIGRAM(S): 250 TABLET, FILM COATED ORAL at 17:45

## 2018-05-07 RX ADMIN — Medication 1 MILLIGRAM(S): at 10:30

## 2018-05-07 RX ADMIN — Medication 800 MILLIGRAM(S): at 22:21

## 2018-05-07 RX ADMIN — PIPERACILLIN AND TAZOBACTAM 25 GRAM(S): 4; .5 INJECTION, POWDER, LYOPHILIZED, FOR SOLUTION INTRAVENOUS at 23:57

## 2018-05-07 RX ADMIN — HEPARIN SODIUM 5000 UNIT(S): 5000 INJECTION INTRAVENOUS; SUBCUTANEOUS at 13:45

## 2018-05-07 NOTE — PROGRESS NOTE ADULT - ASSESSMENT
The patient is a 54 yo male with hx. of severe mental retardation, non verbal, non ambulatory, seizue, impulse control, chr. anemia, osteoporosis, leg edema, DJD bilat knees, GERD, drug induced hyponatremia, prolactinemia, hearing loss , cataracts, GI bleed, hemorrhoids, gynecomastia, aspiration pneumonia, adrenal mass,failure to thrive, lung granuloma LLL, admitted from group home for evaluation of fever to 103, cough and congestion. Patient sitting on stretcher, awake alert and cooperative. History per medical record as patient unable to provide history.  1. Patient admitted with human metapneumovirus infection superimposed on pneumonia which may represent aspiration pneumonia; leukocytosis most likely reactive to pneumonia and viral URI  - follow up cultures   - iv hydration and supportive care   - continue isolation   - serial cbc and monitor temperature   - reviewed prior medical records to evaluate for resistant or atypical pathogens   - day #2 zosyn  - tolerating antibiotics without rashes or side effects   - day #3 isolation, will continue isolation for total 5 days, then discharge back to group home  2. other issues: severe mental retardation, non verbal, non ambulatory, seizue, impulse control, chr. anemia, osteoporosis, leg edema, DJD bilat knees, GERD, drug induced hyponatremia, prolactinemia, hearing loss , cataracts, GI bleed, hemorrhoids, gynecomastia, aspiration pneumonia, adrenal mass,failure to thrive, lung granuloma LLL  - per medicine

## 2018-05-07 NOTE — PROGRESS NOTE ADULT - ASSESSMENT
The patient is a 52 yo male with hx. of severe mental retardation, non verbal, non ambulatory, seizure  disorder,  Chronic  anemia, osteoporosis,  DJD bilat knees, GERD, Chronic drug induced hyponatremia, prolactinemia, hearing loss , cataracts, GI bleed, hemorrhoids, gynecomastia, aspiration pneumonia, adrenal mass,   lung granuloma LLL, fungal nails, functional diarrhea, urine and stool incontinence   admitted for:

## 2018-05-07 NOTE — PROGRESS NOTE ADULT - SUBJECTIVE AND OBJECTIVE BOX
CC: Pneumonia, UTI, (05 May 2018 09:32)    HPI:  The patient is a 52 yo male with hx. of severe mental retardation, non verbal, non ambulatory, seizue, impulse contro, chr. anemia, osteoporosis, leg edema, DJD bilat knees, GERD, drug induced hyponatremia, prolactinemia, hearing loss , cataracts, GI bleed, hemorrhoids, gynecomastia, aspiration pneumonia, adrenal mass,failure to thrive, lung granuloma LLL, fungal nails, funtional diarrhea, urine and stool incontinence  who was sent to the ER from [facility] home for fever 103, cough, congestion.  No seizure reported.   His nurse Yamilet Pagan knows him well and all his medications tel. [phone]. (05 May 2018 09:32)    INTERVAL HPI/ OVERNIGHT EVENTS: Pt was seen and examined, aid at bedside, no events overnight, no issues reported by aid. + cough. Awake, looks comfortable     Vital Signs Last 24 Hrs  T(C): 36.5 (07 May 2018 20:54), Max: 36.6 (07 May 2018 05:00)  T(F): 97.7 (07 May 2018 20:54), Max: 97.8 (07 May 2018 05:00)  HR: 65 (07 May 2018 20:54) (65 - 77)  BP: 117/50 (07 May 2018 20:54) (117/50 - 139/84)  RR: 16 (07 May 2018 20:54) (16 - 18)  SpO2: 98% (07 May 2018 20:54) (97% - 98%)        REVIEW OF SYSTEMS:  Unable to obtain  as Pt is non verbal due to severe MR     PHYSICAL EXAM:  General: Well developed; well nourished; in no acute distress  Eyes: PERRLA, EOMI; conjunctiva and sclera clear  Head: Normocephalic; atraumatic  ENMT: No nasal discharge; airway clear  Neck: Supple; non tender; no masses  Respiratory:  Decreased BS at bases b/l. No wheezes, rales or rhonchi  Cardiovascular: Regular rate and rhythm. S1 and S2 Normal; No murmurs  Gastrointestinal: Soft non-tender non-distended; Normal bowel sounds  Genitourinary: No costovertebral angle tenderness  Extremities: Preserved ROM, No  edema  Vascular: Peripheral pulses palpable 2+ bilaterally  Neurological:  Alert, non verbal, non focal   Skin: Warm and dry. No acute rash  Lymph Nodes: No acute cervical adenopathy  Musculoskeletal: Normal muscle, tone, without deformities  Psychiatric: Cooperative and appropriate      LABS:                         11.9   5.28  )-----------( 206      ( 07 May 2018 06:21 )             34.6         131<L>  |  97  |  14  ----------------------------<  92  4.4   |  24  |  0.70    Ca    8.5      07 May 2018 06:21                                12.1   14.58 )-----------( 194      ( 05 May 2018 12:04 )             35.2     06 May 2018 05:18    133    |  100    |  20     ----------------------------<  87     4.5     |  26     |  0.74     Ca    8.5        06 May 2018 05:18    TPro  7.3    /  Alb  2.7    /  TBili  0.2    /  DBili  x      /  AST  17     /  ALT  18     /  AlkPhos  39     05 May 2018 00:36  PT/INR - ( 05 May 2018 00:36 )   PT: 13.6 sec;   INR: 1.25 ratio    PTT - ( 05 May 2018 00:36 )  PTT:31.8 sec    LIVER FUNCTIONS - ( 05 May 2018 00:36 )  Alb: 2.7 g/dL / Pro: 7.3 gm/dL / ALK PHOS: 39 U/L / ALT: 18 U/L / AST: 17 U/L / GGT: x           Urinalysis Basic - ( 05 May 2018 00:36 )    Color: Flor / Appearance: Clear / S.015 / pH: x  Gluc: x / Ketone: Trace  / Bili: Negative / Urobili: 1 mg/dL   Blood: x / Protein: 30 mg/dL / Nitrite: Negative   Leuk Esterase: Moderate / RBC: 0-2 /HPF / WBC >50   Sq Epi: x / Non Sq Epi: Few / Bacteria: Moderate    Culture - Blood (18 @ 00:36)    Specimen Source: .Blood None    Culture Results:   No growth to date.    Culture - Blood (18 @ 00:36)    Specimen Source: .Blood None    Culture Results:   No growth to date.        MEDICATIONS  (STANDING):  aspirin  chewable 81 milliGRAM(s) Oral daily  benztropine 1 milliGRAM(s) Oral four times a day  carBAMazepine XR Tablet 400 milliGRAM(s) Oral daily  carBAMazepine XR Tablet 800 milliGRAM(s) Oral at bedtime  cholecalciferol 2000 Unit(s) Oral daily  Clobazam 30 milliGRAM(s) Oral at bedtime  clonazePAM Tablet 1 milliGRAM(s) Oral two times a day  diVALproex Sprinkle 500 milliGRAM(s) Oral daily  diVALproex Sprinkle 750 milliGRAM(s) Oral at bedtime  ferrous    sulfate 325 milliGRAM(s) Oral daily  heparin  Injectable 5000 Unit(s) SubCutaneous every 8 hours  levETIRAcetam 2000 milliGRAM(s) Oral two times a day  multivitamin 1 Tablet(s) Oral daily  pantoprazole    Tablet 40 milliGRAM(s) Oral before breakfast  phenytoin   Chewable 200 milliGRAM(s) Chew daily  piperacillin/tazobactam IVPB. 3.375 Gram(s) IV Intermittent every 8 hours  risperiDONE   Tablet 1 milliGRAM(s) Oral daily  risperiDONE   Tablet 3 milliGRAM(s) Oral at bedtime  senna 1 Tablet(s) Oral at bedtime  sodium chloride 2 Gram(s) Oral daily  thiothixene 5 milliGRAM(s) Oral <User Schedule>    MEDICATIONS  (PRN):  acetaminophen   Tablet 650 milliGRAM(s) Oral every 6 hours PRN For Temp greater than 38.5 C (101.3 F)        RADIOLOGY & ADDITIONAL TESTS:    EXAM:  XR CHEST AP OR PA 1V                        PROCEDURE DATE:  2018    INTERPRETATION:  History: Fever.      Impression:    There is unchanged marked elevation of the left hemidiaphragm. There is   adjacent left basilar opacity which which is slightly more prominent   compared to the prior examination and may be related to atelectasis or   pneumonia. Right lung is clear. The heart size is grossly preserved.

## 2018-05-07 NOTE — PROGRESS NOTE ADULT - PROBLEM SELECTOR PLAN 4
Dilantin and phenytoin level subTx  Keppra level normal  seizure precautions  Neuro eval appreciated, phenytoin level increased  Will need to recheck level in 5 days   seizure precautions ,

## 2018-05-07 NOTE — PROGRESS NOTE ADULT - PROBLEM SELECTOR PLAN 1
Likely  PNA. Also has Upper respiratory viral infection   Supportive care  Supplement O2 PRN  IV ABxs   F/u Cx : BCX NGTD, UCX Neg    S/p  IV  Ceftriaxone , Azithromycin, c/w Zosyn as possibly aspiration PNA   Speech and swallow eval appreciated, Mech soft diet and thin liquids recommended   S/p IVF bolus in ED  Monitor oral intake

## 2018-05-08 LAB
ANION GAP SERPL CALC-SCNC: 8 MMOL/L — SIGNIFICANT CHANGE UP (ref 5–17)
BUN SERPL-MCNC: 12 MG/DL — SIGNIFICANT CHANGE UP (ref 7–23)
CALCIUM SERPL-MCNC: 8.8 MG/DL — SIGNIFICANT CHANGE UP (ref 8.5–10.1)
CHLORIDE SERPL-SCNC: 101 MMOL/L — SIGNIFICANT CHANGE UP (ref 96–108)
CO2 SERPL-SCNC: 22 MMOL/L — SIGNIFICANT CHANGE UP (ref 22–31)
CREAT SERPL-MCNC: 0.7 MG/DL — SIGNIFICANT CHANGE UP (ref 0.5–1.3)
GLUCOSE SERPL-MCNC: 90 MG/DL — SIGNIFICANT CHANGE UP (ref 70–99)
HCT VFR BLD CALC: 36.2 % — LOW (ref 39–50)
HGB BLD-MCNC: 12.6 G/DL — LOW (ref 13–17)
MAGNESIUM SERPL-MCNC: 2 MG/DL — SIGNIFICANT CHANGE UP (ref 1.6–2.6)
MCHC RBC-ENTMCNC: 30.4 PG — SIGNIFICANT CHANGE UP (ref 27–34)
MCHC RBC-ENTMCNC: 34.8 GM/DL — SIGNIFICANT CHANGE UP (ref 32–36)
MCV RBC AUTO: 87.4 FL — SIGNIFICANT CHANGE UP (ref 80–100)
NRBC # BLD: 0 /100 WBCS — SIGNIFICANT CHANGE UP (ref 0–0)
PHOSPHATE SERPL-MCNC: 4.3 MG/DL — SIGNIFICANT CHANGE UP (ref 2.5–4.5)
PLATELET # BLD AUTO: 224 K/UL — SIGNIFICANT CHANGE UP (ref 150–400)
POTASSIUM SERPL-MCNC: 4.9 MMOL/L — SIGNIFICANT CHANGE UP (ref 3.5–5.3)
POTASSIUM SERPL-SCNC: 4.9 MMOL/L — SIGNIFICANT CHANGE UP (ref 3.5–5.3)
RBC # BLD: 4.14 M/UL — LOW (ref 4.2–5.8)
RBC # FLD: 12.6 % — SIGNIFICANT CHANGE UP (ref 10.3–14.5)
SODIUM SERPL-SCNC: 131 MMOL/L — LOW (ref 135–145)
TSH SERPL-MCNC: 3 UU/ML — SIGNIFICANT CHANGE UP (ref 0.36–3.74)
WBC # BLD: 4.44 K/UL — SIGNIFICANT CHANGE UP (ref 3.8–10.5)
WBC # FLD AUTO: 4.44 K/UL — SIGNIFICANT CHANGE UP (ref 3.8–10.5)

## 2018-05-08 RX ADMIN — Medication 1 MILLIGRAM(S): at 23:17

## 2018-05-08 RX ADMIN — RISPERIDONE 1 MILLIGRAM(S): 4 TABLET ORAL at 12:55

## 2018-05-08 RX ADMIN — Medication 2000 UNIT(S): at 12:55

## 2018-05-08 RX ADMIN — HEPARIN SODIUM 5000 UNIT(S): 5000 INJECTION INTRAVENOUS; SUBCUTANEOUS at 21:13

## 2018-05-08 RX ADMIN — LEVETIRACETAM 2000 MILLIGRAM(S): 250 TABLET, FILM COATED ORAL at 17:15

## 2018-05-08 RX ADMIN — Medication 81 MILLIGRAM(S): at 12:56

## 2018-05-08 RX ADMIN — LEVETIRACETAM 2000 MILLIGRAM(S): 250 TABLET, FILM COATED ORAL at 06:43

## 2018-05-08 RX ADMIN — HEPARIN SODIUM 5000 UNIT(S): 5000 INJECTION INTRAVENOUS; SUBCUTANEOUS at 13:13

## 2018-05-08 RX ADMIN — Medication 1 MILLIGRAM(S): at 06:48

## 2018-05-08 RX ADMIN — PIPERACILLIN AND TAZOBACTAM 25 GRAM(S): 4; .5 INJECTION, POWDER, LYOPHILIZED, FOR SOLUTION INTRAVENOUS at 21:24

## 2018-05-08 RX ADMIN — Medication 400 MILLIGRAM(S): at 13:40

## 2018-05-08 RX ADMIN — PIPERACILLIN AND TAZOBACTAM 25 GRAM(S): 4; .5 INJECTION, POWDER, LYOPHILIZED, FOR SOLUTION INTRAVENOUS at 13:13

## 2018-05-08 RX ADMIN — Medication 1 TABLET(S): at 12:53

## 2018-05-08 RX ADMIN — Medication 5 MILLIGRAM(S): at 21:13

## 2018-05-08 RX ADMIN — Medication 800 MILLIGRAM(S): at 21:12

## 2018-05-08 RX ADMIN — Medication 1 MILLIGRAM(S): at 12:56

## 2018-05-08 RX ADMIN — RISPERIDONE 3 MILLIGRAM(S): 4 TABLET ORAL at 21:13

## 2018-05-08 RX ADMIN — PANTOPRAZOLE SODIUM 40 MILLIGRAM(S): 20 TABLET, DELAYED RELEASE ORAL at 06:43

## 2018-05-08 RX ADMIN — Medication 1 MILLIGRAM(S): at 00:02

## 2018-05-08 RX ADMIN — Medication 1 MILLIGRAM(S): at 17:15

## 2018-05-08 RX ADMIN — Medication 200 MILLIGRAM(S): at 13:41

## 2018-05-08 RX ADMIN — Medication 325 MILLIGRAM(S): at 12:55

## 2018-05-08 RX ADMIN — DIVALPROEX SODIUM 500 MILLIGRAM(S): 500 TABLET, DELAYED RELEASE ORAL at 13:41

## 2018-05-08 RX ADMIN — SODIUM CHLORIDE 2 GRAM(S): 9 INJECTION INTRAMUSCULAR; INTRAVENOUS; SUBCUTANEOUS at 12:54

## 2018-05-08 RX ADMIN — Medication 1 MILLIGRAM(S): at 06:43

## 2018-05-08 RX ADMIN — DIVALPROEX SODIUM 750 MILLIGRAM(S): 500 TABLET, DELAYED RELEASE ORAL at 21:13

## 2018-05-08 RX ADMIN — SODIUM CHLORIDE 75 MILLILITER(S): 9 INJECTION INTRAMUSCULAR; INTRAVENOUS; SUBCUTANEOUS at 01:55

## 2018-05-08 RX ADMIN — PIPERACILLIN AND TAZOBACTAM 25 GRAM(S): 4; .5 INJECTION, POWDER, LYOPHILIZED, FOR SOLUTION INTRAVENOUS at 06:44

## 2018-05-08 RX ADMIN — SENNA PLUS 1 TABLET(S): 8.6 TABLET ORAL at 21:13

## 2018-05-08 RX ADMIN — HEPARIN SODIUM 5000 UNIT(S): 5000 INJECTION INTRAVENOUS; SUBCUTANEOUS at 06:43

## 2018-05-08 NOTE — PROGRESS NOTE ADULT - PROBLEM SELECTOR PLAN 6
supportive care  Aid at bedside  C/w home meds

## 2018-05-08 NOTE — PROGRESS NOTE ADULT - PROBLEM SELECTOR PLAN 1
Likely  PNA. Also has Upper respiratory viral infection   Supportive care  Supplement O2 PRN  F/u Cx : BCX NGTD, UCX Neg    S/p  IV  Ceftriaxone , Azithromycin, c/w Zosyn as possibly aspiration PNA   Speech and swallow eval appreciated, Mech soft diet and thin liquids recommended   S/p IVF bolus in ED  Monitor oral intake

## 2018-05-08 NOTE — PROGRESS NOTE ADULT - PROBLEM SELECTOR PLAN 7
suspect due to poor oral intake  and  likely  have SIADH due to multiple psych meds   S/p trial of IVF with no improvement of NA level   will sent urine lytes and osm
suspect due to poor oral intake  and may have SIADH due to multiple psych meds   Monitor NA closely  Trial of gentle IVF

## 2018-05-08 NOTE — PROGRESS NOTE ADULT - SUBJECTIVE AND OBJECTIVE BOX
CC: Pneumonia, UTI, (05 May 2018 09:32)    HPI:  The patient is a 54 yo male with hx. of severe mental retardation, non verbal, non ambulatory, seizue, impulse contro, chr. anemia, osteoporosis, leg edema, DJD bilat knees, GERD, drug induced hyponatremia, prolactinemia, hearing loss , cataracts, GI bleed, hemorrhoids, gynecomastia, aspiration pneumonia, adrenal mass,failure to thrive, lung granuloma LLL, fungal nails, funtional diarrhea, urine and stool incontinence  who was sent to the ER from [facility] home for fever 103, cough, congestion.  No seizure reported.   His nurse Yamilet Pagan knows him well and all his medications tel. [phone]. (05 May 2018 09:32)    INTERVAL HPI/ OVERNIGHT EVENTS: Pt was seen and examined, aid at bedside, no events overnight,  doing well      Vital Signs Last 24 Hrs  T(C): 36.7 (08 May 2018 17:00), Max: 36.7 (08 May 2018 10:59)  T(F): 98 (08 May 2018 17:00), Max: 98.1 (08 May 2018 10:59)  HR: 70 (08 May 2018 17:00) (65 - 74)  BP: 127/69 (08 May 2018 17:00) (100/67 - 127/69)  RR: 17 (08 May 2018 17:00) (16 - 18)  SpO2: 97% (08 May 2018 17:00) (95% - 98%)      REVIEW OF SYSTEMS:  Unable to obtain  as Pt is non verbal due to severe MR     PHYSICAL EXAM:  General: Well developed; well nourished; in no acute distress  Eyes: L cataract, conjunctiva and sclera clear  Head: Normocephalic; atraumatic  ENMT: No nasal discharge; airway clear  Neck: Supple; non tender; no masses  Respiratory:  Decreased BS at bases b/l. No wheezes, rales or rhonchi  Cardiovascular: Regular rate and rhythm. S1 and S2 Normal; No murmurs  Gastrointestinal: Soft non-tender non-distended; Normal bowel sounds  Genitourinary: No costovertebral angle tenderness  Extremities: Preserved ROM, No  edema  Vascular: Peripheral pulses palpable 2+ bilaterally  Neurological:  Alert, non verbal, non focal   Skin: Warm and dry. No acute rash  Lymph Nodes: No acute cervical adenopathy  Musculoskeletal: Normal muscle, tone, without deformities  Psychiatric: Cooperative and appropriate      LABS:                                    12.6   4.44  )-----------( 224      ( 08 May 2018 09:22 )             36.2     05-    131<L>  |  101  |  12  ----------------------------<  90  4.9   |  22  |  0.70    Ca    8.8      08 May 2018 05:02  Phos  4.3     05-08  Mg     2.0                        11.9   5.28  )-----------( 206      ( 07 May 2018 06:21 )             34.6     -    131<L>  |  97  |  14  ----------------------------<  92  4.4   |  24  |  0.70    Ca    8.5      07 May 2018 06:21                                12.1   14.58 )-----------( 194      ( 05 May 2018 12:04 )             35.2     06 May 2018 05:18    133    |  100    |  20     ----------------------------<  87     4.5     |  26     |  0.74     Ca    8.5        06 May 2018 05:18    TPro  7.3    /  Alb  2.7    /  TBili  0.2    /  DBili  x      /  AST  17     /  ALT  18     /  AlkPhos  39     05 May 2018 00:36  PT/INR - ( 05 May 2018 00:36 )   PT: 13.6 sec;   INR: 1.25 ratio    PTT - ( 05 May 2018 00:36 )  PTT:31.8 sec    LIVER FUNCTIONS - ( 05 May 2018 00:36 )  Alb: 2.7 g/dL / Pro: 7.3 gm/dL / ALK PHOS: 39 U/L / ALT: 18 U/L / AST: 17 U/L / GGT: x           Urinalysis Basic - ( 05 May 2018 00:36 )    Color: Flor / Appearance: Clear / S.015 / pH: x  Gluc: x / Ketone: Trace  / Bili: Negative / Urobili: 1 mg/dL   Blood: x / Protein: 30 mg/dL / Nitrite: Negative   Leuk Esterase: Moderate / RBC: 0-2 /HPF / WBC >50   Sq Epi: x / Non Sq Epi: Few / Bacteria: Moderate    Culture - Blood (18 @ 00:36)    Specimen Source: .Blood None    Culture Results:   No growth to date.    Culture - Blood (18 @ 00:36)    Specimen Source: .Blood None    Culture Results:   No growth to date.      MEDICATIONS  (STANDING):  aspirin  chewable 81 milliGRAM(s) Oral daily  benztropine 1 milliGRAM(s) Oral four times a day  carBAMazepine XR Tablet 400 milliGRAM(s) Oral daily  carBAMazepine XR Tablet 800 milliGRAM(s) Oral at bedtime  cholecalciferol 2000 Unit(s) Oral daily  Clobazam 30 milliGRAM(s) Oral at bedtime  clonazePAM Tablet 1 milliGRAM(s) Oral two times a day  diVALproex Sprinkle 500 milliGRAM(s) Oral daily  diVALproex Sprinkle 750 milliGRAM(s) Oral at bedtime  ferrous    sulfate 325 milliGRAM(s) Oral daily  heparin  Injectable 5000 Unit(s) SubCutaneous every 8 hours  levETIRAcetam 2000 milliGRAM(s) Oral two times a day  multivitamin 1 Tablet(s) Oral daily  pantoprazole    Tablet 40 milliGRAM(s) Oral before breakfast  phenytoin   Chewable 200 milliGRAM(s) Chew daily  piperacillin/tazobactam IVPB. 3.375 Gram(s) IV Intermittent every 8 hours  risperiDONE   Tablet 1 milliGRAM(s) Oral daily  risperiDONE   Tablet 3 milliGRAM(s) Oral at bedtime  senna 1 Tablet(s) Oral at bedtime  sodium chloride 2 Gram(s) Oral daily  sodium chloride 0.9%. 1000 milliLiter(s) (75 mL/Hr) IV Continuous <Continuous>  thiothixene 5 milliGRAM(s) Oral <User Schedule>    MEDICATIONS  (PRN):  acetaminophen   Tablet 650 milliGRAM(s) Oral every 6 hours PRN For Temp greater than 38.5 C (101.3 F)      RADIOLOGY & ADDITIONAL TESTS:    EXAM:  XR CHEST AP OR PA 1V                        PROCEDURE DATE:  2018    INTERPRETATION:  History: Fever.      Impression:    There is unchanged marked elevation of the left hemidiaphragm. There is   adjacent left basilar opacity which which is slightly more prominent   compared to the prior examination and may be related to atelectasis or   pneumonia. Right lung is clear. The heart size is grossly preserved.

## 2018-05-08 NOTE — PROGRESS NOTE ADULT - PROBLEM SELECTOR PLAN 4
Dilantin and phenytoin level subTx  Keppra level normal  seizure precautions  Neuro eval appreciated, phenytoin dose increased   Will need to recheck level in 5 days   seizure precautions

## 2018-05-08 NOTE — PROGRESS NOTE ADULT - PROBLEM SELECTOR PLAN 5
aspiration precautions, soft mech, diet, swallowing evaluation
aspiration precutions, soft mech, diet, swallowing evaluation
aspiration precautions, soft mech, diet, swallowing evaluation

## 2018-05-08 NOTE — PROGRESS NOTE ADULT - ATTENDING COMMENTS
Dispo: will d/c isolation tomorrow, d/c planning back to group home
Dispo: d/c planning back to Adult Group home when off isolation

## 2018-05-09 ENCOUNTER — TRANSCRIPTION ENCOUNTER (OUTPATIENT)
Age: 53
End: 2018-05-09

## 2018-05-09 VITALS
HEART RATE: 73 BPM | SYSTOLIC BLOOD PRESSURE: 121 MMHG | OXYGEN SATURATION: 98 % | DIASTOLIC BLOOD PRESSURE: 77 MMHG | TEMPERATURE: 98 F | RESPIRATION RATE: 16 BRPM

## 2018-05-09 LAB
ANION GAP SERPL CALC-SCNC: 8 MMOL/L — SIGNIFICANT CHANGE UP (ref 5–17)
BUN SERPL-MCNC: 16 MG/DL — SIGNIFICANT CHANGE UP (ref 7–23)
CALCIUM SERPL-MCNC: 8.8 MG/DL — SIGNIFICANT CHANGE UP (ref 8.5–10.1)
CHLORIDE SERPL-SCNC: 101 MMOL/L — SIGNIFICANT CHANGE UP (ref 96–108)
CO2 SERPL-SCNC: 26 MMOL/L — SIGNIFICANT CHANGE UP (ref 22–31)
CREAT SERPL-MCNC: 0.74 MG/DL — SIGNIFICANT CHANGE UP (ref 0.5–1.3)
GLUCOSE SERPL-MCNC: 91 MG/DL — SIGNIFICANT CHANGE UP (ref 70–99)
POTASSIUM SERPL-MCNC: 4.6 MMOL/L — SIGNIFICANT CHANGE UP (ref 3.5–5.3)
POTASSIUM SERPL-SCNC: 4.6 MMOL/L — SIGNIFICANT CHANGE UP (ref 3.5–5.3)
SODIUM SERPL-SCNC: 135 MMOL/L — SIGNIFICANT CHANGE UP (ref 135–145)

## 2018-05-09 RX ORDER — RISPERIDONE 4 MG/1
1 TABLET ORAL
Qty: 0 | Refills: 0 | COMMUNITY
Start: 2018-05-09

## 2018-05-09 RX ADMIN — HEPARIN SODIUM 5000 UNIT(S): 5000 INJECTION INTRAVENOUS; SUBCUTANEOUS at 05:48

## 2018-05-09 RX ADMIN — HEPARIN SODIUM 5000 UNIT(S): 5000 INJECTION INTRAVENOUS; SUBCUTANEOUS at 14:09

## 2018-05-09 RX ADMIN — LEVETIRACETAM 2000 MILLIGRAM(S): 250 TABLET, FILM COATED ORAL at 05:48

## 2018-05-09 RX ADMIN — Medication 1 MILLIGRAM(S): at 05:49

## 2018-05-09 RX ADMIN — Medication 1 TABLET(S): at 11:52

## 2018-05-09 RX ADMIN — RISPERIDONE 1 MILLIGRAM(S): 4 TABLET ORAL at 11:53

## 2018-05-09 RX ADMIN — DIVALPROEX SODIUM 500 MILLIGRAM(S): 500 TABLET, DELAYED RELEASE ORAL at 11:50

## 2018-05-09 RX ADMIN — Medication 1 MILLIGRAM(S): at 05:48

## 2018-05-09 RX ADMIN — Medication 81 MILLIGRAM(S): at 11:50

## 2018-05-09 RX ADMIN — SODIUM CHLORIDE 2 GRAM(S): 9 INJECTION INTRAMUSCULAR; INTRAVENOUS; SUBCUTANEOUS at 11:53

## 2018-05-09 RX ADMIN — Medication 1 MILLIGRAM(S): at 11:52

## 2018-05-09 RX ADMIN — PIPERACILLIN AND TAZOBACTAM 25 GRAM(S): 4; .5 INJECTION, POWDER, LYOPHILIZED, FOR SOLUTION INTRAVENOUS at 05:49

## 2018-05-09 RX ADMIN — Medication 2000 UNIT(S): at 11:50

## 2018-05-09 RX ADMIN — Medication 200 MILLIGRAM(S): at 11:51

## 2018-05-09 RX ADMIN — Medication 325 MILLIGRAM(S): at 11:51

## 2018-05-09 RX ADMIN — PANTOPRAZOLE SODIUM 40 MILLIGRAM(S): 20 TABLET, DELAYED RELEASE ORAL at 05:48

## 2018-05-09 RX ADMIN — Medication 400 MILLIGRAM(S): at 11:52

## 2018-05-09 NOTE — DISCHARGE NOTE ADULT - HOSPITAL COURSE
The patient is a 52 yo male with hx. of severe mental retardation, non verbal, non ambulatory, seizure disorder,  impulse control,  chr. anemia, osteoporosis,  GERD, drug induced hyponatremia, prolactinemia, hearing loss , cataracts, GI bleed, hemorrhoids, gynecomastia, aspiration pneumonia, adrenal mass, lung granuloma LLL,   urine and stool incontinence  who was sent to the ER from [facility] home for fever 103, cough, congestion.  In ED was found + human MPV and b/l PNA which thought was due to aspiration.  Elevated leukocytosis, hyponatremia  and Abnormal UA.  Cx were sent and came back negative. Pt was admitted, IVF bolus given, Started on IV Abxs. Pt was evaluated by ID and Speech and swallow. [facility] Also Pts seizure meds levels sent and noted phenytoin and valproic acid  level low. Pt had no episodes of seizure in the hospital.  Was evaluated by  neurology and Dilantin level increased to 200mg, will need level check in 2 days. Depakote dose not adjusted, as per neuro likely used as mood stabilizer. Hospital course notable for worsening hyponatremia, which was likely due to poor oral intake, resolved with IVF overnight. Oral intake better. Pt is on NACL, will continue same dose.  Pt Clinically improved and as per aids at his baseline. No fevers, no Difficulty breathing, no cough. Was seen and examined today and no issues reported. D/c planning discussed with aid and SW   Vital Signs Last 24 Hrs  T(C): 36.4 (09 May 2018 10:59), Max: 36.7 (08 May 2018 17:00)  T(F): 97.5 (09 May 2018 10:59), Max: 98 (08 May 2018 17:00)  HR: 68 (09 May 2018 10:59) (68 - 70)  BP: 113/69 (09 May 2018 10:59) (113/69 - 127/69)  RR: 18 (09 May 2018 10:59) (17 - 18)  SpO2: 99% (09 May 2018 10:59) (97% - 99%)    PHYSICAL EXAM:  General: Well developed; well nourished; in no acute distress  Eyes: L cataract, conjunctiva and sclera clear  Head: Normocephalic; atraumatic  ENMT: No nasal discharge; airway clear  Neck: Supple; non tender; no masses  Respiratory:  Decreased BS at bases b/l. No wheezes, rales or rhonchi  Cardiovascular: Regular rate and rhythm. S1 and S2 Normal; No murmurs  Gastrointestinal: Soft non-tender non-distended; Normal bowel sounds  Genitourinary: No costovertebral angle tenderness  Extremities: Preserved ROM, No  edema  Vascular: Peripheral pulses palpable 2+ bilaterally  Neurological:  Alert, non verbal, non focal   Skin: Warm and dry. No acute rash  Lymph Nodes: No acute cervical adenopathy  Musculoskeletal: Normal muscle, tone, without deformities  Psychiatric: Cooperative and appropriate    PLAN:     1.  Sepsis, due to hMNV  URI and Bilateral PNA .   Supportive care  tolerates RA now   F/u Cx : BCX NGTD, UCX Neg    S/p  IV  Ceftriaxone , Azithromycin in ED, On Zosyn as aspiration PNA completed 5 days, no further Abxs recommended by ID  MAy d/c isolation   Speech and swallow eval appreciated, Mech soft diet and thin liquids recommended   D/w DR He, agrees with d/c       2. Abnormal UA. UTI ruled out  H/o ESBL in urine  UCX neg       3. Seizure disorder.     Dilantin and phenytoin level subTx  Keppra level normal  seizure precautions  Neuro eval appreciated, phenytoin dose increased   Will need to recheck level in 2 days   seizure precautions.   Follow with Neurologist in 1-2 weeks or sooner if needed     4 Dysphagia.     aspiration precautions, soft mech, diet,   swallowing evaluation appreciated     5.  MR (mental retardation), severe. With behavioral and mood disorder   supportive care  C/w home meds.      6. Acute on Chronic hyponatremia, likely SIADH  NA levels improved after overnight IVF  C/w NACL tabs  Encourage oral intake. assist with feedings   F/u with PCP for labs check in 2-3 days       Stable for d/c back to group home  when transfer  arranged. D/w CM       Total time 40 min    PCP called, left message

## 2018-05-09 NOTE — DISCHARGE NOTE ADULT - PATIENT PORTAL LINK FT
You can access the [facility] Patient Portal, offered by [facility], by registering with the following website: [email or web address]

## 2018-05-09 NOTE — DISCHARGE NOTE ADULT - MEDICATION SUMMARY - MEDICATIONS TO TAKE
I will START or STAY ON the medications listed below when I get home from the hospital:    aspirin 81 mg oral tablet, chewable  -- 1 tab(s) by mouth once a day  -- Indication: For PPxs    Dilantin Infatabs 50 mg oral tablet, chewable  -- 4 tab(s) by mouth once a day (at bedtime)  -- Indication: For Seizure disorder    Onfi 10 mg oral tablet  -- 3 tab(s) by mouth once a day (at bedtime)  -- Indication: For Sedation     TEGretol  mg oral tablet, extended release  -- 1 tab(s) by mouth once a day  -- Indication: For Seizure disorder    TEGretol  mg oral tablet, extended release  -- 2 tab(s) by mouth once a day (at bedtime)  -- Indication: For Seizure disorder    clonazePAM 1 mg oral tablet  -- 1 milligram(s) by mouth 2 times a day  -- Indication: For Anxiety     Depakote Sprinkles 125 mg oral delayed release capsule  -- 7 cap(s) by mouth once a day  -- Indication: For Mood disorder/Seizure     Depakote Sprinkles 125 mg oral delayed release capsule  -- 6 cap(s) by mouth once a day (at bedtime)  -- Indication: For Mood disorder/Seizure     Keppra 1000 mg oral tablet  -- 2 tab(s) by mouth 2 times a day  -- Indication: For Seizure disorder    benztropine 1 mg oral tablet  -- 1 tab(s) by mouth 4 times a day  -- Indication: For Dystonia    thiothixene 5 mg oral capsule  -- 1 cap(s) by mouth once a day (at bedtime)  -- Indication: For Nood disorder    RisperDAL 1 mg oral tablet  -- 1 tab(s) by mouth once a day  -- Indication: For Agitation     RisperDAL 3 mg oral tablet  -- 1 tab(s) by mouth once a day (at bedtime)  -- Indication: For Agitation     risperiDONE 1 mg oral tablet  -- 1 tab(s) by mouth once a day  -- Indication: For Agitation    risperiDONE 3 mg oral tablet  -- 1 tab(s) by mouth once a day (at bedtime)  -- Indication: For Agitation    ibandronate 150 mg oral tablet  -- 1 tab(s) by mouth once a month  -- 14th of the month  -- Indication: For osteoporosis    ferrous sulfate 325 mg (65 mg elemental iron) oral delayed release tablet  -- 1 tab(s) by mouth once a day  -- Indication: For iron def     multivitamin with iron  -- 1 tab(s) by mouth once a day  -- Indication: For Supplement     Senokot S 50 mg-8.6 mg oral tablet  -- 1 tab(s) by mouth once a day (at bedtime)  -- Indication: For constipation     sodium chloride 1 g oral tablet  -- 2 tab(s) by mouth once a day  -- Indication: For Hyponatremia     PriLOSEC 20 mg oral delayed release capsule  -- 1 cap(s) by mouth once a day  -- Indication: For GERD    Calcium 600+D oral tablet  -- 1 tab(s) by mouth 2 times a day  -- Indication: For Supplement    cholecalciferol 2000 intl units oral capsule  -- 1 cap(s) by mouth once a day  -- Indication: For Vit D def
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
## 2018-05-09 NOTE — DISCHARGE NOTE ADULT - PLAN OF CARE
resolved completed  Abxs course resolve Off isolation monitor c/w salt tabs   Recheck NA levels in 2-3 days c/w meds  Dilantin dose increased to 200mg. Will need level check in 2 days

## 2018-05-09 NOTE — DISCHARGE NOTE ADULT - CARE PLAN
Principal Discharge DX:	Pneumonia  Goal:	resolved  Assessment and plan of treatment:	completed  Abxs course  Secondary Diagnosis:	URI, acute  Goal:	resolve  Assessment and plan of treatment:	Off isolation  Secondary Diagnosis:	Hyponatremia  Goal:	monitor  Assessment and plan of treatment:	c/w salt tabs   Recheck NA levels in 2-3 days  Secondary Diagnosis:	Seizure disorder  Goal:	monitor  Assessment and plan of treatment:	c/w meds  Dilantin dose increased to 200mg. Will need level check in 2 days

## 2018-05-09 NOTE — PROGRESS NOTE ADULT - ASSESSMENT
The patient is a 54 yo male with hx. of severe mental retardation, non verbal, non ambulatory, seizue, impulse control, chr. anemia, osteoporosis, leg edema, DJD bilat knees, GERD, drug induced hyponatremia, prolactinemia, hearing loss , cataracts, GI bleed, hemorrhoids, gynecomastia, aspiration pneumonia, adrenal mass,failure to thrive, lung granuloma LLL, admitted from group home for evaluation of fever to 103, cough and congestion. Patient sitting on stretcher, awake alert and cooperative. History per medical record as patient unable to provide history.  1. Patient admitted with human metapneumovirus infection superimposed on pneumonia which may represent aspiration pneumonia; leukocytosis most likely reactive to pneumonia and viral URI  - follow up cultures   - iv hydration and supportive care   - continue isolation   - serial cbc and monitor temperature   - reviewed prior medical records to evaluate for resistant or atypical pathogens   - day #4 zosyn  - tolerating antibiotics without rashes or side effects   - day #5 isolation, okay from id standpoint to discharge to group home on no isolation and no antibiotics as all rx was iv; patient clinically stable  2. other issues: severe mental retardation, non verbal, non ambulatory, seizue, impulse control, chr. anemia, osteoporosis, leg edema, DJD bilat knees, GERD, drug induced hyponatremia, prolactinemia, hearing loss , cataracts, GI bleed, hemorrhoids, gynecomastia, aspiration pneumonia, adrenal mass,failure to thrive, lung granuloma LLL  - per medicine

## 2018-05-09 NOTE — PROGRESS NOTE ADULT - SUBJECTIVE AND OBJECTIVE BOX
Patient is a 53y old  Male who presents with a chief complaint of Pneumonia, UTI, (05 May 2018 09:32)    Date of service: 05-09-18 @ 13:28  Patient comfortable, no coughing, afebrile  ROS: no fever or chills; denies dizziness, no HA, no SOB or cough, no abdominal pain, no diarrhea or constipation; no dysuria, no urinary frequency, no legs pain, no rashes    MEDICATIONS  (STANDING):  aspirin  chewable 81 milliGRAM(s) Oral daily  benztropine 1 milliGRAM(s) Oral four times a day  carBAMazepine XR Tablet 400 milliGRAM(s) Oral daily  carBAMazepine XR Tablet 800 milliGRAM(s) Oral at bedtime  cholecalciferol 2000 Unit(s) Oral daily  Clobazam 30 milliGRAM(s) Oral at bedtime  clonazePAM Tablet 1 milliGRAM(s) Oral two times a day  diVALproex Sprinkle 500 milliGRAM(s) Oral daily  diVALproex Sprinkle 750 milliGRAM(s) Oral at bedtime  ferrous    sulfate 325 milliGRAM(s) Oral daily  heparin  Injectable 5000 Unit(s) SubCutaneous every 8 hours  levETIRAcetam 2000 milliGRAM(s) Oral two times a day  multivitamin 1 Tablet(s) Oral daily  pantoprazole    Tablet 40 milliGRAM(s) Oral before breakfast  phenytoin   Chewable 200 milliGRAM(s) Chew daily  piperacillin/tazobactam IVPB. 3.375 Gram(s) IV Intermittent every 8 hours  risperiDONE   Tablet 1 milliGRAM(s) Oral daily  risperiDONE   Tablet 3 milliGRAM(s) Oral at bedtime  senna 1 Tablet(s) Oral at bedtime  sodium chloride 2 Gram(s) Oral daily  sodium chloride 0.9%. 1000 milliLiter(s) (75 mL/Hr) IV Continuous <Continuous>  thiothixene 5 milliGRAM(s) Oral <User Schedule>    MEDICATIONS  (PRN):  acetaminophen   Tablet 650 milliGRAM(s) Oral every 6 hours PRN For Temp greater than 38.5 C (101.3 F)      Vital Signs Last 24 Hrs  T(C): 36.4 (09 May 2018 10:59), Max: 36.7 (08 May 2018 17:00)  T(F): 97.5 (09 May 2018 10:59), Max: 98 (08 May 2018 17:00)  HR: 68 (09 May 2018 10:59) (68 - 70)  BP: 113/69 (09 May 2018 10:59) (113/69 - 127/69)  BP(mean): --  RR: 18 (09 May 2018 10:59) (17 - 18)  SpO2: 99% (09 May 2018 10:59) (97% - 99%)    Physical Exam:  PE:    Constitutional: frail looking  HEENT: NC/AT,  ears and nose atraumatic; pharynx clear  Neck: supple  Back: no tenderness  Respiratory: respiratory effort normal; clear no r/r/w  Cardiovascular: S1S2 regular, no murmurs  Abdomen: soft, not tender, not distended, positive BS; no liver or spleen organomegaly  Genitourinary: no suprapubic tenderness  Musculoskeletal: no muscle tenderness, no joint swelling or tenderness  Neurological/ Psychiatric:  moving all extremities  Skin: no rashes; no palpable lesions    Labs: all available labs reviewed                          Labs:           Labs:                        12.6   4.44  )-----------( 224      ( 08 May 2018 09:22 )             36.2     05-09    135  |  101  |  16  ----------------------------<  91  4.6   |  26  |  0.74    Ca    8.8      09 May 2018 06:21  Phos  4.3     05-08  Mg     2.0     05-08             Cultures:       Culture - Urine (collected 05-05-18 @ 00:36)  Source: .Urine None  Final Report (05-06-18 @ 13:55):    <10,000 CFU/ml    Normal Urogenital ric present    Culture - Blood (collected 05-05-18 @ 00:36)  Source: .Blood None  Preliminary Report (05-06-18 @ 08:01):    No growth to date.    Culture - Blood (collected 05-05-18 @ 00:36)  Source: .Blood None  Preliminary Report (05-06-18 @ 08:01):    No growth to date.          < from: Xray Chest 1 View AP/PA. (05.05.18 @ 01:56) >    EXAM:  XR CHEST AP OR PA 1V                            PROCEDURE DATE:  05/05/2018          INTERPRETATION:  History: Fever.    Single view of the chest was performed.    Comparison is made to April 30, 2017.    Impression:    There is unchanged marked elevation of the left hemidiaphragm. There is   adjacent left basilar opacity which which is slightly more prominent   compared to the prior examination and may be related to atelectasis or   pneumonia. Right lung is clear. The heart size is grossly preserved.    < end of copied text >      Radiology: all available radiological tests reviewed    Advanced directives addressed: full resuscitation

## 2018-05-09 NOTE — DISCHARGE NOTE ADULT - MEDICATION SUMMARY - MEDICATIONS TO CHANGE
I will SWITCH the dose or number of times a day I take the medications listed below when I get home from the hospital:    Dilantin Infatabs 50 mg oral tablet, chewable  -- 3 tab(s) by mouth once a day (at bedtime)

## 2018-05-09 NOTE — DISCHARGE NOTE ADULT - MEDICATION SUMMARY - MEDICATIONS TO STOP TAKING
I will STOP taking the medications listed below when I get home from the hospital:    cefdinir 300 mg oral capsule  -- 1 cap(s) by mouth 2 times a day  -- Finish all this medication unless otherwise directed by prescriber.    nitrofurantoin macrocrystals 100 mg oral capsule  -- 1 cap(s) by mouth 2 times a day  -- Finish all this medication unless otherwise directed by prescriber.  May discolor urine or feces.  Take with food or milk.

## 2018-05-16 DIAGNOSIS — R29.2 ABNORMAL REFLEX: ICD-10-CM

## 2018-05-16 DIAGNOSIS — B97.81 HUMAN METAPNEUMOVIRUS AS THE CAUSE OF DISEASES CLASSIFIED ELSEWHERE: ICD-10-CM

## 2018-05-16 DIAGNOSIS — R32 UNSPECIFIED URINARY INCONTINENCE: ICD-10-CM

## 2018-05-16 DIAGNOSIS — K21.9 GASTRO-ESOPHAGEAL REFLUX DISEASE WITHOUT ESOPHAGITIS: ICD-10-CM

## 2018-05-16 DIAGNOSIS — M81.8 OTHER OSTEOPOROSIS WITHOUT CURRENT PATHOLOGICAL FRACTURE: ICD-10-CM

## 2018-05-16 DIAGNOSIS — E22.2 SYNDROME OF INAPPROPRIATE SECRETION OF ANTIDIURETIC HORMONE: ICD-10-CM

## 2018-05-16 DIAGNOSIS — J69.0 PNEUMONITIS DUE TO INHALATION OF FOOD AND VOMIT: ICD-10-CM

## 2018-05-16 DIAGNOSIS — M17.12 UNILATERAL PRIMARY OSTEOARTHRITIS, LEFT KNEE: ICD-10-CM

## 2018-05-16 DIAGNOSIS — G40.909 EPILEPSY, UNSPECIFIED, NOT INTRACTABLE, WITHOUT STATUS EPILEPTICUS: ICD-10-CM

## 2018-05-16 DIAGNOSIS — R49.1 APHONIA: ICD-10-CM

## 2018-05-16 DIAGNOSIS — R13.10 DYSPHAGIA, UNSPECIFIED: ICD-10-CM

## 2018-05-16 DIAGNOSIS — R62.7 ADULT FAILURE TO THRIVE: ICD-10-CM

## 2018-05-16 DIAGNOSIS — R82.90 UNSPECIFIED ABNORMAL FINDINGS IN URINE: ICD-10-CM

## 2018-05-16 DIAGNOSIS — N62 HYPERTROPHY OF BREAST: ICD-10-CM

## 2018-05-16 DIAGNOSIS — A41.89 OTHER SPECIFIED SEPSIS: ICD-10-CM

## 2018-05-16 DIAGNOSIS — R50.9 FEVER, UNSPECIFIED: ICD-10-CM

## 2018-05-16 DIAGNOSIS — F72 SEVERE INTELLECTUAL DISABILITIES: ICD-10-CM

## 2018-05-16 DIAGNOSIS — R15.9 FULL INCONTINENCE OF FECES: ICD-10-CM

## 2018-05-16 DIAGNOSIS — J06.9 ACUTE UPPER RESPIRATORY INFECTION, UNSPECIFIED: ICD-10-CM

## 2018-05-16 DIAGNOSIS — M17.11 UNILATERAL PRIMARY OSTEOARTHRITIS, RIGHT KNEE: ICD-10-CM

## 2018-05-16 DIAGNOSIS — H91.90 UNSPECIFIED HEARING LOSS, UNSPECIFIED EAR: ICD-10-CM

## 2018-05-16 DIAGNOSIS — J84.10 PULMONARY FIBROSIS, UNSPECIFIED: ICD-10-CM

## 2018-05-16 DIAGNOSIS — F39 UNSPECIFIED MOOD [AFFECTIVE] DISORDER: ICD-10-CM

## 2018-06-19 ENCOUNTER — OUTPATIENT (OUTPATIENT)
Dept: OUTPATIENT SERVICES | Facility: HOSPITAL | Age: 53
LOS: 1 days | End: 2018-06-19

## 2018-06-19 DIAGNOSIS — Z98.49 CATARACT EXTRACTION STATUS, UNSPECIFIED EYE: Chronic | ICD-10-CM

## 2018-06-20 DIAGNOSIS — Z01.20 ENCOUNTER FOR DENTAL EXAMINATION AND CLEANING WITHOUT ABNORMAL FINDINGS: ICD-10-CM

## 2018-11-08 ENCOUNTER — APPOINTMENT (OUTPATIENT)
Dept: DERMATOLOGY | Facility: CLINIC | Age: 53
End: 2018-11-08
Payer: MEDICARE

## 2018-11-08 PROCEDURE — 99212 OFFICE O/P EST SF 10 MIN: CPT

## 2018-11-19 ENCOUNTER — INPATIENT (INPATIENT)
Facility: HOSPITAL | Age: 53
LOS: 13 days | Discharge: HOME | End: 2018-12-03
Attending: INTERNAL MEDICINE | Admitting: INTERNAL MEDICINE
Payer: MEDICARE

## 2018-11-19 VITALS — WEIGHT: 220.02 LBS | HEIGHT: 70 IN

## 2018-11-19 DIAGNOSIS — Z98.49 CATARACT EXTRACTION STATUS, UNSPECIFIED EYE: Chronic | ICD-10-CM

## 2018-11-19 PROCEDURE — 71250 CT THORAX DX C-: CPT | Mod: 26

## 2018-11-19 RX ORDER — HALOPERIDOL DECANOATE 100 MG/ML
5 INJECTION INTRAMUSCULAR ONCE
Qty: 0 | Refills: 0 | Status: COMPLETED | OUTPATIENT
Start: 2018-11-19 | End: 2018-11-19

## 2018-11-19 RX ADMIN — Medication 2 MILLIGRAM(S): at 20:11

## 2018-11-19 RX ADMIN — HALOPERIDOL DECANOATE 5 MILLIGRAM(S): 100 INJECTION INTRAMUSCULAR at 20:58

## 2018-11-19 NOTE — ED ADULT TRIAGE NOTE - CHIEF COMPLAINT QUOTE
Pt presents to the ED for cough, pt has a h/o of MR and is nonverbal. Per paperwork from facility pt sent in for rule out TB after CXR. No copy of CXR. Mask placed at triage, pt in no apparent distress at triage.

## 2018-11-19 NOTE — ED PROVIDER NOTE - MEDICAL DECISION MAKING DETAILS
54 y/o M w/ PMH  severe mental retardation, non verbal, non ambulatory, seizure disorder,  impulse control disorder, anemia, osteoporosis,  GERD, drug induced hyponatremia, prolactinemia, hearing loss , cataracts, PPD + in 2013 s/p INH treatment sent in from School by RN for abnormal xray for r/o TB

## 2018-11-19 NOTE — ED ADULT NURSE NOTE - NSIMPLEMENTINTERV_GEN_ALL_ED
Implemented All Fall Risk Interventions:  [place] to call system. Call bell, personal items and telephone within reach. Instruct patient to call for assistance. Room bathroom lighting operational. Non-slip footwear when patient is off stretcher. Physically safe environment: no spills, clutter or unnecessary equipment. Stretcher in lowest position, wheels locked, appropriate side rails in place. Provide visual cue, wrist band, yellow gown, etc. Monitor gait and stability. Monitor for mental status changes and reorient to person, place, and time. Review medications for side effects contributing to fall risk. Reinforce activity limits and safety measures with patient and family.

## 2018-11-19 NOTE — ED ADULT NURSE REASSESSMENT NOTE - NS ED NURSE REASSESS COMMENT FT1
Patient agitated, pulling at gown and linen. Patient refusing to wear mask to go to CT and becoming violent with staff.

## 2018-11-19 NOTE — ED PROVIDER NOTE - PROGRESS NOTE DETAILS
received call from radiologist about cavitary lesion cannot r/o tb, will get labs and pt tba NEGRITA Adler DO will give antibiotics NEGRITA Adler DO Pt had witnessed seizure per nursing.  Back to baseline.  Missed AEDs yesterday, this morning.  Phenytoin, and Depakote levels will be sent.  Will give morning Keppra dose.  Brian Hernandez D.O.

## 2018-11-19 NOTE — ED ADULT NURSE NOTE - OBJECTIVE STATEMENT
Patient presents from day program to [facility] TB. According to Nurse, patient had positive PPD and presents to ED ffor r/o TB. Patient is nonverbal, but aid denies any cough. Aid does state patient has been more lethargic than usual. Denies any recent fevers. Afebrile upon arrival to ED.

## 2018-11-19 NOTE — ED PROVIDER NOTE - OBJECTIVE STATEMENT
54 y/o M w/ PMH  severe mental retardation, non verbal, non ambulatory, seizure disorder,  impulse control disorder, anemia, osteoporosis,  GERD, drug induced hyponatremia, prolactinemia, hearing loss , cataracts, PPD + in 2013 s/p INH treatment sent in from [facility] by RN for abnormal xray for r/o TB. patient nonverbal , medical records reviewed showing patient has abnormal appearance of left diaphragm in 2017 and 2018. CT in 2017 showed elevated diaphragm. contacted patient pcp for details regarding ed visit.

## 2018-11-19 NOTE — ED ADULT NURSE REASSESSMENT NOTE - NS ED NURSE REASSESS COMMENT FT1
Patient appearing to have Second degree wenckeback on heart monitor. Dr. Gallegos and Shaggy BAE made aware. Patient nonverbal but does not appear to be in any distress. VSS.

## 2018-11-19 NOTE — ED PROVIDER NOTE - ATTENDING CONTRIBUTION TO CARE
53M MR, nonverbal, hx (+)PPD 1970s sent for "r/o TB". No fevers, cough.  Patient and provider at bedside unable to provide any hx.  Papers accompanying patient given recent CXR report -- unable to r/o TB. ?unclear why evaluated for same  Plan: Obtain add'l hx, CT Chest

## 2018-11-19 NOTE — ED ADULT NURSE REASSESSMENT NOTE - NS ED NURSE REASSESS COMMENT FT1
Patient had another episode of wenckebach  AV block. Patient resting comfortably in bed. Does not appear to be in any distress. VSS

## 2018-11-20 LAB
ADD ON TEST-SPECIMEN IN LAB: SIGNIFICANT CHANGE UP
ALBUMIN SERPL ELPH-MCNC: 2.9 G/DL — LOW (ref 3.3–5)
ALP SERPL-CCNC: 55 U/L — SIGNIFICANT CHANGE UP (ref 40–120)
ALT FLD-CCNC: 17 U/L — SIGNIFICANT CHANGE UP (ref 12–78)
ANION GAP SERPL CALC-SCNC: 15 MMOL/L — SIGNIFICANT CHANGE UP (ref 5–17)
ANION GAP SERPL CALC-SCNC: 7 MMOL/L — SIGNIFICANT CHANGE UP (ref 5–17)
AST SERPL-CCNC: 20 U/L — SIGNIFICANT CHANGE UP (ref 15–37)
BASOPHILS # BLD AUTO: 0.05 K/UL — SIGNIFICANT CHANGE UP (ref 0–0.2)
BASOPHILS NFR BLD AUTO: 1 % — SIGNIFICANT CHANGE UP (ref 0–2)
BILIRUB SERPL-MCNC: 0.3 MG/DL — SIGNIFICANT CHANGE UP (ref 0.2–1.2)
BUN SERPL-MCNC: 15 MG/DL — SIGNIFICANT CHANGE UP (ref 7–23)
BUN SERPL-MCNC: 15 MG/DL — SIGNIFICANT CHANGE UP (ref 7–23)
CALCIUM SERPL-MCNC: 8.4 MG/DL — LOW (ref 8.5–10.1)
CALCIUM SERPL-MCNC: 8.9 MG/DL — SIGNIFICANT CHANGE UP (ref 8.5–10.1)
CHLORIDE SERPL-SCNC: 99 MMOL/L — SIGNIFICANT CHANGE UP (ref 96–108)
CHLORIDE SERPL-SCNC: 99 MMOL/L — SIGNIFICANT CHANGE UP (ref 96–108)
CO2 SERPL-SCNC: 18 MMOL/L — LOW (ref 22–31)
CO2 SERPL-SCNC: 27 MMOL/L — SIGNIFICANT CHANGE UP (ref 22–31)
CREAT SERPL-MCNC: 0.59 MG/DL — SIGNIFICANT CHANGE UP (ref 0.5–1.3)
CREAT SERPL-MCNC: 0.88 MG/DL — SIGNIFICANT CHANGE UP (ref 0.5–1.3)
EOSINOPHIL # BLD AUTO: 0.31 K/UL — SIGNIFICANT CHANGE UP (ref 0–0.5)
EOSINOPHIL NFR BLD AUTO: 6.1 % — HIGH (ref 0–6)
GLUCOSE SERPL-MCNC: 87 MG/DL — SIGNIFICANT CHANGE UP (ref 70–99)
GLUCOSE SERPL-MCNC: 96 MG/DL — SIGNIFICANT CHANGE UP (ref 70–99)
HCT VFR BLD CALC: 38.3 % — LOW (ref 39–50)
HCT VFR BLD CALC: 40.4 % — SIGNIFICANT CHANGE UP (ref 39–50)
HGB BLD-MCNC: 13.1 G/DL — SIGNIFICANT CHANGE UP (ref 13–17)
HGB BLD-MCNC: 13.6 G/DL — SIGNIFICANT CHANGE UP (ref 13–17)
IMM GRANULOCYTES NFR BLD AUTO: 0.2 % — SIGNIFICANT CHANGE UP (ref 0–1.5)
LACTATE SERPL-SCNC: 0.7 MMOL/L — SIGNIFICANT CHANGE UP (ref 0.7–2)
LYMPHOCYTES # BLD AUTO: 1.3 K/UL — SIGNIFICANT CHANGE UP (ref 1–3.3)
LYMPHOCYTES # BLD AUTO: 25.5 % — SIGNIFICANT CHANGE UP (ref 13–44)
MCHC RBC-ENTMCNC: 29.8 PG — SIGNIFICANT CHANGE UP (ref 27–34)
MCHC RBC-ENTMCNC: 29.8 PG — SIGNIFICANT CHANGE UP (ref 27–34)
MCHC RBC-ENTMCNC: 33.7 GM/DL — SIGNIFICANT CHANGE UP (ref 32–36)
MCHC RBC-ENTMCNC: 34.2 GM/DL — SIGNIFICANT CHANGE UP (ref 32–36)
MCV RBC AUTO: 87 FL — SIGNIFICANT CHANGE UP (ref 80–100)
MCV RBC AUTO: 88.4 FL — SIGNIFICANT CHANGE UP (ref 80–100)
MONOCYTES # BLD AUTO: 0.46 K/UL — SIGNIFICANT CHANGE UP (ref 0–0.9)
MONOCYTES NFR BLD AUTO: 9 % — SIGNIFICANT CHANGE UP (ref 2–14)
NEUTROPHILS # BLD AUTO: 2.96 K/UL — SIGNIFICANT CHANGE UP (ref 1.8–7.4)
NEUTROPHILS NFR BLD AUTO: 58.2 % — SIGNIFICANT CHANGE UP (ref 43–77)
NRBC # BLD: 0 /100 WBCS — SIGNIFICANT CHANGE UP (ref 0–0)
NRBC # BLD: 0 /100 WBCS — SIGNIFICANT CHANGE UP (ref 0–0)
PHENYTOIN FREE SERPL-MCNC: 2.2 UG/ML — LOW (ref 10–20)
PLATELET # BLD AUTO: 213 K/UL — SIGNIFICANT CHANGE UP (ref 150–400)
PLATELET # BLD AUTO: 255 K/UL — SIGNIFICANT CHANGE UP (ref 150–400)
POTASSIUM SERPL-MCNC: 4 MMOL/L — SIGNIFICANT CHANGE UP (ref 3.5–5.3)
POTASSIUM SERPL-MCNC: 4.1 MMOL/L — SIGNIFICANT CHANGE UP (ref 3.5–5.3)
POTASSIUM SERPL-SCNC: 4 MMOL/L — SIGNIFICANT CHANGE UP (ref 3.5–5.3)
POTASSIUM SERPL-SCNC: 4.1 MMOL/L — SIGNIFICANT CHANGE UP (ref 3.5–5.3)
PROT SERPL-MCNC: 7.2 GM/DL — SIGNIFICANT CHANGE UP (ref 6–8.3)
RAPID RVP RESULT: SIGNIFICANT CHANGE UP
RBC # BLD: 4.4 M/UL — SIGNIFICANT CHANGE UP (ref 4.2–5.8)
RBC # BLD: 4.57 M/UL — SIGNIFICANT CHANGE UP (ref 4.2–5.8)
RBC # FLD: 12.7 % — SIGNIFICANT CHANGE UP (ref 10.3–14.5)
RBC # FLD: 12.9 % — SIGNIFICANT CHANGE UP (ref 10.3–14.5)
SODIUM SERPL-SCNC: 132 MMOL/L — LOW (ref 135–145)
SODIUM SERPL-SCNC: 133 MMOL/L — LOW (ref 135–145)
VALPROATE SERPL-MCNC: 16 UG/ML — LOW (ref 50–100)
WBC # BLD: 5.09 K/UL — SIGNIFICANT CHANGE UP (ref 3.8–10.5)
WBC # BLD: 6.62 K/UL — SIGNIFICANT CHANGE UP (ref 3.8–10.5)
WBC # FLD AUTO: 5.09 K/UL — SIGNIFICANT CHANGE UP (ref 3.8–10.5)
WBC # FLD AUTO: 6.62 K/UL — SIGNIFICANT CHANGE UP (ref 3.8–10.5)

## 2018-11-20 PROCEDURE — 99285 EMERGENCY DEPT VISIT HI MDM: CPT

## 2018-11-20 PROCEDURE — 93010 ELECTROCARDIOGRAM REPORT: CPT

## 2018-11-20 RX ORDER — CEFEPIME 1 G/1
1000 INJECTION, POWDER, FOR SOLUTION INTRAMUSCULAR; INTRAVENOUS EVERY 12 HOURS
Qty: 0 | Refills: 0 | Status: DISCONTINUED | OUTPATIENT
Start: 2018-11-20 | End: 2018-11-20

## 2018-11-20 RX ORDER — CARBAMAZEPINE 200 MG
400 TABLET ORAL DAILY
Qty: 0 | Refills: 0 | Status: DISCONTINUED | OUTPATIENT
Start: 2018-11-21 | End: 2018-11-23

## 2018-11-20 RX ORDER — CARBAMAZEPINE 200 MG
800 TABLET ORAL ONCE
Qty: 0 | Refills: 0 | Status: COMPLETED | OUTPATIENT
Start: 2018-11-20 | End: 2018-11-20

## 2018-11-20 RX ORDER — FERROUS SULFATE 325(65) MG
325 TABLET ORAL DAILY
Qty: 0 | Refills: 0 | Status: DISCONTINUED | OUTPATIENT
Start: 2018-11-20 | End: 2018-12-03

## 2018-11-20 RX ORDER — THIOTHIXENE 5 MG
5 CAPSULE ORAL
Qty: 0 | Refills: 0 | Status: DISCONTINUED | OUTPATIENT
Start: 2018-11-20 | End: 2018-12-03

## 2018-11-20 RX ORDER — ONDANSETRON 8 MG/1
4 TABLET, FILM COATED ORAL EVERY 6 HOURS
Qty: 0 | Refills: 0 | Status: DISCONTINUED | OUTPATIENT
Start: 2018-11-20 | End: 2018-12-03

## 2018-11-20 RX ORDER — VANCOMYCIN HCL 1 G
1500 VIAL (EA) INTRAVENOUS ONCE
Qty: 0 | Refills: 0 | Status: COMPLETED | OUTPATIENT
Start: 2018-11-20 | End: 2018-11-20

## 2018-11-20 RX ORDER — LEVETIRACETAM 250 MG/1
2000 TABLET, FILM COATED ORAL ONCE
Qty: 0 | Refills: 0 | Status: COMPLETED | OUTPATIENT
Start: 2018-11-20 | End: 2018-11-20

## 2018-11-20 RX ORDER — ACETAMINOPHEN 500 MG
650 TABLET ORAL EVERY 6 HOURS
Qty: 0 | Refills: 0 | Status: DISCONTINUED | OUTPATIENT
Start: 2018-11-20 | End: 2018-12-03

## 2018-11-20 RX ORDER — LEVETIRACETAM 250 MG/1
2000 TABLET, FILM COATED ORAL
Qty: 0 | Refills: 0 | Status: DISCONTINUED | OUTPATIENT
Start: 2018-11-20 | End: 2018-11-23

## 2018-11-20 RX ORDER — MINERAL OIL/MAG HYDROX 25 %-6 %
30 EMULSION ORAL EVERY 6 HOURS
Qty: 0 | Refills: 0 | Status: DISCONTINUED | OUTPATIENT
Start: 2018-11-20 | End: 2018-11-20

## 2018-11-20 RX ORDER — CARBAMAZEPINE 200 MG
400 TABLET ORAL ONCE
Qty: 0 | Refills: 0 | Status: COMPLETED | OUTPATIENT
Start: 2018-11-20 | End: 2018-11-20

## 2018-11-20 RX ORDER — BENZTROPINE MESYLATE 1 MG
1 TABLET ORAL
Qty: 0 | Refills: 0 | Status: DISCONTINUED | OUTPATIENT
Start: 2018-11-20 | End: 2018-12-03

## 2018-11-20 RX ORDER — DOCUSATE SODIUM 100 MG
100 CAPSULE ORAL THREE TIMES A DAY
Qty: 0 | Refills: 0 | Status: DISCONTINUED | OUTPATIENT
Start: 2018-11-20 | End: 2018-11-27

## 2018-11-20 RX ORDER — SODIUM CHLORIDE 9 MG/ML
2 INJECTION INTRAMUSCULAR; INTRAVENOUS; SUBCUTANEOUS DAILY
Qty: 0 | Refills: 0 | Status: DISCONTINUED | OUTPATIENT
Start: 2018-11-20 | End: 2018-12-03

## 2018-11-20 RX ORDER — DIVALPROEX SODIUM 500 MG/1
500 TABLET, DELAYED RELEASE ORAL DAILY
Qty: 0 | Refills: 0 | Status: DISCONTINUED | OUTPATIENT
Start: 2018-11-20 | End: 2018-11-25

## 2018-11-20 RX ORDER — RISPERIDONE 4 MG/1
1 TABLET ORAL DAILY
Qty: 0 | Refills: 0 | Status: DISCONTINUED | OUTPATIENT
Start: 2018-11-20 | End: 2018-11-27

## 2018-11-20 RX ORDER — CEFEPIME 1 G/1
2000 INJECTION, POWDER, FOR SOLUTION INTRAMUSCULAR; INTRAVENOUS ONCE
Qty: 0 | Refills: 0 | Status: COMPLETED | OUTPATIENT
Start: 2018-11-20 | End: 2018-11-20

## 2018-11-20 RX ORDER — PANTOPRAZOLE SODIUM 20 MG/1
40 TABLET, DELAYED RELEASE ORAL
Qty: 0 | Refills: 0 | Status: DISCONTINUED | OUTPATIENT
Start: 2018-11-20 | End: 2018-11-29

## 2018-11-20 RX ORDER — RISPERIDONE 4 MG/1
3 TABLET ORAL AT BEDTIME
Qty: 0 | Refills: 0 | Status: DISCONTINUED | OUTPATIENT
Start: 2018-11-20 | End: 2018-11-27

## 2018-11-20 RX ORDER — MAGNESIUM HYDROXIDE 400 MG/1
30 TABLET, CHEWABLE ORAL
Qty: 0 | Refills: 0 | Status: DISCONTINUED | OUTPATIENT
Start: 2018-11-20 | End: 2018-12-03

## 2018-11-20 RX ORDER — CHOLECALCIFEROL (VITAMIN D3) 125 MCG
2000 CAPSULE ORAL DAILY
Qty: 0 | Refills: 0 | Status: DISCONTINUED | OUTPATIENT
Start: 2018-11-20 | End: 2018-12-03

## 2018-11-20 RX ORDER — ASPIRIN/CALCIUM CARB/MAGNESIUM 324 MG
81 TABLET ORAL DAILY
Qty: 0 | Refills: 0 | Status: DISCONTINUED | OUTPATIENT
Start: 2018-11-20 | End: 2018-12-03

## 2018-11-20 RX ORDER — PIPERACILLIN AND TAZOBACTAM 4; .5 G/20ML; G/20ML
3.38 INJECTION, POWDER, LYOPHILIZED, FOR SOLUTION INTRAVENOUS EVERY 8 HOURS
Qty: 0 | Refills: 0 | Status: DISCONTINUED | OUTPATIENT
Start: 2018-11-20 | End: 2018-11-26

## 2018-11-20 RX ORDER — CLONAZEPAM 1 MG
1 TABLET ORAL
Qty: 0 | Refills: 0 | Status: DISCONTINUED | OUTPATIENT
Start: 2018-11-20 | End: 2018-11-24

## 2018-11-20 RX ORDER — SENNA PLUS 8.6 MG/1
2 TABLET ORAL AT BEDTIME
Qty: 0 | Refills: 0 | Status: DISCONTINUED | OUTPATIENT
Start: 2018-11-20 | End: 2018-12-03

## 2018-11-20 RX ORDER — CARBAMAZEPINE 200 MG
800 TABLET ORAL AT BEDTIME
Qty: 0 | Refills: 0 | Status: DISCONTINUED | OUTPATIENT
Start: 2018-11-21 | End: 2018-11-23

## 2018-11-20 RX ORDER — DIVALPROEX SODIUM 500 MG/1
625 TABLET, DELAYED RELEASE ORAL AT BEDTIME
Qty: 0 | Refills: 0 | Status: DISCONTINUED | OUTPATIENT
Start: 2018-11-20 | End: 2018-11-25

## 2018-11-20 RX ADMIN — Medication 200 MILLIGRAM(S): at 22:38

## 2018-11-20 RX ADMIN — Medication 1 TABLET(S): at 18:05

## 2018-11-20 RX ADMIN — Medication 1 MILLIGRAM(S): at 17:16

## 2018-11-20 RX ADMIN — Medication 2000 UNIT(S): at 12:17

## 2018-11-20 RX ADMIN — Medication 100 MILLIGRAM(S): at 14:24

## 2018-11-20 RX ADMIN — Medication 800 MILLIGRAM(S): at 22:37

## 2018-11-20 RX ADMIN — Medication 250 MILLIGRAM(S): at 08:29

## 2018-11-20 RX ADMIN — Medication 100 MILLIGRAM(S): at 22:38

## 2018-11-20 RX ADMIN — PANTOPRAZOLE SODIUM 40 MILLIGRAM(S): 20 TABLET, DELAYED RELEASE ORAL at 12:18

## 2018-11-20 RX ADMIN — PIPERACILLIN AND TAZOBACTAM 25 GRAM(S): 4; .5 INJECTION, POWDER, LYOPHILIZED, FOR SOLUTION INTRAVENOUS at 22:36

## 2018-11-20 RX ADMIN — RISPERIDONE 1 MILLIGRAM(S): 4 TABLET ORAL at 12:18

## 2018-11-20 RX ADMIN — SODIUM CHLORIDE 2 GRAM(S): 9 INJECTION INTRAMUSCULAR; INTRAVENOUS; SUBCUTANEOUS at 12:17

## 2018-11-20 RX ADMIN — CEFEPIME 100 MILLIGRAM(S): 1 INJECTION, POWDER, FOR SOLUTION INTRAMUSCULAR; INTRAVENOUS at 06:10

## 2018-11-20 RX ADMIN — Medication 1 TABLET(S): at 12:17

## 2018-11-20 RX ADMIN — Medication 400 MILLIGRAM(S): at 14:24

## 2018-11-20 RX ADMIN — DIVALPROEX SODIUM 625 MILLIGRAM(S): 500 TABLET, DELAYED RELEASE ORAL at 22:39

## 2018-11-20 RX ADMIN — RISPERIDONE 3 MILLIGRAM(S): 4 TABLET ORAL at 22:38

## 2018-11-20 RX ADMIN — Medication 5 MILLIGRAM(S): at 22:37

## 2018-11-20 RX ADMIN — DIVALPROEX SODIUM 500 MILLIGRAM(S): 500 TABLET, DELAYED RELEASE ORAL at 17:15

## 2018-11-20 RX ADMIN — Medication 1 MILLIGRAM(S): at 17:15

## 2018-11-20 RX ADMIN — Medication 81 MILLIGRAM(S): at 12:18

## 2018-11-20 RX ADMIN — Medication 325 MILLIGRAM(S): at 12:17

## 2018-11-20 RX ADMIN — LEVETIRACETAM 2000 MILLIGRAM(S): 250 TABLET, FILM COATED ORAL at 08:30

## 2018-11-20 RX ADMIN — LEVETIRACETAM 2000 MILLIGRAM(S): 250 TABLET, FILM COATED ORAL at 18:05

## 2018-11-20 RX ADMIN — Medication 1 MILLIGRAM(S): at 14:29

## 2018-11-20 RX ADMIN — PIPERACILLIN AND TAZOBACTAM 25 GRAM(S): 4; .5 INJECTION, POWDER, LYOPHILIZED, FOR SOLUTION INTRAVENOUS at 14:25

## 2018-11-20 NOTE — H&P ADULT - HISTORY OF PRESENT ILLNESS
52yo/M with PMH severe MR, minimally verbal, seizure disorder on multiple meds, chr hyponatremia and hyperprolactinemia due to meds [facility] evaluation of cavitary lesion in the lung. Pt unable to provide any history. Pt had a seizure episode in ED due to missed seizure meds

## 2018-11-20 NOTE — H&P ADULT - NSHPPHYSICALEXAM_GEN_ALL_CORE
Vital Signs Last 24 Hrs  T(C): 36.7 (20 Nov 2018 08:25), Max: 36.7 (19 Nov 2018 14:46)  T(F): 98.1 (20 Nov 2018 08:25), Max: 98.1 (20 Nov 2018 08:25)  HR: 82 (20 Nov 2018 08:25) (57 - 82)  BP: 122/72 (20 Nov 2018 08:25) (109/67 - 129/35)  BP(mean): --  RR: 16 (20 Nov 2018 08:25) (16 - 18)  SpO2: 96% (20 Nov 2018 08:25) (96% - 96%)

## 2018-11-20 NOTE — CHART NOTE - NSCHARTNOTEFT_GEN_A_CORE
Patient unable to follow commands. Agitated when attempting to place mask on. Assistant Nurse Manager in room to help, but no sample was obtainable. RN aware.

## 2018-11-20 NOTE — H&P ADULT - ASSESSMENT
#LLL cavitary lesion  Admit to med-surg  Maintain TB isolation  ID eval  Pulm jonny Garza  Cont IV abx as could be pneumonia, s/p Cefepime and Vanco in ED  Chart states pt was treated with INH in 2013  Not producing any sputum    #Severe MR/ Seizure disorder  Resume all home meds  Ativan PRN for seizure activity    #Chr hyponatremia- cont salt tabs    #Esophageal dilation on CT chest  GI eval DR Sharp    #Dispo- inpatient admit

## 2018-11-20 NOTE — ED ADULT NURSE REASSESSMENT NOTE - NS ED NURSE REASSESS COMMENT FT1
Assumed pt care from KISHAN Mills. Pt remains awake, in no distress. Cefepime IVABT in progress. Aide at bedside. Will monitor. VSS.

## 2018-11-20 NOTE — H&P ADULT - NSHPLABSRESULTS_GEN_ALL_CORE
13.1   5.09  )-----------( 213      ( 20 Nov 2018 04:42 )             38.3     20 Nov 2018 04:42    133    |  99     |  15     ----------------------------<  87     4.0     |  27     |  0.59     Ca    8.4        20 Nov 2018 04:42    TPro  7.2    /  Alb  2.9    /  TBili  0.3    /  DBili  x      /  AST  20     /  ALT  17     /  AlkPhos  55     20 Nov 2018 04:42    LIVER FUNCTIONS - ( 20 Nov 2018 04:42 )  Alb: 2.9 g/dL / Pro: 7.2 gm/dL / ALK PHOS: 55 U/L / ALT: 17 U/L / AST: 20 U/L / GGT: x           EXAM:  CT CHEST                        PROCEDURE DATE:  11/19/2018    INTERPRETATION:  CT CHEST WITHOUT CONTRAST  INDICATION: Cough. Left lower consolidation.  TECHNIQUE: Noncontrast CT imaging of the thorax. Coronal and sagittal   reformatted images are provided. Postprocessed MIP reformatted images   were created and reviewed..    COMPARISON: CT chest 6/16/2017.    FINDINGS:  Lines and tubes: None.  Airways: Tracheobronchial tree is patent.  Lungs and Pleura: Continued elevated left hemidiaphragm with interval   left left lower lobe consolidative opacity measuring 4.3 x 3.5 cm   containing areas of cavitation as best seen on image 39 of series 2.   Findings are nonspecific, this may represent developing cavitary   pneumonia. Mycobacterial infection or neoplastic etiology considered in   the differential. Further pulmonary workup and short-term imaging   follow-up is advised to demonstrate resolution. Associated left pleural   parenchymal thickening with trace left pleural effusion extending to the   apex. Mild asymmetric right sided groundglass attenuation, which may be   infectious or inflammatory nature. No pneumothorax.    Mediastinum and lymph nodes: Shotty mediastinal lymph nodes. Continued   mild upper thoracic esophageal distention, improved compared to the prior   study. Mild distal esophageal wall thickening, difficult to evaluate   secondary to inadequate distention. This may be related to esophagitis or   gastroesophageal reflux disease. Consider nonemergent upper endoscopy if   there is concern for esophageal malignancy.  Heart: Cardiomegaly without pericardial effusion.   Vessels: Normal size. Atherosclerotic disease of the aorta and its   branches.     Upper Abdomen:  Continued elevated left hemidiaphragm with stomach,   spleen and bowel loops identified within the left lower quadrant.   Incompletely characterized 1.3 x 1 cm right adrenal nodule for which   nonemergent adrenal protocol MR is advised provided no MR   contraindications.    Bones and soft tissues: Multilevel degenerative changes of the spine with   stable mild compression deformities of mid thoracic spine. Remote   fracture deformities of the left lower ribs.    IMPRESSION:  Continued elevated left hemidiaphragm with interval left left lower lobe   consolidative opacity measuring 4.3 x 3.5 cm containing areas of   cavitation. Findings are nonspecific, this may represent developing   cavitary pneumonia. Mycobacterial infection or neoplastic etiology   considered in the differential. Further pulmonary workup and short-term   imaging follow-up is advised to demonstrate resolution. Associated left   pleural parenchymal thickening with trace left pleural effusion extending   to the apex. Mild asymmetric right sided groundglass attenuation, which   may be infectious or inflammatory nature.     Mild upper thoracic esophageal distention, improved compared to the prior   study. Mild distal esophageal wall thickening, difficult to evaluate   secondary to inadequate distention. This may be related to esophagitis or   gastroesophageal reflux disease. Consider nonemergent upper endoscopy if   there is concern for esophageal malignancy.

## 2018-11-20 NOTE — ED ADULT NURSE REASSESSMENT NOTE - NS ED NURSE REASSESS COMMENT FT1
Pt incontinent of urine.  Pericare provided, linens changed.  Pt appears comfortable.  +blood return from 20g PIV in L hand.  Pt swallows pills without difficulty, po fluids provided. Pt incontinent of urine.  Pericare provided, linens changed.  Pt appears comfortable, no coughing or difficulty breathing.  Isolation maintained.   +blood return from 20g PIV in L hand.  Pt swallows pills without difficulty, po fluids provided.

## 2018-11-21 LAB
ANION GAP SERPL CALC-SCNC: 9 MMOL/L — SIGNIFICANT CHANGE UP (ref 5–17)
BUN SERPL-MCNC: 16 MG/DL — SIGNIFICANT CHANGE UP (ref 7–23)
CALCIUM SERPL-MCNC: 9.1 MG/DL — SIGNIFICANT CHANGE UP (ref 8.5–10.1)
CHLORIDE SERPL-SCNC: 98 MMOL/L — SIGNIFICANT CHANGE UP (ref 96–108)
CO2 SERPL-SCNC: 28 MMOL/L — SIGNIFICANT CHANGE UP (ref 22–31)
CREAT SERPL-MCNC: 0.74 MG/DL — SIGNIFICANT CHANGE UP (ref 0.5–1.3)
GLUCOSE SERPL-MCNC: 86 MG/DL — SIGNIFICANT CHANGE UP (ref 70–99)
HCT VFR BLD CALC: 37.8 % — LOW (ref 39–50)
HGB BLD-MCNC: 13.2 G/DL — SIGNIFICANT CHANGE UP (ref 13–17)
MCHC RBC-ENTMCNC: 30.4 PG — SIGNIFICANT CHANGE UP (ref 27–34)
MCHC RBC-ENTMCNC: 34.9 GM/DL — SIGNIFICANT CHANGE UP (ref 32–36)
MCV RBC AUTO: 87.1 FL — SIGNIFICANT CHANGE UP (ref 80–100)
NRBC # BLD: 0 /100 WBCS — SIGNIFICANT CHANGE UP (ref 0–0)
PLATELET # BLD AUTO: 214 K/UL — SIGNIFICANT CHANGE UP (ref 150–400)
POTASSIUM SERPL-MCNC: 3.5 MMOL/L — SIGNIFICANT CHANGE UP (ref 3.5–5.3)
POTASSIUM SERPL-SCNC: 3.5 MMOL/L — SIGNIFICANT CHANGE UP (ref 3.5–5.3)
RBC # BLD: 4.34 M/UL — SIGNIFICANT CHANGE UP (ref 4.2–5.8)
RBC # FLD: 12.6 % — SIGNIFICANT CHANGE UP (ref 10.3–14.5)
SODIUM SERPL-SCNC: 135 MMOL/L — SIGNIFICANT CHANGE UP (ref 135–145)
WBC # BLD: 5.48 K/UL — SIGNIFICANT CHANGE UP (ref 3.8–10.5)
WBC # FLD AUTO: 5.48 K/UL — SIGNIFICANT CHANGE UP (ref 3.8–10.5)

## 2018-11-21 RX ADMIN — PIPERACILLIN AND TAZOBACTAM 25 GRAM(S): 4; .5 INJECTION, POWDER, LYOPHILIZED, FOR SOLUTION INTRAVENOUS at 13:19

## 2018-11-21 RX ADMIN — Medication 81 MILLIGRAM(S): at 11:54

## 2018-11-21 RX ADMIN — Medication 1 TABLET(S): at 18:42

## 2018-11-21 RX ADMIN — PIPERACILLIN AND TAZOBACTAM 25 GRAM(S): 4; .5 INJECTION, POWDER, LYOPHILIZED, FOR SOLUTION INTRAVENOUS at 21:24

## 2018-11-21 RX ADMIN — RISPERIDONE 3 MILLIGRAM(S): 4 TABLET ORAL at 21:25

## 2018-11-21 RX ADMIN — Medication 100 MILLIGRAM(S): at 05:08

## 2018-11-21 RX ADMIN — Medication 1 MILLIGRAM(S): at 05:06

## 2018-11-21 RX ADMIN — PANTOPRAZOLE SODIUM 40 MILLIGRAM(S): 20 TABLET, DELAYED RELEASE ORAL at 05:07

## 2018-11-21 RX ADMIN — Medication 1 MILLIGRAM(S): at 00:21

## 2018-11-21 RX ADMIN — RISPERIDONE 1 MILLIGRAM(S): 4 TABLET ORAL at 11:54

## 2018-11-21 RX ADMIN — SODIUM CHLORIDE 2 GRAM(S): 9 INJECTION INTRAMUSCULAR; INTRAVENOUS; SUBCUTANEOUS at 11:54

## 2018-11-21 RX ADMIN — DIVALPROEX SODIUM 500 MILLIGRAM(S): 500 TABLET, DELAYED RELEASE ORAL at 11:54

## 2018-11-21 RX ADMIN — PIPERACILLIN AND TAZOBACTAM 25 GRAM(S): 4; .5 INJECTION, POWDER, LYOPHILIZED, FOR SOLUTION INTRAVENOUS at 05:06

## 2018-11-21 RX ADMIN — Medication 100 MILLIGRAM(S): at 13:19

## 2018-11-21 RX ADMIN — Medication 1 TABLET(S): at 11:54

## 2018-11-21 RX ADMIN — Medication 1 MILLIGRAM(S): at 17:33

## 2018-11-21 RX ADMIN — Medication 200 MILLIGRAM(S): at 21:27

## 2018-11-21 RX ADMIN — Medication 1 MILLIGRAM(S): at 17:36

## 2018-11-21 RX ADMIN — LEVETIRACETAM 2000 MILLIGRAM(S): 250 TABLET, FILM COATED ORAL at 17:34

## 2018-11-21 RX ADMIN — Medication 2000 UNIT(S): at 11:54

## 2018-11-21 RX ADMIN — Medication 100 MILLIGRAM(S): at 21:24

## 2018-11-21 RX ADMIN — DIVALPROEX SODIUM 625 MILLIGRAM(S): 500 TABLET, DELAYED RELEASE ORAL at 21:26

## 2018-11-21 RX ADMIN — Medication 5 MILLIGRAM(S): at 21:25

## 2018-11-21 RX ADMIN — Medication 1 MILLIGRAM(S): at 05:07

## 2018-11-21 RX ADMIN — Medication 1 MILLIGRAM(S): at 11:55

## 2018-11-21 RX ADMIN — Medication 325 MILLIGRAM(S): at 11:54

## 2018-11-21 RX ADMIN — Medication 1 TABLET(S): at 05:08

## 2018-11-21 RX ADMIN — LEVETIRACETAM 2000 MILLIGRAM(S): 250 TABLET, FILM COATED ORAL at 05:06

## 2018-11-21 NOTE — PROGRESS NOTE ADULT - SUBJECTIVE AND OBJECTIVE BOX
54 yo WM with PMH severe MR, minimally verbal, seizure disorder on multiple meds, Iatrogenic  chr hyponatremia and hyperprolactinemia,  presented from shelter for evaluation of cavitary lesion in the lung. Pt unable to provide any history. Pt had a seizure episode in ED due to missed seizure meds.  -found to have LLL Pna with 4cm cavitation    11.21: ros difficulty to obtain due to MR            REVIEW OF SYSTEMS:  unable to obtain    Vital Signs Last 24 Hrs  T(C): 37 (21 Nov 2018 13:24), Max: 37.2 (20 Nov 2018 20:18)  T(F): 98.6 (21 Nov 2018 13:24), Max: 99 (20 Nov 2018 20:18)  HR: 66 (21 Nov 2018 13:24) (66 - 90)  BP: 102/56 (21 Nov 2018 13:24) (102/56 - 134/69)  RR: 17 (21 Nov 2018 13:24) (17 - 18)  SpO2: 95% (21 Nov 2018 13:24) (94% - 96%)    PHYSICAL EXAM:    GENERAL: NAD, Well nourished  HEENT:  NC/AT, EOMI, PERRLA, No scleral icterus, Moist mucous membranes  NECK: Supple, No JVD  CNS:  Alert & Oriented X3, Motor Strength 5/5 B/L upper and lower extremities; DTRs 2+ intact   LUNG: Normal Breath sounds, Clear to auscultation bilaterally, No rales, No rhonchi, No wheezing  HEART: RRR; No murmurs, No rubs  ABDOMEN: +BS, ST/ND/NT  GENITOURINARY: Voiding, Bladder not distended  EXTREMITIES:  2+ Peripheral Pulses, No clubbing, No cyanosis, No tibial edema  MUSCULOSKELTAL: Joints normal ROM, No TTP, No effusion  SKIN: no rashes  RECTAL: deferred, not indicated  BREAST: deferred                          13.2   5.48  )-----------( 214      ( 21 Nov 2018 08:03 )             37.8     11-21    135  |  98  |  16  ----------------------------<  86  3.5   |  28  |  0.74    Ca    9.1      21 Nov 2018 08:03    TPro  7.2  /  Alb  2.9<L>  /  TBili  0.3  /  DBili  x   /  AST  20  /  ALT  17  /  AlkPhos  55  11-20    Vancomycin levels:   Cultures:     MEDICATIONS  (STANDING):  aspirin  chewable 81 milliGRAM(s) Oral daily  benztropine 1 milliGRAM(s) Oral four times a day  calcium carbonate 1250 mG  + Vitamin D (OsCal 500 + D) 1 Tablet(s) Oral two times a day  carBAMazepine XR Tablet 400 milliGRAM(s) Oral daily  carBAMazepine XR Tablet 800 milliGRAM(s) Oral at bedtime  cholecalciferol 2000 Unit(s) Oral daily  Clobazam 30 milliGRAM(s) Oral at bedtime  clonazePAM Tablet 1 milliGRAM(s) Oral two times a day  diVALproex Sprinkle 625 milliGRAM(s) Oral at bedtime  diVALproex Sprinkle 500 milliGRAM(s) Oral daily  docusate sodium 100 milliGRAM(s) Oral three times a day  ferrous    sulfate 325 milliGRAM(s) Oral daily  levETIRAcetam 2000 milliGRAM(s) Oral two times a day  multivitamin 1 Tablet(s) Oral daily  pantoprazole    Tablet 40 milliGRAM(s) Oral before breakfast  phenytoin   Chewable 200 milliGRAM(s) Chew at bedtime  piperacillin/tazobactam IVPB. 3.375 Gram(s) IV Intermittent every 8 hours  risperiDONE   Tablet 1 milliGRAM(s) Oral daily  risperiDONE   Tablet 3 milliGRAM(s) Oral at bedtime  sodium chloride 2 Gram(s) Oral daily  thiothixene 5 milliGRAM(s) Oral <User Schedule>    MEDICATIONS  (PRN):  acetaminophen   Tablet .. 650 milliGRAM(s) Oral every 6 hours PRN Temp greater or equal to 38C (100.4F), Mild Pain (1 - 3)  aluminum hydroxide/magnesium hydroxide/simethicone Suspension 30 milliLiter(s) Oral every 4 hours PRN Dyspepsia  magnesium hydroxide Suspension 30 milliLiter(s) Oral four times a day PRN Constipation  ondansetron Injectable 4 milliGRAM(s) IV Push every 6 hours PRN Nausea  senna 2 Tablet(s) Oral at bedtime PRN Constipation      all labs reviewed  all imaging reviewed      Assessment and Plan:    1. LLL Pna with cavitary lesion:  suspect GNR etiology  Zosyn IV day#2  Blood Cx negative x 2    Unable to r/o for TB; patient cannot produce sputum samples.  Low probab for TB considering no "B" symptoms are present    2. Seizures: controlled   cw current AED    3. Mental retardation 52 yo WM with PMH severe MR, minimally verbal, seizure disorder on multiple meds, Iatrogenic  chr hyponatremia and hyperprolactinemia,  [facility]itary [facility] in the lung. Pt unable to provide any history. Pt had a seizure episode in ED due to missed seizure meds.  -found to have LLL Pna with 4cm cavitation    11.21: ros difficulty to obtain due to MR            REVIEW OF SYSTEMS:  unable to obtain    Vital Signs Last 24 Hrs  T(C): 37 (21 Nov 2018 13:24), Max: 37.2 (20 Nov 2018 20:18)  T(F): 98.6 (21 Nov 2018 13:24), Max: 99 (20 Nov 2018 20:18)  HR: 66 (21 Nov 2018 13:24) (66 - 90)  BP: 102/56 (21 Nov 2018 13:24) (102/56 - 134/69)  RR: 17 (21 Nov 2018 13:24) (17 - 18)  SpO2: 95% (21 Nov 2018 13:24) (94% - 96%)    PHYSICAL EXAM:    GENERAL: NAD, Well nourished  HEENT:  NC/AT, EOMI, PERRLA, No scleral icterus, Moist mucous membranes  NECK: Supple, No JVD  CNS:  Alert & Oriented X3, Motor Strength 5/5 B/L upper and lower extremities; DTRs 2+ intact   LUNG: Normal Breath sounds, Clear to auscultation bilaterally, No rales, No rhonchi, No wheezing  HEART: RRR; No murmurs, No rubs  ABDOMEN: +BS, ST/ND/NT  GENITOURINARY: Voiding, Bladder not distended  EXTREMITIES:  2+ Peripheral Pulses, No clubbing, No cyanosis, No tibial edema  MUSCULOSKELTAL: Joints normal ROM, No TTP, No effusion  SKIN: no rashes  RECTAL: deferred, not indicated  BREAST: deferred                          13.2   5.48  )-----------( 214      ( 21 Nov 2018 08:03 )             37.8     11-21    135  |  98  |  16  ----------------------------<  86  3.5   |  28  |  0.74    Ca    9.1      21 Nov 2018 08:03    TPro  7.2  /  Alb  2.9<L>  /  TBili  0.3  /  DBili  x   /  AST  20  /  ALT  17  /  AlkPhos  55  11-20    Vancomycin levels:   Cultures:     MEDICATIONS  (STANDING):  aspirin  chewable 81 milliGRAM(s) Oral daily  benztropine 1 milliGRAM(s) Oral four times a day  calcium carbonate 1250 mG  + Vitamin D (OsCal 500 + D) 1 Tablet(s) Oral two times a day  carBAMazepine XR Tablet 400 milliGRAM(s) Oral daily  carBAMazepine XR Tablet 800 milliGRAM(s) Oral at bedtime  cholecalciferol 2000 Unit(s) Oral daily  Clobazam 30 milliGRAM(s) Oral at bedtime  clonazePAM Tablet 1 milliGRAM(s) Oral two times a day  diVALproex Sprinkle 625 milliGRAM(s) Oral at bedtime  diVALproex Sprinkle 500 milliGRAM(s) Oral daily  docusate sodium 100 milliGRAM(s) Oral three times a day  ferrous    sulfate 325 milliGRAM(s) Oral daily  levETIRAcetam 2000 milliGRAM(s) Oral two times a day  multivitamin 1 Tablet(s) Oral daily  pantoprazole    Tablet 40 milliGRAM(s) Oral before breakfast  phenytoin   Chewable 200 milliGRAM(s) Chew at bedtime  piperacillin/tazobactam IVPB. 3.375 Gram(s) IV Intermittent every 8 hours  risperiDONE   Tablet 1 milliGRAM(s) Oral daily  risperiDONE   Tablet 3 milliGRAM(s) Oral at bedtime  sodium chloride 2 Gram(s) Oral daily  thiothixene 5 milliGRAM(s) Oral <User Schedule>    MEDICATIONS  (PRN):  acetaminophen   Tablet .. 650 milliGRAM(s) Oral every 6 hours PRN Temp greater or equal to 38C (100.4F), Mild Pain (1 - 3)  aluminum hydroxide/magnesium hydroxide/simethicone Suspension 30 milliLiter(s) Oral every 4 hours PRN Dyspepsia  magnesium hydroxide Suspension 30 milliLiter(s) Oral four times a day PRN Constipation  ondansetron Injectable 4 milliGRAM(s) IV Push every 6 hours PRN Nausea  senna 2 Tablet(s) Oral at bedtime PRN Constipation      all labs reviewed  all imaging reviewed      Assessment and Plan:    1. LLL Pna with cavitary lesion:  suspect GNR etiology  Zosyn IV day#2  Blood Cx negative x 2    Unable to r/o for TB; patient cannot produce sputum samples.  Low probab for TB considering no "B" symptoms are present    2. Seizures: controlled   cw current AED    3. Mental retardation    4. Hyponatremia: resolved

## 2018-11-22 RX ADMIN — Medication 1 TABLET(S): at 17:10

## 2018-11-22 RX ADMIN — Medication 1 TABLET(S): at 05:45

## 2018-11-22 RX ADMIN — Medication 1 MILLIGRAM(S): at 05:44

## 2018-11-22 RX ADMIN — Medication 1 MILLIGRAM(S): at 17:10

## 2018-11-22 RX ADMIN — Medication 81 MILLIGRAM(S): at 11:12

## 2018-11-22 RX ADMIN — Medication 1 MILLIGRAM(S): at 00:59

## 2018-11-22 RX ADMIN — Medication 325 MILLIGRAM(S): at 11:13

## 2018-11-22 RX ADMIN — SODIUM CHLORIDE 2 GRAM(S): 9 INJECTION INTRAMUSCULAR; INTRAVENOUS; SUBCUTANEOUS at 11:20

## 2018-11-22 RX ADMIN — Medication 1 MILLIGRAM(S): at 11:13

## 2018-11-22 RX ADMIN — PIPERACILLIN AND TAZOBACTAM 25 GRAM(S): 4; .5 INJECTION, POWDER, LYOPHILIZED, FOR SOLUTION INTRAVENOUS at 05:44

## 2018-11-22 RX ADMIN — PIPERACILLIN AND TAZOBACTAM 25 GRAM(S): 4; .5 INJECTION, POWDER, LYOPHILIZED, FOR SOLUTION INTRAVENOUS at 13:07

## 2018-11-22 RX ADMIN — Medication 100 MILLIGRAM(S): at 05:44

## 2018-11-22 RX ADMIN — Medication 2000 UNIT(S): at 11:12

## 2018-11-22 RX ADMIN — PANTOPRAZOLE SODIUM 40 MILLIGRAM(S): 20 TABLET, DELAYED RELEASE ORAL at 05:44

## 2018-11-22 RX ADMIN — DIVALPROEX SODIUM 500 MILLIGRAM(S): 500 TABLET, DELAYED RELEASE ORAL at 11:13

## 2018-11-22 RX ADMIN — RISPERIDONE 1 MILLIGRAM(S): 4 TABLET ORAL at 11:21

## 2018-11-22 RX ADMIN — PIPERACILLIN AND TAZOBACTAM 25 GRAM(S): 4; .5 INJECTION, POWDER, LYOPHILIZED, FOR SOLUTION INTRAVENOUS at 22:26

## 2018-11-22 RX ADMIN — LEVETIRACETAM 2000 MILLIGRAM(S): 250 TABLET, FILM COATED ORAL at 05:45

## 2018-11-22 RX ADMIN — Medication 1 TABLET(S): at 11:13

## 2018-11-22 RX ADMIN — Medication 100 MILLIGRAM(S): at 13:07

## 2018-11-22 NOTE — PROGRESS NOTE ADULT - SUBJECTIVE AND OBJECTIVE BOX
54 yo WM with PMH severe MR, minimally verbal, seizure disorder on multiple meds, Iatrogenic  chr hyponatremia and hyperprolactinemia,  presented from long term for evaluation of cavitary lesion in the lung. Pt unable to provide any history. Pt had a seizure episode in ED due to missed seizure meds.  -found to have LLL Pna with 4cm cavitation    11.21: ros difficulty to obtain due to MR  11.22: no visible distress      REVIEW OF SYSTEMS:  unable to obtain    Vital Signs Last 24 Hrs  T(C): 36.2 (22 Nov 2018 04:57), Max: 37 (21 Nov 2018 13:24)  T(F): 97.2 (22 Nov 2018 04:57), Max: 98.6 (21 Nov 2018 13:24)  HR: 72 (22 Nov 2018 04:57) (66 - 74)  BP: 116/63 (22 Nov 2018 04:57) (91/60 - 116/63)  RR: 18 (22 Nov 2018 04:57) (17 - 18)  SpO2: 95% (22 Nov 2018 04:57) (95% - 97%)    PHYSICAL EXAM:    GENERAL: NAD, Well nourished  HEENT:  NC/AT, EOMI, PERRLA, No scleral icterus, Moist mucous membranes  NECK: Supple, No JVD  CNS:  Alert & Oriented X3, Motor Strength 5/5 B/L upper and lower extremities; DTRs 2+ intact   LUNG: Normal Breath sounds, Clear to auscultation bilaterally, No rales, No rhonchi, No wheezing  HEART: RRR; No murmurs, No rubs  ABDOMEN: +BS, ST/ND/NT  GENITOURINARY: Voiding, Bladder not distended  EXTREMITIES:  2+ Peripheral Pulses, No clubbing, No cyanosis, No tibial edema  MUSCULOSKELTAL: Joints normal ROM, No TTP, No effusion  SKIN: no rashes  RECTAL: deferred, not indicated  BREAST: deferred                          13.2   5.48  )-----------( 214      ( 21 Nov 2018 08:03 )             37.8     11-21    135  |  98  |  16  ----------------------------<  86  3.5   |  28  |  0.74    Ca    9.1      21 Nov 2018 08:03    TPro  7.2  /  Alb  2.9<L>  /  TBili  0.3  /  DBili  x   /  AST  20  /  ALT  17  /  AlkPhos  55  11-20    Vancomycin levels:   Cultures:     MEDICATIONS  (STANDING):  aspirin  chewable 81 milliGRAM(s) Oral daily  benztropine 1 milliGRAM(s) Oral four times a day  calcium carbonate 1250 mG  + Vitamin D (OsCal 500 + D) 1 Tablet(s) Oral two times a day  carBAMazepine XR Tablet 400 milliGRAM(s) Oral daily  carBAMazepine XR Tablet 800 milliGRAM(s) Oral at bedtime  cholecalciferol 2000 Unit(s) Oral daily  Clobazam 30 milliGRAM(s) Oral at bedtime  clonazePAM Tablet 1 milliGRAM(s) Oral two times a day  diVALproex Sprinkle 625 milliGRAM(s) Oral at bedtime  diVALproex Sprinkle 500 milliGRAM(s) Oral daily  docusate sodium 100 milliGRAM(s) Oral three times a day  ferrous    sulfate 325 milliGRAM(s) Oral daily  levETIRAcetam 2000 milliGRAM(s) Oral two times a day  multivitamin 1 Tablet(s) Oral daily  pantoprazole    Tablet 40 milliGRAM(s) Oral before breakfast  phenytoin   Chewable 200 milliGRAM(s) Chew at bedtime  piperacillin/tazobactam IVPB. 3.375 Gram(s) IV Intermittent every 8 hours  risperiDONE   Tablet 1 milliGRAM(s) Oral daily  risperiDONE   Tablet 3 milliGRAM(s) Oral at bedtime  sodium chloride 2 Gram(s) Oral daily  thiothixene 5 milliGRAM(s) Oral <User Schedule>      MEDICATIONS  (PRN):  acetaminophen   Tablet .. 650 milliGRAM(s) Oral every 6 hours PRN Temp greater or equal to 38C (100.4F), Mild Pain (1 - 3)  aluminum hydroxide/magnesium hydroxide/simethicone Suspension 30 milliLiter(s) Oral every 4 hours PRN Dyspepsia  magnesium hydroxide Suspension 30 milliLiter(s) Oral four times a day PRN Constipation  ondansetron Injectable 4 milliGRAM(s) IV Push every 6 hours PRN Nausea  senna 2 Tablet(s) Oral at bedtime PRN Constipation      all labs reviewed  all imaging reviewed      Assessment and Plan:    1. LLL Pna with cavitary lesion:  suspect GNR etiology  Zosyn IV day#3  Blood Cx negative x 2    Unable to r/o for TB; patient cannot produce sputum samples.  Low probab for TB considering no "B" symptoms are present    2. Seizures: controlled   cw current AED:     3. Mental retardation    4. Hyponatremia: resolved

## 2018-11-22 NOTE — PROGRESS NOTE ADULT - SUBJECTIVE AND OBJECTIVE BOX
TIGRE SOMMERS  MRN: [id number]    S: 11/22/2018: Chart reviewed. Patient is non-verbal this morning. Poorly cooperative with exam. No respiratory distress. He is resting comfortably.     PAST MEDICAL & SURGICAL HISTORY:  Bowel and bladder incontinence  Hearing impaired person  Intellectual disability  Hyperprolactinemia  Hyponatremia  Cataract  GERD (gastroesophageal reflux disease)  Osteoporosis  Seizure disorder  H/O cataract extraction      O: T(C): 36.2 (11-22-18 @ 04:57), Max: 37 (11-21-18 @ 13:24)  HR: 72 (11-22-18 @ 04:57) (66 - 74)  BP: 116/63 (11-22-18 @ 04:57) (91/60 - 116/63)  RR: 18 (11-22-18 @ 04:57) (17 - 18)  SpO2: 95% (11-22-18 @ 04:57) (95% - 97%)  Wt(kg): --    PHYSICAL EXAM:      GENERAL: no distress    NEURO: non-verbal    CHEST: clear - limited exam    CARDIAC: RR    EXT: no edema      LABS:                        13.2   5.48  )-----------( 214      ( 21 Nov 2018 08:03 )             37.8       11-21    135  |  98  |  16  ----------------------------<  86  3.5   |  28  |  0.74    Ca    9.1      21 Nov 2018 08:03    RADIOLOGY:    EXAM:  CT CHEST                            PROCEDURE DATE:  11/19/2018    INTERPRETATION:  CT CHEST WITHOUT CONTRAST    INDICATION: Cough. Left lower consolidation.    TECHNIQUE: Noncontrast CT imaging of the thorax. Coronal and sagittal   reformatted images are provided. Postprocessed MIP reformatted images   were created and reviewed..    COMPARISON: CT chest 6/16/2017.    FINDINGS:    Lines and tubes: None.  Airways: Tracheobronchial tree is patent.  Lungs and Pleura: Continued elevated left hemidiaphragm with interval   left left lower lobe consolidative opacity measuring 4.3 x 3.5 cm   containing areas of cavitation as best seen on image 39 of series 2.   Findings are nonspecific, this may represent developing cavitary   pneumonia. Mycobacterial infection or neoplastic etiology considered in   the differential. Further pulmonary workup and short-term imaging   follow-up is advised to demonstrate resolution. Associated left pleural   parenchymal thickening with trace left pleural effusion extending to the   apex. Mild asymmetric right sided groundglass attenuation, which may be   infectious or inflammatory nature. No pneumothorax.    Mediastinum and lymph nodes: Shotty mediastinal lymph nodes. Continued   mild upper thoracic esophageal distention, improved compared to the prior   study. Mild distal esophageal wall thickening, difficult to evaluate   secondary to inadequate distention. This may be related to esophagitis or   gastroesophageal reflux disease. Consider nonemergent upper endoscopy if   there is concern for esophageal malignancy.  Heart: Cardiomegaly without pericardial effusion.   Vessels: Normal size. Atherosclerotic disease of the aorta and its   branches.     Upper Abdomen:  Continued elevated left hemidiaphragm with stomach,   spleen and bowel loops identified within the left lower quadrant.   Incompletely characterized 1.3 x 1 cm right adrenal nodule for which   nonemergent adrenal protocol MR is advised provided no MR   contraindications.    Bones and soft tissues: Multilevel degenerative changes of the spine with   stable mild compression deformities of mid thoracic spine. Remote   fracture deformities of the left lower ribs.    IMPRESSION:    Continued elevated left hemidiaphragm with interval left left lower lobe   consolidative opacity measuring 4.3 x 3.5 cm containing areas of   cavitation. Findings are nonspecific, this may represent developing   cavitary pneumonia. Mycobacterial infection or neoplastic etiology   considered in the differential. Further pulmonary workup and short-term   imaging follow-up is advised to demonstrate resolution. Associated left   pleural parenchymal thickening with trace left pleural effusion extending   to the apex. Mild asymmetric right sided groundglass attenuation, which   may be infectious or inflammatory nature.     Mild upper thoracic esophageal distention, improved compared to the prior   study. Mild distal esophageal wall thickening, difficult to evaluate   secondary to inadequate distention. This may be related to esophagitis or   gastroesophageal reflux disease. Consider nonemergent upper endoscopy if   there is concern for esophageal malignancy.      These critical results were discussed via telephone at 11/20/2018 12:35   AM by Dr. Deleon of radiology with Dr. Whitten, institution read-back   verification policy was followed.       ERMA DELENO         MEDICATIONS  (STANDING):  aspirin  chewable 81 milliGRAM(s) Oral daily  benztropine 1 milliGRAM(s) Oral four times a day  calcium carbonate 1250 mG  + Vitamin D (OsCal 500 + D) 1 Tablet(s) Oral two times a day  carBAMazepine XR Tablet 400 milliGRAM(s) Oral daily  carBAMazepine XR Tablet 800 milliGRAM(s) Oral at bedtime  cholecalciferol 2000 Unit(s) Oral daily  Clobazam 30 milliGRAM(s) Oral at bedtime  clonazePAM Tablet 1 milliGRAM(s) Oral two times a day  diVALproex Sprinkle 625 milliGRAM(s) Oral at bedtime  diVALproex Sprinkle 500 milliGRAM(s) Oral daily  docusate sodium 100 milliGRAM(s) Oral three times a day  ferrous    sulfate 325 milliGRAM(s) Oral daily  levETIRAcetam 2000 milliGRAM(s) Oral two times a day  multivitamin 1 Tablet(s) Oral daily  pantoprazole    Tablet 40 milliGRAM(s) Oral before breakfast  phenytoin   Chewable 200 milliGRAM(s) Chew at bedtime  piperacillin/tazobactam IVPB. 3.375 Gram(s) IV Intermittent every 8 hours  risperiDONE   Tablet 1 milliGRAM(s) Oral daily  risperiDONE   Tablet 3 milliGRAM(s) Oral at bedtime  sodium chloride 2 Gram(s) Oral daily  thiothixene 5 milliGRAM(s) Oral <User Schedule>    MEDICATIONS  (PRN):  acetaminophen   Tablet .. 650 milliGRAM(s) Oral every 6 hours PRN Temp greater or equal to 38C (100.4F), Mild Pain (1 - 3)  aluminum hydroxide/magnesium hydroxide/simethicone Suspension 30 milliLiter(s) Oral every 4 hours PRN Dyspepsia  magnesium hydroxide Suspension 30 milliLiter(s) Oral four times a day PRN Constipation  ondansetron Injectable 4 milliGRAM(s) IV Push every 6 hours PRN Nausea  senna 2 Tablet(s) Oral at bedtime PRN Constipation        A/P:  1. LLL consolidation with cavitary component; aspiration risk.     2. Hx of positive PPD - treated with INH.     3. Severe cognitive impairment - unable to cooperate with sputum collection.    PLAN: Continue Zosyn. Follow up chest imaging. Although TB is in the differential diagnosis, most likely dx is aspiration pneumonia. Any additional evaluation for TB will depend on the patients clinical course and results of short interval follow up chest CT.     D/W Dr. Feliz.     Dr. Garza will follow up in AM 11/23.

## 2018-11-23 LAB
ANION GAP SERPL CALC-SCNC: 10 MMOL/L — SIGNIFICANT CHANGE UP (ref 5–17)
BUN SERPL-MCNC: 13 MG/DL — SIGNIFICANT CHANGE UP (ref 7–23)
CALCIUM SERPL-MCNC: 8.8 MG/DL — SIGNIFICANT CHANGE UP (ref 8.5–10.1)
CARBAMAZEPINE SERPL-MCNC: 0.9 UG/ML — LOW (ref 4–12)
CHLORIDE SERPL-SCNC: 102 MMOL/L — SIGNIFICANT CHANGE UP (ref 96–108)
CO2 SERPL-SCNC: 24 MMOL/L — SIGNIFICANT CHANGE UP (ref 22–31)
CREAT SERPL-MCNC: 0.92 MG/DL — SIGNIFICANT CHANGE UP (ref 0.5–1.3)
GLUCOSE BLDC GLUCOMTR-MCNC: 117 MG/DL — HIGH (ref 70–99)
GLUCOSE SERPL-MCNC: 111 MG/DL — HIGH (ref 70–99)
PHENYTOIN FREE SERPL-MCNC: 11.9 UG/ML — SIGNIFICANT CHANGE UP (ref 10–20)
POTASSIUM SERPL-MCNC: 3.9 MMOL/L — SIGNIFICANT CHANGE UP (ref 3.5–5.3)
POTASSIUM SERPL-SCNC: 3.9 MMOL/L — SIGNIFICANT CHANGE UP (ref 3.5–5.3)
SODIUM SERPL-SCNC: 136 MMOL/L — SIGNIFICANT CHANGE UP (ref 135–145)
VALPROATE SERPL-MCNC: 4 UG/ML — LOW (ref 50–100)

## 2018-11-23 RX ORDER — HALOPERIDOL DECANOATE 100 MG/ML
2 INJECTION INTRAMUSCULAR ONCE
Qty: 0 | Refills: 0 | Status: COMPLETED | OUTPATIENT
Start: 2018-11-23 | End: 2018-11-23

## 2018-11-23 RX ORDER — CARBAMAZEPINE 200 MG
400 TABLET ORAL DAILY
Qty: 0 | Refills: 0 | Status: DISCONTINUED | OUTPATIENT
Start: 2018-11-23 | End: 2018-11-24

## 2018-11-23 RX ORDER — DIVALPROEX SODIUM 500 MG/1
6 TABLET, DELAYED RELEASE ORAL
Qty: 0 | Refills: 0 | COMMUNITY

## 2018-11-23 RX ORDER — CLONAZEPAM 1 MG
0 TABLET ORAL
Qty: 0 | Refills: 0 | COMMUNITY

## 2018-11-23 RX ORDER — CARBAMAZEPINE 200 MG
800 TABLET ORAL AT BEDTIME
Qty: 0 | Refills: 0 | Status: DISCONTINUED | OUTPATIENT
Start: 2018-11-23 | End: 2018-11-24

## 2018-11-23 RX ORDER — BENZTROPINE MESYLATE 1 MG
1 TABLET ORAL
Qty: 0 | Refills: 0 | COMMUNITY

## 2018-11-23 RX ORDER — CLOBAZAM 10 MG/1
2 TABLET ORAL
Qty: 0 | Refills: 0 | COMMUNITY

## 2018-11-23 RX ORDER — VALPROIC ACID (AS SODIUM SALT) 250 MG/5ML
625 SOLUTION, ORAL ORAL AT BEDTIME
Qty: 0 | Refills: 0 | Status: COMPLETED | OUTPATIENT
Start: 2018-11-23 | End: 2018-11-23

## 2018-11-23 RX ORDER — FOSPHENYTOIN 50 MG/ML
200 INJECTION INTRAMUSCULAR; INTRAVENOUS ONCE
Qty: 0 | Refills: 0 | Status: COMPLETED | OUTPATIENT
Start: 2018-11-23 | End: 2018-11-23

## 2018-11-23 RX ORDER — DIVALPROEX SODIUM 500 MG/1
7 TABLET, DELAYED RELEASE ORAL
Qty: 0 | Refills: 0 | COMMUNITY

## 2018-11-23 RX ORDER — RISPERIDONE 4 MG/1
0 TABLET ORAL
Qty: 0 | Refills: 0 | COMMUNITY

## 2018-11-23 RX ORDER — LEVETIRACETAM 250 MG/1
2000 TABLET, FILM COATED ORAL
Qty: 0 | Refills: 0 | Status: DISCONTINUED | OUTPATIENT
Start: 2018-11-23 | End: 2018-11-25

## 2018-11-23 RX ORDER — LEVETIRACETAM 250 MG/1
2000 TABLET, FILM COATED ORAL ONCE
Qty: 0 | Refills: 0 | Status: COMPLETED | OUTPATIENT
Start: 2018-11-23 | End: 2018-11-23

## 2018-11-23 RX ORDER — LEVETIRACETAM 250 MG/1
2000 TABLET, FILM COATED ORAL EVERY 12 HOURS
Qty: 0 | Refills: 0 | Status: DISCONTINUED | OUTPATIENT
Start: 2018-11-23 | End: 2018-11-23

## 2018-11-23 RX ORDER — HALOPERIDOL DECANOATE 100 MG/ML
3 INJECTION INTRAMUSCULAR ONCE
Qty: 0 | Refills: 0 | Status: COMPLETED | OUTPATIENT
Start: 2018-11-23 | End: 2018-11-23

## 2018-11-23 RX ORDER — DIVALPROEX SODIUM 500 MG/1
4 TABLET, DELAYED RELEASE ORAL
Qty: 0 | Refills: 0 | COMMUNITY

## 2018-11-23 RX ADMIN — Medication 100 MILLIGRAM(S): at 05:40

## 2018-11-23 RX ADMIN — Medication 2 MILLIGRAM(S): at 12:31

## 2018-11-23 RX ADMIN — PIPERACILLIN AND TAZOBACTAM 25 GRAM(S): 4; .5 INJECTION, POWDER, LYOPHILIZED, FOR SOLUTION INTRAVENOUS at 05:42

## 2018-11-23 RX ADMIN — LEVETIRACETAM 400 MILLIGRAM(S): 250 TABLET, FILM COATED ORAL at 14:21

## 2018-11-23 RX ADMIN — HALOPERIDOL DECANOATE 3 MILLIGRAM(S): 100 INJECTION INTRAMUSCULAR at 11:12

## 2018-11-23 RX ADMIN — Medication 1 MILLIGRAM(S): at 05:39

## 2018-11-23 RX ADMIN — HALOPERIDOL DECANOATE 2 MILLIGRAM(S): 100 INJECTION INTRAMUSCULAR at 12:31

## 2018-11-23 RX ADMIN — LEVETIRACETAM 2000 MILLIGRAM(S): 250 TABLET, FILM COATED ORAL at 05:38

## 2018-11-23 RX ADMIN — Medication 53.13 MILLIGRAM(S): at 22:48

## 2018-11-23 RX ADMIN — FOSPHENYTOIN 108 MILLIGRAM(S) PE: 50 INJECTION INTRAMUSCULAR; INTRAVENOUS at 21:38

## 2018-11-23 RX ADMIN — Medication 1 TABLET(S): at 05:37

## 2018-11-23 RX ADMIN — PIPERACILLIN AND TAZOBACTAM 25 GRAM(S): 4; .5 INJECTION, POWDER, LYOPHILIZED, FOR SOLUTION INTRAVENOUS at 14:38

## 2018-11-23 RX ADMIN — Medication 1 MILLIGRAM(S): at 05:41

## 2018-11-23 RX ADMIN — Medication 2 MILLIGRAM(S): at 14:11

## 2018-11-23 RX ADMIN — PANTOPRAZOLE SODIUM 40 MILLIGRAM(S): 20 TABLET, DELAYED RELEASE ORAL at 07:58

## 2018-11-23 NOTE — PROVIDER CONTACT NOTE (CHANGE IN STATUS NOTIFICATION) - ACTION/TREATMENT ORDERED:
Pt remains on unit. No further treatment at this time. Pt remains on unit. IV Keppra, Ativan 2mg IVP. Ativan PRN q15

## 2018-11-23 NOTE — PROGRESS NOTE ADULT - SUBJECTIVE AND OBJECTIVE BOX
RN called to notify that Pt is refusing to take PO meds and he has Seizure meds ordered PO for bedtime. Pt has Keppra, Dilantin, Depakote and Carbamazepine all PO ordered. Pt had seizure during daytime and RRT called. RN reported that pt also has ? seizure in evening. Pt is due for seizure meds at bedtime.   D/w Pharmacist and IV conversion AED meds ordered for bedtime. Avoid Haldol, Haldol lower seizure threshold.  Pt is non complaint with AED meds. Neurology consult ordered to adjust AED meds dose. Seizure precaution. Keppra, Valproic acid, Dilantin level ordered.     Case d/w Dr. Janine Gray d/w RN.

## 2018-11-23 NOTE — PROGRESS NOTE ADULT - SUBJECTIVE AND OBJECTIVE BOX
HOSPITAL COURSE AND ASSESSMENT  54 yo WM with PMH severe MR, minimally verbal, seizure disorder on multiple meds, Iatrogenic  chr hyponatremia and hyperprolactinemia,  [facility] evaluation of cavitary lesion in the lung. Pt unable to provide any history. Pt had a seizure episode in ED due to missed seizure meds.    Upon ED evaluation, pt was found to have LLL Pna with 4cm cavitation, most consistent with aspiration. He was placed on isolation for small possibility of TB. Unfortunately pt unable to expectorate to produce sputum sample. Serial CT was ordered to assess resolution on antibiotic. Course complicated by additional seizure activity for which RRT was called 11/23.      *LLL Pna with cavitary lesion due to suspected aspiration pneumonia, less likely TB  suspect GNR etiology  Zosyn IV day#3  Blood Cx negative x 2  ID following    *Unable to r/o for TB; patient cannot produce sputum samples.  Low probab for TB considering no "B" symptoms are present, location of cavitation  repeat CT attempted, but patient unable to tolerate mask  await pulmonary/ID imput on how to proceed    *Seizure disorder  -on keppra 2000 BID, tegretol, dilantin PTA so suspect poor control  -seizure in ED on arrival with meds held  -tegretol suspended by pharmacy due to none in stock, provider not notified  -converted tegretol to alternate formulation in stock 11/23 (just prior to seizure 11.23)  -keppra PO, but IV dose given when pt refused this AM  -Neurology consulted    Mental retardation   assistance with ADLs      Chronic Hyponatremia: resolved               ----------------------------------------------------------------------------------------------  CHIEF COMPLAINT:  seizure     SUBJECTIVE:     tolerating PO. seizure activity today. unable to cooperate with CT scan because would not tolerate mask  nonverbal    REVIEW OF SYSTEMS:  ltd due to AMS      Vital Signs Last 24 Hrs  T(C): 37.3 (23 Nov 2018 14:58), Max: 37.3 (23 Nov 2018 14:58)  T(F): 99.1 (23 Nov 2018 14:58), Max: 99.1 (23 Nov 2018 14:58)  HR: 100 (23 Nov 2018 14:58) (58 - 100)  BP: 118/82 (23 Nov 2018 14:58) (105/74 - 118/82)  BP(mean): --  RR: 20 (23 Nov 2018 14:58) (20 - 20)  SpO2: 93% (23 Nov 2018 14:58) (93% - 95%)  CAPILLARY BLOOD GLUCOSE      POCT Blood Glucose.: 117 mg/dL (23 Nov 2018 13:10)      PHYSICAL EXAM:  Constitutional: NAD, NCAT  HEENT: EOMI, Normal Hearing, MMM, fair dentition  Neck: trachea midline, No JVD  Respiratory: Breath sounds are clear, unlabored respiration  Cardiovascular: S1 and S2, regular rate   Gastrointestinal: Bowel Sounds present, soft, nontender, nondistended  Extremities: No peripheral edema  Vascular: 2+ radial pulse  Neurological: awake, alert, follows no commands, sensation grossly intact  Psych: appropriate affect,  poor insight  Musculoskeletal: moves all extremities  Skin: No rashes    ALLERGIES  Carrots (Other)  Corn (Other)  Dislikes Beans (Other)  Dislikes Peas (Other)  Lamisil (Unknown)  peas, carrots, corn, beans (Diarrhea)  strawberry (Unknown)      MEDICATIONS  (STANDING):  aspirin  chewable 81 milliGRAM(s) Oral daily  benztropine 1 milliGRAM(s) Oral four times a day  calcium carbonate 1250 mG  + Vitamin D (OsCal 500 + D) 1 Tablet(s) Oral two times a day  carBAMazepine ER Capsule 400 milliGRAM(s) Oral daily  carBAMazepine ER Capsule 800 milliGRAM(s) Oral at bedtime  cholecalciferol 2000 Unit(s) Oral daily  Clobazam 30 milliGRAM(s) Oral at bedtime  clonazePAM Tablet 1 milliGRAM(s) Oral two times a day  diVALproex Sprinkle 625 milliGRAM(s) Oral at bedtime  diVALproex Sprinkle 500 milliGRAM(s) Oral daily  docusate sodium 100 milliGRAM(s) Oral three times a day  ferrous    sulfate 325 milliGRAM(s) Oral daily  levETIRAcetam 2000 milliGRAM(s) Oral two times a day  multivitamin 1 Tablet(s) Oral daily  pantoprazole    Tablet 40 milliGRAM(s) Oral before breakfast  phenytoin   Chewable 200 milliGRAM(s) Chew at bedtime  piperacillin/tazobactam IVPB. 3.375 Gram(s) IV Intermittent every 8 hours  risperiDONE   Tablet 1 milliGRAM(s) Oral daily  risperiDONE   Tablet 3 milliGRAM(s) Oral at bedtime  sodium chloride 2 Gram(s) Oral daily  thiothixene 5 milliGRAM(s) Oral <User Schedule>  valproate sodium IVPB 625 milliGRAM(s) IV Intermittent at bedtime      LABS: All Labs Reviewed:                Blood Culture: 11-20 @ 04:42  Organism --  Gram Stain Blood -- Gram Stain --  Specimen Source .Blood None  Culture-Blood -- HOSPITAL COURSE AND ASSESSMENT  54 yo WM with PMH severe MR, minimally verbal, seizure disorder on multiple meds, Iatrogenic  chr hyponatremia and hyperprolactinemia,  presented from care home for evaluation of cavitary lesion in the lung. Pt unable to provide any history. Pt had a seizure episode in ED due to missed seizure meds.    Upon ED evaluation, pt was found to have LLL Pna with 4cm cavitation, most consistent with aspiration. He was placed on isolation for small possibility of TB. Unfortunately pt unable to expectorate to produce sputum sample. Serial CT was ordered to assess resolution on antibiotic. Course complicated by additional seizure activity for which RRT was called 11/23.      *LLL Pna with cavitary lesion due to suspected aspiration pneumonia, less likely TB  suspect GNR etiology  Zosyn IV day#4  Blood Cx negative x 2  ID following    *Unable to r/o for TB; patient cannot produce sputum samples.  Low probab for TB considering no "B" symptoms are present, location of cavitation  repeat CT attempted, but patient unable to tolerate mask  await pulmonary/ID imput on how to proceed    *Seizure disorder  -on keppra 2000 BID, tegretol, dilantin PTA so suspect poor control  -seizure in ED on arrival with meds held  -tegretol suspended by pharmacy due to none in stock, provider not notified  -converted tegretol to alternate formulation in stock 11/23 (just prior to seizure 11.23)  -keppra PO, but IV dose given when pt refused this AM  -Neurology consulted    Mental retardation   assistance with ADLs      Chronic Hyponatremia: resolved               ----------------------------------------------------------------------------------------------  CHIEF COMPLAINT:  seizure     SUBJECTIVE:     tolerating PO. seizure activity today. unable to cooperate with CT scan because would not tolerate mask  nonverbal    REVIEW OF SYSTEMS:  ltd due to AMS      Vital Signs Last 24 Hrs  T(C): 37.3 (23 Nov 2018 14:58), Max: 37.3 (23 Nov 2018 14:58)  T(F): 99.1 (23 Nov 2018 14:58), Max: 99.1 (23 Nov 2018 14:58)  HR: 100 (23 Nov 2018 14:58) (58 - 100)  BP: 118/82 (23 Nov 2018 14:58) (105/74 - 118/82)  BP(mean): --  RR: 20 (23 Nov 2018 14:58) (20 - 20)  SpO2: 93% (23 Nov 2018 14:58) (93% - 95%)  CAPILLARY BLOOD GLUCOSE      POCT Blood Glucose.: 117 mg/dL (23 Nov 2018 13:10)      PHYSICAL EXAM:  Constitutional: NAD, NCAT  HEENT: EOMI, Normal Hearing, MMM, fair dentition  Neck: trachea midline, No JVD  Respiratory: Breath sounds are clear, unlabored respiration  Cardiovascular: S1 and S2, regular rate   Gastrointestinal: Bowel Sounds present, soft, nontender, nondistended  Extremities: No peripheral edema  Vascular: 2+ radial pulse  Neurological: awake, alert, follows no commands, sensation grossly intact  Psych: appropriate affect,  poor insight  Musculoskeletal: moves all extremities  Skin: No rashes    ALLERGIES  Carrots (Other)  Corn (Other)  Dislikes Beans (Other)  Dislikes Peas (Other)  Lamisil (Unknown)  peas, carrots, corn, beans (Diarrhea)  strawberry (Unknown)      MEDICATIONS  (STANDING):  aspirin  chewable 81 milliGRAM(s) Oral daily  benztropine 1 milliGRAM(s) Oral four times a day  calcium carbonate 1250 mG  + Vitamin D (OsCal 500 + D) 1 Tablet(s) Oral two times a day  carBAMazepine ER Capsule 400 milliGRAM(s) Oral daily  carBAMazepine ER Capsule 800 milliGRAM(s) Oral at bedtime  cholecalciferol 2000 Unit(s) Oral daily  Clobazam 30 milliGRAM(s) Oral at bedtime  clonazePAM Tablet 1 milliGRAM(s) Oral two times a day  diVALproex Sprinkle 625 milliGRAM(s) Oral at bedtime  diVALproex Sprinkle 500 milliGRAM(s) Oral daily  docusate sodium 100 milliGRAM(s) Oral three times a day  ferrous    sulfate 325 milliGRAM(s) Oral daily  levETIRAcetam 2000 milliGRAM(s) Oral two times a day  multivitamin 1 Tablet(s) Oral daily  pantoprazole    Tablet 40 milliGRAM(s) Oral before breakfast  phenytoin   Chewable 200 milliGRAM(s) Chew at bedtime  piperacillin/tazobactam IVPB. 3.375 Gram(s) IV Intermittent every 8 hours  risperiDONE   Tablet 1 milliGRAM(s) Oral daily  risperiDONE   Tablet 3 milliGRAM(s) Oral at bedtime  sodium chloride 2 Gram(s) Oral daily  thiothixene 5 milliGRAM(s) Oral <User Schedule>  valproate sodium IVPB 625 milliGRAM(s) IV Intermittent at bedtime      LABS: All Labs Reviewed:                Blood Culture: 11-20 @ 04:42  Organism --  Gram Stain Blood -- Gram Stain --  Specimen Source .Blood None  Culture-Blood --

## 2018-11-23 NOTE — PROGRESS NOTE ADULT - SUBJECTIVE AND OBJECTIVE BOX
SUBJECTIVE     Patient is non verbal and no events reported . no issues with the sob   He is unable to produce sputum   clinical suspicion for the TB is less     PAST MEDICAL & SURGICAL HISTORY:  Bowel and bladder incontinence  Hearing impaired person  Intellectual disability  Hyperprolactinemia  Hyponatremia  Cataract  GERD (gastroesophageal reflux disease)  Osteoporosis  Seizure disorder  H/O cataract extraction    OBJECTIVE   Vital Signs Last 24 Hrs  T(C): 36.4 (23 Nov 2018 05:03), Max: 36.7 (22 Nov 2018 16:54)  T(F): 97.5 (23 Nov 2018 05:03), Max: 98 (22 Nov 2018 16:54)  HR: 58 (23 Nov 2018 05:03) (58 - 80)  BP: 105/74 (23 Nov 2018 05:03) (97/61 - 105/74)  BP(mean): --  RR: 20 (23 Nov 2018 05:03) (19 - 20)  SpO2: 95% (23 Nov 2018 05:03) (95% - 96%)    PHYSICAL EXAM:  Constitutional: , awake and alert, not in distress.  HEENT: Normo cephalic atraumatic  Neck: Soft and supple, No J.V.D   Respiratory: poor effort   Cardiovascular: S1 and S2, regular rate .   Gastrointestinal:  soft, nontender,   Extremities: No  edema or calf tenderness .  Neurological: seen sitting in chair     MEDICATIONS  (STANDING):  aspirin  chewable 81 milliGRAM(s) Oral daily  benztropine 1 milliGRAM(s) Oral four times a day  calcium carbonate 1250 mG  + Vitamin D (OsCal 500 + D) 1 Tablet(s) Oral two times a day  carBAMazepine XR Tablet 400 milliGRAM(s) Oral daily  carBAMazepine XR Tablet 800 milliGRAM(s) Oral at bedtime  cholecalciferol 2000 Unit(s) Oral daily  Clobazam 30 milliGRAM(s) Oral at bedtime  clonazePAM Tablet 1 milliGRAM(s) Oral two times a day  diVALproex Sprinkle 625 milliGRAM(s) Oral at bedtime  diVALproex Sprinkle 500 milliGRAM(s) Oral daily  docusate sodium 100 milliGRAM(s) Oral three times a day  ferrous    sulfate 325 milliGRAM(s) Oral daily  levETIRAcetam  IVPB 2000 milliGRAM(s) IV Intermittent every 12 hours  multivitamin 1 Tablet(s) Oral daily  pantoprazole    Tablet 40 milliGRAM(s) Oral before breakfast  phenytoin   Chewable 200 milliGRAM(s) Chew at bedtime  piperacillin/tazobactam IVPB. 3.375 Gram(s) IV Intermittent every 8 hours  risperiDONE   Tablet 1 milliGRAM(s) Oral daily  risperiDONE   Tablet 3 milliGRAM(s) Oral at bedtime  sodium chloride 2 Gram(s) Oral daily  thiothixene 5 milliGRAM(s) Oral <User Schedule>

## 2018-11-23 NOTE — CHART NOTE - NSCHARTNOTEFT_GEN_A_CORE
RAPID RESPONSE called for patient having seizure    As per nurse, pt began having seizure. Should have been sent for CT scan for PNA r/o TB however pt unable to keep his mask on. Of note, nurse reports patient has not received Tegretol and missed a dose of Keppra last night.   Pt was seen and examined at bedside. Nonverbal, not following commands. Able to open his eyes    VS- BP - 153/116 -> 145/94, HR= 128-> 95, T= 98.6 F,     PHYSICAL EXAM    Constitutional- NAD, postictal appearance  Neuro- non verbal, not following commands  CV- +S1/S2  Lungs- CTAB  Abdomen- soft , nontender, + BS      A/P-    52 yo M with PMH of IDD and seizure d/o admitted for PNA R/O TB ,having seizure when RAPID RESPONSE was called. Pt now postictal  - Tegretol was previously held and pt missed dose of Keppra   - Haldol  -Ativan   -Continue Keppra 2 g BID, and tegretol       Hospitalist and Intensivist also present during this encounter.    Case was discussed with Dr. Ruby PGY-2. RAPID RESPONSE called for patient having seizure    As per nurse, pt began having seizure. Should have been sent for CT scan for PNA r/o TB however pt unable to keep his mask on. Of note, nurse reports patient has not received Tegretol and missed a dose of Keppra last night.   Pt was seen and examined at bedside. Nonverbal, not following commands. Able to open his eyes    VS- BP - 153/116 -> 145/94, HR= 128-> 95, T= 98.6 F,     PHYSICAL EXAM    Constitutional- NAD, postictal appearance  Neuro- non verbal, not following commands  CV- +S1/S2  Lungs- CTAB  Abdomen- soft , nontender, + BS      A/P-    52 yo M with PMH of IDD and seizure d/o admitted for PNA R/O TB ,having seizure when RAPID RESPONSE was called. Pt now postictal  - Tegretol was previously held and pt missed dose of Keppra   - Haldol given , Ativan 2 mg IVP x1  -Keppra 2 g x1  -Ativan 2 mg IVP q15 minutes PRN   -Continue Keppra 2 g BID      Hospitalist and Intensivist also present during this encounter.    Case was discussed with Dr. Ruby PGY-2.

## 2018-11-24 LAB
ANION GAP SERPL CALC-SCNC: 7 MMOL/L — SIGNIFICANT CHANGE UP (ref 5–17)
BUN SERPL-MCNC: 13 MG/DL — SIGNIFICANT CHANGE UP (ref 7–23)
CALCIUM SERPL-MCNC: 8.8 MG/DL — SIGNIFICANT CHANGE UP (ref 8.5–10.1)
CHLORIDE SERPL-SCNC: 102 MMOL/L — SIGNIFICANT CHANGE UP (ref 96–108)
CO2 SERPL-SCNC: 26 MMOL/L — SIGNIFICANT CHANGE UP (ref 22–31)
CREAT SERPL-MCNC: 0.67 MG/DL — SIGNIFICANT CHANGE UP (ref 0.5–1.3)
GLUCOSE SERPL-MCNC: 97 MG/DL — SIGNIFICANT CHANGE UP (ref 70–99)
POTASSIUM SERPL-MCNC: 3.7 MMOL/L — SIGNIFICANT CHANGE UP (ref 3.5–5.3)
POTASSIUM SERPL-SCNC: 3.7 MMOL/L — SIGNIFICANT CHANGE UP (ref 3.5–5.3)
SODIUM SERPL-SCNC: 135 MMOL/L — SIGNIFICANT CHANGE UP (ref 135–145)

## 2018-11-24 PROCEDURE — 99223 1ST HOSP IP/OBS HIGH 75: CPT

## 2018-11-24 RX ORDER — CLONAZEPAM 1 MG
0.5 TABLET ORAL DAILY
Qty: 0 | Refills: 0 | Status: DISCONTINUED | OUTPATIENT
Start: 2018-11-24 | End: 2018-11-25

## 2018-11-24 RX ORDER — LEVETIRACETAM 250 MG/1
1000 TABLET, FILM COATED ORAL ONCE
Qty: 0 | Refills: 0 | Status: COMPLETED | OUTPATIENT
Start: 2018-11-24 | End: 2018-11-24

## 2018-11-24 RX ORDER — CARBAMAZEPINE 200 MG
400 TABLET ORAL THREE TIMES A DAY
Qty: 0 | Refills: 0 | Status: DISCONTINUED | OUTPATIENT
Start: 2018-11-24 | End: 2018-12-03

## 2018-11-24 RX ORDER — CARBAMAZEPINE 200 MG
400 TABLET ORAL ONCE
Qty: 0 | Refills: 0 | Status: COMPLETED | OUTPATIENT
Start: 2018-11-24 | End: 2018-11-24

## 2018-11-24 RX ORDER — ENOXAPARIN SODIUM 100 MG/ML
40 INJECTION SUBCUTANEOUS DAILY
Qty: 0 | Refills: 0 | Status: DISCONTINUED | OUTPATIENT
Start: 2018-11-24 | End: 2018-12-03

## 2018-11-24 RX ORDER — CLONAZEPAM 1 MG
1 TABLET ORAL AT BEDTIME
Qty: 0 | Refills: 0 | Status: DISCONTINUED | OUTPATIENT
Start: 2018-11-24 | End: 2018-11-25

## 2018-11-24 RX ADMIN — Medication 400 MILLIGRAM(S): at 17:35

## 2018-11-24 RX ADMIN — Medication 1 TABLET(S): at 13:22

## 2018-11-24 RX ADMIN — SODIUM CHLORIDE 2 GRAM(S): 9 INJECTION INTRAMUSCULAR; INTRAVENOUS; SUBCUTANEOUS at 13:24

## 2018-11-24 RX ADMIN — RISPERIDONE 1 MILLIGRAM(S): 4 TABLET ORAL at 13:27

## 2018-11-24 RX ADMIN — Medication 1 MILLIGRAM(S): at 05:00

## 2018-11-24 RX ADMIN — Medication 400 MILLIGRAM(S): at 22:48

## 2018-11-24 RX ADMIN — Medication 5 MILLIGRAM(S): at 22:46

## 2018-11-24 RX ADMIN — Medication 200 MILLIGRAM(S): at 22:47

## 2018-11-24 RX ADMIN — Medication 100 MILLIGRAM(S): at 13:29

## 2018-11-24 RX ADMIN — Medication 81 MILLIGRAM(S): at 13:22

## 2018-11-24 RX ADMIN — Medication 1 MILLIGRAM(S): at 13:29

## 2018-11-24 RX ADMIN — Medication 325 MILLIGRAM(S): at 13:22

## 2018-11-24 RX ADMIN — DIVALPROEX SODIUM 500 MILLIGRAM(S): 500 TABLET, DELAYED RELEASE ORAL at 13:26

## 2018-11-24 RX ADMIN — Medication 1 TABLET(S): at 05:00

## 2018-11-24 RX ADMIN — Medication 400 MILLIGRAM(S): at 13:25

## 2018-11-24 RX ADMIN — DIVALPROEX SODIUM 625 MILLIGRAM(S): 500 TABLET, DELAYED RELEASE ORAL at 22:47

## 2018-11-24 RX ADMIN — PANTOPRAZOLE SODIUM 40 MILLIGRAM(S): 20 TABLET, DELAYED RELEASE ORAL at 05:22

## 2018-11-24 RX ADMIN — PIPERACILLIN AND TAZOBACTAM 25 GRAM(S): 4; .5 INJECTION, POWDER, LYOPHILIZED, FOR SOLUTION INTRAVENOUS at 17:35

## 2018-11-24 RX ADMIN — PIPERACILLIN AND TAZOBACTAM 25 GRAM(S): 4; .5 INJECTION, POWDER, LYOPHILIZED, FOR SOLUTION INTRAVENOUS at 09:51

## 2018-11-24 RX ADMIN — Medication 100 MILLIGRAM(S): at 22:48

## 2018-11-24 RX ADMIN — LEVETIRACETAM 2000 MILLIGRAM(S): 250 TABLET, FILM COATED ORAL at 05:19

## 2018-11-24 RX ADMIN — Medication 100 MILLIGRAM(S): at 05:21

## 2018-11-24 RX ADMIN — ENOXAPARIN SODIUM 40 MILLIGRAM(S): 100 INJECTION SUBCUTANEOUS at 17:35

## 2018-11-24 RX ADMIN — SENNA PLUS 2 TABLET(S): 8.6 TABLET ORAL at 22:48

## 2018-11-24 RX ADMIN — RISPERIDONE 3 MILLIGRAM(S): 4 TABLET ORAL at 22:48

## 2018-11-24 RX ADMIN — LEVETIRACETAM 400 MILLIGRAM(S): 250 TABLET, FILM COATED ORAL at 19:03

## 2018-11-24 RX ADMIN — PIPERACILLIN AND TAZOBACTAM 25 GRAM(S): 4; .5 INJECTION, POWDER, LYOPHILIZED, FOR SOLUTION INTRAVENOUS at 03:15

## 2018-11-24 RX ADMIN — Medication 2000 UNIT(S): at 13:22

## 2018-11-24 RX ADMIN — Medication 1 MILLIGRAM(S): at 22:47

## 2018-11-24 NOTE — CONSULT NOTE ADULT - SUBJECTIVE AND OBJECTIVE BOX
HPI:  52yo/M with PMH severe MR, minimally verbal, seizure disorder on multiple meds, chr hyponatremia and hyperprolactinemia due to meds presented from alf for evaluation of cavitary lesion in the lung. Pt unable to provide any history. Pt had a seizure episode in ED due to missed seizure meds (20 Nov 2018 08:55)  -----------  CT chest with cavitary lesion and incidental thickening of the esophagus. Patient non-verbal    PAST MEDICAL & SURGICAL HISTORY:  Bowel and bladder incontinence  Hearing impaired person  Intellectual disability  Hyperprolactinemia  Hyponatremia  Cataract  GERD (gastroesophageal reflux disease)  Osteoporosis  Seizure disorder  H/O cataract extraction      Home Medications:  aspirin 81 mg oral tablet, chewable: 1 tab(s) orally once a day (09 May 2018 16:51)  benztropine 1 mg oral tablet: 1 tab(s) orally 4 times a day (09 May 2018 16:51)  Calcium 600+D oral tablet: 1 tab(s) orally 2 times a day (09 May 2018 16:51)  cholecalciferol 2000 intl units oral capsule: 1 cap(s) orally once a day (09 May 2018 16:51)  clonazePAM 1 mg oral tablet: 1 milligram(s) orally 2 times a day (09 May 2018 16:51)  Depakote Sprinkles 125 mg oral delayed release capsule: 5 cap(s) orally once a day (at bedtime) (09 May 2018 16:51)  Depakote Sprinkles 125 mg oral delayed release capsule: 4 cap(s) orally once a day (09 May 2018 16:51)  Dilantin Infatabs 50 mg oral tablet, chewable: 4 tab(s) orally once a day (at bedtime) (09 May 2018 16:51)  ferrous sulfate 325 mg (65 mg elemental iron) oral delayed release tablet: 1 tab(s) orally once a day (09 May 2018 16:51)  ibandronate 150 mg oral tablet: 1 tab(s) orally once a month (09 May 2018 16:51)  Keppra 1000 mg oral tablet: 2 tab(s) orally 2 times a day (09 May 2018 16:51)  multivitamin with iron: 1 tab(s) orally once a day (09 May 2018 16:51)  Onfi 10 mg oral tablet: 3 tab(s) orally once a day (at bedtime) (09 May 2018 16:51)  PriLOSEC 20 mg oral delayed release capsule: 1 cap(s) orally once a day (09 May 2018 16:51)  RisperDAL 1 mg oral tablet: 1 tab(s) orally once a day (09 May 2018 16:51)  RisperDAL 3 mg oral tablet: 1 tab(s) orally once a day (at bedtime) (09 May 2018 16:51)  risperiDONE 1 mg oral tablet: 1 tab(s) orally once a day (09 May 2018 14:11)  risperiDONE 3 mg oral tablet: 1 tab(s) orally once a day (at bedtime) (09 May 2018 14:11)  Senokot S 50 mg-8.6 mg oral tablet: 1 tab(s) orally once a day (at bedtime) (09 May 2018 16:51)  sodium chloride 1 g oral tablet: 2 tab(s) orally once a day (09 May 2018 16:51)  TEGretol  mg oral tablet, extended release: 1 tab(s) orally once a day (09 May 2018 16:51)  TEGretol  mg oral tablet, extended release: 2 tab(s) orally once a day (at bedtime) (09 May 2018 16:51)  thiothixene 5 mg oral capsule: 1 cap(s) orally once a day (at bedtime) (09 May 2018 16:51)      MEDICATIONS  (STANDING):  aspirin  chewable 81 milliGRAM(s) Oral daily  benztropine 1 milliGRAM(s) Oral four times a day  calcium carbonate 1250 mG  + Vitamin D (OsCal 500 + D) 1 Tablet(s) Oral two times a day  carBAMazepine ER Capsule 800 milliGRAM(s) Oral once  carBAMazepine XR Tablet 400 milliGRAM(s) Oral daily  carBAMazepine XR Tablet 800 milliGRAM(s) Oral at bedtime  cholecalciferol 2000 Unit(s) Oral daily  Clobazam 30 milliGRAM(s) Oral at bedtime  clonazePAM Tablet 1 milliGRAM(s) Oral two times a day  diVALproex Sprinkle 625 milliGRAM(s) Oral at bedtime  diVALproex Sprinkle 500 milliGRAM(s) Oral daily  docusate sodium 100 milliGRAM(s) Oral three times a day  ferrous    sulfate 325 milliGRAM(s) Oral daily  levETIRAcetam 2000 milliGRAM(s) Oral two times a day  multivitamin 1 Tablet(s) Oral daily  pantoprazole    Tablet 40 milliGRAM(s) Oral before breakfast  phenytoin   Chewable 200 milliGRAM(s) Chew at bedtime  piperacillin/tazobactam IVPB. 3.375 Gram(s) IV Intermittent every 8 hours  risperiDONE   Tablet 1 milliGRAM(s) Oral daily  risperiDONE   Tablet 3 milliGRAM(s) Oral at bedtime  sodium chloride 2 Gram(s) Oral daily  thiothixene 5 milliGRAM(s) Oral <User Schedule>    MEDICATIONS  (PRN):  acetaminophen   Tablet .. 650 milliGRAM(s) Oral every 6 hours PRN Temp greater or equal to 38C (100.4F), Mild Pain (1 - 3)  aluminum hydroxide/magnesium hydroxide/simethicone Suspension 30 milliLiter(s) Oral every 4 hours PRN Dyspepsia  magnesium hydroxide Suspension 30 milliLiter(s) Oral four times a day PRN Constipation  ondansetron Injectable 4 milliGRAM(s) IV Push every 6 hours PRN Nausea  senna 2 Tablet(s) Oral at bedtime PRN Constipation      Allergies    Lamisil (Unknown)    Intolerances    Carrots (Other)  Corn (Other)  Dislikes Beans (Other)  Dislikes Peas (Other)      SOCIAL HISTORY:    FAMILY HISTORY:  No pertinent family history in first degree relatives      ROS  As above  Otherwise unremarkable    Vital Signs Last 24 Hrs  T(C): 37.1 (20 Nov 2018 17:39), Max: 37.1 (20 Nov 2018 17:39)  T(F): 98.8 (20 Nov 2018 17:39), Max: 98.8 (20 Nov 2018 17:39)  HR: 83 (20 Nov 2018 17:39) (63 - 92)  BP: 134/69 (20 Nov 2018 17:39) (109/67 - 134/69)  BP(mean): --  RR: 18 (20 Nov 2018 17:39) (16 - 18)  SpO2: 94% (20 Nov 2018 17:39) (94% - 96%)    Constitutional: NAD, frail  Respiratory: CTAB  Cardiovascular: S1 and S2, RRR  Gastrointestinal: BS+, soft, NT/ND  Extremities: No peripheral edema  Psychiatric: non-verbal  Skin: No rashes    LABS:                        13.6   6.62  )-----------( 255      ( 20 Nov 2018 08:26 )             40.4     11-20    132<L>  |  99  |  15  ----------------------------<  96  4.1   |  18<L>  |  0.88    Ca    8.9      20 Nov 2018 08:26    TPro  7.2  /  Alb  2.9<L>  /  TBili  0.3  /  DBili  x   /  AST  20  /  ALT  17  /  AlkPhos  55  11-20      LIVER FUNCTIONS - ( 20 Nov 2018 04:42 )  Alb: 2.9 g/dL / Pro: 7.2 gm/dL / ALK PHOS: 55 U/L / ALT: 17 U/L / AST: 20 U/L / GGT: x             RADIOLOGY & ADDITIONAL STUDIES:
Patient is a 53y old  Male who presents with a chief complaint of Cavitary lesion (20 Nov 2018 08:55)    HPI:  52yo/M with PMH severe MR, minimally verbal, seizure disorder on multiple meds, chr hyponatremia and hyperprolactinemia due to meds admitted on 11/19 from group home for evaluation of abnormal chest xray; history per medical record as patient does not provide history; patient chart states he was treated in past with INH for positive PPD. The patient had chest xray prior to admission suggestive of pulmonary TB. The imaging done upon admission revealed cavitary lesion in consolidation.        PMH: as above  PSH: as above  Meds: per reconciliation sheet, noted below  MEDICATIONS  (STANDING):  aspirin  chewable 81 milliGRAM(s) Oral daily  benztropine 1 milliGRAM(s) Oral four times a day  calcium carbonate 1250 mG  + Vitamin D (OsCal 500 + D) 1 Tablet(s) Oral two times a day  carBAMazepine XR Tablet 400 milliGRAM(s) Oral daily  carBAMazepine XR Tablet 800 milliGRAM(s) Oral at bedtime  cefepime  Injectable. 1000 milliGRAM(s) IV Push every 12 hours  cholecalciferol 2000 Unit(s) Oral daily  Clobazam 30 milliGRAM(s) Oral at bedtime  clonazePAM Tablet 1 milliGRAM(s) Oral two times a day  diVALproex Sprinkle 625 milliGRAM(s) Oral at bedtime  diVALproex Sprinkle 500 milliGRAM(s) Oral daily  docusate sodium 100 milliGRAM(s) Oral three times a day  ferrous    sulfate 325 milliGRAM(s) Oral daily  levETIRAcetam 2000 milliGRAM(s) Oral two times a day  multivitamin 1 Tablet(s) Oral daily  pantoprazole    Tablet 40 milliGRAM(s) Oral before breakfast  phenytoin   Chewable 200 milliGRAM(s) Chew at bedtime  risperiDONE   Tablet 1 milliGRAM(s) Oral daily  risperiDONE   Tablet 3 milliGRAM(s) Oral at bedtime  sodium chloride 2 Gram(s) Oral daily  thiothixene 5 milliGRAM(s) Oral <User Schedule>    MEDICATIONS  (PRN):  acetaminophen   Tablet .. 650 milliGRAM(s) Oral every 6 hours PRN Temp greater or equal to 38C (100.4F), Mild Pain (1 - 3)  aluminum hydroxide/magnesium hydroxide/simethicone Suspension 30 milliLiter(s) Oral every 4 hours PRN Dyspepsia  magnesium hydroxide Suspension 30 milliLiter(s) Oral four times a day PRN Constipation  ondansetron Injectable 4 milliGRAM(s) IV Push every 6 hours PRN Nausea  senna 2 Tablet(s) Oral at bedtime PRN Constipation    Allergies    Lamisil (Unknown)    Intolerances    Carrots (Other)  Corn (Other)  Dislikes Beans (Other)  Dislikes Peas (Other)    Social: no smoking, no alcohol, no illegal drugs; no recent travel  FAMILY HISTORY:  No pertinent family history in first degree relatives     no history of premature cardiovascular disease in first degree relatives  ROS: unable to obtain secondary to patient medical condition     Vital Signs Last 24 Hrs  T(C): 36.7 (20 Nov 2018 08:25), Max: 36.7 (19 Nov 2018 14:46)  T(F): 98.1 (20 Nov 2018 08:25), Max: 98.1 (20 Nov 2018 08:25)  HR: 92 (20 Nov 2018 10:27) (57 - 92)  BP: 117/78 (20 Nov 2018 10:27) (109/67 - 129/35)  BP(mean): --  RR: 16 (20 Nov 2018 08:25) (16 - 18)  SpO2: 96% (20 Nov 2018 08:25) (96% - 96%)  Daily Height in cm: 177.8 (19 Nov 2018 14:11)    Daily     PE:    Constitutional: frail looking  HEENT: NC/AT, EOMI, PERRLA, conjunctivae clear; ears and nose atraumatic; pharynx clear  Neck: supple; thyroid not palpable  Back: no tenderness  Respiratory: respiratory effort normal; clear to auscultation  Cardiovascular: S1S2 regular, no murmurs  Abdomen: soft, not tender, not distended, positive BS; no liver or spleen organomegaly  Genitourinary: no suprapubic tenderness  Musculoskeletal: no muscle tenderness, no joint swelling or tenderness  Neurological/ Psychiatric: nonverbal moving all extremities  Skin: no rashes; no palpable lesions    Labs: all available labs reviewed                        13.6   6.62  )-----------( 255      ( 20 Nov 2018 08:26 )             40.4     11-20    132<L>  |  99  |  15  ----------------------------<  96  4.1   |  18<L>  |  0.88    Ca    8.9      20 Nov 2018 08:26    TPro  7.2  /  Alb  2.9<L>  /  TBili  0.3  /  DBili  x   /  AST  20  /  ALT  17  /  AlkPhos  55  11-20     LIVER FUNCTIONS - ( 20 Nov 2018 04:42 )  Alb: 2.9 g/dL / Pro: 7.2 gm/dL / ALK PHOS: 55 U/L / ALT: 17 U/L / AST: 20 U/L / GGT: x           < from: CT Chest No Cont (11.19.18 @ 23:28) >    EXAM:  CT CHEST                            PROCEDURE DATE:  11/19/2018          INTERPRETATION:  CT CHEST WITHOUT CONTRAST    INDICATION: Cough. Left lower consolidation.    TECHNIQUE: Noncontrast CT imaging of the thorax. Coronal and sagittal   reformatted images are provided. Postprocessed MIP reformatted images   were created and reviewed..    COMPARISON: CT chest 6/16/2017.    FINDINGS:    Lines and tubes: None.  Airways: Tracheobronchial tree is patent.  Lungs and Pleura: Continued elevated left hemidiaphragm with interval   left left lower lobe consolidative opacity measuring 4.3 x 3.5 cm   containing areas of cavitation as best seen on image 39 of series 2.   Findings are nonspecific, this may represent developing cavitary   pneumonia. Mycobacterial infection or neoplastic etiology considered in   the differential. Further pulmonary workup and short-term imaging   follow-up is advised to demonstrate resolution. Associated left pleural   parenchymal thickening with trace left pleural effusion extending to the   apex. Mild asymmetric right sided groundglass attenuation, which may be   infectious or inflammatory nature. No pneumothorax.    Mediastinum and lymph nodes: Shotty mediastinal lymph nodes. Continued   mild upper thoracic esophageal distention, improved compared to the prior   study. Mild distal esophageal wall thickening, difficult to evaluate   secondary to inadequate distention. This may be related to esophagitis or   gastroesophageal reflux disease. Consider nonemergent upper endoscopy if   there is concern for esophageal malignancy.  Heart: Cardiomegaly without pericardial effusion.   Vessels: Normal size. Atherosclerotic disease of the aorta and its   branches.     Upper Abdomen:  Continued elevated left hemidiaphragm with stomach,   spleen and bowel loops identified within the left lower quadrant.   Incompletely characterized 1.3 x 1 cm right adrenal nodule for which   nonemergent adrenal protocol MR is advised provided no MR   contraindications.    Bones and soft tissues: Multilevel degenerative changes of the spine with   stable mild compression deformities of mid thoracic spine. Remote   fracture deformities of the left lower ribs.    IMPRESSION:    Continued elevated left hemidiaphragm with interval left left lower lobe   consolidative opacity measuring 4.3 x 3.5 cm containing areas of   cavitation. Findings are nonspecific, this may represent developing   cavitary pneumonia. Mycobacterial infection or neoplastic etiology   considered in the differential. Further pulmonary workup and short-term   imaging follow-up is advised to demonstrate resolution. Associated left   pleural parenchymal thickening with trace left pleural effusion extending   to the apex. Mild asymmetric right sided groundglass attenuation, which   may be infectious or inflammatory nature.     Mild upper thoracic esophageal distention, improved compared to the prior   study. Mild distal esophageal wall thickening, difficult to evaluate   secondary to inadequate distention. This may be related to esophagitis or   gastroesophageal reflux disease. Consider nonemergent upper endoscopy if   there is concern for esophageal malignancy.    < end of copied text >        Radiology: all available radiological tests reviewed    Advanced directives addressed: full resuscitation
Patient is a 53y old  Male who presents with a chief complaint of Cavitary lesion (23 Nov 2018 20:50) and noted to have break through seizures last night       HPI:  52yo/M with PMH severe MR, minimally verbal, seizure disorder on multiple meds, chr hyponatremia and hyperprolactinemia due to meds presented from [facility] for evaluation of cavitary lesion in the lung. Pt unable to provide any history. Pt had a seizure episode in ED due to missed seizure meds (20 Nov 2018 08:55)and had seizures last night and neuro consult called. Both Depakote and Tegretal  levels were low. As per staff pt refuses to take meds  some times and misses his dose of meds. Came from a group  home. He is unable to provide any information. Took  his meds today.      PAST MEDICAL & SURGICAL HISTORY:  Bowel and bladder incontinence  Hearing impaired person  Intellectual disability  Hyperprolactinemia  Hyponatremia  Cataract  GERD (gastroesophageal reflux disease)  Osteoporosis  Seizure disorder  Internal Hemorrhoids  Prolactin Increased  Gastritis  Osteoporosis  Leg Edema  Incontinent of Urine  Diarrhea  Cataract  Anemia  Seizure Disorder  Mental Retardation, Severe (I.Q. 20-34)  H/O cataract extraction  S/P Colonoscopy  S/P Endoscopy      FAMILY HISTORY:  No pertinent family history in first degree relatives  No pertinent family history in first degree relatives      Social Hx:  Nonsmoker, no drug or alcohol use    MEDICATIONS  (STANDING):  aspirin  chewable 81 milliGRAM(s) Oral daily  benztropine 1 milliGRAM(s) Oral four times a day  calcium carbonate 1250 mG  + Vitamin D (OsCal 500 + D) 1 Tablet(s) Oral two times a day  carBAMazepine ER Capsule 400 milliGRAM(s) Oral daily  carBAMazepine ER Capsule 800 milliGRAM(s) Oral at bedtime  cholecalciferol 2000 Unit(s) Oral daily  Clobazam 30 milliGRAM(s) Oral at bedtime  clonazePAM Tablet 1 milliGRAM(s) Oral two times a day  diVALproex Sprinkle 625 milliGRAM(s) Oral at bedtime  diVALproex Sprinkle 500 milliGRAM(s) Oral daily  docusate sodium 100 milliGRAM(s) Oral three times a day  ferrous    sulfate 325 milliGRAM(s) Oral daily  levETIRAcetam 2000 milliGRAM(s) Oral two times a day  multivitamin 1 Tablet(s) Oral daily  pantoprazole    Tablet 40 milliGRAM(s) Oral before breakfast  phenytoin   Chewable 200 milliGRAM(s) Chew at bedtime  piperacillin/tazobactam IVPB. 3.375 Gram(s) IV Intermittent every 8 hours  risperiDONE   Tablet 1 milliGRAM(s) Oral daily  risperiDONE   Tablet 3 milliGRAM(s) Oral at bedtime  sodium chloride 2 Gram(s) Oral daily  thiothixene 5 milliGRAM(s) Oral <User Schedule>       Allergies    Lamisil (Unknown)  peas, carrots, corn, beans (Diarrhea)  strawberry (Unknown)    Carrots (Other)  Corn (Other)  Dislikes Beans (Other)  Dislikes Peas (Other)      ROS: Pertinent positives in HPI, all other ROS were reviewed and are negative.      Vital Signs Last 24 Hrs  T(C): 36.8 (24 Nov 2018 10:56), Max: 37.5 (24 Nov 2018 04:58)  T(F): 98.3 (24 Nov 2018 10:56), Max: 99.5 (24 Nov 2018 04:58)  HR: 84 (24 Nov 2018 10:56) (84 - 101)  BP: 101/68 (24 Nov 2018 10:56) (101/68 - 161/81)  BP(mean): --  RR: 18 (24 Nov 2018 10:56) (18 - 30)  SpO2: 95% (24 Nov 2018 10:56) (93% - 97%)        Constitutional: sleeping but arousable.  HEENT: PERRLA, EOMI,   Neck: Supple.  Respiratory: Breath sounds are clear bilaterally  Cardiovascular: S1 and S2, regular  rhythm  Gastrointestinal: soft, nontender  Extremities:  no edema  Vascular:  Carotid Bruit - no  Musculoskeletal: no joint swelling/tenderness  Skin: No rashes    Neurological exam:  HF: Sleeping arousable, non communicative, sitting , rocking movements.  CN: EUNICE, EOMI, VFF, facial sensation normal, no NLFD  Motor: No weakness moves all extremities   Sens: can not test  Reflexes: Symmetric +2 Plantars with drawing  Coord:  Not tested  Gait/Balance: Cannot test      Labs:   11-24    135  |  102  |  13  ----------------------------<  97  3.7   |  26  |  0.67    Ca    8.8      24 Nov 2018 06:52    Phenytoin Level, Serum (11.23.18 @ 22:19)    Phenytoin Level, Serum: 11.9 ug/mL    Valproic Acid Level, Serum (11.23.18 @ 22:19)    Valproic Acid Level, Serum: 4 ug/mL    Carbamazepine Level, Serum (11.23.18 @ 22:19)    Carbamazepine Level, Serum: 0.9 ug/mL

## 2018-11-24 NOTE — PROGRESS NOTE ADULT - SUBJECTIVE AND OBJECTIVE BOX
54 yo WM with PMH severe MR, minimally verbal, seizure disorder on multiple meds, Iatrogenic  chr hyponatremia and hyperprolactinemia,  presented from alf for evaluation of cavitary lesion in the lung. Pt unable to provide any history. Pt had a seizure episode in ED due to missed seizure meds.  -found to have LLL Pna with 4cm cavitation    11.21: ros difficulty to obtain due to MR  11.22: no visible distress  11.24: somnolent, arousable, no distress      REVIEW OF SYSTEMS:  unable to obtain due to MR    Vital Signs Last 24 Hrs  T(C): 36.8 (24 Nov 2018 10:56), Max: 37.5 (24 Nov 2018 04:58)  T(F): 98.3 (24 Nov 2018 10:56), Max: 99.5 (24 Nov 2018 04:58)  HR: 84 (24 Nov 2018 10:56) (84 - 101)  BP: 101/68 (24 Nov 2018 10:56) (101/68 - 161/81)  RR: 18 (24 Nov 2018 10:56) (18 - 30)  SpO2: 95% (24 Nov 2018 10:56) (93% - 97%)    PHYSICAL EXAM:    GENERAL: NAD, Well nourished  HEENT:  NC/AT, EOMI, PERRLA, No scleral icterus, Moist mucous membranes  NECK: Supple, No JVD  CNS:  Alert & Oriented X3, Motor Strength 5/5 B/L upper and lower extremities; DTRs 2+ intact   LUNG: Normal Breath sounds, Clear to auscultation bilaterally, No rales, No rhonchi, No wheezing  HEART: RRR; No murmurs, No rubs  ABDOMEN: +BS, ST/ND/NT  GENITOURINARY: Voiding, Bladder not distended  EXTREMITIES:  2+ Peripheral Pulses, No clubbing, No cyanosis, No tibial edema  MUSCULOSKELTAL: Joints normal ROM, No TTP, No effusion  SKIN: no rashes  RECTAL: deferred, not indicated  BREAST: deferred               Labs:    11-24    MEDICATIONS  (STANDING):  aspirin  chewable 81 milliGRAM(s) Oral daily  benztropine 1 milliGRAM(s) Oral four times a day  calcium carbonate 1250 mG  + Vitamin D (OsCal 500 + D) 1 Tablet(s) Oral two times a day  carBAMazepine ER Capsule 400 milliGRAM(s) Oral daily  carBAMazepine ER Capsule 800 milliGRAM(s) Oral at bedtime  cholecalciferol 2000 Unit(s) Oral daily  Clobazam 30 milliGRAM(s) Oral at bedtime  clonazePAM Tablet 1 milliGRAM(s) Oral two times a day  diVALproex Sprinkle 625 milliGRAM(s) Oral at bedtime  diVALproex Sprinkle 500 milliGRAM(s) Oral daily  docusate sodium 100 milliGRAM(s) Oral three times a day  ferrous    sulfate 325 milliGRAM(s) Oral daily  levETIRAcetam 2000 milliGRAM(s) Oral two times a day  multivitamin 1 Tablet(s) Oral daily  pantoprazole    Tablet 40 milliGRAM(s) Oral before breakfast  phenytoin   Chewable 200 milliGRAM(s) Chew at bedtime  piperacillin/tazobactam IVPB. 3.375 Gram(s) IV Intermittent every 8 hours  risperiDONE   Tablet 1 milliGRAM(s) Oral daily  risperiDONE   Tablet 3 milliGRAM(s) Oral at bedtime  sodium chloride 2 Gram(s) Oral daily  thiothixene 5 milliGRAM(s) Oral <User Schedule>    135  |  102  |  13  ----------------------------<  97  3.7   |  26  |  0.67    Ca    8.8      24 Nov 2018 06:52        all labs reviewed  all imaging reviewed      Assessment and Plan:    1. LLL Pna with cavitary lesion:  suspect GNR etiology  Zosyn IV day#5  Blood Cx negative x 2    Unable to r/o for TB; patient cannot produce sputum samples.  Low probab for TB considering no "B" symptoms are present    2. Seizures: controlled   cw current AED:     3. Mental retardation    4. Hyponatremia: resolved

## 2018-11-24 NOTE — PROGRESS NOTE ADULT - SUBJECTIVE AND OBJECTIVE BOX
Subjective:  No events  Nonverbal  Comfortable    Review of Systems:  All 10 systems reviewed in detailed and found to be negative with the exception of what has already been described above    Allergies:  Carrots (Other)  Corn (Other)  Dislikes Beans (Other)  Dislikes Peas (Other)  Lamisil (Unknown)  peas, carrots, corn, beans (Diarrhea)  strawberry (Unknown)    Meds  MEDICATIONS  (STANDING):  aspirin  chewable 81 milliGRAM(s) Oral daily  benztropine 1 milliGRAM(s) Oral four times a day  calcium carbonate 1250 mG  + Vitamin D (OsCal 500 + D) 1 Tablet(s) Oral two times a day  carBAMazepine ER Capsule 400 milliGRAM(s) Oral daily  carBAMazepine ER Capsule 800 milliGRAM(s) Oral at bedtime  cholecalciferol 2000 Unit(s) Oral daily  Clobazam 30 milliGRAM(s) Oral at bedtime  clonazePAM Tablet 1 milliGRAM(s) Oral two times a day  diVALproex Sprinkle 625 milliGRAM(s) Oral at bedtime  diVALproex Sprinkle 500 milliGRAM(s) Oral daily  docusate sodium 100 milliGRAM(s) Oral three times a day  ferrous    sulfate 325 milliGRAM(s) Oral daily  levETIRAcetam 2000 milliGRAM(s) Oral two times a day  multivitamin 1 Tablet(s) Oral daily  pantoprazole    Tablet 40 milliGRAM(s) Oral before breakfast  phenytoin   Chewable 200 milliGRAM(s) Chew at bedtime  piperacillin/tazobactam IVPB. 3.375 Gram(s) IV Intermittent every 8 hours  risperiDONE   Tablet 1 milliGRAM(s) Oral daily  risperiDONE   Tablet 3 milliGRAM(s) Oral at bedtime  sodium chloride 2 Gram(s) Oral daily  thiothixene 5 milliGRAM(s) Oral <User Schedule>    MEDICATIONS  (PRN):  acetaminophen   Tablet .. 650 milliGRAM(s) Oral every 6 hours PRN Temp greater or equal to 38C (100.4F), Mild Pain (1 - 3)  aluminum hydroxide/magnesium hydroxide/simethicone Suspension 30 milliLiter(s) Oral every 4 hours PRN Dyspepsia  LORazepam   Injectable 2 milliGRAM(s) IV Push every 15 minutes PRN Seizure  magnesium hydroxide Suspension 30 milliLiter(s) Oral four times a day PRN Constipation  ondansetron Injectable 4 milliGRAM(s) IV Push every 6 hours PRN Nausea  senna 2 Tablet(s) Oral at bedtime PRN Constipation    Physical Exam  Gen: Somnolent  HEENT: Scars on right cheek  Cardio: Regular rhythm and rate, normal S1S2, no murmurs  Resp: Clear to auscultation bilaterally, no wheezing or rhonchi  GI: Nontender, nondistended, normoactive bowel sounds  Ext: No cyanosis, clubbing or edema  Neuro: Nonfocal    Labs:    11-24    135  |  102  |  13  ----------------------------<  97  3.7   |  26  |  0.67    Ca    8.8      24 Nov 2018 06:52    < from: CT Chest No Cont (11.19.18 @ 23:28) >  daisha and tubes: None.  Airways: Tracheobronchial tree is patent.  Lungs and Pleura: Continued elevated left hemidiaphragm with interval   left left lower lobe consolidative opacity measuring 4.3 x 3.5 cm   containing areas of cavitation as best seen on image 39 of series 2.   Findings are nonspecific, this may represent developing cavitary   pneumonia. Mycobacterial infection or neoplastic etiology considered in   the differential. Further pulmonary workup and short-term imaging   follow-up is advised to demonstrate resolution. Associated left pleural   parenchymal thickening with trace left pleural effusion extending to the   apex. Mild asymmetric right sided groundglass attenuation, which may be   infectious or inflammatory nature. No pneumothorax.    Mediastinum and lymph nodes: Shotty mediastinal lymph nodes. Continued   mild upper thoracic esophageal distention, improved compared to the prior   study. Mild distal esophageal wall thickening, difficult to evaluate   secondary to inadequate distention. This may be related to esophagitis or   gastroesophageal reflux disease. Consider nonemergent upper endoscopy if   there is concern for esophageal malignancy.  Heart: Cardiomegaly without pericardial effusion.   Vessels: Normal size. Atherosclerotic disease of the aorta and its   branches.     Upper Abdomen:  Continued elevated left hemidiaphragm with stomach,   spleen and bowel loops identified within the left lower quadrant.   Incompletely characterized 1.3 x 1 cm right adrenal nodule for which   nonemergent adrenal protocol MR is advised provided no MR   contraindications.    Bones and soft tissues: Multilevel degenerative changes of the spine with   stable mild compression deformities of mid thoracic spine. Remote   fracture deformities of the left lower ribs.    IMPRESSION:    Continued elevated left hemidiaphragm with interval left left lower lobe   consolidative opacity measuring 4.3 x 3.5 cm containing areas of   cavitation. Findings are nonspecific, this may represent developing   cavitary pneumonia. Mycobacterial infection or neoplastic etiology   considered in the differential. Further pulmonary workup and short-term   imaging follow-up is advised to demonstrate resolution. Associated left   pleural parenchymal thickening with trace left pleural effusion extending   to the apex. Mild asymmetric right sided groundglass attenuation, which   may be infectious or inflammatory nature.     Mild upper thoracic esophageal distention, improved compared to the prior   study. Mild distal esophageal wall thickening, difficult to evaluate   secondary to inadequate distention. This may be related to esophagitis or   gastroesophageal reflux disease. Consider nonemergent upper endoscopy if   there is concern for esophageal malignancy.      < end of copied text >

## 2018-11-24 NOTE — CONSULT NOTE ADULT - ASSESSMENT
Imp:  Thickening of esophagus on CT is non-specific, and EGD a year ago was negative    Rec:  Would defer any GI evaluation until TB ruled out and patient stable from pulmonary perspective  At that point, could consider elective EGD inpatient our outpatient
52yo/M with PMH severe MR, minimally verbal, seizure disorder on multiple meds, chr hyponatremia and hyperprolactinemia due to meds admitted on 11/19 from group home for evaluation of abnormal chest xray; history per medical record as patient does not provide history; patient chart states he was treated in past with INH for positive PPD. The patient had chest xray prior to admission suggestive of pulmonary TB. The imaging done upon admission revealed cavitary lesion in consolidation.  1. Patient admitted with pneumonia, history of latent TB treated with INH; most likely the patient has aspiration pneumonia given the esophageal findings however still need to rule out active pulmonary TB, given that INH treatment is not 100% effective to prevent active pulmonary TB, and/or the patient may have had another TB exposure.   - will need sputum for AFB times 3 one of which needs to be before 8 am  - follow up cultures   - iv hydration and supportive care   - serial cbc and monitor temperature   - reviewed prior medical records to evaluate for resistant or atypical pathogens   - will optimize antibiotics to zosyn to cover anaerobes  - oxygen and nebs as needed   - continue isolation for now  2. other issues: per medicine
54 yo WM with PMH severe MR, minimally verbal, seizure disorder on multiple meds, Iatrogenic  chr hyponatremia and hyperprolactinemia,  presented from [facility] for evaluation of cavitary lesion in the lung. Pt unable to provide any history. Pt had a seizure episode in ED due to missed seizure meds. with Break through seizures with Subtherapeutic levels of meds including Depakote andtegretal.    REc Adjust meds If needed Give IV form ( depakote, Dilantin and Keppra ) and Tegretal and Onfi can be crushed or given liquid form.    Correct Metabolic causes like Hyponatremia/ Low calcium.  keep Lorazepam bed side.  consider EEG.  Avoid meds that lower seizure threshold.  Will F/u.

## 2018-11-25 LAB
AMMONIA BLD-MCNC: 36 UMOL/L — HIGH (ref 11–32)
BASE EXCESS BLDA CALC-SCNC: 2.4 MMOL/L — HIGH (ref -2–2)
BLOOD GAS COMMENTS ARTERIAL: SIGNIFICANT CHANGE UP
CARBAMAZEPINE SERPL-MCNC: 4.6 UG/ML — SIGNIFICANT CHANGE UP (ref 4–12)
CULTURE RESULTS: SIGNIFICANT CHANGE UP
CULTURE RESULTS: SIGNIFICANT CHANGE UP
GAS PNL BLDA: SIGNIFICANT CHANGE UP
HCO3 BLDA-SCNC: 25 MMOL/L — SIGNIFICANT CHANGE UP (ref 21–29)
HOROWITZ INDEX BLDA+IHG-RTO: SIGNIFICANT CHANGE UP
PCO2 BLDA: 34 MMHG — SIGNIFICANT CHANGE UP (ref 32–46)
PH BLDA: 7.48 — HIGH (ref 7.35–7.45)
PO2 BLDA: 74 MMHG — SIGNIFICANT CHANGE UP (ref 74–108)
SAO2 % BLDA: 95 % — SIGNIFICANT CHANGE UP (ref 92–96)
SPECIMEN SOURCE: SIGNIFICANT CHANGE UP
SPECIMEN SOURCE: SIGNIFICANT CHANGE UP
VALPROATE SERPL-MCNC: 47 UG/ML — LOW (ref 50–100)

## 2018-11-25 PROCEDURE — 99233 SBSQ HOSP IP/OBS HIGH 50: CPT

## 2018-11-25 PROCEDURE — 70450 CT HEAD/BRAIN W/O DYE: CPT | Mod: 26

## 2018-11-25 PROCEDURE — 95819 EEG AWAKE AND ASLEEP: CPT | Mod: 26

## 2018-11-25 RX ORDER — FOSPHENYTOIN 50 MG/ML
1000 INJECTION INTRAMUSCULAR; INTRAVENOUS ONCE
Qty: 0 | Refills: 0 | Status: COMPLETED | OUTPATIENT
Start: 2018-11-25 | End: 2018-11-25

## 2018-11-25 RX ORDER — VALPROIC ACID (AS SODIUM SALT) 250 MG/5ML
500 SOLUTION, ORAL ORAL DAILY
Qty: 0 | Refills: 0 | Status: DISCONTINUED | OUTPATIENT
Start: 2018-11-25 | End: 2018-12-03

## 2018-11-25 RX ORDER — LEVETIRACETAM 250 MG/1
2000 TABLET, FILM COATED ORAL EVERY 12 HOURS
Qty: 0 | Refills: 0 | Status: DISCONTINUED | OUTPATIENT
Start: 2018-11-25 | End: 2018-12-03

## 2018-11-25 RX ORDER — VALPROIC ACID (AS SODIUM SALT) 250 MG/5ML
625 SOLUTION, ORAL ORAL AT BEDTIME
Qty: 0 | Refills: 0 | Status: DISCONTINUED | OUTPATIENT
Start: 2018-11-25 | End: 2018-12-03

## 2018-11-25 RX ADMIN — FOSPHENYTOIN 140 MILLIGRAM(S) PE: 50 INJECTION INTRAMUSCULAR; INTRAVENOUS at 13:05

## 2018-11-25 RX ADMIN — PIPERACILLIN AND TAZOBACTAM 25 GRAM(S): 4; .5 INJECTION, POWDER, LYOPHILIZED, FOR SOLUTION INTRAVENOUS at 17:12

## 2018-11-25 RX ADMIN — ENOXAPARIN SODIUM 40 MILLIGRAM(S): 100 INJECTION SUBCUTANEOUS at 13:05

## 2018-11-25 RX ADMIN — Medication 28.13 MILLIGRAM(S): at 22:16

## 2018-11-25 RX ADMIN — Medication 27.5 MILLIGRAM(S): at 13:44

## 2018-11-25 RX ADMIN — LEVETIRACETAM 400 MILLIGRAM(S): 250 TABLET, FILM COATED ORAL at 22:16

## 2018-11-25 RX ADMIN — Medication 1 MILLIGRAM(S): at 01:40

## 2018-11-25 RX ADMIN — Medication 2 MILLIGRAM(S): at 12:33

## 2018-11-25 RX ADMIN — PIPERACILLIN AND TAZOBACTAM 25 GRAM(S): 4; .5 INJECTION, POWDER, LYOPHILIZED, FOR SOLUTION INTRAVENOUS at 01:42

## 2018-11-25 RX ADMIN — PIPERACILLIN AND TAZOBACTAM 25 GRAM(S): 4; .5 INJECTION, POWDER, LYOPHILIZED, FOR SOLUTION INTRAVENOUS at 10:22

## 2018-11-25 RX ADMIN — LEVETIRACETAM 400 MILLIGRAM(S): 250 TABLET, FILM COATED ORAL at 11:01

## 2018-11-25 NOTE — PROGRESS NOTE ADULT - SUBJECTIVE AND OBJECTIVE BOX
52 yo WM with PMH severe MR, minimally verbal, seizure disorder on multiple meds, Iatrogenic  chr hyponatremia and hyperprolactinemia,  presented from skilled nursing for evaluation of cavitary lesion in the lung. Pt unable to provide any history. Pt had a seizure episode in ED due to missed seizure meds.  -found to have LLL Pna with 4cm cavitation    11.21: ros difficulty to obtain due to MR  11.22: no visible distress  11.24: somnolent, arousable, no distress  11.25: patient is more lethargic today, not waking up to sternal rub      REVIEW OF SYSTEMS:  unable to obtain due to MR    Vital Signs Last 24 Hrs  T(C): 37.4 (25 Nov 2018 05:17), Max: 37.6 (24 Nov 2018 17:28)  T(F): 99.3 (25 Nov 2018 05:17), Max: 99.6 (24 Nov 2018 17:28)  HR: 83 (25 Nov 2018 05:17) (76 - 83)  BP: 126/70 (25 Nov 2018 05:17) (104/71 - 126/70)  RR: 18 (25 Nov 2018 05:17) (18 - 18)  SpO2: 95% (25 Nov 2018 05:17) (95% - 95%)    PHYSICAL EXAM:    GENERAL: NAD, Well nourished  HEENT:  NC/AT, EOMI, PERRLA, No scleral icterus, Moist mucous membranes  NECK: Supple, No JVD  CNS:  lethargic, moves all extremities  LUNG: Normal Breath sounds, Clear to auscultation bilaterally, No rales, No rhonchi, No wheezing  HEART: RRR; No murmurs, No rubs  ABDOMEN: +BS, ST/ND/NT  GENITOURINARY: Voiding, Bladder not distended  EXTREMITIES:  2+ Peripheral Pulses, No clubbing, No cyanosis, No tibial edema  MUSCULOSKELTAL: Joints normal ROM, No TTP, No effusion  SKIN: no rashes  RECTAL: deferred, not indicated  BREAST: deferred                 MEDICATIONS  (STANDING):  aspirin  chewable 81 milliGRAM(s) Oral daily  benztropine 1 milliGRAM(s) Oral four times a day  calcium carbonate 1250 mG  + Vitamin D (OsCal 500 + D) 1 Tablet(s) Oral two times a day  carBAMazepine ER Capsule 400 milliGRAM(s) Oral daily  carBAMazepine ER Capsule 800 milliGRAM(s) Oral at bedtime  cholecalciferol 2000 Unit(s) Oral daily  Clobazam 30 milliGRAM(s) Oral at bedtime  clonazePAM Tablet 1 milliGRAM(s) Oral two times a day  diVALproex Sprinkle 625 milliGRAM(s) Oral at bedtime  diVALproex Sprinkle 500 milliGRAM(s) Oral daily  docusate sodium 100 milliGRAM(s) Oral three times a day  ferrous    sulfate 325 milliGRAM(s) Oral daily  levETIRAcetam 2000 milliGRAM(s) Oral two times a day  multivitamin 1 Tablet(s) Oral daily  pantoprazole    Tablet 40 milliGRAM(s) Oral before breakfast  phenytoin   Chewable 200 milliGRAM(s) Chew at bedtime  piperacillin/tazobactam IVPB. 3.375 Gram(s) IV Intermittent every 8 hours  risperiDONE   Tablet 1 milliGRAM(s) Oral daily  risperiDONE   Tablet 3 milliGRAM(s) Oral at bedtime  sodium chloride 2 Gram(s) Oral daily  thiothixene 5 milliGRAM(s) Oral <User Schedule>    135  |  102  |  13  ----------------------------<  97  3.7   |  26  |  0.67    Ca    8.8      24 Nov 2018 06:52        all labs reviewed  all imaging reviewed      Assessment and Plan:    1. Encephalopathy:  -r/o toxic encephalopathy (due to ? Depakote?): will check ammonia    -r/o seizures and postictal state:  EEG  Neuro consult    -check ABG to r/o Co2 narcosis    2. LLL Pna with cavitary lesion:  suspect GNR etiology  Zosyn IV day#6  Blood Cx negative x 2    Unable to r/o for TB; patient cannot produce sputum samples.  Low probab for TB considering no "B" symptoms are present  Patient would need a bronchoscopy to r/o TB    3. Seizures:   will change Keppra and Depakote to IV to improve compliance (patient is spitting his pills)  cw Clobazam and Tegretol    4. Mental retardation      5. Hyponatremia: resolved

## 2018-11-25 NOTE — CHART NOTE - NSCHARTNOTEFT_GEN_A_CORE
Rapid response called at 610 this morning for this patient hx of seizure disorder who appeared to be unresponsive. Pt was last observed to be alert around 0230 this morning as per RN. Pt has had rapids due to seizure activity throughout his stay here due to lapses in seizure meds and/or patient selectivity of meds he tolerates. He has required IV meds for this for this reason. As per RN, he received his PM dose but may have consumed only about 75% of it. Pt was admitted for r/o TB. Pt was seen and examined at bedside and opened his eyes to commands. He gradually returned to his  baseline.     VS  Physical Exam  Gen: man sitting up in bed, opens eyes to verbal commands, moving extremities randomly      A/P: 52 yo M in post-ictal state, gradually resolving  - Hold AM PO meds until fully alert  - Will sign out to hospitalist       Sarah Crenshaw MD PGY -2    Dr. Rene (intensivist) and Dr Ruby also present for rapid. Rapid response called at 610 this morning for this patient hx of seizure disorder who appeared to be unresponsive. Pt was last observed to be alert around 0230 this morning as per RN. Pt has had rapids due to seizure activity throughout his stay here due to lapses in seizure meds and/or patient selectivity of meds he tolerates. He has required IV meds for this for this reason. As per RN, he received his PM dose but may have consumed only about 75% of it. Pt was admitted for r/o TB. Pt was seen and examined at bedside and opened his eyes to commands. He gradually returned to his  baseline.     VS /71      RR 16    O2 Sat96%  Physical Exam  Gen: man sitting up in bed, opens eyes to verbal commands, moving extremities randomly      A/P: 52 yo M in post-ictal state, gradually resolving  - Hold AM PO meds until fully alert  - Will sign out to hospitalist       Sarah Crenshaw MD PGY -2    Dr. Rene (intensivist) and Dr Ruby also present for rapid. Rapid response called at 610 this morning for this patient hx of seizure disorder who appeared to be unresponsive. Pt was last observed to be alert around 0230 this morning as per RN. Pt has had rapids due to seizure activity throughout his stay here due to lapses in seizure meds and/or patient selectivity of meds he tolerates. He has required IV meds for this for this reason. As per RN, he received his PM dose but may have consumed only about 75% of it. Pt was admitted for r/o TB. Pt was seen and examined at bedside and opened his eyes to commands. He gradually returned to his  baseline.     VS /71      RR 16    O2 Sat96%  Physical Exam  Gen: man sitting up in bed, opens eyes to verbal commands, moving extremities randomly      A/P: 52 yo M in post-ictal state, gradually resolving  - Hold AM PO meds until fully alert  - Will sign out to hospitalist       Sarah Crenshaw MD PGY -2    Dr. Rene (intensivist) and Dr Ruby also present for rapid.    Attending Addendum:    seen at RRT with Dr Crenshaw as noted above.  pt likely post-ictal  improving without issue  protecting airway/hemodynamics and respiratory function reasonable.  No acute intervention required.    continue management per primary team.

## 2018-11-25 NOTE — PROGRESS NOTE ADULT - SUBJECTIVE AND OBJECTIVE BOX
HPI:  52yo/M with PMH severe MR, minimally verbal, seizure disorder on multiple meds, chr hyponatremia and hyperprolactinemia due to meds presented from [facility] for evaluation of cavitary lesion in the lung. Pt unable to provide any history. Pt had a seizure episode in ED due to missed seizure meds (20 Nov 2018 08:55)and had seizures last night and neuro consult called. Both Depakote and Tegretal  levels were low. As per staff pt refuses to take meds  some times and misses his dose of meds. Came from a group  home. He is unable to provide any information. Took  his meds today.  11/25/18 : Pt again had rapid response this morning and and not responding since then and not taking medications since this morning. Keppra  and Depakote can be changed to Iv but Tegretal and onfi can not be given IV. Pt had no witnessed seizure and Tegretal level 4.6 and depakote improved to 47.      MEDICATIONS  (STANDING):  aspirin  chewable 81 milliGRAM(s) Oral daily  benztropine 1 milliGRAM(s) Oral four times a day  calcium carbonate 1250 mG  + Vitamin D (OsCal 500 + D) 1 Tablet(s) Oral two times a day  carBAMazepine Suspension 400 milliGRAM(s) Oral three times a day  cholecalciferol 2000 Unit(s) Oral daily  Clobazam 30 milliGRAM(s) Oral at bedtime  diVALproex Sprinkle 625 milliGRAM(s) Oral at bedtime  diVALproex Sprinkle 500 milliGRAM(s) Oral daily  docusate sodium 100 milliGRAM(s) Oral three times a day  enoxaparin Injectable 40 milliGRAM(s) SubCutaneous daily  ferrous    sulfate 325 milliGRAM(s) Oral daily  levETIRAcetam  IVPB 2000 milliGRAM(s) IV Intermittent every 12 hours  multivitamin 1 Tablet(s) Oral daily  pantoprazole    Tablet 40 milliGRAM(s) Oral before breakfast  phenytoin   Chewable 200 milliGRAM(s) Chew at bedtime  piperacillin/tazobactam IVPB. 3.375 Gram(s) IV Intermittent every 8 hours  risperiDONE   Tablet 1 milliGRAM(s) Oral daily  risperiDONE   Tablet 3 milliGRAM(s) Oral at bedtime  sodium chloride 2 Gram(s) Oral daily  thiothixene 5 milliGRAM(s) Oral <User Schedule>      Vital Signs Last 24 Hrs  T(C): 37.4 (25 Nov 2018 05:17), Max: 37.6 (24 Nov 2018 17:28)  T(F): 99.3 (25 Nov 2018 05:17), Max: 99.6 (24 Nov 2018 17:28)  HR: 83 (25 Nov 2018 05:17) (76 - 83)  BP: 126/70 (25 Nov 2018 05:17) (104/71 - 126/70)  BP(mean): --  RR: 18 (25 Nov 2018 05:17) (18 - 18)  SpO2: 95% (25 Nov 2018 05:17) (95% - 95%)    Neurological exam:  HF: Sleeping , not responding, unarousable.   CN: EUNICE, EOMI, VFF, facial sensation normal, no NLFD  Motor: No weakness moves all extremities but not to commands.   Sens: can not test  Reflexes: Symmetric +2 Plantars with drawing  Coord:  Not tested  Gait/Balance: Cannot test    11-24    135  |  102  |  13  ----------------------------<  97  3.7   |  26  |  0.67    Ca    8.8      24 Nov 2018 06:52    Carbamazepine Level, Serum (11.25.18 @ 06:53)    Carbamazepine Level, Serum: 4.6 ug/mL    Valproic Acid Level, Serum: 47 ug/mL (11.25.18 @ 06:53)

## 2018-11-25 NOTE — PROVIDER CONTACT NOTE (OTHER) - ASSESSMENT
Pt change in status from arousable with eyes open to eyes not opening after sternal rub. Pt moving in bed and swallowing without opening eyes. VSS, 128/71, 102 HR, 95% RA and 18 RR.

## 2018-11-25 NOTE — PROGRESS NOTE ADULT - SUBJECTIVE AND OBJECTIVE BOX
Subjective:  Rapid response called yesterday for ? seizure  He was unresponsive for a brief time period, but this improved and therefore he was thought to be post ictal  Unable to be seen - he was at CT of head, chest    Review of Systems:  All 10 systems reviewed in detailed and found to be negative with the exception of what has already been described above    Allergies:  Carrots (Other)  Corn (Other)  Dislikes Beans (Other)  Dislikes Peas (Other)  Lamisil (Unknown)  peas, carrots, corn, beans (Diarrhea)  strawberry (Unknown)    Meds  MEDICATIONS  (STANDING):  aspirin  chewable 81 milliGRAM(s) Oral daily  benztropine 1 milliGRAM(s) Oral four times a day  calcium carbonate 1250 mG  + Vitamin D (OsCal 500 + D) 1 Tablet(s) Oral two times a day  carBAMazepine Suspension 400 milliGRAM(s) Oral three times a day  cholecalciferol 2000 Unit(s) Oral daily  Clobazam 30 milliGRAM(s) Oral at bedtime  diVALproex Sprinkle 625 milliGRAM(s) Oral at bedtime  diVALproex Sprinkle 500 milliGRAM(s) Oral daily  docusate sodium 100 milliGRAM(s) Oral three times a day  enoxaparin Injectable 40 milliGRAM(s) SubCutaneous daily  ferrous    sulfate 325 milliGRAM(s) Oral daily  levETIRAcetam  IVPB 2000 milliGRAM(s) IV Intermittent every 12 hours  multivitamin 1 Tablet(s) Oral daily  pantoprazole    Tablet 40 milliGRAM(s) Oral before breakfast  phenytoin   Chewable 200 milliGRAM(s) Chew at bedtime  piperacillin/tazobactam IVPB. 3.375 Gram(s) IV Intermittent every 8 hours  risperiDONE   Tablet 1 milliGRAM(s) Oral daily  risperiDONE   Tablet 3 milliGRAM(s) Oral at bedtime  sodium chloride 2 Gram(s) Oral daily  thiothixene 5 milliGRAM(s) Oral <User Schedule>    MEDICATIONS  (PRN):  acetaminophen   Tablet .. 650 milliGRAM(s) Oral every 6 hours PRN Temp greater or equal to 38C (100.4F), Mild Pain (1 - 3)  aluminum hydroxide/magnesium hydroxide/simethicone Suspension 30 milliLiter(s) Oral every 4 hours PRN Dyspepsia  LORazepam   Injectable 2 milliGRAM(s) IV Push every 15 minutes PRN Seizure  magnesium hydroxide Suspension 30 milliLiter(s) Oral four times a day PRN Constipation  ondansetron Injectable 4 milliGRAM(s) IV Push every 6 hours PRN Nausea  senna 2 Tablet(s) Oral at bedtime PRN Constipation    Physical Exam  Unable to assess as patient was away    Labs:    11-24    135  |  102  |  13  ----------------------------<  97  3.7   |  26  |  0.67    Ca    8.8      24 Nov 2018 06:52    CT head, chest pending for 11/25    < from: CT Chest No Cont (11.19.18 @ 23:28) >  daisha and tubes: None.  Airways: Tracheobronchial tree is patent.  Lungs and Pleura: Continued elevated left hemidiaphragm with interval   left left lower lobe consolidative opacity measuring 4.3 x 3.5 cm   containing areas of cavitation as best seen on image 39 of series 2.   Findings are nonspecific, this may represent developing cavitary   pneumonia. Mycobacterial infection or neoplastic etiology considered in   the differential. Further pulmonary workup and short-term imaging   follow-up is advised to demonstrate resolution. Associated left pleural   parenchymal thickening with trace left pleural effusion extending to the   apex. Mild asymmetric right sided groundglass attenuation, which may be   infectious or inflammatory nature. No pneumothorax.    Mediastinum and lymph nodes: Shotty mediastinal lymph nodes. Continued   mild upper thoracic esophageal distention, improved compared to the prior   study. Mild distal esophageal wall thickening, difficult to evaluate   secondary to inadequate distention. This may be related to esophagitis or   gastroesophageal reflux disease. Consider nonemergent upper endoscopy if   there is concern for esophageal malignancy.  Heart: Cardiomegaly without pericardial effusion.   Vessels: Normal size. Atherosclerotic disease of the aorta and its   branches.     Upper Abdomen:  Continued elevated left hemidiaphragm with stomach,   spleen and bowel loops identified within the left lower quadrant.   Incompletely characterized 1.3 x 1 cm right adrenal nodule for which   nonemergent adrenal protocol MR is advised provided no MR   contraindications.    Bones and soft tissues: Multilevel degenerative changes of the spine with   stable mild compression deformities of mid thoracic spine. Remote   fracture deformities of the left lower ribs.    IMPRESSION:    Continued elevated left hemidiaphragm with interval left left lower lobe   consolidative opacity measuring 4.3 x 3.5 cm containing areas of   cavitation. Findings are nonspecific, this may represent developing   cavitary pneumonia. Mycobacterial infection or neoplastic etiology   considered in the differential. Further pulmonary workup and short-term   imaging follow-up is advised to demonstrate resolution. Associated left   pleural parenchymal thickening with trace left pleural effusion extending   to the apex. Mild asymmetric right sided groundglass attenuation, which   may be infectious or inflammatory nature.     Mild upper thoracic esophageal distention, improved compared to the prior   study. Mild distal esophageal wall thickening, difficult to evaluate   secondary to inadequate distention. This may be related to esophagitis or   gastroesophageal reflux disease. Consider nonemergent upper endoscopy if   there is concern for esophageal malignancy.

## 2018-11-26 LAB
CARBAMAZEPINE SERPL-MCNC: 1.8 UG/ML — LOW (ref 4–12)
HCT VFR BLD CALC: 38.4 % — LOW (ref 39–50)
HGB BLD-MCNC: 13.2 G/DL — SIGNIFICANT CHANGE UP (ref 13–17)
LEVETIRACETAM SERPL-MCNC: 39.8 MCG/ML — SIGNIFICANT CHANGE UP (ref 12–46)
MCHC RBC-ENTMCNC: 30.1 PG — SIGNIFICANT CHANGE UP (ref 27–34)
MCHC RBC-ENTMCNC: 34.4 GM/DL — SIGNIFICANT CHANGE UP (ref 32–36)
MCV RBC AUTO: 87.7 FL — SIGNIFICANT CHANGE UP (ref 80–100)
NRBC # BLD: 0 /100 WBCS — SIGNIFICANT CHANGE UP (ref 0–0)
PHENYTOIN FREE SERPL-MCNC: 8.1 UG/ML — LOW (ref 10–20)
PLATELET # BLD AUTO: 227 K/UL — SIGNIFICANT CHANGE UP (ref 150–400)
RBC # BLD: 4.38 M/UL — SIGNIFICANT CHANGE UP (ref 4.2–5.8)
RBC # FLD: 12.6 % — SIGNIFICANT CHANGE UP (ref 10.3–14.5)
VALPROATE SERPL-MCNC: 41 UG/ML — LOW (ref 50–100)
WBC # BLD: 10.35 K/UL — SIGNIFICANT CHANGE UP (ref 3.8–10.5)
WBC # FLD AUTO: 10.35 K/UL — SIGNIFICANT CHANGE UP (ref 3.8–10.5)

## 2018-11-26 PROCEDURE — 99233 SBSQ HOSP IP/OBS HIGH 50: CPT

## 2018-11-26 PROCEDURE — 71045 X-RAY EXAM CHEST 1 VIEW: CPT | Mod: 26

## 2018-11-26 RX ORDER — FOSPHENYTOIN 50 MG/ML
150 INJECTION INTRAMUSCULAR; INTRAVENOUS EVERY 12 HOURS
Qty: 0 | Refills: 0 | Status: DISCONTINUED | OUTPATIENT
Start: 2018-11-26 | End: 2018-12-03

## 2018-11-26 RX ORDER — FOSPHENYTOIN 50 MG/ML
300 INJECTION INTRAMUSCULAR; INTRAVENOUS ONCE
Qty: 0 | Refills: 0 | Status: COMPLETED | OUTPATIENT
Start: 2018-11-26 | End: 2018-11-26

## 2018-11-26 RX ADMIN — Medication 1 TABLET(S): at 17:11

## 2018-11-26 RX ADMIN — Medication 2 MILLIGRAM(S): at 09:33

## 2018-11-26 RX ADMIN — Medication 2 MILLIGRAM(S): at 05:52

## 2018-11-26 RX ADMIN — LEVETIRACETAM 600 MILLIGRAM(S): 250 TABLET, FILM COATED ORAL at 17:11

## 2018-11-26 RX ADMIN — Medication 27.5 MILLIGRAM(S): at 11:02

## 2018-11-26 RX ADMIN — ENOXAPARIN SODIUM 40 MILLIGRAM(S): 100 INJECTION SUBCUTANEOUS at 11:03

## 2018-11-26 RX ADMIN — FOSPHENYTOIN 106 MILLIGRAM(S) PE: 50 INJECTION INTRAMUSCULAR; INTRAVENOUS at 17:11

## 2018-11-26 RX ADMIN — PIPERACILLIN AND TAZOBACTAM 25 GRAM(S): 4; .5 INJECTION, POWDER, LYOPHILIZED, FOR SOLUTION INTRAVENOUS at 09:44

## 2018-11-26 RX ADMIN — PIPERACILLIN AND TAZOBACTAM 25 GRAM(S): 4; .5 INJECTION, POWDER, LYOPHILIZED, FOR SOLUTION INTRAVENOUS at 01:25

## 2018-11-26 RX ADMIN — Medication 2 MILLIGRAM(S): at 15:25

## 2018-11-26 RX ADMIN — Medication 400 MILLIGRAM(S): at 14:14

## 2018-11-26 RX ADMIN — FOSPHENYTOIN 112 MILLIGRAM(S) PE: 50 INJECTION INTRAMUSCULAR; INTRAVENOUS at 13:02

## 2018-11-26 RX ADMIN — Medication 2 MILLIGRAM(S): at 06:20

## 2018-11-26 RX ADMIN — Medication 2 MILLIGRAM(S): at 17:10

## 2018-11-26 RX ADMIN — Medication 28.13 MILLIGRAM(S): at 21:19

## 2018-11-26 RX ADMIN — LEVETIRACETAM 400 MILLIGRAM(S): 250 TABLET, FILM COATED ORAL at 05:26

## 2018-11-26 NOTE — CHART NOTE - NSCHARTNOTEFT_GEN_A_CORE
RRT called for seizures. Pt comes from a group home with a hx of seizure disorder and was admitted for seizures. Pt unable to give history. Per nurse, pt has been having various seizures throughout the night.     5:52 /82, 2 mg of ativan given  Gen: pt seizing with left eye seizing laterally   6:03 HR 99 /75 O2 97%, pt still seizing, another 2 mg of ativan given   6:21 HR 85 /76 SpO2 95%  CV: +s1/s2, no M/R/G  Pulm: CTAB    Pt then with reflexes, but nonverbal     a/p: 54 yo M w/ hx of seizures with RRT called for seizures s/p 2 mg ativan x 2  - resolution of seizure  - continue to monitor   - ativan PRN as needed for seizures  - remain on med/surg    pt seen at bedside and plan d/w with Dr. Ramírez and Dr. Mc, PGY3 RRT called for seizures. Pt comes from a group home with a hx of seizure disorder and was admitted for seizures. Pt unable to give history. Per nurse, pt has been having various seizures throughout the night.     5:52 /82, 2 mg of ativan given  Gen: pt seizing with left eye seizing laterally   6:03 HR 99 /75 O2 97%, pt still seizing, another 2 mg of ativan given   6:21 HR 85 /76 SpO2 95%  CV: +s1/s2, no M/R/G  Pulm: CTAB    Pt then with reflexes, but nonverbal     a/p: 54 yo M w/ hx of seizures with RRT called for seizures s/p 2 mg ativan x 2  - resolution of seizure  - continue to monitor   - ativan PRN as needed for seizures  - remain on med/surg    pt seen at bedside and plan d/w with Dr. Ramírez and Dr. Mc, PGY3    ATTENDING ADDENDUM:    pt seen at RRT on 5E with Dr Roldan.    Pt with active seizures noted - downward B/L pupils, huffing respirations    hemodynamics and respiratory function reasonable.    Treated with ativan 2mg IVP x 2 doses with resolution of seizure activity.    Nasotracheal suctioned to obtain 2 AFB specimens - sent to lab.    continue current management per primary team.

## 2018-11-26 NOTE — PROGRESS NOTE ADULT - SUBJECTIVE AND OBJECTIVE BOX
HPI:  52yo/M with PMH severe MR, minimally verbal, seizure disorder on multiple meds, chr hyponatremia and hyperprolactinemia due to meds presented from senior living for evaluation of cavitary lesion in the lung. Pt unable to provide any history. Pt had a seizure episode in ED due to missed seizure meds (20 Nov 2018 08:55)and had seizures last night and neuro consult called. Both Depakote and Tegretal  levels were low. As per staff pt refuses to take meds  some times and misses his dose of meds. Came from a group  home. He is unable to provide any information. Took  his meds today.  11/25/18 : Pt again had rapid response this morning and and not responding since then and not taking medications since this morning. Keppra  and Depakote can be changed to Iv but Tegretal and onfi can not be given IV. Pt had no witnessed seizure and Tegretal level 4.6 and depakote improved to 47.  11/26/18 : Pt still not responding and not taking PO meds witnessed having seizures this morning. Did not take his Carbamazepine and Onfi since 11/24/18 and started on phenytoin IV yesterday. Levels pending today. EEG done yesterday did not show epileptic  activity but diffuse bilat slow activity.     MEDICATIONS  (STANDING):  aspirin  chewable 81 milliGRAM(s) Oral daily  benztropine 1 milliGRAM(s) Oral four times a day  calcium carbonate 1250 mG  + Vitamin D (OsCal 500 + D) 1 Tablet(s) Oral two times a day  carBAMazepine Suspension 400 milliGRAM(s) Oral three times a day  cholecalciferol 2000 Unit(s) Oral daily  Clobazam 30 milliGRAM(s) Oral at bedtime  docusate sodium 100 milliGRAM(s) Oral three times a day  enoxaparin Injectable 40 milliGRAM(s) SubCutaneous daily  ferrous    sulfate 325 milliGRAM(s) Oral daily  fosphenytoin IVPB 150 milliGRAM(s) PE IV Intermittent every 12 hours  levETIRAcetam  IVPB 2000 milliGRAM(s) IV Intermittent every 12 hours  multivitamin 1 Tablet(s) Oral daily  pantoprazole    Tablet 40 milliGRAM(s) Oral before breakfast  phenytoin   Chewable 200 milliGRAM(s) Chew at bedtime  risperiDONE   Tablet 1 milliGRAM(s) Oral daily  risperiDONE   Tablet 3 milliGRAM(s) Oral at bedtime  sodium chloride 2 Gram(s) Oral daily  thiothixene 5 milliGRAM(s) Oral <User Schedule>  valproate sodium IVPB 500 milliGRAM(s) IV Intermittent daily  valproate sodium IVPB 625 milliGRAM(s) IV Intermittent at bedtime      Vital Signs Last 24 Hrs  T(C): 37.1 (26 Nov 2018 05:25), Max: 37.2 (25 Nov 2018 22:32)  T(F): 98.7 (26 Nov 2018 05:25), Max: 98.9 (25 Nov 2018 22:32)  HR: 93 (26 Nov 2018 09:38) (81 - 99)  BP: 132/81 (26 Nov 2018 09:38) (123/76 - 142/80)  BP(mean): --  RR: 22 (26 Nov 2018 09:38) (16 - 22)  SpO2: 95% (26 Nov 2018 09:38) (95% - 99%)     Neurological exam:  HF: Sleeping , not responding, unarousable, post ictal.   CN: EUNICE, EOMI, VFF, facial sensation normal, no NLFD  Motor:  moves all extremities but not to commands.   Sens: can not test  Reflexes: Symmetric +2 Plantars with drawing  Coord:  Not tested  Gait/Balance: Cannot test                         13.2   10.35 )-----------( 227      ( 26 Nov 2018 12:38 )             38.4     Carbamazepine Level, Serum (11.25.18 @ 06:53)    Carbamazepine Level, Serum: 4.6 ug/mL    Valproic Acid Level, Serum: 47 ug/mL (11.25.18 @ 06:53)    Radiology report:  < from: CT Head No Cont (11.25.18 @ 12:15) >  IMPRESSION:   Mildperiventricular white matter ischemia is noted.            - EEG    < from: EEG Awake and Asleep (11.25.18 @ 14:00) >  Impression this is a limited EEG. Abnormal EEG due to slow background   activity consistent with a diffuse cerebral dysfunction may be on the   basis of a diffuse metabolic, toxic or structural abnormality. Clinical   correlation recommended. No epileptiform activity noted in this recording.

## 2018-11-26 NOTE — PROGRESS NOTE ADULT - SUBJECTIVE AND OBJECTIVE BOX
52 yo WM with PMH severe MR, minimally verbal, seizure disorder on multiple meds, Iatrogenic  chr hyponatremia and hyperprolactinemia,  presented from care home for evaluation of cavitary lesion in the lung. Pt unable to provide any history. Pt had a seizure episode in ED due to missed seizure meds.  -found to have LLL Pna with 4cm cavitation    11.21: ros difficulty to obtain due to MR  11.22: no visible distress  11.24: somnolent, arousable, no distress  11.25: patient is more lethargic today, not waking up to sternal rub  11.26: had a few seizures in the past 24hrs, tonic-clonic, lasting about 30sec  patient is somnolent at this time      REVIEW OF SYSTEMS:  unable to obtain due to MR    Vital Signs Last 24 Hrs  T(C): 37.1 (26 Nov 2018 05:25), Max: 37.2 (25 Nov 2018 22:32)  T(F): 98.7 (26 Nov 2018 05:25), Max: 98.9 (25 Nov 2018 22:32)  HR: 93 (26 Nov 2018 09:38) (81 - 99)  BP: 132/81 (26 Nov 2018 09:38) (123/76 - 142/80)  RR: 22 (26 Nov 2018 09:38) (16 - 22)  SpO2: 95% (26 Nov 2018 09:38) (95% - 99%)    PHYSICAL EXAM:    GENERAL: NAD, Well nourished  HEENT:  NC/AT, EOMI, PERRLA, No scleral icterus, Moist mucous membranes  NECK: Supple, No JVD  CNS:  lethargic, moves all extremities  LUNG: Normal Breath sounds, Clear to auscultation bilaterally, No rales, No rhonchi, No wheezing  HEART: RRR; No murmurs, No rubs  ABDOMEN: +BS, ST/ND/NT  GENITOURINARY: Voiding, Bladder not distended  EXTREMITIES:  2+ Peripheral Pulses, No clubbing, No cyanosis, No tibial edema  MUSCULOSKELTAL: Joints normal ROM, No TTP, No effusion  SKIN: no rashes  RECTAL: deferred, not indicated  BREAST: deferred                 MEDICATIONS  (STANDING):  aspirin  chewable 81 milliGRAM(s) Oral daily  benztropine 1 milliGRAM(s) Oral four times a day  calcium carbonate 1250 mG  + Vitamin D (OsCal 500 + D) 1 Tablet(s) Oral two times a day  carBAMazepine ER Capsule 400 milliGRAM(s) Oral daily  carBAMazepine ER Capsule 800 milliGRAM(s) Oral at bedtime  cholecalciferol 2000 Unit(s) Oral daily  Clobazam 30 milliGRAM(s) Oral at bedtime  clonazePAM Tablet 1 milliGRAM(s) Oral two times a day  diVALproex Sprinkle 625 milliGRAM(s) Oral at bedtime  diVALproex Sprinkle 500 milliGRAM(s) Oral daily  docusate sodium 100 milliGRAM(s) Oral three times a day  ferrous    sulfate 325 milliGRAM(s) Oral daily  levETIRAcetam 2000 milliGRAM(s) Oral two times a day  multivitamin 1 Tablet(s) Oral daily  pantoprazole    Tablet 40 milliGRAM(s) Oral before breakfast  phenytoin   Chewable 200 milliGRAM(s) Chew at bedtime  piperacillin/tazobactam IVPB. 3.375 Gram(s) IV Intermittent every 8 hours  risperiDONE   Tablet 1 milliGRAM(s) Oral daily  risperiDONE   Tablet 3 milliGRAM(s) Oral at bedtime  sodium chloride 2 Gram(s) Oral daily  thiothixene 5 milliGRAM(s) Oral <User Schedule>    Labs:                        13.2   10.35 )-----------( 227      ( 26 Nov 2018 12:38 )             38.4       Assessment and Plan:    1. Encephalopathy:  -due to uncontrolled  seizures and postictal state  EEG noted: 11.25...no epileptiform activity  Neuro consult noted  c/w IV Phenytoin, IV Keppra, IV Depakote  will place NGT to administer Tegretol and Clobazam    -ABG noted: no hypercarbia  -Ammonia normal    2. LLL Pna with cavitary lesion:  suspect GNR etiology  Zosyn IV day#7  will change to Ceftriaxone IV as Zosyn might lower seizure treshhold   Blood Cx negative x 2    r/o TB: will check AFB x 3  Low probab for TB considering no "B" symptoms are present    3. Mental retardation      4. Hyponatremia: resolved 52 yo WM with PMH severe MR, minimally verbal, seizure disorder on multiple meds, Iatrogenic  chr hyponatremia and hyperprolactinemia,  [facility]ed from intermediate for evaluation of cavitary lesion in the lung. Pt unable to provide any history. Pt had a seizure episode in ED due to missed seizure meds.  -found to have LLL Pna with 4cm cavitation    11.21: ros difficulty to obtain due to MR  11.22: no visible distress  11.24: somnolent, arousable, no distress  11.25: patient is more lethargic today, not waking up to sternal rub  11.26: had a few seizures in the past 24hrs, tonic-clonic, lasting about 30sec  patient is somnolent at this time      REVIEW OF SYSTEMS:  unable to obtain due to MR    Vital Signs Last 24 Hrs  T(C): 37.1 (26 Nov 2018 05:25), Max: 37.2 (25 Nov 2018 22:32)  T(F): 98.7 (26 Nov 2018 05:25), Max: 98.9 (25 Nov 2018 22:32)  HR: 93 (26 Nov 2018 09:38) (81 - 99)  BP: 132/81 (26 Nov 2018 09:38) (123/76 - 142/80)  RR: 22 (26 Nov 2018 09:38) (16 - 22)  SpO2: 95% (26 Nov 2018 09:38) (95% - 99%)    PHYSICAL EXAM:    GENERAL: NAD, Well nourished  HEENT:  NC/AT, EOMI, PERRLA, No scleral icterus, Moist mucous membranes  NECK: Supple, No JVD  CNS:  lethargic, moves all extremities  LUNG: Normal Breath sounds, Clear to auscultation bilaterally, No rales, No rhonchi, No wheezing  HEART: RRR; No murmurs, No rubs  ABDOMEN: +BS, ST/ND/NT  GENITOURINARY: Voiding, Bladder not distended  EXTREMITIES:  2+ Peripheral Pulses, No clubbing, No cyanosis, No tibial edema  MUSCULOSKELTAL: Joints normal ROM, No TTP, No effusion  SKIN: no rashes  RECTAL: deferred, not indicated  BREAST: deferred                 MEDICATIONS  (STANDING):  aspirin  chewable 81 milliGRAM(s) Oral daily  benztropine 1 milliGRAM(s) Oral four times a day  calcium carbonate 1250 mG  + Vitamin D (OsCal 500 + D) 1 Tablet(s) Oral two times a day  carBAMazepine ER Capsule 400 milliGRAM(s) Oral daily  carBAMazepine ER Capsule 800 milliGRAM(s) Oral at bedtime  cholecalciferol 2000 Unit(s) Oral daily  Clobazam 30 milliGRAM(s) Oral at bedtime  clonazePAM Tablet 1 milliGRAM(s) Oral two times a day  diVALproex Sprinkle 625 milliGRAM(s) Oral at bedtime  diVALproex Sprinkle 500 milliGRAM(s) Oral daily  docusate sodium 100 milliGRAM(s) Oral three times a day  ferrous    sulfate 325 milliGRAM(s) Oral daily  levETIRAcetam 2000 milliGRAM(s) Oral two times a day  multivitamin 1 Tablet(s) Oral daily  pantoprazole    Tablet 40 milliGRAM(s) Oral before breakfast  phenytoin   Chewable 200 milliGRAM(s) Chew at bedtime  piperacillin/tazobactam IVPB. 3.375 Gram(s) IV Intermittent every 8 hours  risperiDONE   Tablet 1 milliGRAM(s) Oral daily  risperiDONE   Tablet 3 milliGRAM(s) Oral at bedtime  sodium chloride 2 Gram(s) Oral daily  thiothixene 5 milliGRAM(s) Oral <User Schedule>    Labs:                        13.2   10.35 )-----------( 227      ( 26 Nov 2018 12:38 )             38.4       Assessment and Plan:    1. Encephalopathy:  -due to uncontrolled  seizures and postictal state  EEG noted: 11.25...no epileptiform activity  Neuro consult noted  c/w IV Phenytoin, IV Keppra, IV Depakote  will place NGT to administer Tegretol and Clobazam    -ABG noted: no hypercarbia  -Ammonia normal    2. LLL Pna with cavitary lesion:  suspect GNR etiology  Zosyn IV day#7  will change to Augmenti as Zosyn might lower seizure treshhold   Blood Cx negative x 2  repeat CT Chest per pulmonary    r/o TB: will check AFB x 3  Low probab for TB considering no "B" symptoms are present    3. Mental retardation      4. Hyponatremia: resolved

## 2018-11-26 NOTE — PROGRESS NOTE ADULT - SUBJECTIVE AND OBJECTIVE BOX
SUBJECTIVE     patient has seizure this A.M and received ativan and currently sedated with the 1 to 1 observation   still did not have ct scan of chest for the follow up   on nasal canula not in distress and breathing comfortably     PAST MEDICAL & SURGICAL HISTORY:  Bowel and bladder incontinence  Hearing impaired person  Intellectual disability  Hyperprolactinemia  Hyponatremia  Cataract  GERD (gastroesophageal reflux disease)  Osteoporosis  Seizure disorder  Internal Hemorrhoids  Prolactin Increased  Gastritis  Osteoporosis  Leg Edema  Incontinent of Urine  Diarrhea  Cataract  Anemia  Seizure Disorder  Mental Retardation, Severe (I.Q. 20-34)  H/O cataract extraction  S/P Colonoscopy  S/P Endoscopy    OBJECTIVE   Vital Signs Last 24 Hrs  T(C): 37.1 (26 Nov 2018 05:25), Max: 37.2 (25 Nov 2018 22:32)  T(F): 98.7 (26 Nov 2018 05:25), Max: 98.9 (25 Nov 2018 22:32)  HR: 93 (26 Nov 2018 09:38) (81 - 99)  BP: 132/81 (26 Nov 2018 09:38) (123/76 - 143/79)  BP(mean): --  RR: 22 (26 Nov 2018 09:38) (16 - 22)  SpO2: 95% (26 Nov 2018 09:38) (95% - 99%)    PHYSICAL EXAM:  Constitutional: lethargic and received sedation   HEENT: Normo cephalic atraumatic  Neck: Soft and supple, No J.V.D   Respiratory: vesicular breathing poor inspiratory effort with the sedation   Cardiovascular: S1 and S2, regular rate .   Gastrointestinal:  soft, nontender,   Extremities: No  edema or calf tenderness .  Neurological: sedated with the baseline mental retardation     MEDICATIONS  (STANDING):  aspirin  chewable 81 milliGRAM(s) Oral daily  benztropine 1 milliGRAM(s) Oral four times a day  calcium carbonate 1250 mG  + Vitamin D (OsCal 500 + D) 1 Tablet(s) Oral two times a day  carBAMazepine Suspension 400 milliGRAM(s) Oral three times a day  cholecalciferol 2000 Unit(s) Oral daily  Clobazam 30 milliGRAM(s) Oral at bedtime  docusate sodium 100 milliGRAM(s) Oral three times a day  enoxaparin Injectable 40 milliGRAM(s) SubCutaneous daily  ferrous    sulfate 325 milliGRAM(s) Oral daily  levETIRAcetam  IVPB 2000 milliGRAM(s) IV Intermittent every 12 hours  multivitamin 1 Tablet(s) Oral daily  pantoprazole    Tablet 40 milliGRAM(s) Oral before breakfast  phenytoin   Chewable 200 milliGRAM(s) Chew at bedtime  piperacillin/tazobactam IVPB. 3.375 Gram(s) IV Intermittent every 8 hours  risperiDONE   Tablet 1 milliGRAM(s) Oral daily  risperiDONE   Tablet 3 milliGRAM(s) Oral at bedtime  sodium chloride 2 Gram(s) Oral daily  thiothixene 5 milliGRAM(s) Oral <User Schedule>  valproate sodium IVPB 500 milliGRAM(s) IV Intermittent daily  valproate sodium IVPB 625 milliGRAM(s) IV Intermittent at bedtime                 ABG - ( 25 Nov 2018 11:42 )  pH, Arterial: 7.48  pH, Blood: x     /  pCO2: 34    /  pO2: 74    / HCO3: 25    / Base Excess: 2.4   /  SaO2: 95

## 2018-11-26 NOTE — PROVIDER CONTACT NOTE (CHANGE IN STATUS NOTIFICATION) - SITUATION
Patient with active seizure, rapid response called, ativan administered, VSS, sputum AFB sample obtained by doctor Desiree. New orders received. Will continue to monitor.

## 2018-11-27 LAB
CARBAMAZEPINE SERPL-MCNC: 4.5 UG/ML — SIGNIFICANT CHANGE UP (ref 4–12)
NIGHT BLUE STAIN TISS: SIGNIFICANT CHANGE UP
NIGHT BLUE STAIN TISS: SIGNIFICANT CHANGE UP
PHENYTOIN FREE SERPL-MCNC: 10.4 UG/ML — SIGNIFICANT CHANGE UP (ref 10–20)
PHENYTOIN FREE SERPL-MCNC: 9.8 UG/ML — LOW (ref 10–20)
SPECIMEN SOURCE: SIGNIFICANT CHANGE UP
SPECIMEN SOURCE: SIGNIFICANT CHANGE UP
VALPROATE SERPL-MCNC: 37 UG/ML — LOW (ref 50–100)

## 2018-11-27 PROCEDURE — 71045 X-RAY EXAM CHEST 1 VIEW: CPT | Mod: 26

## 2018-11-27 PROCEDURE — 95951: CPT | Mod: 26

## 2018-11-27 PROCEDURE — 99233 SBSQ HOSP IP/OBS HIGH 50: CPT

## 2018-11-27 RX ORDER — DOCUSATE SODIUM 100 MG
100 CAPSULE ORAL THREE TIMES A DAY
Qty: 0 | Refills: 0 | Status: DISCONTINUED | OUTPATIENT
Start: 2018-11-27 | End: 2018-12-03

## 2018-11-27 RX ORDER — RISPERIDONE 4 MG/1
2 TABLET ORAL AT BEDTIME
Qty: 0 | Refills: 0 | Status: DISCONTINUED | OUTPATIENT
Start: 2018-11-27 | End: 2018-12-03

## 2018-11-27 RX ADMIN — Medication 1 MILLIGRAM(S): at 06:51

## 2018-11-27 RX ADMIN — Medication 27.5 MILLIGRAM(S): at 11:12

## 2018-11-27 RX ADMIN — Medication 1 MILLIGRAM(S): at 17:44

## 2018-11-27 RX ADMIN — Medication 5 MILLIGRAM(S): at 22:29

## 2018-11-27 RX ADMIN — Medication 28.13 MILLIGRAM(S): at 22:32

## 2018-11-27 RX ADMIN — Medication 1 TABLET(S): at 17:43

## 2018-11-27 RX ADMIN — SODIUM CHLORIDE 2 GRAM(S): 9 INJECTION INTRAMUSCULAR; INTRAVENOUS; SUBCUTANEOUS at 11:12

## 2018-11-27 RX ADMIN — Medication 400 MILLIGRAM(S): at 01:19

## 2018-11-27 RX ADMIN — ENOXAPARIN SODIUM 40 MILLIGRAM(S): 100 INJECTION SUBCUTANEOUS at 11:12

## 2018-11-27 RX ADMIN — LEVETIRACETAM 600 MILLIGRAM(S): 250 TABLET, FILM COATED ORAL at 05:31

## 2018-11-27 RX ADMIN — FOSPHENYTOIN 106 MILLIGRAM(S) PE: 50 INJECTION INTRAMUSCULAR; INTRAVENOUS at 17:44

## 2018-11-27 RX ADMIN — Medication 100 MILLIGRAM(S): at 22:30

## 2018-11-27 RX ADMIN — Medication 400 MILLIGRAM(S): at 22:30

## 2018-11-27 RX ADMIN — Medication 1 MILLIGRAM(S): at 01:19

## 2018-11-27 RX ADMIN — Medication 5 MILLIGRAM(S): at 01:19

## 2018-11-27 RX ADMIN — Medication 400 MILLIGRAM(S): at 14:07

## 2018-11-27 RX ADMIN — Medication 1 TABLET(S): at 11:12

## 2018-11-27 RX ADMIN — FOSPHENYTOIN 106 MILLIGRAM(S) PE: 50 INJECTION INTRAMUSCULAR; INTRAVENOUS at 06:51

## 2018-11-27 RX ADMIN — Medication 1 TABLET(S): at 06:51

## 2018-11-27 RX ADMIN — Medication 325 MILLIGRAM(S): at 11:12

## 2018-11-27 RX ADMIN — Medication 1 TABLET(S): at 17:44

## 2018-11-27 RX ADMIN — Medication 200 MILLIGRAM(S): at 01:19

## 2018-11-27 RX ADMIN — Medication 2000 UNIT(S): at 11:12

## 2018-11-27 RX ADMIN — Medication 1 MILLIGRAM(S): at 11:14

## 2018-11-27 RX ADMIN — RISPERIDONE 2 MILLIGRAM(S): 4 TABLET ORAL at 22:31

## 2018-11-27 RX ADMIN — RISPERIDONE 3 MILLIGRAM(S): 4 TABLET ORAL at 01:19

## 2018-11-27 RX ADMIN — Medication 2 MILLIGRAM(S): at 01:13

## 2018-11-27 RX ADMIN — Medication 81 MILLIGRAM(S): at 11:12

## 2018-11-27 RX ADMIN — Medication 400 MILLIGRAM(S): at 08:23

## 2018-11-27 RX ADMIN — LEVETIRACETAM 600 MILLIGRAM(S): 250 TABLET, FILM COATED ORAL at 17:43

## 2018-11-27 RX ADMIN — Medication 200 MILLIGRAM(S): at 22:29

## 2018-11-27 NOTE — PROGRESS NOTE ADULT - SUBJECTIVE AND OBJECTIVE BOX
52 yo WM with PMH severe MR, minimally verbal, seizure disorder on multiple meds, Iatrogenic  chr hyponatremia and hyperprolactinemia,  [facility] cavitary lesion in the lung. Pt unable to provide any history. Pt had a seizure episode in ED due to missed seizure meds.  -found to have LLL Pna with 4cm cavitation    11.21: ros difficulty to obtain due to MR  11.22: no visible distress  11.24: somnolent, arousable, no distress  11.25: patient is more lethargic today, not waking up to sternal rub  11.26: had a few seizures in the past 24hrs, tonic-clonic, lasting about 30sec  patient is somnolent at this time  11.27: less seizure episodes overnight; sputum samples obtained    REVIEW OF SYSTEMS:  unable to obtain due to MR    Vital Signs Last 24 Hrs  T(C): 36.6 (27 Nov 2018 11:01), Max: 37.6 (26 Nov 2018 17:29)  T(F): 97.9 (27 Nov 2018 11:01), Max: 99.7 (26 Nov 2018 17:29)  HR: 85 (27 Nov 2018 11:01) (75 - 106)  BP: 94/62 (27 Nov 2018 11:01) (94/62 - 149/76)  RR: 18 (27 Nov 2018 11:01) (18 - 26)  SpO2: 99% (27 Nov 2018 11:01) (94% - 100%)    PHYSICAL EXAM:    GENERAL: NAD, Well nourished  HEENT:  NC/AT, EOMI, PERRLA, No scleral icterus, Moist mucous membranes  NECK: Supple, No JVD  CNS:  lethargic, moves all extremities  LUNG: Normal Breath sounds, Clear to auscultation bilaterally, No rales, No rhonchi, No wheezing  HEART: RRR; No murmurs, No rubs  ABDOMEN: +BS, ST/ND/NT  GENITOURINARY: Voiding, Bladder not distended  EXTREMITIES:  2+ Peripheral Pulses, No clubbing, No cyanosis, No tibial edema  MUSCULOSKELTAL: Joints normal ROM, No TTP, No effusion  SKIN: no rashes  RECTAL: deferred, not indicated  BREAST: deferred                 MEDICATIONS  (STANDING):  aspirin  chewable 81 milliGRAM(s) Oral daily  benztropine 1 milliGRAM(s) Oral four times a day  calcium carbonate 1250 mG  + Vitamin D (OsCal 500 + D) 1 Tablet(s) Oral two times a day  carBAMazepine ER Capsule 400 milliGRAM(s) Oral daily  carBAMazepine ER Capsule 800 milliGRAM(s) Oral at bedtime  cholecalciferol 2000 Unit(s) Oral daily  Clobazam 30 milliGRAM(s) Oral at bedtime  clonazePAM Tablet 1 milliGRAM(s) Oral two times a day  diVALproex Sprinkle 625 milliGRAM(s) Oral at bedtime  diVALproex Sprinkle 500 milliGRAM(s) Oral daily  docusate sodium 100 milliGRAM(s) Oral three times a day  ferrous    sulfate 325 milliGRAM(s) Oral daily  levETIRAcetam 2000 milliGRAM(s) Oral two times a day  multivitamin 1 Tablet(s) Oral daily  pantoprazole    Tablet 40 milliGRAM(s) Oral before breakfast  phenytoin   Chewable 200 milliGRAM(s) Chew at bedtime  piperacillin/tazobactam IVPB. 3.375 Gram(s) IV Intermittent every 8 hours  risperiDONE   Tablet 1 milliGRAM(s) Oral daily  risperiDONE   Tablet 3 milliGRAM(s) Oral at bedtime  sodium chloride 2 Gram(s) Oral daily  thiothixene 5 milliGRAM(s) Oral <User Schedule>    Labs:                        13.2   10.35 )-----------( 227      ( 26 Nov 2018 12:38 )             38.4       Assessment and Plan:    1. Encephalopathy:  -due to uncontrolled  seizures and postictal state  EEG noted: 11.25...no epileptiform activity  24hr EEG done as well, results pending  Neuro consult noted  c/w IV Phenytoin, IV Keppra, IV Depakote  placed NGT to administer Tegretol and Clobazam  Mittens ordered    -ABG noted: no hypercarbia  -Ammonia normal    2. LLL Pna with cavitary lesion:  suspect GNR etiology  Zosyn IV day#7  will cw Augmentin po  as Zosyn might lower seizure threshold   Blood Cx negative x 2  repeat CT Chest per pulmonary    r/o TB: will check AFB x 3, all collected   Low probab for TB considering no "B" symptoms are present    3. Mental retardation      4. Hyponatremia: resolved

## 2018-11-27 NOTE — PROGRESS NOTE ADULT - SUBJECTIVE AND OBJECTIVE BOX
SUBJECTIVE     Patient seen in the A.M and has ngt tube placement since seen   no reported sob or fever spikes   as per the nursing 3 sputum were sent   He still did not have follow up ct   patient wakes up to commands other wise non verbal .     PAST MEDICAL & SURGICAL HISTORY:  Bowel and bladder incontinence  Hearing impaired person  Intellectual disability  Hyperprolactinemia  Hyponatremia  Cataract  GERD (gastroesophageal reflux disease)  Osteoporosis  Seizure disorder  Internal Hemorrhoids  Prolactin Increased  Gastritis  Osteoporosis  Leg Edema  Incontinent of Urine  Diarrhea  Cataract  Anemia  Seizure Disorder  Mental Retardation, Severe (I.Q. 20-34)  H/O cataract extraction  S/P Colonoscopy  S/P Endoscopy    OBJECTIVE   Vital Signs Last 24 Hrs  T(C): 36.6 (27 Nov 2018 11:01), Max: 37.6 (27 Nov 2018 01:10)  T(F): 97.9 (27 Nov 2018 11:01), Max: 99.7 (27 Nov 2018 01:10)  HR: 85 (27 Nov 2018 11:01) (75 - 98)  BP: 94/62 (27 Nov 2018 11:01) (94/62 - 149/76)  BP(mean): --  RR: 18 (27 Nov 2018 11:01) (18 - 26)  SpO2: 99% (27 Nov 2018 11:01) (94% - 100%)    PHYSICAL EXAM:  Constitutional: , awake and alert, not in distress.  HEENT: Normo cephalic atraumatic  Neck: Soft and supple, No J.V.D   Respiratory: vesicular breathing ,poor inspiratory effort   Cardiovascular: S1 and S2, regular rate .   Gastrointestinal:  soft, nontender,   Extremities: No  edema or calf tenderness .  Neurological: lethargic with the history of mental retardation     MEDICATIONS  (STANDING):  amoxicillin  875 milliGRAM(s)/clavulanate 1 Tablet(s) Oral two times a day  aspirin  chewable 81 milliGRAM(s) Oral daily  benztropine 1 milliGRAM(s) Oral four times a day  calcium carbonate 1250 mG  + Vitamin D (OsCal 500 + D) 1 Tablet(s) Oral two times a day  carBAMazepine Suspension 400 milliGRAM(s) Oral three times a day  cholecalciferol 2000 Unit(s) Oral daily  Clobazam 30 milliGRAM(s) Oral at bedtime  docusate sodium Liquid 100 milliGRAM(s) Oral three times a day  enoxaparin Injectable 40 milliGRAM(s) SubCutaneous daily  ferrous    sulfate 325 milliGRAM(s) Oral daily  fosphenytoin IVPB 150 milliGRAM(s) PE IV Intermittent every 12 hours  levETIRAcetam  IVPB 2000 milliGRAM(s) IV Intermittent every 12 hours  multivitamin 1 Tablet(s) Oral daily  pantoprazole    Tablet 40 milliGRAM(s) Oral before breakfast  phenytoin   Chewable 200 milliGRAM(s) Chew at bedtime  risperiDONE   Tablet 2 milliGRAM(s) Oral at bedtime  sodium chloride 2 Gram(s) Oral daily  thiothixene 5 milliGRAM(s) Oral <User Schedule>  valproate sodium IVPB 500 milliGRAM(s) IV Intermittent daily  valproate sodium IVPB 625 milliGRAM(s) IV Intermittent at bedtime                            13.2   10.35 )-----------( 227      ( 26 Nov 2018 12:38 )             38.4             Culture - Acid Fast - Sputum w/Smear (collected 26 Nov 2018 16:40)  Source: .Sputum Sputum    Culture - Acid Fast - Sputum w/Smear (collected 26 Nov 2018 06:30)  Source: .Sputum Sputum conical

## 2018-11-27 NOTE — PROGRESS NOTE ADULT - SUBJECTIVE AND OBJECTIVE BOX
HPI:  54yo/M with PMH severe MR, minimally verbal, seizure disorder on multiple meds, chr hyponatremia and hyperprolactinemia due to meds [facility] evaluation of cavitary lesion in the lung. Pt unable to provide any history. Pt had a seizure episode in ED due to missed seizure meds (20 Nov 2018 08:55)and had seizures last night and neuro consult called. Both Depakote and Tegretal  levels were low. As per staff pt refuses to take meds  some times and misses his dose of meds. Came from a group  home. He is unable to provide any information. Took  his meds today.  11/25/18 : Pt again had rapid response this morning and and not responding since then and not taking medications since this morning. Keppra  and Depakote can be changed to Iv but Tegretal and onfi can not be given IV. Pt had no witnessed seizure and Tegretal level 4.6 and depakote improved to 47.  11/26/18 : Pt still not responding and not taking PO meds witnessed having seizures this morning. Did not take his Carbamazepine and Onfi since 11/24/18 and started on phenytoin IV yesterday. Levels pending today. EEG done yesterday did not show epileptic  activity but diffuse bilat slow activity.   11/27/18 : Pt had 1 seizure early morning . Completed EEG monitoring. No new seizures today. Recived his tegretal and onfi since yesterday after placing NGT. More responsive but still not alert.     MEDICATIONS  (STANDING):  amoxicillin  875 milliGRAM(s)/clavulanate 1 Tablet(s) Oral two times a day  aspirin  chewable 81 milliGRAM(s) Oral daily  benztropine 1 milliGRAM(s) Oral four times a day  calcium carbonate 1250 mG  + Vitamin D (OsCal 500 + D) 1 Tablet(s) Oral two times a day  carBAMazepine Suspension 400 milliGRAM(s) Oral three times a day  cholecalciferol 2000 Unit(s) Oral daily  Clobazam 30 milliGRAM(s) Oral at bedtime  docusate sodium Liquid 100 milliGRAM(s) Oral three times a day  enoxaparin Injectable 40 milliGRAM(s) SubCutaneous daily  ferrous    sulfate 325 milliGRAM(s) Oral daily  fosphenytoin IVPB 150 milliGRAM(s) PE IV Intermittent every 12 hours  levETIRAcetam  IVPB 2000 milliGRAM(s) IV Intermittent every 12 hours  multivitamin 1 Tablet(s) Oral daily  pantoprazole    Tablet 40 milliGRAM(s) Oral before breakfast  phenytoin   Chewable 200 milliGRAM(s) Chew at bedtime  risperiDONE   Tablet 2 milliGRAM(s) Oral at bedtime  sodium chloride 2 Gram(s) Oral daily  thiothixene 5 milliGRAM(s) Oral <User Schedule>  valproate sodium IVPB 500 milliGRAM(s) IV Intermittent daily  valproate sodium IVPB 625 milliGRAM(s) IV Intermittent at bedtime      Vital Signs Last 24 Hrs  T(C): 36.6 (27 Nov 2018 11:01), Max: 37.6 (26 Nov 2018 17:29)  T(F): 97.9 (27 Nov 2018 11:01), Max: 99.7 (26 Nov 2018 17:29)  HR: 85 (27 Nov 2018 11:01) (75 - 106)  BP: 94/62 (27 Nov 2018 11:01) (94/62 - 149/76)  BP(mean): --  RR: 18 (27 Nov 2018 11:01) (18 - 26)  SpO2: 99% (27 Nov 2018 11:01) (94% - 100%)     Neurological exam:  HF: Sleeping , not responding, unarousable, post ictal.   CN: EUNICE, EOMI, VFF, facial sensation normal, no NLFD  Motor:  moves all extremities but not to commands.   Sens: can not test  Reflexes: Symmetric +2 Plantars with drawing  Coord:  Not tested  Gait/Balance: Cannot test                         13.2   10.35 )-----------( 227      ( 26 Nov 2018 12:38 )             38.4     Valproic Acid Level, Serum (11.26.18 @ 12:38)    Valproic Acid Level, Serum: 41 ug/mL    Phenytoin Level, Serum (11.27.18 @ 07:00)    Phenytoin Level, Serum: 10.4 ug/mL    Carbamazepine Level, Serum: 1.8 ug/mL (11.26.18 @ 12:38)    Radiology report:  < from: CT Head No Cont (11.25.18 @ 12:15) >  IMPRESSION:   Mildperiventricular white matter ischemia is noted.            EEG     < from: EEG Monitoring Each 24 hours (11.27.18 @ 10:30) >  mpression :  Abnormal 24 hour EEG recording due to the presence of  Moderate generalizedslowing without any clear epileptiform activity   suggestive of underlying encephalopathy. No intracranial or ictal EEG   abnormalities noted. Clinical correlation recommended. Discussed the   results with Dr. GASPER Feliz.

## 2018-11-28 LAB
NIGHT BLUE STAIN TISS: SIGNIFICANT CHANGE UP
SPECIMEN SOURCE: SIGNIFICANT CHANGE UP

## 2018-11-28 RX ADMIN — Medication 1 TABLET(S): at 06:12

## 2018-11-28 RX ADMIN — FOSPHENYTOIN 106 MILLIGRAM(S) PE: 50 INJECTION INTRAMUSCULAR; INTRAVENOUS at 17:09

## 2018-11-28 RX ADMIN — Medication 100 MILLIGRAM(S): at 06:12

## 2018-11-28 RX ADMIN — Medication 1 MILLIGRAM(S): at 17:08

## 2018-11-28 RX ADMIN — Medication 400 MILLIGRAM(S): at 13:44

## 2018-11-28 RX ADMIN — Medication 325 MILLIGRAM(S): at 11:07

## 2018-11-28 RX ADMIN — Medication 5 MILLIGRAM(S): at 21:57

## 2018-11-28 RX ADMIN — Medication 81 MILLIGRAM(S): at 11:06

## 2018-11-28 RX ADMIN — Medication 100 MILLIGRAM(S): at 21:59

## 2018-11-28 RX ADMIN — LEVETIRACETAM 600 MILLIGRAM(S): 250 TABLET, FILM COATED ORAL at 17:09

## 2018-11-28 RX ADMIN — Medication 2000 UNIT(S): at 11:06

## 2018-11-28 RX ADMIN — Medication 1 TABLET(S): at 17:08

## 2018-11-28 RX ADMIN — Medication 400 MILLIGRAM(S): at 06:12

## 2018-11-28 RX ADMIN — Medication 650 MILLIGRAM(S): at 10:30

## 2018-11-28 RX ADMIN — Medication 1 MILLIGRAM(S): at 23:24

## 2018-11-28 RX ADMIN — SODIUM CHLORIDE 2 GRAM(S): 9 INJECTION INTRAMUSCULAR; INTRAVENOUS; SUBCUTANEOUS at 11:06

## 2018-11-28 RX ADMIN — RISPERIDONE 2 MILLIGRAM(S): 4 TABLET ORAL at 21:57

## 2018-11-28 RX ADMIN — Medication 1 MILLIGRAM(S): at 11:06

## 2018-11-28 RX ADMIN — Medication 27.5 MILLIGRAM(S): at 11:06

## 2018-11-28 RX ADMIN — LEVETIRACETAM 600 MILLIGRAM(S): 250 TABLET, FILM COATED ORAL at 06:12

## 2018-11-28 RX ADMIN — ENOXAPARIN SODIUM 40 MILLIGRAM(S): 100 INJECTION SUBCUTANEOUS at 11:06

## 2018-11-28 RX ADMIN — Medication 1 MILLIGRAM(S): at 06:12

## 2018-11-28 RX ADMIN — Medication 1 TABLET(S): at 11:07

## 2018-11-28 RX ADMIN — FOSPHENYTOIN 106 MILLIGRAM(S) PE: 50 INJECTION INTRAMUSCULAR; INTRAVENOUS at 06:33

## 2018-11-28 RX ADMIN — Medication 28.13 MILLIGRAM(S): at 21:58

## 2018-11-28 RX ADMIN — Medication 100 MILLIGRAM(S): at 13:43

## 2018-11-28 RX ADMIN — Medication 1 MILLIGRAM(S): at 00:52

## 2018-11-28 RX ADMIN — Medication 200 MILLIGRAM(S): at 21:57

## 2018-11-28 RX ADMIN — Medication 400 MILLIGRAM(S): at 21:58

## 2018-11-28 NOTE — SWALLOW BEDSIDE ASSESSMENT ADULT - COMMENTS
The patient was admitted from [facility]. Hospital course is notable for hyponatremia, seizures with variable post ictal lethargy, left pneumonia with cavitation and finding of a dilated esophagus on imaging Note that the pt was previously hospitalized at this facility in 5/18 with UTI and PNA at which time he was seen by this service and diagnosed with chronic Cognitive Dysfunction/chronic Oral Dysphagia. This profile is superimposed upon a history of a prior hospitalization at this facility in 6/17 with left PNA, and hyponatremia, at which time he was also seen by this service and was found to have marked Cognitive Deficits/Oral Dysphagia with suspected chronic irreversible component. Other past selected medical history is remarkable for profound mental retardation, impulse control disorder, seizure disorder, hearing loss, anemia, OP, DJD of knees, left lung granuloma,  prolactinemia, gynecomastia, GERD, hemorrhoids s/p GIB, bilateral cataracts s/p sx, and past medication induced hyponatremia. See below for additional prior medical information. The patient was admitted from [facility]. Hospital course is notable for hyponatremia, seizures with variable post ictal lethargy, left pneumonia with cavitation and finding of a dilated esophagus on imaging Note that the pt was previously hospitalized at this facility in 5/18 with UTI/PNA at which time he was seen by this service and diagnosed with chronic Cognitive Dysfunction/chronic Oral Dysphagia. This profile is superimposed upon a history of a prior hospitalization at this facility in 6/17 with left PNA, and hyponatremia, at which time he was also seen by this service and was found to have marked Cognitive Deficits/Oral Dysphagia with suspected chronic irreversible component. Other past selected medical history is remarkable for profound mental retardation, impulse control disorder, seizure disorder, hearing loss, anemia, OP, DJD of knees, left lung granuloma,  prolactinemia, gynecomastia, GERD, hemorrhoids s/p GIB, bilateral cataracts s/p sx, and past medication induced hyponatremia. See below for additional prior medical information.

## 2018-11-28 NOTE — SWALLOW BEDSIDE ASSESSMENT ADULT - ADDITIONAL RECOMMENDATIONS
1) Nutrition f/u. Profile places pt at nutrition risk.    2) Note that pt with finding of dilated esophagus on imaging. Need to consult GI at discretion of hospitalist. Also question need for current NGT.

## 2018-11-28 NOTE — SWALLOW BEDSIDE ASSESSMENT ADULT - SWALLOW EVAL: FEEDING ASSISTANCE
Assist with tray set up, cutting up food and feeding as needed. Pt has aid from group home at bedside for all meals.

## 2018-11-28 NOTE — SWALLOW BEDSIDE ASSESSMENT ADULT - SWALLOW EVAL: DIAGNOSIS
1) Pt demonstrates periodically reduced alertness for feeding/decreased self monitoring abilities for feeding with concomitant mild to moderate Oral Dysphagia which is a functional condition with a restricted inventory of modified food textures when he is alert/cognizant enough to be fed. This is atop underlying pharyngeal integrity which subjectively appears to be grossly within functional parameters for age. No behavioral aspiration signs exhibited on exam. Note that reduced self monitoring for feeding/Oral Dysphagia are chronic pre-existing issues associated with profound mental retardation. Further note that swallow status is felt to be stable despite the above, providing that pt is in an alert calm state.  2) Pt is [place] and demonstrates severe Cognitive Dysfunction in setting of known mental retardation. Pt does not follow commands and is non verbal which is reportedly chronic. His needs must be anticipated. At suspected communicative baseline.

## 2018-11-28 NOTE — SWALLOW BEDSIDE ASSESSMENT ADULT - SLP GENERAL OBSERVATIONS
Pt seen at bedside, On encounter, a NGT was in his nare and he had scratches along the sides of his face. [place] were on his hands. Rocking behaviors were evident at rest which seemed to be a self stimulatory behavior. He was somewhat congested but not in overt respiratory distress. Pt was arousable but fatigued. His affect was flat.  Eye gaze was often distant when he was awake and joint attention was fleeting. Pt seemed [place] and demonstrated severe Cognitive Dysfunction in setting of known mental retardation. Pt did not follow commands and was non verbal which is reportedly chronic in setting of profound cognitive deficits associated with MR. His needs must be anticipated. At suspected communicative baseline..

## 2018-11-28 NOTE — PROGRESS NOTE ADULT - SUBJECTIVE AND OBJECTIVE BOX
Patient is a 53y old  Male who presents with a chief complaint of Cavitary lesion (20 Nov 2018 08:55)      Date of service: 11-28-18 @ 15:57      Patient sitting in bed with ngt in place  Now with fevers to 101; AFB smears were negative times three      ROS unable to obtain secondary to patient medical condition     MEDICATIONS  (STANDING):  amoxicillin  875 milliGRAM(s)/clavulanate 1 Tablet(s) Oral two times a day  aspirin  chewable 81 milliGRAM(s) Oral daily  benztropine 1 milliGRAM(s) Oral four times a day  calcium carbonate 1250 mG  + Vitamin D (OsCal 500 + D) 1 Tablet(s) Oral two times a day  carBAMazepine Suspension 400 milliGRAM(s) Oral three times a day  cholecalciferol 2000 Unit(s) Oral daily  docusate sodium Liquid 100 milliGRAM(s) Oral three times a day  enoxaparin Injectable 40 milliGRAM(s) SubCutaneous daily  ferrous    sulfate 325 milliGRAM(s) Oral daily  fosphenytoin IVPB 150 milliGRAM(s) PE IV Intermittent every 12 hours  levETIRAcetam  IVPB 2000 milliGRAM(s) IV Intermittent every 12 hours  multivitamin 1 Tablet(s) Oral daily  pantoprazole    Tablet 40 milliGRAM(s) Oral before breakfast  phenytoin   Chewable 200 milliGRAM(s) Chew at bedtime  risperiDONE   Tablet 2 milliGRAM(s) Oral at bedtime  sodium chloride 2 Gram(s) Oral daily  thiothixene 5 milliGRAM(s) Oral <User Schedule>  valproate sodium IVPB 500 milliGRAM(s) IV Intermittent daily  valproate sodium IVPB 625 milliGRAM(s) IV Intermittent at bedtime    MEDICATIONS  (PRN):  acetaminophen   Tablet .. 650 milliGRAM(s) Oral every 6 hours PRN Temp greater or equal to 38C (100.4F), Mild Pain (1 - 3)  aluminum hydroxide/magnesium hydroxide/simethicone Suspension 30 milliLiter(s) Oral every 4 hours PRN Dyspepsia  LORazepam   Injectable 2 milliGRAM(s) IV Push every 15 minutes PRN Seizure  magnesium hydroxide Suspension 30 milliLiter(s) Oral four times a day PRN Constipation  ondansetron Injectable 4 milliGRAM(s) IV Push every 6 hours PRN Nausea  senna 2 Tablet(s) Oral at bedtime PRN Constipation      Vital Signs Last 24 Hrs  T(C): 38.4 (28 Nov 2018 10:31), Max: 38.4 (28 Nov 2018 10:21)  T(F): 101.2 (28 Nov 2018 10:31), Max: 101.2 (28 Nov 2018 10:21)  HR: 97 (28 Nov 2018 10:21) (82 - 99)  BP: 125/73 (28 Nov 2018 10:21) (101/69 - 125/73)  BP(mean): --  RR: 18 (28 Nov 2018 10:21) (18 - 18)  SpO2: 99% (28 Nov 2018 10:21) (99% - 100%)    Physical Exam:      PE:    Constitutional: frail looking  HEENT: NC/AT, EOMI, PERRLA, conjunctivae clear; ears and nose atraumatic; pharynx clear  Neck: supple; thyroid not palpable  Back: no tenderness  Respiratory: respiratory effort normal; clear to auscultation  Cardiovascular: S1S2 regular, no murmurs  Abdomen: soft, not tender, not distended, positive BS; no liver or spleen organomegaly  Genitourinary: no suprapubic tenderness  Musculoskeletal: no muscle tenderness, no joint swelling or tenderness  Neurological/ Psychiatric: nonverbal moving all extremities  Skin: no rashes; no palpable lesions    Labs: all available labs reviewed                         Labs:                 Cultures:       Culture - Acid Fast - Sputum w/Smear (collected 11-27-18 @ 06:15)  Source: .Sputum Sputum    Culture - Acid Fast - Sputum w/Smear (collected 11-26-18 @ 16:40)  Source: .Sputum Sputum    Culture - Acid Fast - Sputum w/Smear (collected 11-26-18 @ 06:30)  Source: .Sputum Sputum conical            < from: CT Chest No Cont (11.19.18 @ 23:28) >    EXAM:  CT CHEST                            PROCEDURE DATE:  11/19/2018          INTERPRETATION:  CT CHEST WITHOUT CONTRAST    INDICATION: Cough. Left lower consolidation.    TECHNIQUE: Noncontrast CT imaging of the thorax. Coronal and sagittal   reformatted images are provided. Postprocessed MIP reformatted images   were created and reviewed..    COMPARISON: CT chest 6/16/2017.    FINDINGS:    Lines and tubes: None.  Airways: Tracheobronchial tree is patent.  Lungs and Pleura: Continued elevated left hemidiaphragm with interval   left left lower lobe consolidative opacity measuring 4.3 x 3.5 cm   containing areas of cavitation as best seen on image 39 of series 2.   Findings are nonspecific, this may represent developing cavitary   pneumonia. Mycobacterial infection or neoplastic etiology considered in   the differential. Further pulmonary workup and short-term imaging   follow-up is advised to demonstrate resolution. Associated left pleural   parenchymal thickening with trace left pleural effusion extending to the   apex. Mild asymmetric right sided groundglass attenuation, which may be   infectious or inflammatory nature. No pneumothorax.    Mediastinum and lymph nodes: [place] mediastinal lymph nodes. Continued   mild upper thoracic esophageal distention, improved compared to the prior   study. Mild distal esophageal wall thickening, difficult to evaluate   secondary to inadequate distention. This may be related to esophagitis or   gastroesophageal reflux disease. Consider nonemergent upper endoscopy if   there is concern for esophageal malignancy.  Heart: Cardiomegaly without pericardial effusion.   Vessels: Normal size. Atherosclerotic disease of the aorta and its   branches.     Upper Abdomen:  Continued elevated left hemidiaphragm with stomach,   spleen and bowel loops identified within the left lower quadrant.   Incompletely characterized 1.3 x 1 cm right adrenal nodule for which   nonemergent adrenal protocol MR is advised provided no MR   contraindications.    Bones and soft tissues: Multilevel degenerative changes of the spine with   stable mild compression deformities of mid thoracic spine. Remote   fracture deformities of the left lower ribs.    IMPRESSION:    Continued elevated left hemidiaphragm with interval left left lower lobe   consolidative opacity measuring 4.3 x 3.5 cm containing areas of   cavitation. Findings are nonspecific, this may represent developing   cavitary pneumonia. Mycobacterial infection or neoplastic etiology   considered in the differential. Further pulmonary workup and short-term   imaging follow-up is advised to demonstrate resolution. Associated left   pleural parenchymal thickening with trace left pleural effusion extending   to the apex. Mild asymmetric right sided groundglass attenuation, which   may be infectious or inflammatory nature.     Mild upper thoracic esophageal distention, improved compared to the prior   study. Mild distal esophageal wall thickening, difficult to evaluate   secondary to inadequate distention. This may be related to esophagitis or   gastroesophageal reflux disease. Consider nonemergent upper endoscopy if   there is concern for esophageal malignancy.    < end of copied text >        Radiology: all available radiological tests reviewed    Advanced directives addressed: full resuscitation

## 2018-11-28 NOTE — PROGRESS NOTE ADULT - SUBJECTIVE AND OBJECTIVE BOX
CHIEF COMPLAINT:    SUBJECTIVE:     REVIEW OF SYSTEMS:    CONSTITUTIONAL: No weakness, fevers or chills  EYES/ENT: No visual changes;  No vertigo or throat pain   NECK: No pain or stiffness  RESPIRATORY: No cough, wheezing, hemoptysis; No shortness of breath  CARDIOVASCULAR: No chest pain or palpitations  GASTROINTESTINAL: No abdominal or epigastric pain. No nausea, vomiting, or hematemesis; No diarrhea or constipation. No melena or hematochezia.  GENITOURINARY: No dysuria, frequency or hematuria  NEUROLOGICAL: No numbness or weakness  SKIN: No itching, burning, rashes, or lesions   All other review of systems is negative unless indicated above    Vital Signs Last 24 Hrs  T(C): 37.7 (28 Nov 2018 06:24), Max: 37.7 (28 Nov 2018 06:24)  T(F): 99.8 (28 Nov 2018 06:24), Max: 99.8 (28 Nov 2018 06:24)  HR: 99 (28 Nov 2018 06:24) (82 - 99)  BP: 123/75 (28 Nov 2018 06:24) (94/62 - 123/75)  BP(mean): --  RR: 18 (28 Nov 2018 06:24) (18 - 18)  SpO2: 99% (28 Nov 2018 06:24) (99% - 100%)    I&O's Summary      CAPILLARY BLOOD GLUCOSE          PHYSICAL EXAM:    Constitutional: NAD, awake and alert, well-developed  HEENT: PERR, EOMI, Normal Hearing, MMM  Neck: Soft and supple, No LAD, No JVD  Respiratory: Breath sounds are clear bilaterally, No wheezing, rales or rhonchi  Cardiovascular: S1 and S2, regular rate and rhythm, no Murmurs, gallops or rubs  Gastrointestinal: Bowel Sounds present, soft, nontender, nondistended, no guarding, no rebound  Extremities: No peripheral edema  Vascular: 2+ peripheral pulses  Neurological: A/O x 3, no focal deficits  Musculoskeletal: 5/5 strength b/l upper and lower extremities  Skin: No rashes    MEDICATIONS:  MEDICATIONS  (STANDING):  amoxicillin  875 milliGRAM(s)/clavulanate 1 Tablet(s) Oral two times a day  aspirin  chewable 81 milliGRAM(s) Oral daily  benztropine 1 milliGRAM(s) Oral four times a day  calcium carbonate 1250 mG  + Vitamin D (OsCal 500 + D) 1 Tablet(s) Oral two times a day  carBAMazepine Suspension 400 milliGRAM(s) Oral three times a day  cholecalciferol 2000 Unit(s) Oral daily  docusate sodium Liquid 100 milliGRAM(s) Oral three times a day  enoxaparin Injectable 40 milliGRAM(s) SubCutaneous daily  ferrous    sulfate 325 milliGRAM(s) Oral daily  fosphenytoin IVPB 150 milliGRAM(s) PE IV Intermittent every 12 hours  levETIRAcetam  IVPB 2000 milliGRAM(s) IV Intermittent every 12 hours  multivitamin 1 Tablet(s) Oral daily  pantoprazole    Tablet 40 milliGRAM(s) Oral before breakfast  phenytoin   Chewable 200 milliGRAM(s) Chew at bedtime  risperiDONE   Tablet 2 milliGRAM(s) Oral at bedtime  sodium chloride 2 Gram(s) Oral daily  thiothixene 5 milliGRAM(s) Oral <User Schedule>  valproate sodium IVPB 500 milliGRAM(s) IV Intermittent daily  valproate sodium IVPB 625 milliGRAM(s) IV Intermittent at bedtime      LABS: All Labs Reviewed:                        13.2   10.35 )-----------( 227      ( 26 Nov 2018 12:38 )             38.4                 Blood Culture: 11-26 @ 16:40  Organism --  Gram Stain Blood -- Gram Stain --  Specimen Source .Sputum Sputum  Culture-Blood --    11-26 @ 06:30  Organism --  Gram Stain Blood -- Gram Stain --  Specimen Source .Sputum Sputum conical  Culture-Blood --      Assessment and Plan:      52 yo WM with PMH severe MR, minimally verbal, seizure disorder on multiple meds, Iatrogenic  chr hyponatremia and hyperprolactinemia,  presented from [facility] for evaluation of cavitary lesion in the lung. Pt unable to provide any history. Pt had a seizure episode in ED due to missed seizure meds.  -found to have LLL Pna with 4cm cavitation      Assessment and Plan:    1. Encephalopathy:  -due to uncontrolled  seizures and postictal state  EEG noted: 11.25...no epileptiform activity  24hr EEG done as well, results pending  Neuro consult noted  c/w IV Phenytoin, IV Keppra, IV Depakote  placed NGT to administer Tegretol and Clobazam  Mittens ordered  -ABG noted: no hypercarbia  -Ammonia normal    2. LLL Pna with cavitary lesion:  suspect GNR etiology  Zosyn IV day#7  will cw Augmentin po  as Zosyn might lower seizure threshold   Blood Cx negative x 2  repeat CT Chest per pulmonary    r/o TB: will check AFB x 3, all collected   Low probab for TB considering no "B" symptoms are present    3. Mental retardation      4. Hyponatremia: resolved CHIEF COMPLAINT/Diagnosis: pna/ aspiration pna/ r/o TB/ seizures    SUBJECTIVE: no complaints    REVIEW OF SYSTEMS:    CONSTITUTIONAL: No weakness, fevers or chills  EYES/ENT: No visual changes;  No vertigo or throat pain   NECK: No pain or stiffness  RESPIRATORY: No cough, wheezing, hemoptysis; No shortness of breath  CARDIOVASCULAR: No chest pain or palpitations  GASTROINTESTINAL: No abdominal or epigastric pain. No nausea, vomiting, or hematemesis; No diarrhea or constipation. No melena or hematochezia.  GENITOURINARY: No dysuria, frequency or hematuria  NEUROLOGICAL: No numbness or weakness  SKIN: No itching, burning, rashes, or lesions   All other review of systems is negative unless indicated above    Vital Signs Last 24 Hrs  T(C): 37.7 (28 Nov 2018 06:24), Max: 37.7 (28 Nov 2018 06:24)  T(F): 99.8 (28 Nov 2018 06:24), Max: 99.8 (28 Nov 2018 06:24)  HR: 99 (28 Nov 2018 06:24) (82 - 99)  BP: 123/75 (28 Nov 2018 06:24) (94/62 - 123/75)  BP(mean): --  RR: 18 (28 Nov 2018 06:24) (18 - 18)  SpO2: 99% (28 Nov 2018 06:24) (99% - 100%)    I&O's Summary      CAPILLARY BLOOD GLUCOSE          PHYSICAL EXAM:    Constitutional: NAD, awake and alert, well-developed  HEENT: PERR, EOMI, Normal Hearing, MMM  Neck: Soft and supple, No LAD, No JVD  Respiratory: Breath sounds are clear bilaterally, No wheezing, rales or rhonchi  Cardiovascular: S1 and S2, regular rate and rhythm, no Murmurs, gallops or rubs  Gastrointestinal: Bowel Sounds present, soft, nontender, nondistended, no guarding, no rebound  Extremities: No peripheral edema  Vascular: 2+ peripheral pulses  Neurological: A/O x 3, no focal deficits  Musculoskeletal: 5/5 strength b/l upper and lower extremities  Skin: No rashes    MEDICATIONS:  MEDICATIONS  (STANDING):  amoxicillin  875 milliGRAM(s)/clavulanate 1 Tablet(s) Oral two times a day  aspirin  chewable 81 milliGRAM(s) Oral daily  benztropine 1 milliGRAM(s) Oral four times a day  calcium carbonate 1250 mG  + Vitamin D (OsCal 500 + D) 1 Tablet(s) Oral two times a day  carBAMazepine Suspension 400 milliGRAM(s) Oral three times a day  cholecalciferol 2000 Unit(s) Oral daily  docusate sodium Liquid 100 milliGRAM(s) Oral three times a day  enoxaparin Injectable 40 milliGRAM(s) SubCutaneous daily  ferrous    sulfate 325 milliGRAM(s) Oral daily  fosphenytoin IVPB 150 milliGRAM(s) PE IV Intermittent every 12 hours  levETIRAcetam  IVPB 2000 milliGRAM(s) IV Intermittent every 12 hours  multivitamin 1 Tablet(s) Oral daily  pantoprazole    Tablet 40 milliGRAM(s) Oral before breakfast  phenytoin   Chewable 200 milliGRAM(s) Chew at bedtime  risperiDONE   Tablet 2 milliGRAM(s) Oral at bedtime  sodium chloride 2 Gram(s) Oral daily  thiothixene 5 milliGRAM(s) Oral <User Schedule>  valproate sodium IVPB 500 milliGRAM(s) IV Intermittent daily  valproate sodium IVPB 625 milliGRAM(s) IV Intermittent at bedtime      LABS: All Labs Reviewed:                        13.2   10.35 )-----------( 227      ( 26 Nov 2018 12:38 )             38.4         Blood Culture: 11-26 @ 16:40  Organism --  Gram Stain Blood -- Gram Stain --  Specimen Source .Sputum Sputum  Culture-Blood --    11-26 @ 06:30  Organism --  Gram Stain Blood -- Gram Stain --  Specimen Source .Sputum Sputum conical  Culture-Blood --      Assessment and Plan:      52 yo WM with PMH severe MR, minimally verbal, seizure disorder on multiple meds, Iatrogenic  chr hyponatremia and hyperprolactinemia,  [facility] of cavitary lesion in the lung. Pt unable to provide any history. Pt had a seizure episode in ED due to missed seizure meds.  -found to have LLL Pna with 4cm cavitation        1. Metabolic encepahlpathy Encephalopathy:  -now back to  baseline w/ severe MR  EEG noted: 11.25...no epileptiform activity  24hr EEG done as well, no epileptic activity  Neuro consult noted  c/w IV Phenytoin, IV Keppra, IV Depakote  placed NGT to administer Tegretol and Clobazam  Mittens ordered  -ABG noted: no hypercarbia  -Ammonia normal    2. LLL Pna with cavitary lesion:  suspect GNR etiology  Zosyn IV day#7 > discontinued for "siezure like activity"  will cw Augmentin po  as Zosyn might lower seizure threshold   Blood Cx negative x 2  repeat CT Chest per pulmonary  r/o TB: will check AFB x 3, all collected   Low probab for TB considering no "B" symptoms are present    3. Mental retardation    4. Hyponatremia: resolved     5-DVt prophy - sc lovenox      6-advance care planning- patient very poor prognosis, not eating , with aspiration PNA.  still spiking temps.  hx of sizures and possibly having break through seizures, and iv abx put him at increased risk. currently on oral regemin. Possibly also has Abcess in lung (cavitary lesion noted on CT chest).   Will call sister Darren [phone] for goals of care.   patient is also under the authority of [employer]

## 2018-11-28 NOTE — DIETITIAN INITIAL EVALUATION ADULT. - PERTINENT MEDS FT
MEDICATIONS  (STANDING):  amoxicillin  875 milliGRAM(s)/clavulanate 1 Tablet(s) Oral two times a day  aspirin  chewable 81 milliGRAM(s) Oral daily  benztropine 1 milliGRAM(s) Oral four times a day  calcium carbonate 1250 mG  + Vitamin D (OsCal 500 + D) 1 Tablet(s) Oral two times a day  carBAMazepine Suspension 400 milliGRAM(s) Oral three times a day  cholecalciferol 2000 Unit(s) Oral daily  docusate sodium Liquid 100 milliGRAM(s) Oral three times a day  enoxaparin Injectable 40 milliGRAM(s) SubCutaneous daily  ferrous    sulfate 325 milliGRAM(s) Oral daily  fosphenytoin IVPB 150 milliGRAM(s) PE IV Intermittent every 12 hours  levETIRAcetam  IVPB 2000 milliGRAM(s) IV Intermittent every 12 hours  multivitamin 1 Tablet(s) Oral daily  pantoprazole    Tablet 40 milliGRAM(s) Oral before breakfast  phenytoin   Chewable 200 milliGRAM(s) Chew at bedtime  risperiDONE   Tablet 2 milliGRAM(s) Oral at bedtime  sodium chloride 2 Gram(s) Oral daily  thiothixene 5 milliGRAM(s) Oral <User Schedule>  valproate sodium IVPB 500 milliGRAM(s) IV Intermittent daily  valproate sodium IVPB 625 milliGRAM(s) IV Intermittent at bedtime    MEDICATIONS  (PRN):  acetaminophen   Tablet .. 650 milliGRAM(s) Oral every 6 hours PRN Temp greater or equal to 38C (100.4F), Mild Pain (1 - 3)  aluminum hydroxide/magnesium hydroxide/simethicone Suspension 30 milliLiter(s) Oral every 4 hours PRN Dyspepsia  LORazepam   Injectable 2 milliGRAM(s) IV Push every 15 minutes PRN Seizure  magnesium hydroxide Suspension 30 milliLiter(s) Oral four times a day PRN Constipation  ondansetron Injectable 4 milliGRAM(s) IV Push every 6 hours PRN Nausea  senna 2 Tablet(s) Oral at bedtime PRN Constipation

## 2018-11-28 NOTE — SWALLOW BEDSIDE ASSESSMENT ADULT - ORAL PREPARATORY PHASE
Food was cut by clinician prior to administration. Pt's self monitoring abilities were variably reduced when pt was alert enough to be fed.  When PO was offered, he tended to sometimes aversively turn head/resistively close mouth.  Labial grading on utensils was functionally reduced when willing to accept food and lingual thrusting was also noted at times when PO was presented. Food was cut by clinician prior to administration. Pt's self monitoring abilities were variably reduced when pt was alert enough to be fed.  When PO was offered, he tended to sometimes aversively turn head/resistively close mouth. Lingual thrusting actions were also noted at times when accepting PO.  Labial grading on utensils was functionally reduced when willing to accept food.

## 2018-11-28 NOTE — SWALLOW BEDSIDE ASSESSMENT ADULT - SWALLOW EVAL: RECOMMENDED FEEDING/EATING TECHNIQUES
alternate food with liquid/position upright (90 degrees)/check mouth frequently for oral residue/pocketing

## 2018-11-28 NOTE — SWALLOW BEDSIDE ASSESSMENT ADULT - SWALLOW EVAL: CRITERIA FOR SKILLED INTERVENTION MET
ACUTE SPEECH PATH INTERVENTION IS NOT CLINICALLY INDICATED NOR WOULD IT . PT'S ORAL DYSPHAGIA/COGNITIVE DYSFUNCTION ARE CHRONIC PRE-EXISTING/IRREVERSIBLE. PT IS NOT A VIABLE THERAPY CANDIDATE REGARDLESS GIVEN THE SEVERITY OF HIS CHRONIC COGNITIVE DYSFUNCTION ASSOCIATED WITH MR. GIVEN ABOVE, WILL NOT ACTIVELY FOLLOW. RECONSULT PRN. ACUTE SPEECH PATH INTERVENTION IS NOT CLINICALLY INDICATED NOR WOULD IT . PT'S ORAL DYSPHAGIA/COGNITIVE DYSFUNCTION ARE CHRONIC PRE-EXISTING/IRREVERSIBLE. PT IS NOT A VIABLE THERAPY CANDIDATE REGARDLESS GIVEN THE SEVERITY OF HIS CHRONIC COGNITIVE DYSFUNCTION ASSOCIATED WITH MR. COMMUNICATIVE AND OROPHARYNGEAL SWALLOWING POTENTIAL ARE FELT TO BE MAXIMIZED. GIVEN ABOVE, WILL NOT ACTIVELY FOLLOW. RECONSULT PRN.

## 2018-11-28 NOTE — SWALLOW BEDSIDE ASSESSMENT ADULT - PHARYNGEAL PHASE
Swallow triggered in an acceptable time frame for age and laryngeal lift on palpation during swallow trials was within functional parameters. No behavioral aspiration signs exhibited on exam. Swallow triggered in an acceptable time frame for age and laryngeal lift on palpation during swallow trials was grossly within functional parameters. No behavioral aspiration signs exhibited on exam.

## 2018-11-28 NOTE — PROGRESS NOTE ADULT - SUBJECTIVE AND OBJECTIVE BOX
SUBJECTIVE     Patient is awake not in distress and non verbal   not eating getting medications through iv   no reported seizures     PAST MEDICAL & SURGICAL HISTORY:  Bowel and bladder incontinence  Hearing impaired person  Intellectual disability  Hyperprolactinemia  Hyponatremia  Cataract  GERD (gastroesophageal reflux disease)  Osteoporosis  Seizure disorder  Internal Hemorrhoids  Prolactin Increased  Gastritis  Osteoporosis  Leg Edema  Incontinent of Urine  Diarrhea  Cataract  Anemia  Seizure Disorder  Mental Retardation, Severe (I.Q. 20-34)  H/O cataract extraction  S/P Colonoscopy  S/P Endoscopy    OBJECTIVE   Vital Signs Last 24 Hrs  T(C): 37.7 (28 Nov 2018 06:24), Max: 37.7 (28 Nov 2018 06:24)  T(F): 99.8 (28 Nov 2018 06:24), Max: 99.8 (28 Nov 2018 06:24)  HR: 99 (28 Nov 2018 06:24) (82 - 99)  BP: 123/75 (28 Nov 2018 06:24) (94/62 - 123/75)  BP(mean): --  RR: 18 (28 Nov 2018 06:24) (18 - 18)  SpO2: 99% (28 Nov 2018 06:24) (99% - 100%)    PHYSICAL EXAM:  Constitutional: , awake and alert, not in distress and non verbal.   HEENT: Normo cephalic atraumatic  Neck: Soft and supple, No J.V.D   Respiratory: vesicular breathing  has poor inspiratory effort   Cardiovascular: S1 and S2, regular rate .   Gastrointestinal:  soft, nontender,   Extremities: No  edema or calf tenderness .  Neurological: has mental retardation and difficult to evaluate    MEDICATIONS  (STANDING):  amoxicillin  875 milliGRAM(s)/clavulanate 1 Tablet(s) Oral two times a day  aspirin  chewable 81 milliGRAM(s) Oral daily  benztropine 1 milliGRAM(s) Oral four times a day  calcium carbonate 1250 mG  + Vitamin D (OsCal 500 + D) 1 Tablet(s) Oral two times a day  carBAMazepine Suspension 400 milliGRAM(s) Oral three times a day  cholecalciferol 2000 Unit(s) Oral daily  docusate sodium Liquid 100 milliGRAM(s) Oral three times a day  enoxaparin Injectable 40 milliGRAM(s) SubCutaneous daily  ferrous    sulfate 325 milliGRAM(s) Oral daily  fosphenytoin IVPB 150 milliGRAM(s) PE IV Intermittent every 12 hours  levETIRAcetam  IVPB 2000 milliGRAM(s) IV Intermittent every 12 hours  multivitamin 1 Tablet(s) Oral daily  pantoprazole    Tablet 40 milliGRAM(s) Oral before breakfast  phenytoin   Chewable 200 milliGRAM(s) Chew at bedtime  risperiDONE   Tablet 2 milliGRAM(s) Oral at bedtime  sodium chloride 2 Gram(s) Oral daily  thiothixene 5 milliGRAM(s) Oral <User Schedule>  valproate sodium IVPB 500 milliGRAM(s) IV Intermittent daily  valproate sodium IVPB 625 milliGRAM(s) IV Intermittent at bedtime                            13.2   10.35 )-----------( 227      ( 26 Nov 2018 12:38 )             38.4             Culture - Acid Fast - Sputum w/Smear (collected 26 Nov 2018 16:40)  Source: .Sputum Sputum    Culture - Acid Fast - Sputum w/Smear (collected 26 Nov 2018 06:30)  Source: .Sputum Sputum conical         < from: Xray Chest 1 View- PORTABLE-Urgent (11.27.18 @ 06:32) >  XAM:  XR CHEST PORTABLE IMMED 1V                          EXAM:  XR CHEST PORTABLE URGENT 1V                            PROCEDURE DATE:  11/27/2018          INTERPRETATION:  History: Nasogastric tube placement.    Frontal views of chest 1120 7/18 and 11/26/2018 compared to prior study   of 11/19/2018.    Findings: There is left lower lobe atelectasis and trace left pleural   effusion, underlying consolidation cannot be excluded. There is elevation   of left hemidiaphragm. On the latest radiograph, there is a nasogastric   tube tip in the body of stomach.    Impression: Elevation of left hemidiaphragm patchy consolidation left   lower lobe, follow-up resolution is suggested. Epigastric tube tip in   left quadrant region of stomach      < end of copied text >

## 2018-11-28 NOTE — DIETITIAN INITIAL EVALUATION ADULT. - OTHER INFO
52 yo M seen as LOS and admitted 2/2 cavity lesion of lung (currently under airborne precautions). PMHx severe MR, minimally verbal, seizure disorder (multiple meds), chr hyponatremia, and hyperprolactinemia from group home. Pt was reported to have good appetite and no problem with intake however pt had multiple seizure episodes w/ rapid response called and currently has NG tube placed for Tegretol and Clobazam administration. As per RN NGT likely to be removed tomorrow. NGT is likely causing discomfort and has effected overall PO intake. As per aide pt has had very little intake since NGT placement. Noted that pt is lethargic and at increased risk of aspiration while lethargic. Recommend to only provide PO intake during times pt is awake can safely tolerate PO intake.   Recommend providing oral supplement while PO intake decreased. If PO intake remains limited pt could meet criteria for malnutrition. Will continue to monitor. Pt currently on [facility] soft diet however pt normally has good intake. No noted skin breakdown; trace generalized edema; huey 10=increased risk for skin breakdown. Last BM 11/23; noted in MAR pt received colace. Will continue to monitor for improvements. No noted n/v/d. Recommendations: 1. Provide ensure TID while PO intake decreased. 2. Obtain new wt and monitor weekly. 3. Encourage PO intake during times when pt is alert and can take PO intake safely.

## 2018-11-28 NOTE — SWALLOW BEDSIDE ASSESSMENT ADULT - DIET PRIOR TO ADMI
The patient was on a Ground Texture Diet with Thin Liquids at [facility] as per my phone conversation with nurse at [facility] when he was last seen by this service during a prior hospitalization at this facility in 5/18. Additionally, he was on a mechanical soft texture diet with thin liquids at [facility] when last seen by this service during a prior hospitalization at this facility in 5/18. The patient was reportedly on a Ground Texture Diet with Thin Liquids at [facility] as per my phone conversation with nurse at [facility] when he was last seen by this service during a prior hospitalization at this facility in 5/18. Additionally, he was on a mechanical soft texture diet with thin liquids at [facility] when last seen by this service during a prior hospitalization at this facility in 5/18.

## 2018-11-28 NOTE — SWALLOW BEDSIDE ASSESSMENT ADULT - ASPIRATION PRECAUTIONS
MAINTAIN ASPIRATION PRECAUTIONS AS A CONSERVATIVE MEASURE. NOTE THAT PT'S PROFILE MAY PLACE HIM AT A RELATIVELY HEIGHTENED RISK FOR EPISODIC ASPIRATION BUT HE DID NOT APPEAR TO BE ASPIRATING ON CLINICAL EXAM./yes

## 2018-11-29 LAB — LEVETIRACETAM SERPL-MCNC: 55.4 MCG/ML — HIGH (ref 12–46)

## 2018-11-29 RX ORDER — PANTOPRAZOLE SODIUM 20 MG/1
40 TABLET, DELAYED RELEASE ORAL
Qty: 0 | Refills: 0 | Status: DISCONTINUED | OUTPATIENT
Start: 2018-11-29 | End: 2018-12-03

## 2018-11-29 RX ADMIN — Medication 1 TABLET(S): at 17:26

## 2018-11-29 RX ADMIN — Medication 1 TABLET(S): at 05:38

## 2018-11-29 RX ADMIN — Medication 1 MILLIGRAM(S): at 17:25

## 2018-11-29 RX ADMIN — Medication 100 MILLIGRAM(S): at 21:59

## 2018-11-29 RX ADMIN — Medication 27.5 MILLIGRAM(S): at 13:12

## 2018-11-29 RX ADMIN — Medication 200 MILLIGRAM(S): at 21:57

## 2018-11-29 RX ADMIN — Medication 1 MILLIGRAM(S): at 23:34

## 2018-11-29 RX ADMIN — Medication 1 MILLIGRAM(S): at 05:38

## 2018-11-29 RX ADMIN — Medication 1 TABLET(S): at 13:18

## 2018-11-29 RX ADMIN — Medication 81 MILLIGRAM(S): at 13:20

## 2018-11-29 RX ADMIN — Medication 325 MILLIGRAM(S): at 13:18

## 2018-11-29 RX ADMIN — Medication 400 MILLIGRAM(S): at 17:24

## 2018-11-29 RX ADMIN — Medication 28.13 MILLIGRAM(S): at 21:50

## 2018-11-29 RX ADMIN — RISPERIDONE 2 MILLIGRAM(S): 4 TABLET ORAL at 21:53

## 2018-11-29 RX ADMIN — Medication 100 MILLIGRAM(S): at 13:13

## 2018-11-29 RX ADMIN — SODIUM CHLORIDE 2 GRAM(S): 9 INJECTION INTRAMUSCULAR; INTRAVENOUS; SUBCUTANEOUS at 13:17

## 2018-11-29 RX ADMIN — Medication 2000 UNIT(S): at 13:19

## 2018-11-29 RX ADMIN — PANTOPRAZOLE SODIUM 40 MILLIGRAM(S): 20 TABLET, DELAYED RELEASE ORAL at 05:38

## 2018-11-29 RX ADMIN — FOSPHENYTOIN 106 MILLIGRAM(S) PE: 50 INJECTION INTRAMUSCULAR; INTRAVENOUS at 17:24

## 2018-11-29 RX ADMIN — Medication 400 MILLIGRAM(S): at 22:10

## 2018-11-29 RX ADMIN — Medication 400 MILLIGRAM(S): at 13:14

## 2018-11-29 RX ADMIN — LEVETIRACETAM 600 MILLIGRAM(S): 250 TABLET, FILM COATED ORAL at 06:22

## 2018-11-29 RX ADMIN — FOSPHENYTOIN 106 MILLIGRAM(S) PE: 50 INJECTION INTRAMUSCULAR; INTRAVENOUS at 05:40

## 2018-11-29 RX ADMIN — Medication 400 MILLIGRAM(S): at 05:39

## 2018-11-29 RX ADMIN — Medication 1 MILLIGRAM(S): at 13:18

## 2018-11-29 RX ADMIN — LEVETIRACETAM 600 MILLIGRAM(S): 250 TABLET, FILM COATED ORAL at 17:26

## 2018-11-29 RX ADMIN — ENOXAPARIN SODIUM 40 MILLIGRAM(S): 100 INJECTION SUBCUTANEOUS at 13:21

## 2018-11-29 RX ADMIN — Medication 5 MILLIGRAM(S): at 21:58

## 2018-11-29 NOTE — PROGRESS NOTE ADULT - SUBJECTIVE AND OBJECTIVE BOX
CHIEF COMPLAINT/Diagnosis: aspiration PNA    SUBJECTIVE: patient resting comfortably    REVIEW OF SYSTEMS:    severe MR, not able to get this inform    Vital Signs Last 24 Hrs  T(C): 37.4 (29 Nov 2018 11:53), Max: 37.4 (29 Nov 2018 11:53)  T(F): 99.4 (29 Nov 2018 11:53), Max: 99.4 (29 Nov 2018 11:53)  HR: 96 (29 Nov 2018 11:53) (96 - 99)  BP: 141/73 (29 Nov 2018 11:53) (127/40 - 141/73)  BP(mean): --  RR: 17 (29 Nov 2018 11:53) (17 - 18)  SpO2: 96% (29 Nov 2018 11:53) (96% - 100%)    I&O's Summary      CAPILLARY BLOOD GLUCOSE          PHYSICAL EXAM:    Constitutional: not alert or oriented; well-developed  HEENT: PERR, EOMI, Normal Hearing, MMM  Neck: Soft and supple, No LAD, No JVD  Respiratory: Breath sounds are clear bilaterally, No wheezing, rales or rhonchi  Cardiovascular: S1 and S2, regular rate and rhythm, no Murmurs, gallops or rubs  Gastrointestinal: Bowel Sounds present, soft, nontender, nondistended, no guarding, no rebound  Extremities: No peripheral edema  Vascular: 2+ peripheral pulses  Neurological:  no focal deficits  Musculoskeletal: 5/5 strength b/l upper and lower extremities  Skin: No rashes    MEDICATIONS:  MEDICATIONS  (STANDING):  amoxicillin  875 milliGRAM(s)/clavulanate 1 Tablet(s) Oral two times a day  aspirin  chewable 81 milliGRAM(s) Oral daily  benztropine 1 milliGRAM(s) Oral four times a day  calcium carbonate 1250 mG  + Vitamin D (OsCal 500 + D) 1 Tablet(s) Oral two times a day  carBAMazepine Suspension 400 milliGRAM(s) Oral three times a day  cholecalciferol 2000 Unit(s) Oral daily  docusate sodium Liquid 100 milliGRAM(s) Oral three times a day  enoxaparin Injectable 40 milliGRAM(s) SubCutaneous daily  ferrous    sulfate 325 milliGRAM(s) Oral daily  fosphenytoin IVPB 150 milliGRAM(s) PE IV Intermittent every 12 hours  levETIRAcetam  IVPB 2000 milliGRAM(s) IV Intermittent every 12 hours  multivitamin 1 Tablet(s) Oral daily  pantoprazole    Tablet 40 milliGRAM(s) Oral before breakfast  phenytoin   Chewable 200 milliGRAM(s) Chew at bedtime  risperiDONE   Tablet 2 milliGRAM(s) Oral at bedtime  sodium chloride 2 Gram(s) Oral daily  thiothixene 5 milliGRAM(s) Oral <User Schedule>  valproate sodium IVPB 500 milliGRAM(s) IV Intermittent daily  valproate sodium IVPB 625 milliGRAM(s) IV Intermittent at bedtime      LABS: All Labs Reviewed:          Blood Culture: 11-27 @ 06:15  Organism --  Gram Stain Blood -- Gram Stain --  Specimen Source .Sputum Sputum  Culture-Blood --    11-26 @ 16:40  Organism --  Gram Stain Blood -- Gram Stain --  Specimen Source .Sputum Sputum  Culture-Blood --    11-26 @ 06:30  Organism --  Gram Stain Blood -- Gram Stain --  Specimen Source .Sputum Sputum conical  Culture-Blood --        Assessment and Plan:      52 yo WM with PMH severe MR, minimally verbal, seizure disorder on multiple meds, Iatrogenic  chr hyponatremia and hyperprolactinemia,  presented from [facility] for evaluation of cavitary lesion in the lung. Pt unable to provide any history. Pt had a seizure episode in ED due to missed seizure meds.  -found to have LLL Pna with 4cm cavitation        1. Metabolic encepahlpathy Encephalopathy:  -now back to  baseline w/ severe MR  EEG noted: 11.25...no epileptiform activity  24hr EEG done as well, no epileptic activity  Neuro consult noted  c/w IV Phenytoin, IV Keppra, IV Depakote  placed NGT to administer Tegretol and Clobazam>> patient improved now; d/c ng tube  Mittens ordered  -ABG noted: no hypercarbia  -Ammonia normal    2. LLL Pna with cavitary lesion:  suspect GNR etiology  Zosyn IV day#7 > discontinued for "siezure like activity"  will cw Augmentin po  as Zosyn might lower seizure threshold   Blood Cx negative x 2  repeat CT Chest per pulmonary  r/o TB: will check AFB x 3, all collected ; negative  no temps spikes in 24 hours  continue with liquid augmentin  for 2 weeks.    3. Mental retardation    4. Hyponatremia: resolved     5-DVt prophy - sc lovenox      6-advance care planning- patient very poor prognosis, not eating , with aspiration PNA.  still spiking temps.  hx of sizures and possibly having break through seizures, and iv abx put him at increased risk. currently on oral regemin.Tolerating oral augmentin well. No tempurature spikes on this, clincially improving on oral augmentin.   Will call sister Darren [phone] for goals of care.   patient is also under the authority of [facility]

## 2018-11-29 NOTE — PROGRESS NOTE ADULT - SUBJECTIVE AND OBJECTIVE BOX
SUBJECTIVE     Patient seen in the A.M and non verbal awake   as per the attendant no new episodes of aspiration.  3 sputum were negative and off the isolation   no fever still  has ngt in place     PAST MEDICAL & SURGICAL HISTORY:  Bowel and bladder incontinence  Hearing impaired person  Intellectual disability  Hyperprolactinemia  Hyponatremia  Cataract  GERD (gastroesophageal reflux disease)  Osteoporosis  Seizure disorder  Internal Hemorrhoids  Prolactin Increased  Gastritis  Osteoporosis  Leg Edema  Incontinent of Urine  Diarrhea  Cataract  Anemia  Seizure Disorder  Mental Retardation, Severe (I.Q. 20-34)  H/O cataract extraction  S/P Colonoscopy  S/P Endoscopy    OBJECTIVE   Vital Signs Last 24 Hrs  T(C): 37.4 (29 Nov 2018 11:53), Max: 37.4 (29 Nov 2018 11:53)  T(F): 99.4 (29 Nov 2018 11:53), Max: 99.4 (29 Nov 2018 11:53)  HR: 96 (29 Nov 2018 11:53) (96 - 99)  BP: 141/73 (29 Nov 2018 11:53) (127/67 - 141/73)  BP(mean): --  RR: 17 (29 Nov 2018 11:53) (17 - 18)  SpO2: 96% (29 Nov 2018 11:53) (96% - 100%)    PHYSICAL EXAM:  Constitutional: , awake and alert, not in distress has ngt in place   HEENT: Normo cephalic atraumatic  Neck: Soft and supple, No J.V.D   Respiratory: vesicular breathing poor inspiratory effort   Cardiovascular: S1 and S2, regular rate .   Gastrointestinal:  soft, nontender,   Extremities: No  edema or calf tenderness .  Neurological: baseline mental retardation and non verbal     MEDICATIONS  (STANDING):  amoxicillin   80 mG/mL/clavulanate Suspension 400 milliGRAM(s) Oral two times a day  aspirin  chewable 81 milliGRAM(s) Oral daily  benztropine 1 milliGRAM(s) Oral four times a day  calcium carbonate 1250 mG  + Vitamin D (OsCal 500 + D) 1 Tablet(s) Oral two times a day  carBAMazepine Suspension 400 milliGRAM(s) Oral three times a day  cholecalciferol 2000 Unit(s) Oral daily  docusate sodium Liquid 100 milliGRAM(s) Oral three times a day  enoxaparin Injectable 40 milliGRAM(s) SubCutaneous daily  ferrous    sulfate 325 milliGRAM(s) Oral daily  fosphenytoin IVPB 150 milliGRAM(s) PE IV Intermittent every 12 hours  levETIRAcetam  IVPB 2000 milliGRAM(s) IV Intermittent every 12 hours  multivitamin 1 Tablet(s) Oral daily  pantoprazole    Tablet 40 milliGRAM(s) Oral before breakfast  phenytoin   Chewable 200 milliGRAM(s) Chew at bedtime  risperiDONE   Tablet 2 milliGRAM(s) Oral at bedtime  sodium chloride 2 Gram(s) Oral daily  thiothixene 5 milliGRAM(s) Oral <User Schedule>  valproate sodium IVPB 500 milliGRAM(s) IV Intermittent daily  valproate sodium IVPB 625 milliGRAM(s) IV Intermittent at bedtime                Culture - Acid Fast - Sputum w/Smear (collected 27 Nov 2018 06:15)  Source: .Sputum Sputum

## 2018-11-30 LAB
CARBAMAZEPINE SERPL-MCNC: 7.7 UG/ML — SIGNIFICANT CHANGE UP (ref 4–12)
PHENYTOIN FREE SERPL-MCNC: 11.1 UG/ML — SIGNIFICANT CHANGE UP (ref 10–20)
VALPROATE SERPL-MCNC: 52 UG/ML — SIGNIFICANT CHANGE UP (ref 50–100)

## 2018-11-30 PROCEDURE — 99233 SBSQ HOSP IP/OBS HIGH 50: CPT

## 2018-11-30 PROCEDURE — 71250 CT THORAX DX C-: CPT | Mod: 26

## 2018-11-30 RX ORDER — ACETAMINOPHEN 500 MG
650 TABLET ORAL ONCE
Qty: 0 | Refills: 0 | Status: COMPLETED | OUTPATIENT
Start: 2018-11-30 | End: 2018-11-30

## 2018-11-30 RX ADMIN — Medication 1 TABLET(S): at 17:42

## 2018-11-30 RX ADMIN — Medication 200 MILLIGRAM(S): at 22:24

## 2018-11-30 RX ADMIN — Medication 400 MILLIGRAM(S): at 22:23

## 2018-11-30 RX ADMIN — Medication 28.13 MILLIGRAM(S): at 22:21

## 2018-11-30 RX ADMIN — FOSPHENYTOIN 106 MILLIGRAM(S) PE: 50 INJECTION INTRAMUSCULAR; INTRAVENOUS at 17:41

## 2018-11-30 RX ADMIN — Medication 100 MILLIGRAM(S): at 15:37

## 2018-11-30 RX ADMIN — Medication 27.5 MILLIGRAM(S): at 11:39

## 2018-11-30 RX ADMIN — LEVETIRACETAM 600 MILLIGRAM(S): 250 TABLET, FILM COATED ORAL at 17:41

## 2018-11-30 RX ADMIN — Medication 400 MILLIGRAM(S): at 15:35

## 2018-11-30 RX ADMIN — Medication 5 MILLIGRAM(S): at 22:23

## 2018-11-30 RX ADMIN — ENOXAPARIN SODIUM 40 MILLIGRAM(S): 100 INJECTION SUBCUTANEOUS at 11:37

## 2018-11-30 RX ADMIN — Medication 81 MILLIGRAM(S): at 11:37

## 2018-11-30 RX ADMIN — Medication 400 MILLIGRAM(S): at 06:03

## 2018-11-30 RX ADMIN — Medication 400 MILLIGRAM(S): at 06:02

## 2018-11-30 RX ADMIN — Medication 400 MILLIGRAM(S): at 17:42

## 2018-11-30 RX ADMIN — Medication 1 TABLET(S): at 11:37

## 2018-11-30 RX ADMIN — Medication 1 MILLIGRAM(S): at 17:42

## 2018-11-30 RX ADMIN — FOSPHENYTOIN 106 MILLIGRAM(S) PE: 50 INJECTION INTRAMUSCULAR; INTRAVENOUS at 07:39

## 2018-11-30 RX ADMIN — Medication 2000 UNIT(S): at 11:37

## 2018-11-30 RX ADMIN — PANTOPRAZOLE SODIUM 40 MILLIGRAM(S): 20 TABLET, DELAYED RELEASE ORAL at 06:09

## 2018-11-30 RX ADMIN — Medication 1 MILLIGRAM(S): at 06:02

## 2018-11-30 RX ADMIN — LEVETIRACETAM 600 MILLIGRAM(S): 250 TABLET, FILM COATED ORAL at 05:00

## 2018-11-30 RX ADMIN — SODIUM CHLORIDE 2 GRAM(S): 9 INJECTION INTRAMUSCULAR; INTRAVENOUS; SUBCUTANEOUS at 11:38

## 2018-11-30 RX ADMIN — Medication 1 MILLIGRAM(S): at 11:42

## 2018-11-30 RX ADMIN — Medication 325 MILLIGRAM(S): at 11:38

## 2018-11-30 RX ADMIN — Medication 100 MILLIGRAM(S): at 22:23

## 2018-11-30 NOTE — PROGRESS NOTE ADULT - SUBJECTIVE AND OBJECTIVE BOX
· Subjective and Objective: 	  HPI:  52yo/M with PMH severe MR, minimally verbal, seizure disorder on multiple meds, chr hyponatremia and hyperprolactinemia due to meds presented from [facility] for evaluation of cavitary lesion in the lung. Pt unable to provide any history. Pt had a seizure episode in ED due to missed seizure meds (20 Nov 2018 08:55)and had seizures last night and neuro consult called. Both Depakote and Tegretal  levels were low. As per staff pt refuses to take meds  some times and misses his dose of meds. Came from a group  home. He is unable to provide any information. Took  his meds today.  11/25/18 : Pt again had rapid response this morning and and not responding since then and not taking medications since this morning. Keppra  and Depakote can be changed to Iv but Tegretal and onfi can not be given IV. Pt had no witnessed seizure and Tegretal level 4.6 and depakote improved to 47.  11/26/18 : Pt still not responding and not taking PO meds witnessed having seizures this morning. Did not take his Carbamazepine and Onfi since 11/24/18 and started on phenytoin IV yesterday. Levels pending today. EEG done yesterday did not show epileptic  activity but diffuse bilat slow activity.   11/27/18 : Pt had 1 seizure early morning . Completed EEG monitoring. No new seizures today. Received  his tegretol and Onfi since yesterday after placing NGT. More responsive but still not alert.   11/30/18 : Pt awake, alert , sitting in bed, non verbal, no recurrent seizures noted. Pulled his NGT but received his meds by mouth. No repeat levels done yesterday.    MEDICATIONS  (STANDING):  amoxicillin   80 mG/mL/clavulanate Suspension 400 milliGRAM(s) Oral two times a day  aspirin  chewable 81 milliGRAM(s) Oral daily  benztropine 1 milliGRAM(s) Oral four times a day  calcium carbonate 1250 mG  + Vitamin D (OsCal 500 + D) 1 Tablet(s) Oral two times a day  carBAMazepine Suspension 400 milliGRAM(s) Oral three times a day  cholecalciferol 2000 Unit(s) Oral daily  docusate sodium Liquid 100 milliGRAM(s) Oral three times a day  enoxaparin Injectable 40 milliGRAM(s) SubCutaneous daily  ferrous    sulfate 325 milliGRAM(s) Oral daily  fosphenytoin IVPB 150 milliGRAM(s) PE IV Intermittent every 12 hours  levETIRAcetam  IVPB 2000 milliGRAM(s) IV Intermittent every 12 hours  multivitamin 1 Tablet(s) Oral daily  pantoprazole   Suspension 40 milliGRAM(s) Oral before breakfast  phenytoin   Chewable 200 milliGRAM(s) Chew at bedtime  risperiDONE   Tablet 2 milliGRAM(s) Oral at bedtime  sodium chloride 2 Gram(s) Oral daily  thiothixene 5 milliGRAM(s) Oral <User Schedule>  valproate sodium IVPB 500 milliGRAM(s) IV Intermittent daily  valproate sodium IVPB 625 milliGRAM(s) IV Intermittent at bedtime          Vital Signs Last 24 Hrs  T(C): 37.9 (30 Nov 2018 05:06), Max: 37.9 (30 Nov 2018 05:06)  T(F): 100.3 (30 Nov 2018 05:06), Max: 100.3 (30 Nov 2018 05:06)  HR: 94 (30 Nov 2018 05:06) (84 - 96)  BP: 120/73 (30 Nov 2018 05:06) (115/72 - 141/73)  BP(mean): --  RR: 19 (30 Nov 2018 05:06) (17 - 19)  SpO2: 100% (30 Nov 2018 05:06) (96% - 100%)     Neurological exam:  HF:  awake, alert, sitting in bed, not verbalizing , moves both sides but not to commands.   CN: EUNICE, EOMI, VFF, facial sensation normal, no NLFD  Motor:  moves all extremities but not to commands.   Sens:  with draws to touch  Reflexes: Symmetric +2 Plantars with drawing  Coord:   can not tested  Gait/Balance: Cannot test       Valproic Acid Level, Serum (11.27.18 @ 19:45)    Valproic Acid Level, Serum: 37 ug/mL    Phenytoin Level, Serum (11.27.18 @ 19:45)    Phenytoin Level, Serum: 9.8 ug/mL    Carbamazepine Level, Serum (11.27.18 @ 19:45)    Carbamazepine Level, Serum: 4.5 ug/mL    Levetiracetam Level, Serum: 55.4: -------------------ADDITIONAL INFORMATION-------------------  This test was developed and its performance characteristics  determined by [facility] in a manner consistent with CLIA  requirements. This test has not been cleared or approved by  the U.[place]. Food and Drug Administration.  Test Performed by:  [facility]  [address] [place] [address] mcg/mL (11.27.18 @ 19:45)      Radiology report:  < from: CT Head No Cont (11.25.18 @ 12:15) >  IMPRESSION:   Mildperiventricular white matter ischemia is noted.            EEG     < from: EEG Monitoring Each 24 hours (11.27.18 @ 10:30) >  mpression :  Abnormal 24 hour EEG recording due to the presence of  Moderate generalizedslowing without any clear epileptiform activity   suggestive of underlying encephalopathy. No intracranial or ictal EEG   abnormalities noted. Clinical correlation recommended. Discussed the   results with Dr. GASPER Feliz.

## 2018-11-30 NOTE — PROVIDER CONTACT NOTE (OTHER) - SITUATION
MR patient pulled out NGT  for medications  Attempted to insert another tube,  Patient became aggressive and anxious.

## 2018-11-30 NOTE — PROGRESS NOTE ADULT - SUBJECTIVE AND OBJECTIVE BOX
Patient is a 53y old  Male who presents with a chief complaint of Cavitary lesion (20 Nov 2018 08:55)    Date of service: 11-30-18 @ 11:41    Patient lying in bed  Afebrile        ROS unable to obtain secondary to patient medical condition     MEDICATIONS  (STANDING):  amoxicillin   80 mG/mL/clavulanate Suspension 400 milliGRAM(s) Oral two times a day  aspirin  chewable 81 milliGRAM(s) Oral daily  benztropine 1 milliGRAM(s) Oral four times a day  calcium carbonate 1250 mG  + Vitamin D (OsCal 500 + D) 1 Tablet(s) Oral two times a day  carBAMazepine Suspension 400 milliGRAM(s) Oral three times a day  cholecalciferol 2000 Unit(s) Oral daily  Clobazam 30 milliGRAM(s) Oral at bedtime  docusate sodium Liquid 100 milliGRAM(s) Oral three times a day  enoxaparin Injectable 40 milliGRAM(s) SubCutaneous daily  ferrous    sulfate 325 milliGRAM(s) Oral daily  fosphenytoin IVPB 150 milliGRAM(s) PE IV Intermittent every 12 hours  levETIRAcetam  IVPB 2000 milliGRAM(s) IV Intermittent every 12 hours  multivitamin 1 Tablet(s) Oral daily  pantoprazole   Suspension 40 milliGRAM(s) Oral before breakfast  phenytoin   Chewable 200 milliGRAM(s) Chew at bedtime  risperiDONE   Tablet 2 milliGRAM(s) Oral at bedtime  sodium chloride 2 Gram(s) Oral daily  thiothixene 5 milliGRAM(s) Oral <User Schedule>  valproate sodium IVPB 500 milliGRAM(s) IV Intermittent daily  valproate sodium IVPB 625 milliGRAM(s) IV Intermittent at bedtime    MEDICATIONS  (PRN):  acetaminophen   Tablet .. 650 milliGRAM(s) Oral every 6 hours PRN Temp greater or equal to 38C (100.4F), Mild Pain (1 - 3)  aluminum hydroxide/magnesium hydroxide/simethicone Suspension 30 milliLiter(s) Oral every 4 hours PRN Dyspepsia  LORazepam   Injectable 2 milliGRAM(s) IV Push every 15 minutes PRN Seizure  magnesium hydroxide Suspension 30 milliLiter(s) Oral four times a day PRN Constipation  ondansetron Injectable 4 milliGRAM(s) IV Push every 6 hours PRN Nausea  senna 2 Tablet(s) Oral at bedtime PRN Constipation      Vital Signs Last 24 Hrs  T(C): 37.9 (30 Nov 2018 05:06), Max: 37.9 (30 Nov 2018 05:06)  T(F): 100.3 (30 Nov 2018 05:06), Max: 100.3 (30 Nov 2018 05:06)  HR: 94 (30 Nov 2018 05:06) (84 - 96)  BP: 120/73 (30 Nov 2018 05:06) (115/72 - 141/73)  BP(mean): --  RR: 19 (30 Nov 2018 05:06) (17 - 19)  SpO2: 100% (30 Nov 2018 05:06) (96% - 100%)    Physical Exam:        PE:    Constitutional: frail looking  HEENT: NC/AT, EOMI, PERRLA, conjunctivae clear; ears and nose atraumatic; pharynx clear  Neck: supple; thyroid not palpable  Back: no tenderness  Respiratory: respiratory effort normal; clear to auscultation  Cardiovascular: S1S2 regular, no murmurs  Abdomen: soft, not tender, not distended, positive BS; no liver or spleen organomegaly  Genitourinary: no suprapubic tenderness  Musculoskeletal: no muscle tenderness, no joint swelling or tenderness  Neurological/ Psychiatric: nonverbal moving all extremities  Skin: no rashes; no palpable lesions    Labs: all available labs reviewed                         Labs:                 Cultures:       Culture - Acid Fast - Sputum w/Smear (collected 11-27-18 @ 06:15)  Source: .Sputum Sputum    Culture - Acid Fast - Sputum w/Smear (collected 11-26-18 @ 16:40)  Source: .Sputum Sputum    Culture - Acid Fast - Sputum w/Smear (collected 11-26-18 @ 06:30)  Source: .Sputum Sputum conical            < from: CT Chest No Cont (11.19.18 @ 23:28) >    EXAM:  CT CHEST                            PROCEDURE DATE:  11/19/2018          INTERPRETATION:  CT CHEST WITHOUT CONTRAST    INDICATION: Cough. Left lower consolidation.    TECHNIQUE: Noncontrast CT imaging of the thorax. Coronal and sagittal   reformatted images are provided. Postprocessed MIP reformatted images   were created and reviewed..    COMPARISON: CT chest 6/16/2017.    FINDINGS:    Lines and tubes: None.  Airways: Tracheobronchial tree is patent.  Lungs and Pleura: Continued elevated left hemidiaphragm with interval   left left lower lobe consolidative opacity measuring 4.3 x 3.5 cm   containing areas of cavitation as best seen on image 39 of series 2.   Findings are nonspecific, this may represent developing cavitary   pneumonia. Mycobacterial infection or neoplastic etiology considered in   the differential. Further pulmonary workup and short-term imaging   follow-up is advised to demonstrate resolution. Associated left pleural   parenchymal thickening with trace left pleural effusion extending to the   apex. Mild asymmetric right sided groundglass attenuation, which may be   infectious or inflammatory nature. No pneumothorax.    Mediastinum and lymph nodes: Shotty mediastinal lymph nodes. Continued   mild upper thoracic esophageal distention, improved compared to the prior   study. Mild distal esophageal wall thickening, difficult to evaluate   secondary to inadequate distention. This may be related to esophagitis or   gastroesophageal reflux disease. Consider nonemergent upper endoscopy if   there is concern for esophageal malignancy.  Heart: Cardiomegaly without pericardial effusion.   Vessels: Normal size. Atherosclerotic disease of the aorta and its   branches.     Upper Abdomen:  Continued elevated left hemidiaphragm with stomach,   spleen and bowel loops identified within the left lower quadrant.   Incompletely characterized 1.3 x 1 cm right adrenal nodule for which   nonemergent adrenal protocol MR is advised provided no MR   contraindications.    Bones and soft tissues: Multilevel degenerative changes of the spine with   stable mild compression deformities of mid thoracic spine. Remote   fracture deformities of the left lower ribs.    IMPRESSION:    Continued elevated left hemidiaphragm with interval left left lower lobe   consolidative opacity measuring 4.3 x 3.5 cm containing areas of   cavitation. Findings are nonspecific, this may represent developing   cavitary pneumonia. Mycobacterial infection or neoplastic etiology   considered in the differential. Further pulmonary workup and short-term   imaging follow-up is advised to demonstrate resolution. Associated left   pleural parenchymal thickening with trace left pleural effusion extending   to the apex. Mild asymmetric right sided groundglass attenuation, which   may be infectious or inflammatory nature.     Mild upper thoracic esophageal distention, improved compared to the prior   study. Mild distal esophageal wall thickening, difficult to evaluate   secondary to inadequate distention. This may be related to esophagitis or   gastroesophageal reflux disease. Consider nonemergent upper endoscopy if   there is concern for esophageal malignancy.    < end of copied text >        Radiology: all available radiological tests reviewed    Advanced directives addressed: full resuscitation

## 2018-11-30 NOTE — PROGRESS NOTE ADULT - SUBJECTIVE AND OBJECTIVE BOX
CHIEF COMPLAINT/Diagnosis: aspiration pna/ seizure d/o    SUBJECTIVE: no complaint; resting comfortably in bed    REVIEW OF SYSTEMS:    CONSTITUTIONAL: No weakness, fevers or chills  EYES/ENT: No visual changes;  No vertigo or throat pain   NECK: No pain or stiffness  RESPIRATORY: No cough, wheezing, hemoptysis; No shortness of breath  CARDIOVASCULAR: No chest pain or palpitations  GASTROINTESTINAL: No abdominal or epigastric pain. No nausea, vomiting, or hematemesis; No diarrhea or constipation. No melena or hematochezia.  GENITOURINARY: No dysuria, frequency or hematuria  NEUROLOGICAL: No numbness or weakness  SKIN: No itching, burning, rashes, or lesions   All other review of systems is negative unless indicated above    Vital Signs Last 24 Hrs  T(C): 37.2 (30 Nov 2018 16:50), Max: 37.9 (30 Nov 2018 05:06)  T(F): 98.9 (30 Nov 2018 16:50), Max: 100.3 (30 Nov 2018 05:06)  HR: 81 (30 Nov 2018 16:50) (81 - 94)  BP: 101/63 (30 Nov 2018 16:50) (101/63 - 120/73)  BP(mean): --  RR: 20 (30 Nov 2018 16:50) (18 - 20)  SpO2: 97% (30 Nov 2018 16:50) (97% - 100%)    I&O's Summary      CAPILLARY BLOOD GLUCOSE          PHYSICAL EXAM:    Constitutional: NAD, awake and alert, well-developed  HEENT: PERR, EOMI, Normal Hearing, MMM  Neck: Soft and supple, No LAD, No JVD  Respiratory: Breath sounds are clear bilaterally, No wheezing, rales or rhonchi  Cardiovascular: S1 and S2, regular rate and rhythm, no Murmurs, gallops or rubs  Gastrointestinal: Bowel Sounds present, soft, nontender, nondistended, no guarding, no rebound  Extremities: No peripheral edema  Vascular: 2+ peripheral pulses  Neurological: A/O x 3, no focal deficits  Musculoskeletal: 5/5 strength b/l upper and lower extremities  Skin: No rashes    MEDICATIONS:  MEDICATIONS  (STANDING):  amoxicillin   80 mG/mL/clavulanate Suspension 400 milliGRAM(s) Oral two times a day  aspirin  chewable 81 milliGRAM(s) Oral daily  benztropine 1 milliGRAM(s) Oral four times a day  calcium carbonate 1250 mG  + Vitamin D (OsCal 500 + D) 1 Tablet(s) Oral two times a day  carBAMazepine Suspension 400 milliGRAM(s) Oral three times a day  cholecalciferol 2000 Unit(s) Oral daily  Clobazam 30 milliGRAM(s) Oral at bedtime  docusate sodium Liquid 100 milliGRAM(s) Oral three times a day  enoxaparin Injectable 40 milliGRAM(s) SubCutaneous daily  ferrous    sulfate 325 milliGRAM(s) Oral daily  fosphenytoin IVPB 150 milliGRAM(s) PE IV Intermittent every 12 hours  levETIRAcetam  IVPB 2000 milliGRAM(s) IV Intermittent every 12 hours  multivitamin 1 Tablet(s) Oral daily  pantoprazole   Suspension 40 milliGRAM(s) Oral before breakfast  phenytoin   Chewable 200 milliGRAM(s) Chew at bedtime  risperiDONE   Tablet 2 milliGRAM(s) Oral at bedtime  sodium chloride 2 Gram(s) Oral daily  thiothixene 5 milliGRAM(s) Oral <User Schedule>  valproate sodium IVPB 500 milliGRAM(s) IV Intermittent daily  valproate sodium IVPB 625 milliGRAM(s) IV Intermittent at bedtime      LABS: All Labs Reviewed:                Blood Culture: 11-27 @ 06:15  Organism --  Gram Stain Blood -- Gram Stain --  Specimen Source .Sputum Sputum  Culture-Blood --    11-26 @ 16:40  Organism --  Gram Stain Blood -- Gram Stain --  Specimen Source .Sputum Sputum  Culture-Blood --    11-26 @ 06:30  Organism --  Gram Stain Blood -- Gram Stain --  Specimen Source .Sputum Sputum conical  Culture-Blood --          Assessment and Plan:      54 yo WM with PMH severe MR, minimally verbal, seizure disorder on multiple meds, Iatrogenic  chr hyponatremia and hyperprolactinemia,  presented from nursing home for evaluation of cavitary lesion in the lung. Pt unable to provide any history. Pt had a seizure episode in ED due to missed seizure meds.  -found to have LLL Pna with 4cm cavitation        1. Metabolic encepahlpathy Encephalopathy:  -now back to  baseline w/ severe MR  EEG noted: 11.25...no epileptiform activity  24hr EEG done as well, no epileptic activity  Neuro consult noted  c/w IV Phenytoin, IV Keppra, IV Depakote  placed NGT to administer Tegretol and Clobazam>> patient improved now;  NG tube removed today  -will observe over weekend to see if patient can tolerate oral diet prior to discharge to group home  Mittens ordered  -ABG noted: no hypercarbia  -Ammonia normal    2. LLL Pna with cavitary lesion:  suspect GNR etiology  Zosyn IV day#7 > discontinued for "siezure like activity"  will cw Augmentin po  as Zosyn might lower seizure threshold   Blood Cx negative x 2  repeat CT Chest per pulmonary  r/o TB: will check AFB x 3, all collected ; negative  no temps spikes in 24 hours  continue with liquid augmentin  for 2 weeks.    3. Mental retardation    4. Hyponatremia: resolved     5-DVt prophy - sc lovenox      6-advance care planning- patient very poor prognosis, not eating , with aspiration PNA.  still spiking temps.  hx of sizures and possibly having break through seizures, and iv abx put him at increased risk. currently on oral regemin.Tolerating oral augmentin well. No tempurature spikes on this, clincially improving on oral augmentin.   Will call sister Darren [phone] for goals of care.   patient is also under the authority of [facility]

## 2018-12-01 RX ADMIN — LEVETIRACETAM 600 MILLIGRAM(S): 250 TABLET, FILM COATED ORAL at 04:41

## 2018-12-01 RX ADMIN — Medication 1 TABLET(S): at 12:03

## 2018-12-01 RX ADMIN — Medication 400 MILLIGRAM(S): at 04:41

## 2018-12-01 RX ADMIN — Medication 400 MILLIGRAM(S): at 14:29

## 2018-12-01 RX ADMIN — Medication 400 MILLIGRAM(S): at 17:17

## 2018-12-01 RX ADMIN — Medication 1 MILLIGRAM(S): at 05:00

## 2018-12-01 RX ADMIN — Medication 100 MILLIGRAM(S): at 05:25

## 2018-12-01 RX ADMIN — Medication 400 MILLIGRAM(S): at 05:25

## 2018-12-01 RX ADMIN — Medication 400 MILLIGRAM(S): at 21:54

## 2018-12-01 RX ADMIN — Medication 2000 UNIT(S): at 12:03

## 2018-12-01 RX ADMIN — Medication 1 MILLIGRAM(S): at 17:17

## 2018-12-01 RX ADMIN — Medication 1 TABLET(S): at 17:17

## 2018-12-01 RX ADMIN — Medication 200 MILLIGRAM(S): at 21:56

## 2018-12-01 RX ADMIN — Medication 5 MILLIGRAM(S): at 21:53

## 2018-12-01 RX ADMIN — Medication 1 TABLET(S): at 04:42

## 2018-12-01 RX ADMIN — FOSPHENYTOIN 106 MILLIGRAM(S) PE: 50 INJECTION INTRAMUSCULAR; INTRAVENOUS at 04:57

## 2018-12-01 RX ADMIN — Medication 1 MILLIGRAM(S): at 00:31

## 2018-12-01 RX ADMIN — LEVETIRACETAM 600 MILLIGRAM(S): 250 TABLET, FILM COATED ORAL at 17:17

## 2018-12-01 RX ADMIN — Medication 28.13 MILLIGRAM(S): at 21:56

## 2018-12-01 RX ADMIN — Medication 100 MILLIGRAM(S): at 21:53

## 2018-12-01 RX ADMIN — RISPERIDONE 2 MILLIGRAM(S): 4 TABLET ORAL at 21:55

## 2018-12-01 RX ADMIN — SODIUM CHLORIDE 2 GRAM(S): 9 INJECTION INTRAMUSCULAR; INTRAVENOUS; SUBCUTANEOUS at 12:03

## 2018-12-01 RX ADMIN — PANTOPRAZOLE SODIUM 40 MILLIGRAM(S): 20 TABLET, DELAYED RELEASE ORAL at 04:41

## 2018-12-01 RX ADMIN — RISPERIDONE 2 MILLIGRAM(S): 4 TABLET ORAL at 00:31

## 2018-12-01 RX ADMIN — Medication 325 MILLIGRAM(S): at 12:03

## 2018-12-01 RX ADMIN — Medication 100 MILLIGRAM(S): at 16:24

## 2018-12-01 RX ADMIN — Medication 81 MILLIGRAM(S): at 12:03

## 2018-12-01 RX ADMIN — Medication 27.5 MILLIGRAM(S): at 12:03

## 2018-12-01 RX ADMIN — Medication 1 MILLIGRAM(S): at 12:03

## 2018-12-01 RX ADMIN — ENOXAPARIN SODIUM 40 MILLIGRAM(S): 100 INJECTION SUBCUTANEOUS at 12:03

## 2018-12-01 RX ADMIN — FOSPHENYTOIN 106 MILLIGRAM(S) PE: 50 INJECTION INTRAMUSCULAR; INTRAVENOUS at 17:17

## 2018-12-01 NOTE — PROGRESS NOTE ADULT - SUBJECTIVE AND OBJECTIVE BOX
CHIEF COMPLAINT: aspriation pneumonia    SUBJECTIVE: appears comfortable    REVIEW OF SYSTEMS:    not attainable secondary to severe MR    Vital Signs Last 24 Hrs  T(C): 36.7 (01 Dec 2018 04:44), Max: 37.2 (30 Nov 2018 16:50)  T(F): 98 (01 Dec 2018 04:44), Max: 98.9 (30 Nov 2018 16:50)  HR: 90 (01 Dec 2018 04:44) (81 - 93)  BP: 136/84 (01 Dec 2018 04:44) (101/63 - 136/84)  BP(mean): --  RR: 19 (01 Dec 2018 04:44) (19 - 20)  SpO2: 96% (01 Dec 2018 04:44) (96% - 97%)    I&O's Summary      CAPILLARY BLOOD GLUCOSE          PHYSICAL EXAM:    Constitutional: NAD, awake and alert, well-developed  HEENT: PERR, EOMI, Normal Hearing, MMM  Neck: Soft and supple, No LAD, No JVD  Respiratory: Breath sounds are clear bilaterally, No wheezing, rales or rhonchi  Cardiovascular: S1 and S2, regular rate and rhythm, no Murmurs, gallops or rubs  Gastrointestinal: Bowel Sounds present, soft, nontender, nondistended, no guarding, no rebound  Extremities: No peripheral edema  Vascular: 2+ peripheral pulses  Neurological: no focal deficits  Musculoskeletal: 5/5 strength b/l upper and lower extremities  Skin: No rashes    MEDICATIONS:  MEDICATIONS  (STANDING):  amoxicillin   80 mG/mL/clavulanate Suspension 400 milliGRAM(s) Oral two times a day  aspirin  chewable 81 milliGRAM(s) Oral daily  benztropine 1 milliGRAM(s) Oral four times a day  calcium carbonate 1250 mG  + Vitamin D (OsCal 500 + D) 1 Tablet(s) Oral two times a day  carBAMazepine Suspension 400 milliGRAM(s) Oral three times a day  cholecalciferol 2000 Unit(s) Oral daily  Clobazam 30 milliGRAM(s) Oral at bedtime  docusate sodium Liquid 100 milliGRAM(s) Oral three times a day  enoxaparin Injectable 40 milliGRAM(s) SubCutaneous daily  ferrous    sulfate 325 milliGRAM(s) Oral daily  fosphenytoin IVPB 150 milliGRAM(s) PE IV Intermittent every 12 hours  levETIRAcetam  IVPB 2000 milliGRAM(s) IV Intermittent every 12 hours  multivitamin 1 Tablet(s) Oral daily  pantoprazole   Suspension 40 milliGRAM(s) Oral before breakfast  phenytoin   Chewable 200 milliGRAM(s) Chew at bedtime  risperiDONE   Tablet 2 milliGRAM(s) Oral at bedtime  sodium chloride 2 Gram(s) Oral daily  thiothixene 5 milliGRAM(s) Oral <User Schedule>  valproate sodium IVPB 500 milliGRAM(s) IV Intermittent daily  valproate sodium IVPB 625 milliGRAM(s) IV Intermittent at bedtime      LABS: All Labs Reviewed:        Blood Culture: 11-27 @ 06:15  Organism --  Gram Stain Blood -- Gram Stain --  Specimen Source .Sputum Sputum  Culture-Blood --    11-26 @ 16:40  Organism --  Gram Stain Blood -- Gram Stain --  Specimen Source .Sputum Sputum  Culture-Blood --          Assessment and Plan:      52 yo WM with PMH severe MR, minimally verbal, seizure disorder on multiple meds, Iatrogenic  chr hyponatremia and hyperprolactinemia,  presented from [facility] for evaluation of cavitary lesion in the lung. Pt unable to provide any history. Pt had a seizure episode in ED due to missed seizure meds.  -found to have LLL Pna with 4cm cavitation        1. Metabolic encepahlpathy Encephalopathy:  -now back to  baseline w/ severe MR  EEG noted: 11.25...no epileptiform activity  24hr EEG done as well, no epileptic activity  Neuro consult noted  c/w IV Phenytoin, IV Keppra, IV Depakote  placed NGT to administer Tegretol and Clobazam>> patient improved now;  NG tube removed today  -will observe over weekend to see if patient can tolerate oral diet prior to discharge to group home  Heath ordered  -ABG noted: no hypercarbia  -Ammonia normal    2. LLL Pna with cavitary lesion:  suspect GNR etiology  Zosyn IV day#7 > discontinued for "siezure like activity"  will cw Augmentin po  as Zosyn might lower seizure threshold   Blood Cx negative x 2  repeat CT Chest per pulmonary  r/o TB: will check AFB x 3, all collected ; negative  no temps spikes in 24 hours  continue with liquid augmentin  for 2 weeks.    3. Mental retardation    4. Hyponatremia: resolved     5-DVt prophy - sc lovenox      6-advance care planning- patient very poor prognosis, not eating , with aspiration PNA.  still spiking temps.  hx of sizures and possibly having break through seizures, and iv abx put him at increased risk. currently on oral regemin.Tolerating oral augmentin well. No tempurature spikes on this, clincially improving on oral augmentin.   Will call sister Darren [phone] for goals of care.   patient is also under the authority of [facility]

## 2018-12-01 NOTE — PROGRESS NOTE ADULT - NSHPATTENDINGPLANDISCUSS_GEN_ALL_CORE
nursing, group home staff, social workers, 
patient, nursing, staff, group home, social work, 
patient, nursing,staff, social work, Dr. Amador, 
nursing, staff, , , Dr. He

## 2018-12-01 NOTE — PROGRESS NOTE ADULT - SUBJECTIVE AND OBJECTIVE BOX
TIGRE SOMMERS  MRN: [id number]    S: 12/01/2018: No respiratory distress. Lying flat in bed. Non-verbal. Awake.     PAST MEDICAL & SURGICAL HISTORY:  Bowel and bladder incontinence  Hearing impaired person  Intellectual disability  Hyperprolactinemia  Hyponatremia  Cataract  GERD (gastroesophageal reflux disease)  Osteoporosis  Seizure disorder  Internal Hemorrhoids  Prolactin Increased  Gastritis  Osteoporosis  Leg Edema  Incontinent of Urine  Diarrhea  Cataract  Anemia  Seizure Disorder  Mental Retardation, Severe (I.Q. 20-34)  H/O cataract extraction  S/P Colonoscopy  S/P Endoscopy      O: T(C): 36.9 (12-01-18 @ 11:13), Max: 37.2 (11-30-18 @ 16:50)  HR: 91 (12-01-18 @ 11:13) (81 - 93)  BP: 135/82 (12-01-18 @ 11:13) (101/63 - 136/84)  RR: 20 (12-01-18 @ 11:13) (19 - 20)  SpO2: 96% (12-01-18 @ 11:13) (96% - 97%)  Wt(kg): --    PHYSICAL EXAM:      GENERAL: comfortable    NEURO: awake; non-verbal. Chronic cognitive impairment    NECK: no JVD    CHEST: limited exam; clear anteriorly    CARDIAC: RR    EXT: no edema      RADIOLOGY:      EXAM:  CT CHEST                        PROCEDURE DATE:  11/30/2018      INTERPRETATION:  CT CHEST    HISTORY:  follow up of the cavitary  lesion                     TECHNIQUE: Noncontrast CT of the chest was performed. Coronal and   sagittal images were reconstructed. This study was performed using   automatic exposure control (radiation dose reduction software) to obtain   a diagnostic image quality scan with patient dose as low as reasonably   achievable.    COMPARISON: Chest x-ray 11/27/2018, chest CT 11/19/2018    FINDINGS:    LUNGS, AIRWAYS: No central endobronchial lesion. No evidence of   emphysema. Stable size and appearance of consolidative opacity in the   left lower lobe without cavitation, which is at least in part accounted   for by atelectasis based on left hemidiaphragm elevation. However, a   component of pneumonia cannot be excluded and correlation with clinical   signs and symptoms is needed. Development of mild patchy opacities in the   left upper lobe. Stable1.9 cm ovoid peripheral opacity in the right   lower lobe with adjacent atelectasis or scarring (3; 78). This remains   indeterminate.    PLEURA: No pleural abnormality.    HEART AND VESSELS: Normal heart size. No pericardial effusion. Coronary   artery calcifications are present. Normal caliber thoracic aorta.    MEDIASTINUM AND CHELSEA: No adenopathy. 6 mm short axis left cardiophrenic   lymph node again noted. The esophagus is mildly patulous without fluid or   debris.    UPPER ABDOMEN: Gallstones. Right adrenal adenoma again noted.    BONES AND SOFT TISSUES: No acute bony abnormality. Bilateral gynecomastia.    IMPRESSION:     Stable size and appearance of consolidative opacity in the left lower   lobe without cavitation, which is at least in part accounted for by   atelectasis based on left hemidiaphragm elevation. However, a component   of pneumonia cannot be excluded and correlation with clinical signs and   symptoms is needed.     Development of mild patchy opacities in the left upper lobe.     1.9 cm ovoid peripheral opacity in the right lower lobe with adjacent   atelectasis or scarring (3; 78). This remains indeterminate. Continued   attention on follow-up imaging is recommended.        KENNY GAING                   MEDICATIONS  (STANDING):  amoxicillin   80 mG/mL/clavulanate Suspension 400 milliGRAM(s) Oral two times a day  aspirin  chewable 81 milliGRAM(s) Oral daily  benztropine 1 milliGRAM(s) Oral four times a day  calcium carbonate 1250 mG  + Vitamin D (OsCal 500 + D) 1 Tablet(s) Oral two times a day  carBAMazepine Suspension 400 milliGRAM(s) Oral three times a day  cholecalciferol 2000 Unit(s) Oral daily  Clobazam 30 milliGRAM(s) Oral at bedtime  docusate sodium Liquid 100 milliGRAM(s) Oral three times a day  enoxaparin Injectable 40 milliGRAM(s) SubCutaneous daily  ferrous    sulfate 325 milliGRAM(s) Oral daily  fosphenytoin IVPB 150 milliGRAM(s) PE IV Intermittent every 12 hours  levETIRAcetam  IVPB 2000 milliGRAM(s) IV Intermittent every 12 hours  multivitamin 1 Tablet(s) Oral daily  pantoprazole   Suspension 40 milliGRAM(s) Oral before breakfast  phenytoin   Chewable 200 milliGRAM(s) Chew at bedtime  risperiDONE   Tablet 2 milliGRAM(s) Oral at bedtime  sodium chloride 2 Gram(s) Oral daily  thiothixene 5 milliGRAM(s) Oral <User Schedule>  valproate sodium IVPB 500 milliGRAM(s) IV Intermittent daily  valproate sodium IVPB 625 milliGRAM(s) IV Intermittent at bedtime    MEDICATIONS  (PRN):  acetaminophen   Tablet .. 650 milliGRAM(s) Oral every 6 hours PRN Temp greater or equal to 38C (100.4F), Mild Pain (1 - 3)  aluminum hydroxide/magnesium hydroxide/simethicone Suspension 30 milliLiter(s) Oral every 4 hours PRN Dyspepsia  magnesium hydroxide Suspension 30 milliLiter(s) Oral four times a day PRN Constipation  ondansetron Injectable 4 milliGRAM(s) IV Push every 6 hours PRN Nausea  senna 2 Tablet(s) Oral at bedtime PRN Constipation        A/P: 1. Probable aspiration pneumonia. Continue Augmentin per ID. Sputum negative for AFB.     2. Aspiration risk. Continue aspiration precautions.     3. Persistent CT abnormalities. Will need to have outpatient follow up imaging.     4. Seizure disorder. Per neuro.

## 2018-12-02 RX ADMIN — PANTOPRAZOLE SODIUM 40 MILLIGRAM(S): 20 TABLET, DELAYED RELEASE ORAL at 05:43

## 2018-12-02 RX ADMIN — Medication 100 MILLIGRAM(S): at 21:03

## 2018-12-02 RX ADMIN — Medication 1 MILLIGRAM(S): at 06:15

## 2018-12-02 RX ADMIN — RISPERIDONE 2 MILLIGRAM(S): 4 TABLET ORAL at 21:03

## 2018-12-02 RX ADMIN — Medication 5 MILLIGRAM(S): at 21:03

## 2018-12-02 RX ADMIN — Medication 400 MILLIGRAM(S): at 17:08

## 2018-12-02 RX ADMIN — Medication 81 MILLIGRAM(S): at 11:29

## 2018-12-02 RX ADMIN — FOSPHENYTOIN 106 MILLIGRAM(S) PE: 50 INJECTION INTRAMUSCULAR; INTRAVENOUS at 06:15

## 2018-12-02 RX ADMIN — Medication 1 MILLIGRAM(S): at 23:12

## 2018-12-02 RX ADMIN — Medication 1 TABLET(S): at 11:28

## 2018-12-02 RX ADMIN — Medication 650 MILLIGRAM(S): at 03:06

## 2018-12-02 RX ADMIN — SODIUM CHLORIDE 2 GRAM(S): 9 INJECTION INTRAMUSCULAR; INTRAVENOUS; SUBCUTANEOUS at 11:29

## 2018-12-02 RX ADMIN — Medication 400 MILLIGRAM(S): at 05:44

## 2018-12-02 RX ADMIN — Medication 100 MILLIGRAM(S): at 05:44

## 2018-12-02 RX ADMIN — Medication 28.13 MILLIGRAM(S): at 21:03

## 2018-12-02 RX ADMIN — Medication 1 TABLET(S): at 05:43

## 2018-12-02 RX ADMIN — Medication 650 MILLIGRAM(S): at 03:40

## 2018-12-02 RX ADMIN — Medication 1 MILLIGRAM(S): at 17:07

## 2018-12-02 RX ADMIN — ENOXAPARIN SODIUM 40 MILLIGRAM(S): 100 INJECTION SUBCUTANEOUS at 11:29

## 2018-12-02 RX ADMIN — Medication 400 MILLIGRAM(S): at 05:43

## 2018-12-02 RX ADMIN — Medication 200 MILLIGRAM(S): at 21:03

## 2018-12-02 RX ADMIN — Medication 325 MILLIGRAM(S): at 11:28

## 2018-12-02 RX ADMIN — Medication 27.5 MILLIGRAM(S): at 11:34

## 2018-12-02 RX ADMIN — LEVETIRACETAM 600 MILLIGRAM(S): 250 TABLET, FILM COATED ORAL at 17:08

## 2018-12-02 RX ADMIN — Medication 100 MILLIGRAM(S): at 13:56

## 2018-12-02 RX ADMIN — FOSPHENYTOIN 106 MILLIGRAM(S) PE: 50 INJECTION INTRAMUSCULAR; INTRAVENOUS at 17:08

## 2018-12-02 RX ADMIN — Medication 400 MILLIGRAM(S): at 13:56

## 2018-12-02 RX ADMIN — Medication 1 TABLET(S): at 17:07

## 2018-12-02 RX ADMIN — Medication 400 MILLIGRAM(S): at 21:02

## 2018-12-02 RX ADMIN — Medication 2000 UNIT(S): at 11:29

## 2018-12-02 RX ADMIN — LEVETIRACETAM 600 MILLIGRAM(S): 250 TABLET, FILM COATED ORAL at 05:43

## 2018-12-02 RX ADMIN — Medication 1 MILLIGRAM(S): at 11:28

## 2018-12-02 RX ADMIN — Medication 1 MILLIGRAM(S): at 00:05

## 2018-12-02 NOTE — PROGRESS NOTE ADULT - SUBJECTIVE AND OBJECTIVE BOX
TIGRE SOMMERS  MRN: [id number]    S: 12/01/2018: No respiratory distress. Lying flat in bed. Non-verbal. Awake.    12/02/2018: Sitting up in bed. Awake/alert. Eating lunch with assistance. NO cough noted while eating.     PAST MEDICAL & SURGICAL HISTORY:  Bowel and bladder incontinence  Hearing impaired person  Intellectual disability  Hyperprolactinemia  Hyponatremia  Cataract  GERD (gastroesophageal reflux disease)  Osteoporosis  Seizure disorder  Internal Hemorrhoids  Prolactin Increased  Gastritis  Osteoporosis  Leg Edema  Incontinent of Urine  Diarrhea  Cataract  Anemia  Seizure Disorder  Mental Retardation, Severe (I.Q. 20-34)  H/O cataract extraction  S/P Colonoscopy  S/P Endoscopy      O: T(C): 37.3 (12-02-18 @ 11:38), Max: 37.3 (12-02-18 @ 11:38)  HR: 80 (12-02-18 @ 11:38) (76 - 81)  BP: 102/65 (12-02-18 @ 11:38) (102/65 - 115/65)  RR: 24 (12-02-18 @ 11:38) (18 - 24)  SpO2: 94% (12-02-18 @ 11:38) (94% - 96%)  Wt(kg): --    PHYSICAL EXAM:      GENERAL: comfortable    NEURO: awake; non-verbal. Chronic cognitive impairment    NECK: no JVD    CHEST: limited exam; clear anteriorly    CARDIAC: RR    EXT: no edema      RADIOLOGY:      EXAM:  CT CHEST                        PROCEDURE DATE:  11/30/2018      INTERPRETATION:  CT CHEST    HISTORY:  follow up of the cavitary  lesion                     TECHNIQUE: Noncontrast CT of the chest was performed. Coronal and   sagittal images were reconstructed. This study was performed using   automatic exposure control (radiation dose reduction software) to obtain   a diagnostic image quality scan with patient dose as low as reasonably   achievable.    COMPARISON: Chest x-ray 11/27/2018, chest CT 11/19/2018    FINDINGS:    LUNGS, AIRWAYS: No central endobronchial lesion. No evidence of   emphysema. Stable size and appearance of consolidative opacity in the   left lower lobe without cavitation, which is at least in part accounted   for by atelectasis based on left hemidiaphragm elevation. However, a   component of pneumonia cannot be excluded and correlation with clinical   signs and symptoms is needed. Development of mild patchy opacities in the   left upper lobe. Stable1.9 cm ovoid peripheral opacity in the right   lower lobe with adjacent atelectasis or scarring (3; 78). This remains   indeterminate.    PLEURA: No pleural abnormality.    HEART AND VESSELS: Normal heart size. No pericardial effusion. Coronary   artery calcifications are present. Normal caliber thoracic aorta.    MEDIASTINUM AND CHELSEA: No adenopathy. 6 mm short axis left cardiophrenic   lymph node again noted. The esophagus is mildly patulous without fluid or   debris.    UPPER ABDOMEN: Gallstones. Right adrenal adenoma again noted.    BONES AND SOFT TISSUES: No acute bony abnormality. Bilateral gynecomastia.    IMPRESSION:     Stable size and appearance of consolidative opacity in the left lower   lobe without cavitation, which is at least in part accounted for by   atelectasis based on left hemidiaphragm elevation. However, a component   of pneumonia cannot be excluded and correlation with clinical signs and   symptoms is needed.     Development of mild patchy opacities in the left upper lobe.     1.9 cm ovoid peripheral opacity in the right lower lobe with adjacent   atelectasis or scarring (3; 78). This remains indeterminate. Continued   attention on follow-up imaging is recommended.        KENNY GAING     MEDICATIONS  (STANDING):  amoxicillin   80 mG/mL/clavulanate Suspension 400 milliGRAM(s) Oral two times a day  aspirin  chewable 81 milliGRAM(s) Oral daily  benztropine 1 milliGRAM(s) Oral four times a day  calcium carbonate 1250 mG  + Vitamin D (OsCal 500 + D) 1 Tablet(s) Oral two times a day  carBAMazepine Suspension 400 milliGRAM(s) Oral three times a day  cholecalciferol 2000 Unit(s) Oral daily  Clobazam 30 milliGRAM(s) Oral at bedtime  docusate sodium Liquid 100 milliGRAM(s) Oral three times a day  enoxaparin Injectable 40 milliGRAM(s) SubCutaneous daily  ferrous    sulfate 325 milliGRAM(s) Oral daily  fosphenytoin IVPB 150 milliGRAM(s) PE IV Intermittent every 12 hours  levETIRAcetam  IVPB 2000 milliGRAM(s) IV Intermittent every 12 hours  multivitamin 1 Tablet(s) Oral daily  pantoprazole   Suspension 40 milliGRAM(s) Oral before breakfast  phenytoin   Chewable 200 milliGRAM(s) Chew at bedtime  risperiDONE   Tablet 2 milliGRAM(s) Oral at bedtime  sodium chloride 2 Gram(s) Oral daily  thiothixene 5 milliGRAM(s) Oral <User Schedule>  valproate sodium IVPB 500 milliGRAM(s) IV Intermittent daily  valproate sodium IVPB 625 milliGRAM(s) IV Intermittent at bedtime    MEDICATIONS  (PRN):  acetaminophen   Tablet .. 650 milliGRAM(s) Oral every 6 hours PRN Temp greater or equal to 38C (100.4F), Mild Pain (1 - 3)  aluminum hydroxide/magnesium hydroxide/simethicone Suspension 30 milliLiter(s) Oral every 4 hours PRN Dyspepsia  magnesium hydroxide Suspension 30 milliLiter(s) Oral four times a day PRN Constipation  ondansetron Injectable 4 milliGRAM(s) IV Push every 6 hours PRN Nausea  senna 2 Tablet(s) Oral at bedtime PRN Constipation        A/P: 1. Probable aspiration pneumonia. Continue Augmentin per ID. Sputum negative for AFB.     2. Aspiration risk. Continue aspiration precautions.     3. Persistent CT abnormalities. Will need to have outpatient follow up imaging.     4. Seizure disorder. Per neuro.

## 2018-12-02 NOTE — PROGRESS NOTE ADULT - SUBJECTIVE AND OBJECTIVE BOX
CHIEF COMPLAINT/Diagnosis:  aspiration pna/ vomitting/     SUBJECTIVE:     REVIEW OF SYSTEMS:    CONSTITUTIONAL: No weakness, fevers or chills  EYES/ENT: No visual changes;  No vertigo or throat pain   NECK: No pain or stiffness  RESPIRATORY: No cough, wheezing, hemoptysis; No shortness of breath  CARDIOVASCULAR: No chest pain or palpitations  GASTROINTESTINAL: No abdominal or epigastric pain. No nausea, vomiting, or hematemesis; No diarrhea or constipation. No melena or hematochezia.  GENITOURINARY: No dysuria, frequency or hematuria  NEUROLOGICAL: No numbness or weakness  SKIN: No itching, burning, rashes, or lesions   All other review of systems is negative unless indicated above    Vital Signs Last 24 Hrs  T(C): 37.3 (02 Dec 2018 11:38), Max: 37.3 (02 Dec 2018 11:38)  T(F): 99.1 (02 Dec 2018 11:38), Max: 99.1 (02 Dec 2018 11:38)  HR: 80 (02 Dec 2018 11:38) (76 - 81)  BP: 102/65 (02 Dec 2018 11:38) (102/65 - 115/65)  BP(mean): --  RR: 24 (02 Dec 2018 11:38) (18 - 24)  SpO2: 94% (02 Dec 2018 11:38) (94% - 96%)    I&O's Summary      CAPILLARY BLOOD GLUCOSE          PHYSICAL EXAM:    Constitutional: NAD, awake and alert, well-developed  HEENT: PERR, EOMI, Normal Hearing, MMM  Neck: Soft and supple, No LAD, No JVD  Respiratory: Breath sounds are clear bilaterally, No wheezing, rales or rhonchi  Cardiovascular: S1 and S2, regular rate and rhythm, no Murmurs, gallops or rubs  Gastrointestinal: Bowel Sounds present, soft, nontender, nondistended, no guarding, no rebound  Extremities: No peripheral edema  Vascular: 2+ peripheral pulses  Neurological: A/O x 3, no focal deficits  Musculoskeletal: 5/5 strength b/l upper and lower extremities  Skin: No rashes    MEDICATIONS:  MEDICATIONS  (STANDING):  amoxicillin   80 mG/mL/clavulanate Suspension 400 milliGRAM(s) Oral two times a day  aspirin  chewable 81 milliGRAM(s) Oral daily  benztropine 1 milliGRAM(s) Oral four times a day  calcium carbonate 1250 mG  + Vitamin D (OsCal 500 + D) 1 Tablet(s) Oral two times a day  carBAMazepine Suspension 400 milliGRAM(s) Oral three times a day  cholecalciferol 2000 Unit(s) Oral daily  Clobazam 30 milliGRAM(s) Oral at bedtime  docusate sodium Liquid 100 milliGRAM(s) Oral three times a day  enoxaparin Injectable 40 milliGRAM(s) SubCutaneous daily  ferrous    sulfate 325 milliGRAM(s) Oral daily  fosphenytoin IVPB 150 milliGRAM(s) PE IV Intermittent every 12 hours  levETIRAcetam  IVPB 2000 milliGRAM(s) IV Intermittent every 12 hours  multivitamin 1 Tablet(s) Oral daily  pantoprazole   Suspension 40 milliGRAM(s) Oral before breakfast  phenytoin   Chewable 200 milliGRAM(s) Chew at bedtime  risperiDONE   Tablet 2 milliGRAM(s) Oral at bedtime  sodium chloride 2 Gram(s) Oral daily  thiothixene 5 milliGRAM(s) Oral <User Schedule>  valproate sodium IVPB 500 milliGRAM(s) IV Intermittent daily  valproate sodium IVPB 625 milliGRAM(s) IV Intermittent at bedtime      LABS: All Labs Reviewed:        Assessment and Plan:      54 yo WM with PMH severe MR, minimally verbal, seizure disorder on multiple meds, Iatrogenic  chr hyponatremia and hyperprolactinemia,  presented from shelter for evaluation of cavitary lesion in the lung. Pt unable to provide any history. Pt had a seizure episode in ED due to missed seizure meds.  -found to have LLL Pna with 4cm cavitation        1. Metabolic encepahlpathy Encephalopathy:  -now back to  baseline w/ severe MR  EEG noted: 11.25...no epileptiform activity  24hr EEG done as well, no epileptic activity  Neuro consult noted  c/w IV Phenytoin, IV Keppra, IV Depakote  placed NGT to administer Tegretol and Clobazam>> patient improved now;  NG tube removed   -will observe over weekend to see if patient can tolerate oral diet prior to discharge to group home>> patient did not tolerate mechanical soft diet; will change to pureed and see how tolerates  Mittens ordered  -ABG noted: no hypercarbia  -Ammonia normal    2. LLL Pna with cavitary lesion:  suspect GNR etiology  Zosyn IV day#7 > discontinued for "siezure like activity"  will cw Augmentin po  as Zosyn might lower seizure threshold   Blood Cx negative x 2  repeat CT Chest per pulmonary  r/o TB: will check AFB x 3, all collected ; negative  no temps spikes in 24 hours  continue with liquid augmentin  for 2 weeks.    3. Mental retardation    4. Hyponatremia: resolved     5-DVt prophy - sc lovenox      6-advance care planning- patient very poor prognosis, not eating , with aspiration PNA.  still spiking temps.  hx of sizures and possibly having break through seizures, and iv abx put him at increased risk. currently on oral regemin.Tolerating oral augmentin well. No tempurature spikes on this, clincially improving on oral augmentin.   Will call sister Darren [phone] for goals of care.   patient is also under the authority of [facility]

## 2018-12-03 ENCOUNTER — TRANSCRIPTION ENCOUNTER (OUTPATIENT)
Age: 53
End: 2018-12-03

## 2018-12-03 VITALS
OXYGEN SATURATION: 96 % | HEART RATE: 103 BPM | DIASTOLIC BLOOD PRESSURE: 77 MMHG | SYSTOLIC BLOOD PRESSURE: 123 MMHG | TEMPERATURE: 100 F | RESPIRATION RATE: 18 BRPM

## 2018-12-03 LAB
ANION GAP SERPL CALC-SCNC: 7 MMOL/L — SIGNIFICANT CHANGE UP (ref 5–17)
BUN SERPL-MCNC: 28 MG/DL — HIGH (ref 7–23)
CALCIUM SERPL-MCNC: 8.9 MG/DL — SIGNIFICANT CHANGE UP (ref 8.5–10.1)
CHLORIDE SERPL-SCNC: 105 MMOL/L — SIGNIFICANT CHANGE UP (ref 96–108)
CO2 SERPL-SCNC: 27 MMOL/L — SIGNIFICANT CHANGE UP (ref 22–31)
CREAT SERPL-MCNC: 0.74 MG/DL — SIGNIFICANT CHANGE UP (ref 0.5–1.3)
GLUCOSE SERPL-MCNC: 97 MG/DL — SIGNIFICANT CHANGE UP (ref 70–99)
HCT VFR BLD CALC: 35.5 % — LOW (ref 39–50)
HGB BLD-MCNC: 12 G/DL — LOW (ref 13–17)
MCHC RBC-ENTMCNC: 30.1 PG — SIGNIFICANT CHANGE UP (ref 27–34)
MCHC RBC-ENTMCNC: 33.8 GM/DL — SIGNIFICANT CHANGE UP (ref 32–36)
MCV RBC AUTO: 89 FL — SIGNIFICANT CHANGE UP (ref 80–100)
NRBC # BLD: 0 /100 WBCS — SIGNIFICANT CHANGE UP (ref 0–0)
PLATELET # BLD AUTO: 300 K/UL — SIGNIFICANT CHANGE UP (ref 150–400)
POTASSIUM SERPL-MCNC: 3.8 MMOL/L — SIGNIFICANT CHANGE UP (ref 3.5–5.3)
POTASSIUM SERPL-SCNC: 3.8 MMOL/L — SIGNIFICANT CHANGE UP (ref 3.5–5.3)
RBC # BLD: 3.99 M/UL — LOW (ref 4.2–5.8)
RBC # FLD: 13.1 % — SIGNIFICANT CHANGE UP (ref 10.3–14.5)
SODIUM SERPL-SCNC: 139 MMOL/L — SIGNIFICANT CHANGE UP (ref 135–145)
WBC # BLD: 7.98 K/UL — SIGNIFICANT CHANGE UP (ref 3.8–10.5)
WBC # FLD AUTO: 7.98 K/UL — SIGNIFICANT CHANGE UP (ref 3.8–10.5)

## 2018-12-03 RX ORDER — RISPERIDONE 4 MG/1
1 TABLET ORAL
Qty: 0 | Refills: 0 | COMMUNITY

## 2018-12-03 RX ORDER — CARBAMAZEPINE 200 MG
1 TABLET ORAL
Qty: 0 | Refills: 0 | COMMUNITY

## 2018-12-03 RX ORDER — THIOTHIXENE 5 MG
1 CAPSULE ORAL
Qty: 0 | Refills: 0 | COMMUNITY

## 2018-12-03 RX ORDER — BENZTROPINE MESYLATE 1 MG
1 TABLET ORAL
Qty: 0 | Refills: 0 | COMMUNITY

## 2018-12-03 RX ORDER — CARBAMAZEPINE 200 MG
3 TABLET ORAL
Qty: 0 | Refills: 0 | COMMUNITY
Start: 2018-12-03

## 2018-12-03 RX ORDER — SODIUM CHLORIDE 9 MG/ML
2 INJECTION INTRAMUSCULAR; INTRAVENOUS; SUBCUTANEOUS
Qty: 0 | Refills: 0 | COMMUNITY

## 2018-12-03 RX ORDER — CLONAZEPAM 1 MG
1 TABLET ORAL
Qty: 0 | Refills: 0 | COMMUNITY

## 2018-12-03 RX ORDER — CARBAMAZEPINE 200 MG
2 TABLET ORAL
Qty: 0 | Refills: 0 | COMMUNITY

## 2018-12-03 RX ORDER — SENNOSIDES/DOCUSATE SODIUM 8.6MG-50MG
1 TABLET ORAL
Qty: 0 | Refills: 0 | COMMUNITY

## 2018-12-03 RX ADMIN — SODIUM CHLORIDE 2 GRAM(S): 9 INJECTION INTRAMUSCULAR; INTRAVENOUS; SUBCUTANEOUS at 12:39

## 2018-12-03 RX ADMIN — Medication 27.5 MILLIGRAM(S): at 12:40

## 2018-12-03 RX ADMIN — Medication 1 MILLIGRAM(S): at 18:00

## 2018-12-03 RX ADMIN — Medication 100 MILLIGRAM(S): at 05:42

## 2018-12-03 RX ADMIN — Medication 1 TABLET(S): at 12:39

## 2018-12-03 RX ADMIN — PANTOPRAZOLE SODIUM 40 MILLIGRAM(S): 20 TABLET, DELAYED RELEASE ORAL at 05:42

## 2018-12-03 RX ADMIN — Medication 325 MILLIGRAM(S): at 12:40

## 2018-12-03 RX ADMIN — ENOXAPARIN SODIUM 40 MILLIGRAM(S): 100 INJECTION SUBCUTANEOUS at 12:40

## 2018-12-03 RX ADMIN — Medication 1 MILLIGRAM(S): at 05:42

## 2018-12-03 RX ADMIN — FOSPHENYTOIN 106 MILLIGRAM(S) PE: 50 INJECTION INTRAMUSCULAR; INTRAVENOUS at 06:40

## 2018-12-03 RX ADMIN — Medication 400 MILLIGRAM(S): at 05:42

## 2018-12-03 RX ADMIN — Medication 100 MILLIGRAM(S): at 14:13

## 2018-12-03 RX ADMIN — Medication 81 MILLIGRAM(S): at 12:40

## 2018-12-03 RX ADMIN — Medication 400 MILLIGRAM(S): at 18:27

## 2018-12-03 RX ADMIN — Medication 400 MILLIGRAM(S): at 14:13

## 2018-12-03 RX ADMIN — Medication 1 TABLET(S): at 18:00

## 2018-12-03 RX ADMIN — LEVETIRACETAM 600 MILLIGRAM(S): 250 TABLET, FILM COATED ORAL at 06:40

## 2018-12-03 RX ADMIN — Medication 1 MILLIGRAM(S): at 12:39

## 2018-12-03 RX ADMIN — Medication 2000 UNIT(S): at 12:39

## 2018-12-03 RX ADMIN — Medication 1 TABLET(S): at 05:43

## 2018-12-03 NOTE — PROGRESS NOTE ADULT - SUBJECTIVE AND OBJECTIVE BOX
SUBJECTIVE     Patient is non verbal and alert and awake no reported sob and fever spikes   official ct report noted     PAST MEDICAL & SURGICAL HISTORY:  Bowel and bladder incontinence  Hearing impaired person  Intellectual disability  Hyperprolactinemia  Hyponatremia  Cataract  GERD (gastroesophageal reflux disease)  Osteoporosis  Seizure disorder  Internal Hemorrhoids  Prolactin Increased  Gastritis  Osteoporosis  Leg Edema  Incontinent of Urine  Diarrhea  Cataract  Anemia  Seizure Disorder  Mental Retardation, Severe (I.Q. 20-34)  H/O cataract extraction  S/P Colonoscopy  S/P Endoscopy    OBJECTIVE   Vital Signs Last 24 Hrs  T(C): 36.7 (03 Dec 2018 05:04), Max: 37.3 (02 Dec 2018 11:38)  T(F): 98.1 (03 Dec 2018 05:04), Max: 99.1 (02 Dec 2018 11:38)  HR: 102 (03 Dec 2018 05:04) (80 - 102)  BP: 105/68 (03 Dec 2018 05:04) (97/79 - 105/68)  BP(mean): --  RR: 20 (03 Dec 2018 05:04) (20 - 24)  SpO2: 94% (03 Dec 2018 05:04) (93% - 94%)    PHYSICAL EXAM:  Constitutional: , awake and alert, not in distress.  HEENT: Normo cephalic atraumatic  Neck: Soft and supple, No J.V.D   Respiratory: poor inspiratory effort with no gross wheezing and decreased breath sounds in the left base   Cardiovascular: S1 and S2, regular rate .   Gastrointestinal:  soft, nontender,   Extremities: No  edema or calf tenderness .  Neurological: baseline mental retardation and non verbal     MEDICATIONS  (STANDING):  amoxicillin   80 mG/mL/clavulanate Suspension 400 milliGRAM(s) Oral two times a day  aspirin  chewable 81 milliGRAM(s) Oral daily  benztropine 1 milliGRAM(s) Oral four times a day  calcium carbonate 1250 mG  + Vitamin D (OsCal 500 + D) 1 Tablet(s) Oral two times a day  carBAMazepine Suspension 400 milliGRAM(s) Oral three times a day  cholecalciferol 2000 Unit(s) Oral daily  Clobazam 30 milliGRAM(s) Oral at bedtime  docusate sodium Liquid 100 milliGRAM(s) Oral three times a day  enoxaparin Injectable 40 milliGRAM(s) SubCutaneous daily  ferrous    sulfate 325 milliGRAM(s) Oral daily  fosphenytoin IVPB 150 milliGRAM(s) PE IV Intermittent every 12 hours  levETIRAcetam  IVPB 2000 milliGRAM(s) IV Intermittent every 12 hours  multivitamin 1 Tablet(s) Oral daily  pantoprazole   Suspension 40 milliGRAM(s) Oral before breakfast  phenytoin   Chewable 200 milliGRAM(s) Chew at bedtime  risperiDONE   Tablet 2 milliGRAM(s) Oral at bedtime  sodium chloride 2 Gram(s) Oral daily  thiothixene 5 milliGRAM(s) Oral <User Schedule>  valproate sodium IVPB 500 milliGRAM(s) IV Intermittent daily  valproate sodium IVPB 625 milliGRAM(s) IV Intermittent at bedtime                            12.0   7.98  )-----------( 300      ( 03 Dec 2018 07:48 )             35.5     12-03    139  |  105  |  28<H>  ----------------------------<  97  3.8   |  27  |  0.74    Ca    8.9      03 Dec 2018 07:48         < from: CT Chest No Cont (11.30.18 @ 09:26) >  INDINGS:    LUNGS, AIRWAYS: No central endobronchial lesion. No evidence of   emphysema. Stable size and appearance of consolidative opacity in the   left lower lobe without cavitation, which is at least in part accounted   for by atelectasis based on left hemidiaphragm elevation. However, a   component of pneumonia cannot be excluded and correlation with clinical   signs and symptoms is needed. Development of mild patchy opacities in the   left upper lobe. Stable1.9 cm ovoid peripheral opacity in the right   lower lobe with adjacent atelectasis or scarring (3; 78). This remains   indeterminate.    PLEURA: No pleural abnormality.    HEART AND VESSELS: Normal heart size. No pericardial effusion. Coronary   artery calcifications are present. Normal caliber thoracic aorta.    MEDIASTINUM AND CHELSEA: No adenopathy. 6 mm short axis left cardiophrenic   lymph node again noted. The esophagus is mildly patulous without fluid or   debris.    UPPER ABDOMEN: Gallstones. Right adrenal adenoma again noted.    BONES AND SOFT TISSUES: No acute bony abnormality. Bilateral gynecomastia.    IMPRESSION:     Stable size and appearance of consolidative opacity in the left lower   lobe without cavitation, which is at least in part accounted for by   atelectasis based on left hemidiaphragm elevation. However, a component   of pneumonia cannot be excluded and correlation with clinical signs and   symptoms is needed.     Development of mild patchy opacities in the left upper lobe.     1.9 cm ovoid peripheral opacity in the right lower lobe with adjacent   atelectasis or scarring (3; 78). This remains indeterminate. Continued   attention on follow-up imaging is recommended.        < end of copied text >

## 2018-12-03 NOTE — CHART NOTE - NSCHARTNOTEFT_GEN_A_CORE
Assessment:     *overall status noted to be improved. As per RN pt pending d/c.  *s/p NGT removal and RN reports that appetite has improved greatly.   *Pt downgraded from mech soft to pureed 2/2 no longer tolerating mech soft.   *pt noted with last BM 11/2 and 11/1. Regular BMs   *no new recent wt; if pt not d/c recommend obtaining new wt.   *Damian 17; trace generalized edema.           Recommendations:    1. Obtain new wt when feasible.   2. Provide assistance and encouragement with meals and supplements.   3. c/w ensure enlive to supplement PO intake          Diet Presciption: Diet, Dysphagia 1 Pureed-Thin Liquids (12-02-18 @ 14:24)  Diet, Dysphagia 1 Pureed-Thin Liquids (12-02-18 @ 14:47)  Diet, Dysphagia 1 Pureed-Thin Liquids (12-02-18 @ 18:28)      Wt Hx:  Height (cm): 177.8 (11-19-18 @ 14:11)  Weight (kg): 99.8 (11-19-18 @ 14:11)  BMI (kg/m2): 31.6 (11-19-18 @ 14:11)        Estimated Needs:   [x ] no change since previous assessment      Estimated Energy Needs (calories/kg):  · Weight Used for Calculation  adjusted  83.7kg    Estimated Energy Needs (25-30 calories/kg):  · Weight  (lbs)  184.5 lb  · Weight (kg)  83.7 kg  · From (25cal/kg)  2092  · To (30cal/kg)  2511    Other Calculation:  · Other Calculation  Ht.  70"     Wt. 99.8Kg        BMI 31.6      IBW  73Kg      Pt is at    137%  IBW    Estimated Protein Needs (1.0-1.2 gm/kg):  · Weight  (lbs)  160.9  · Weight (kg)  73 kg  · From (1 g/kg)  73  · To (1.2 g/kg)  87.6    Estimated Fluid Needs (25-30 ml/kg):  · Weight  (lbs)  160.9  · Weight (kg)  73  · From (25 ml/kg)  1825  · To (30 ml/kg)  2190          Nutrition Diagnosis is [ x] ongoing  [ ] resolved [ ] not applicable         New Nutrition Diagnosis: [x ] not applicable     Nutrition Diagnostic #1:  · Nutrition Diagnostic Terminology #1: Oral or Nutrition Support Intake  · Oral or Nutrition Support Intake: Inadequate oral intake  · Etiology: recent multiple seizure episodes and current NGT placement  · Signs/Symptoms: reported PO intake <50% of needs since NGT placed.  · Nutrition Intervention: Meals and Snack; Medical Food Supplements  · Meals and Snacks: General/healthful diet; pureed thin liquids  · Medical and Food Supplements: Commercial beverage; ensure enlive TID  · Goal/Expected Outcome: Pt will consume and tolerate at least 80% of meals and supplements          Pertinent Medications: MEDICATIONS  (STANDING):  amoxicillin   80 mG/mL/clavulanate Suspension 400 milliGRAM(s) Oral two times a day  aspirin  chewable 81 milliGRAM(s) Oral daily  benztropine 1 milliGRAM(s) Oral four times a day  calcium carbonate 1250 mG  + Vitamin D (OsCal 500 + D) 1 Tablet(s) Oral two times a day  carBAMazepine Suspension 400 milliGRAM(s) Oral three times a day  cholecalciferol 2000 Unit(s) Oral daily  Clobazam 30 milliGRAM(s) Oral at bedtime  docusate sodium Liquid 100 milliGRAM(s) Oral three times a day  enoxaparin Injectable 40 milliGRAM(s) SubCutaneous daily  ferrous    sulfate 325 milliGRAM(s) Oral daily  fosphenytoin IVPB 150 milliGRAM(s) PE IV Intermittent every 12 hours  levETIRAcetam  IVPB 2000 milliGRAM(s) IV Intermittent every 12 hours  multivitamin 1 Tablet(s) Oral daily  pantoprazole   Suspension 40 milliGRAM(s) Oral before breakfast  phenytoin   Chewable 200 milliGRAM(s) Chew at bedtime  risperiDONE   Tablet 2 milliGRAM(s) Oral at bedtime  sodium chloride 2 Gram(s) Oral daily  thiothixene 5 milliGRAM(s) Oral <User Schedule>  valproate sodium IVPB 500 milliGRAM(s) IV Intermittent daily  valproate sodium IVPB 625 milliGRAM(s) IV Intermittent at bedtime    MEDICATIONS  (PRN):  acetaminophen   Tablet .. 650 milliGRAM(s) Oral every 6 hours PRN Temp greater or equal to 38C (100.4F), Mild Pain (1 - 3)  aluminum hydroxide/magnesium hydroxide/simethicone Suspension 30 milliLiter(s) Oral every 4 hours PRN Dyspepsia  magnesium hydroxide Suspension 30 milliLiter(s) Oral four times a day PRN Constipation  ondansetron Injectable 4 milliGRAM(s) IV Push every 6 hours PRN Nausea  senna 2 Tablet(s) Oral at bedtime PRN Constipation    Pertinent Labs: 12-03 Na139 mmol/L Glu 97 mg/dL K+ 3.8 mmol/L Cr  0.74 mg/dL BUN 28 mg/dL<H>     CAPILLARY BLOOD GLUCOSE          Skin: damian score = 17          Monitoring and Evaluation:   [x] PO intake/Nutr support infusion [ x ] Tolerance to Nutr [ x ] weights [ x ] labs[ x ] follow up per protocol  [ ] other:

## 2018-12-03 NOTE — PROGRESS NOTE ADULT - ASSESSMENT
52yo/M with PMH severe MR, minimally verbal, seizure disorder on multiple meds, chr hyponatremia and hyperprolactinemia due to meds admitted on 11/19 from group home for evaluation of abnormal chest xray; history per medical record as patient does not provide history; patient chart states he was treated in past with INH for positive PPD. The patient had chest xray prior to admission suggestive of pulmonary TB. The imaging done upon admission revealed cavitary lesion in consolidation.  1. Patient admitted with pneumonia, history of latent TB treated with INH; most likely the patient has aspiration pneumonia given the esophageal findings however still need to rule out active pulmonary TB, given that INH treatment is not 100% effective to prevent active pulmonary TB, and/or the patient may have had another TB exposure.   - negative AFB smears times 3  - on augmentin most likely has aspiration pneumonia and will continue to do so; s/s evaluation appreciated  - consideration for palliative care evaluation   2. other issues: per medicine
52 yo WM with PMH severe MR, minimally verbal, seizure disorder on multiple meds, Iatrogenic  chr hyponatremia and hyperprolactinemia,  presented from care home for evaluation of cavitary lesion in the lung. Pt unable to provide any history. Pt had a seizure episode in ED due to missed seizure meds. with Break through seizures with Subtherapeutic levels of meds including Depakote and tegretal.  Pt seizures improved after meds given by NGT now MS improved .  Can change his meds to babck to Po again    EEG did not reveal active seizures today.  Avoid meds that lower seizure threshold.  repeat levels  today pending.  Discussed with Dr. Johnston.  D/c planning.   From tomorrow Dr. Macias on call if needs f/u.
Assessment and Recommendation:   · Assessment		  52 yo WM with PMH severe MR, minimally verbal, seizure disorder on multiple meds, Iatrogenic  chr hyponatremia and hyperprolactinemia,  [facility] of cavitary lesion in the lung. Pt unable to provide any history. Pt had a seizure episode in ED due to missed seizure meds. with Break through seizures with Subtherapeutic levels of meds including Depakote andtegretal.    REc Adjust meds If needed Give IV form ( depakote, Dilantin and Keppra ) and Tegretal and Onfi can be crushed or given liquid form.  Tegretal 400mg liquid tid  Keppra 2000mg IV q 12  depakote 500 mg IV in am and increase to 1000 mg q pm daily  Correct Metabolic causes like Hyponatremia/ Low calcium.  keep Lorazepam bed side.   EEG today called Technician.  Avoid meds that lower seizure threshold.  repeat levels in am.  Will F/u.
Patient is seen and consult dictated   - left lung consolidation with small area of cavitation likely pneumonia with small  cavitary with the less clinical suspicion for the tuberculosis or neoplastic process   - known patient with mental retardation and seizure disorder   - dilated oesophagus with the risk of aspiration     PLAN     - continue with the antibiotics and sputum for the afb and and would need short term interval ct scan   - clinical suspicion for the TB is less .  - monitor fever spikes .  G.I follow up for the dilated oesophagus
· Assessment		  52 yo WM with PMH severe MR, minimally verbal, seizure disorder on multiple meds, Iatrogenic  chr hyponatremia and hyperprolactinemia,  presented from senior care for evaluation of cavitary lesion in the lung. Pt unable to provide any history. Pt had a seizure episode in ED due to missed seizure meds. with Break through seizures with Subtherapeutic levels of meds including Depakote and tegretal.  Pt not getting Po meds Tegtretal and Onfi not available IV form.  REc Adjust meds If needed Give IV form ( depakote, Dilantin and Keppra ) and Tegretal and Onfi can be crushed or given liquid form.  NGT placement for oral meds.  Tegretal 400mg liquid tid  Keppra 2000mg IV q 12  depakote 500 mg IV in am and increase to 1000 mg q pm daily  Correct Metabolic causes like Hyponatremia/ Low calcium.  keep Lorazepam bed side.   EEG did not reveal active seizures yesterday  Avoid meds that lower seizure threshold.  repeat levels pending today.  Discussed with Dr. Feliz and Dr. Lou.  Consider transfer to ICU for close monitor if pt continue to seize.  Will repeat EEG today .  Will F/u.
· Assessment		  54 yo WM with PMH severe MR, minimally verbal, seizure disorder on multiple meds, Iatrogenic  chr hyponatremia and hyperprolactinemia,  presented from senior living for evaluation of cavitary lesion in the lung. Pt unable to provide any history. Pt had a seizure episode in ED due to missed seizure meds. with Break through seizures with Subtherapeutic levels of meds including Depakote and tegretal.  Pt not getting Po meds Tegtretal and Onfi not available IV form.  REc Adjust meds If needed Give IV form ( depakote, Dilantin and Keppra ) and Tegretal and Onfi can be crushed or given liquid form.  NGT placement for oral meds.  Tegretal 400mg liquid tid  Keppra 2000mg IV q 12  depakote 500 mg IV in am and increase to 1000 mg q pm daily  Correct Metabolic causes like Hyponatremia/ Low calcium.  keep Lorazepam bed side.   EEG did not reveal active seizures today.  Avoid meds that lower seizure threshold.  repeat levels in AM.  Discussed with Dr. Feliz .  Will F/u.
- left lung consolidation with no further cavitation reported in the follow up ct scan with the chronic elevation of the left hemidiaphragm with suspected aspiration pneumonia patient current on aumentin and negative for 3 sputum for the AFB   - known patient with mental retardation and seizure disorder with recurrent seizures seen by the neurology and receiving medications iv and oral medications now   - dilated oesophagus with the risk of aspiration     PLAN        - complete total of 2 weeks of antibiotics with the completion with the po augmentin   - aspiration precautions   - repeat ct in few months to rule out any adverse changes with baseline chronic left lower lobe atelectasis   - follow up final sputum cultures   - continue with the current medication for the seizures as per the neurology
- left lung consolidation with small area of cavitation likely pneumonia   - known patient with mental retardation and seizure disorder with recurrent seizures and received sedation in the morning   - dilated oesophagus with the risk of aspiration     PLAN        - clinical suspicion for the TB is less .  - monitor fever spikes and continue to maintain aspiration precautions with the recent seizures episodes .  - follow up repeat ct scan once done which was held with the seizure episodes   - neuro follow up for the recurrent seizures   - discuss with the I.D regarding changing zosyn to some other antibiotics as it could lower the seizure threshold . follow up 3 sputum to rule out T.B
- left lung consolidation with small area of cavitation likely pneumonia   - known patient with mental retardation and seizure disorder with recurrent seizures and received sedation in the morning   - dilated oesophagus with the risk of aspiration     PLAN        - clinical suspicion for the TB is less so far 3 sputum are collected   - monitor fever spikes and continue to maintain aspiration precautions with the recent seizures episodes .  - follow up repeat ct scan once done which was held with the seizure episodes   - neuro follow up for the recurrent seizures and patient has placement of the NGT tube for the seizure medication   - continue with the antibiotics and changed to po augmentin .  - patient is increased risk of aspiration with the lethargy
- left lung consolidation with small area of cavitation likely pneumonia so far 2 sputum were negative   - known patient with mental retardation and seizure disorder with recurrent seizures seen by the neurology and receiving medications through ngt and iv   - dilated oesophagus with the risk of aspiration     PLAN        - clinical suspicion for the TB is less so far 2 sputum noted smears were negative   - monitor fever spikes and continue to maintain aspiration precautions with the recent seizures episodes .  - will obtain the follow up ct as the patient is stable with no risk of seizures   - neuro follow up for the recurrent seizures and patient has placement of the NGT tube for the seizure medication   - continue with the antibiotics and changed to po augmentin .
- left lung consolidation with small area of cavitation likely pneumonia so far 3 sputum were negative   - known patient with mental retardation and seizure disorder with recurrent seizures seen by the neurology and receiving medications through ngt and iv   - dilated oesophagus with the risk of aspiration     PLAN        - clinical suspicion for the TB is less so far 3 sputum noted smears were negative and patient is off the isolation   - monitor fever spikes and continue to maintain aspiration precautions with the recent seizures episodes .  - will obtain the follow up ct as the patient is stable with no risk of seizures as the patient is more stable now   - neuro follow up for the recurrent seizures and patient has placement of the NGT tube for the seizure medication   - continue with the antibiotics and changed to po augmentin .
- left lung consolidation with small area of cavitation likely pneumonia so far 3 sputum were negative and has repeat ct done today and need to follow up the report   - known patient with mental retardation and seizure disorder with recurrent seizures seen by the neurology and receiving medications iv and oral medications now   - dilated oesophagus with the risk of aspiration     PLAN        - clinical suspicion for the TB is less so far 3 sputum noted smears were negative and patient is off the isolation   - monitor fever spikes and patient does have low grade fever now   - follow up official ct report   - continue with the Augmentin and aspiration precautions .   - continue with the dvt prophylaxis with lovenox
- left lung consolidation with small area of cavitation likely pneumonia with small  cavitary with the less clinical suspicion for the tuberculosis or neoplastic process   - known patient with mental retardation and seizure disorder   - dilated oesophagus with the risk of aspiration     PLAN     - Continue with the antibiotics and sputum for the afb if patient can produce  - Short term interval ct scan
- left lung consolidation with small area of cavitation likely pneumonia with small  cavitary with the less clinical suspicion for the tuberculosis or neoplastic process   - known patient with mental retardation and seizure disorder   - dilated oesophagus with the risk of aspiration     PLAN     - continue with the antibiotics and sputum for the afb if patient can produce and and would need short term interval ct scan   - clinical suspicion for the TB is less .  - monitor fever spikes .  - need to discuss with the I.D for the further management
54yo/M with PMH severe MR, minimally verbal, seizure disorder on multiple meds, chr hyponatremia and hyperprolactinemia due to meds admitted on 11/19 from group home for evaluation of abnormal chest xray; history per medical record as patient does not provide history; patient chart states he was treated in past with INH for positive PPD. The patient had chest xray prior to admission suggestive of pulmonary TB. The imaging done upon admission revealed cavitary lesion in consolidation.  1. Patient admitted with pneumonia, history of latent TB treated with INH; most likely the patient has aspiration pneumonia given the esophageal findings however still need to rule out active pulmonary TB, given that INH treatment is not 100% effective to prevent active pulmonary TB, and/or the patient may have had another TB exposure.   - negative AFB smears times 3  - on augmentin most likely has aspiration pneumonia and will continue to do so; s/s evaluation appreciated  - consideration for palliative care evaluation   2. other issues: per medicine
left lung consolidation with small area of cavitation likely pneumonia with small  cavitary with the less clinical suspicion for the tuberculosis or neoplastic process   - known patient with mental retardation and seizure disorder   - dilated esophagus with the risk of aspiration   - seizure, post ictal state with higher risk of aspiration    PLAN     - Continue with the antibiotics and sputum for the afb if patient can produce  - He will have a CT of the chest today, along with his ct head, will follow up to eval for improvement in area of cavitation

## 2018-12-03 NOTE — DISCHARGE NOTE ADULT - PLAN OF CARE
continue with antiobiotics as prescribed close follow up with a primary care doctor w/ in one week of discharge continue with medications exactly as written in this discharge order

## 2018-12-03 NOTE — DISCHARGE NOTE ADULT - MEDICATION SUMMARY - MEDICATIONS TO TAKE
I will START or STAY ON the medications listed below when I get home from the hospital:    aspirin 81 mg oral tablet, chewable  -- 1 tab(s) by mouth once a day  -- Indication: For Cad    phenytoin 300 mg oral capsule, extended release  -- 1 cap(s) by mouth once a day MDD:1 tab  -- Do not drink alcoholic beverages when taking this medication.  Do not take this drug if you are pregnant.  It is very important that you take or use this exactly as directed.  Do not skip doses or discontinue unless directed by your doctor.  May cause drowsiness.  Alcohol may intensify this effect.  Use care when operating dangerous machinery.  Obtain medical advice before taking any non-prescription drugs as some may affect the action of this medication.    -- Indication: For siezure d/o    Dilantin Infatabs 50 mg oral tablet, chewable  -- 4 tab(s) by mouth once a day (at bedtime)  -- Indication: For seizure d/o    Depakote Sprinkles 125 mg oral delayed release capsule  -- 5 cap(s) by mouth once a day (at bedtime)  -- Indication: For seizure d/o    Depakote Sprinkles 125 mg oral delayed release capsule  -- 4 cap(s) by mouth once a day  -- Indication: For seizure d/o    TEGretol  mg oral tablet, extended release  -- 3 cap(s) by mouth once a day  -- Indication: For seizure d/o    clonazePAM 1 mg oral tablet  -- 1 milligram(s) by mouth 2 times a day  -- Indication: For seizure d/o    Keppra 1000 mg oral tablet  -- 2 tab(s) by mouth 2 times a day  -- Indication: For seizure d/o    Onfi 10 mg oral tablet  -- 3 tab(s) by mouth once a day (at bedtime)  -- Indication: For seizrue d/o    benztropine 1 mg oral tablet  -- 1 tab(s) by mouth 4 times a day  -- Indication: For parkinsonism    RisperDAL 3 mg oral tablet  -- 1 tab(s) by mouth once a day (at bedtime)  -- Indication: For seizure/ pysch med    thiothixene 5 mg oral capsule  -- 1 cap(s) by mouth once a day (at bedtime)  -- Indication: For pysch med    ibandronate 150 mg oral tablet  -- 1 tab(s) by mouth once a month  -- 14th of the month  -- Indication: For prophylaxis    ferrous sulfate 325 mg (65 mg elemental iron) oral delayed release tablet  -- 1 tab(s) by mouth once a day  -- Indication: For anemia    multivitamin with iron  -- 1 tab(s) by mouth once a day  -- Indication: For prophylaxis    Senokot S 50 mg-8.6 mg oral tablet  -- 1 tab(s) by mouth once a day (at bedtime)  -- Indication: For prophylaxis    bisacodyl 10 mg rectal suppository  -- 1 suppository(ies) rectally once a day (if no bowel movement on 3rd day)  -- Indication: For prophylaxis    sodium chloride 1 g oral tablet  -- 2 gram(s) by mouth once a day  -- Indication: For prophylaxis    amoxicillin-clavulanate 400 mg-57 mg/5 mL oral liquid  -- 5 milliliter(s) by mouth 2 times a day  ; pharmacy to adjust quantity as needed   -- Indication: For pneumonia    PriLOSEC 20 mg oral delayed release capsule  -- 1 cap(s) by mouth once a day  -- Indication: For gerd    Calcium 600+D oral tablet  -- 1 tab(s) by mouth 2 times a day  -- Indication: For prophylaxis    cholecalciferol 2000 intl units oral capsule  -- 1 cap(s) by mouth once a day  -- Indication: For prophylaxis    folic acid 1 mg oral tablet  -- 1 tab(s) by mouth once a day  -- Indication: For prophylaxis

## 2018-12-03 NOTE — PROGRESS NOTE ADULT - REASON FOR ADMISSION
Cavitary lesion
Cavitary lesion/ break through seizures
Cavitary lesion/ seizure disorder with Break through seizures
Cavitary lesion/ seizures
Cavitary lesion/recurrent seizures
Cavitary lesion

## 2018-12-03 NOTE — DISCHARGE NOTE ADULT - CARE PROVIDER_API CALL
Pily Garza), Critical Care Medicine; Internal Medicine; Pulmonary Disease; Sleep Medicine  [address] [place] [address]  Phone: [phone]  Fax: [phone]

## 2018-12-03 NOTE — DISCHARGE NOTE ADULT - MEDICATION SUMMARY - MEDICATIONS TO CHANGE
I will SWITCH the dose or number of times a day I take the medications listed below when I get home from the hospital:    TEGretol  mg oral tablet, extended release  -- 1 tab(s) by mouth once a day    TEGretol  mg oral tablet, extended release  -- 2 tab(s) by mouth once a day (at bedtime)    RisperDAL 1 mg oral tablet  -- 1 tab(s) by mouth once a day    RisperDAL 3 mg oral tablet  -- 1 tab(s) by mouth once a day (at bedtime)    risperiDONE 1 mg oral tablet  -- 1 tab(s) by mouth once a day    risperiDONE 3 mg oral tablet  -- 1 tab(s) by mouth once a day (at bedtime)    Depakote Sprinkles 125 mg oral delayed release capsule  -- 5 cap(s) by mouth once a day (at bedtime)    Depakote Sprinkles 125 mg oral delayed release capsule  -- 4 cap(s) by mouth once a day

## 2018-12-03 NOTE — PROGRESS NOTE ADULT - PROVIDER SPECIALTY LIST ADULT
Hospitalist
Infectious Disease
Pulmonology
Hospitalist
Hospitalist
Infectious Disease

## 2018-12-03 NOTE — DISCHARGE NOTE ADULT - HOSPITAL COURSE
Vital Signs Last 24 Hrs  T(C): 37.6 (03 Dec 2018 11:56), Max: 37.6 (03 Dec 2018 11:56)  T(F): 99.6 (03 Dec 2018 11:56), Max: 99.6 (03 Dec 2018 11:56)  HR: 103 (03 Dec 2018 11:56) (84 - 103)  BP: 123/77 (03 Dec 2018 11:56) (97/79 - 123/77)  BP(mean): --  RR: 18 (03 Dec 2018 11:56) (18 - 20)  SpO2: 96% (03 Dec 2018 11:56) (93% - 96%)    HEENT:   pupils equal and reactive, EOMI, no oropharyngeal lesions, erythema, exudates, oral thrush    NECK:   supple, no carotid bruits, no palpable lymph nodes, no thyromegaly    CV:  +S1, +S2, regular, no murmurs or rubs    RESP:   lungs clear to auscultation bilaterally, no wheezing, rales, rhonchi, good air entry bilaterally    BREAST:  not examined    GI:  abdomen soft, non-tender, non-distended, normal BS, no bruits, no abdominal masses, no palpable masses    RECTAL:  not examined    :  not examined    MSK:   normal muscle tone, no atrophy, no rigidity, no contractions    EXT:   no clubbing, no cyanosis, no edema, no calf pain, swelling or erythema    VASCULAR:  pulses equal and symmetric in the upper and lower extremities    NEURO:  AAOX3, no focal neurological deficits, follows all commands, able to move extremities spontaneously    SKIN:  no ulcers, lesions or rashes    03 Dec 2018 07:48    139    |  105    |  28     ----------------------------<  97     3.8     |  27     |  0.74     Ca    8.9        03 Dec 2018 07:48    CBC Full  -  ( 03 Dec 2018 07:48 )  WBC Count : 7.98 K/uL  Hemoglobin : 12.0 g/dL  Hematocrit : 35.5 %  Platelet Count - Automated : 300 K/uL  Mean Cell Volume : 89.0 fl  Mean Cell Hemoglobin : 30.1 pg  Mean Cell Hemoglobin Concentration : 33.8 gm/dL        Hospital Course:      54 yo WM with PMH severe MR, minimally verbal, seizure disorder on multiple meds, Iatrogenic  chr hyponatremia and hyperprolactinemia,  presented from senior care for evaluation of cavitary lesion in the lung. Pt unable to provide any history. Pt had a seizure episode in ED due to missed seizure meds. Found to have LLL Pna with 4cm cavitation. TB was rule out.   Admitted with Metabolic encepahlpathy, now back to  baseline w/ severe MR. Patient lung findings likey secondary to aspiration pneumonia. Patient is high risk for aspiration. He was evaluated by speech and swallow therapist and also the nursing at bedside. After attempts with various food consistencies, it is in patients best interest to proceed with pureed diet with thin liquids.   Patient completed several days of Zosyn IV day#7 . Has now been changed to oral Augmentin liquid which patient has been tolerating well. Vital Signs Last 24 Hrs  T(C): 37.6 (03 Dec 2018 11:56), Max: 37.6 (03 Dec 2018 11:56)  T(F): 99.6 (03 Dec 2018 11:56), Max: 99.6 (03 Dec 2018 11:56)  HR: 103 (03 Dec 2018 11:56) (84 - 103)  BP: 123/77 (03 Dec 2018 11:56) (97/79 - 123/77)  BP(mean): --  RR: 18 (03 Dec 2018 11:56) (18 - 20)  SpO2: 96% (03 Dec 2018 11:56) (93% - 96%)    HEENT:   pupils equal and reactive, EOMI, no oropharyngeal lesions, erythema, exudates, oral thrush    NECK:   supple, no carotid bruits, no palpable lymph nodes, no thyromegaly    CV:  +S1, +S2, regular, no murmurs or rubs    RESP:   lungs clear to auscultation bilaterally, no wheezing, rales, rhonchi, good air entry bilaterally    BREAST:  not examined    GI:  abdomen soft, non-tender, non-distended, normal BS, no bruits, no abdominal masses, no palpable masses    RECTAL:  not examined    :  not examined    MSK:   normal muscle tone, no atrophy, no rigidity, no contractions    EXT:   no clubbing, no cyanosis, no edema, no calf pain, swelling or erythema    VASCULAR:  pulses equal and symmetric in the upper and lower extremities    NEURO:  AAOX3, no focal neurological deficits, follows all commands, able to move extremities spontaneously    SKIN:  no ulcers, lesions or rashes    03 Dec 2018 07:48    139    |  105    |  28     ----------------------------<  97     3.8     |  27     |  0.74     Ca    8.9        03 Dec 2018 07:48    CBC Full  -  ( 03 Dec 2018 07:48 )  WBC Count : 7.98 K/uL  Hemoglobin : 12.0 g/dL  Hematocrit : 35.5 %  Platelet Count - Automated : 300 K/uL  Mean Cell Volume : 89.0 fl  Mean Cell Hemoglobin : 30.1 pg  Mean Cell Hemoglobin Concentration : 33.8 gm/dL        Hospital Course:      52 yo WM with PMH severe MR, minimally verbal, seizure disorder on multiple meds, Iatrogenic  chr hyponatremia and hyperprolactinemia,  presented from senior care for evaluation of cavitary lesion in the lung. Pt unable to provide any history. Pt had a seizure episode in ED due to missed seizure meds. Found to have LLL Pna with 4cm cavitation. TB was rule out.   Admitted with Metabolic encepahlpathy, now back to  baseline w/ severe MR. Patient lung findings likey secondary to aspiration pneumonia. Patient is high risk for aspiration. He was evaluated by speech and swallow therapist and also the nursing at bedside. After attempts with various food consistencies, it is in patients best interest to proceed with pureed diet with thin liquids.   Patient completed several days of Zosyn IV day#7 . Has now been changed to oral Augmentin liquid which patient has been tolerating well.   Note: patient has been taking tegretol 1200mg of tegretol here and has been tolerating this well. recco same dose of outpatient.

## 2018-12-03 NOTE — DISCHARGE NOTE ADULT - CARE PLAN
Principal Discharge DX:	Pneumonia  Goal:	continue with antiobiotics as prescribed  Assessment and plan of treatment:	close follow up with a primary care doctor w/ in one week of discharge  Secondary Diagnosis:	Seizure disorder  Goal:	continue with medications exactly as written in this discharge order

## 2018-12-06 DIAGNOSIS — M81.0 AGE-RELATED OSTEOPOROSIS WITHOUT CURRENT PATHOLOGICAL FRACTURE: ICD-10-CM

## 2018-12-06 DIAGNOSIS — G93.41 METABOLIC ENCEPHALOPATHY: ICD-10-CM

## 2018-12-06 DIAGNOSIS — E22.1 HYPERPROLACTINEMIA: ICD-10-CM

## 2018-12-06 DIAGNOSIS — J69.0 PNEUMONITIS DUE TO INHALATION OF FOOD AND VOMIT: ICD-10-CM

## 2018-12-06 DIAGNOSIS — E87.1 HYPO-OSMOLALITY AND HYPONATREMIA: ICD-10-CM

## 2018-12-06 DIAGNOSIS — K22.8 OTHER SPECIFIED DISEASES OF ESOPHAGUS: ICD-10-CM

## 2018-12-06 DIAGNOSIS — F72 SEVERE INTELLECTUAL DISABILITIES: ICD-10-CM

## 2018-12-06 DIAGNOSIS — K21.9 GASTRO-ESOPHAGEAL REFLUX DISEASE WITHOUT ESOPHAGITIS: ICD-10-CM

## 2018-12-06 DIAGNOSIS — J98.4 OTHER DISORDERS OF LUNG: ICD-10-CM

## 2018-12-06 DIAGNOSIS — G40.909 EPILEPSY, UNSPECIFIED, NOT INTRACTABLE, WITHOUT STATUS EPILEPTICUS: ICD-10-CM

## 2019-01-16 LAB
CULTURE RESULTS: SIGNIFICANT CHANGE UP
SPECIMEN SOURCE: SIGNIFICANT CHANGE UP

## 2019-01-29 PROBLEM — K21.9 GASTRO-ESOPHAGEAL REFLUX DISEASE WITHOUT ESOPHAGITIS: Chronic | Status: ACTIVE | Noted: 2017-06-16

## 2019-01-29 PROBLEM — H91.90 UNSPECIFIED HEARING LOSS, UNSPECIFIED EAR: Chronic | Status: ACTIVE | Noted: 2017-06-16

## 2019-02-16 ENCOUNTER — EMERGENCY (EMERGENCY)
Facility: HOSPITAL | Age: 54
LOS: 1 days | Discharge: HOME | End: 2019-02-16
Attending: EMERGENCY MEDICINE | Admitting: EMERGENCY MEDICINE
Payer: MEDICARE

## 2019-02-16 VITALS
DIASTOLIC BLOOD PRESSURE: 78 MMHG | HEART RATE: 74 BPM | OXYGEN SATURATION: 96 % | TEMPERATURE: 98 F | RESPIRATION RATE: 16 BRPM | SYSTOLIC BLOOD PRESSURE: 111 MMHG

## 2019-02-16 VITALS
RESPIRATION RATE: 16 BRPM | HEART RATE: 84 BPM | TEMPERATURE: 98 F | OXYGEN SATURATION: 99 % | DIASTOLIC BLOOD PRESSURE: 74 MMHG | SYSTOLIC BLOOD PRESSURE: 128 MMHG

## 2019-02-16 DIAGNOSIS — Z98.49 CATARACT EXTRACTION STATUS, UNSPECIFIED EYE: Chronic | ICD-10-CM

## 2019-02-16 LAB
ALBUMIN SERPL ELPH-MCNC: 2.7 G/DL — LOW (ref 3.3–5)
ALP SERPL-CCNC: 53 U/L — SIGNIFICANT CHANGE UP (ref 40–120)
ALT FLD-CCNC: 14 U/L — SIGNIFICANT CHANGE UP (ref 12–78)
ANION GAP SERPL CALC-SCNC: 9 MMOL/L — SIGNIFICANT CHANGE UP (ref 5–17)
APTT BLD: 32.4 SEC — SIGNIFICANT CHANGE UP (ref 27.5–36.3)
AST SERPL-CCNC: 17 U/L — SIGNIFICANT CHANGE UP (ref 15–37)
BASOPHILS # BLD AUTO: 0.04 K/UL — SIGNIFICANT CHANGE UP (ref 0–0.2)
BASOPHILS NFR BLD AUTO: 0.9 % — SIGNIFICANT CHANGE UP (ref 0–2)
BILIRUB SERPL-MCNC: 0.2 MG/DL — SIGNIFICANT CHANGE UP (ref 0.2–1.2)
BUN SERPL-MCNC: 13 MG/DL — SIGNIFICANT CHANGE UP (ref 7–23)
CALCIUM SERPL-MCNC: 8.1 MG/DL — LOW (ref 8.5–10.1)
CARBAMAZEPINE SERPL-MCNC: 7.4 UG/ML — SIGNIFICANT CHANGE UP (ref 4–12)
CHLORIDE SERPL-SCNC: 104 MMOL/L — SIGNIFICANT CHANGE UP (ref 96–108)
CO2 SERPL-SCNC: 24 MMOL/L — SIGNIFICANT CHANGE UP (ref 22–31)
CREAT SERPL-MCNC: 0.62 MG/DL — SIGNIFICANT CHANGE UP (ref 0.5–1.3)
EOSINOPHIL # BLD AUTO: 0.18 K/UL — SIGNIFICANT CHANGE UP (ref 0–0.5)
EOSINOPHIL NFR BLD AUTO: 3.9 % — SIGNIFICANT CHANGE UP (ref 0–6)
GLUCOSE SERPL-MCNC: 97 MG/DL — SIGNIFICANT CHANGE UP (ref 70–99)
HCT VFR BLD CALC: 36.9 % — LOW (ref 39–50)
HGB BLD-MCNC: 12.6 G/DL — LOW (ref 13–17)
IMM GRANULOCYTES NFR BLD AUTO: 0.7 % — SIGNIFICANT CHANGE UP (ref 0–1.5)
INR BLD: 1.15 RATIO — SIGNIFICANT CHANGE UP (ref 0.88–1.16)
LACTATE SERPL-SCNC: 0.9 MMOL/L — SIGNIFICANT CHANGE UP (ref 0.7–2)
LIDOCAIN IGE QN: 83 U/L — SIGNIFICANT CHANGE UP (ref 73–393)
LYMPHOCYTES # BLD AUTO: 1.27 K/UL — SIGNIFICANT CHANGE UP (ref 1–3.3)
LYMPHOCYTES # BLD AUTO: 27.9 % — SIGNIFICANT CHANGE UP (ref 13–44)
MCHC RBC-ENTMCNC: 29.5 PG — SIGNIFICANT CHANGE UP (ref 27–34)
MCHC RBC-ENTMCNC: 34.1 GM/DL — SIGNIFICANT CHANGE UP (ref 32–36)
MCV RBC AUTO: 86.4 FL — SIGNIFICANT CHANGE UP (ref 80–100)
MONOCYTES # BLD AUTO: 0.6 K/UL — SIGNIFICANT CHANGE UP (ref 0–0.9)
MONOCYTES NFR BLD AUTO: 13.2 % — SIGNIFICANT CHANGE UP (ref 2–14)
NEUTROPHILS # BLD AUTO: 2.44 K/UL — SIGNIFICANT CHANGE UP (ref 1.8–7.4)
NEUTROPHILS NFR BLD AUTO: 53.4 % — SIGNIFICANT CHANGE UP (ref 43–77)
NRBC # BLD: 0 /100 WBCS — SIGNIFICANT CHANGE UP (ref 0–0)
PLATELET # BLD AUTO: 249 K/UL — SIGNIFICANT CHANGE UP (ref 150–400)
POTASSIUM SERPL-MCNC: 4 MMOL/L — SIGNIFICANT CHANGE UP (ref 3.5–5.3)
POTASSIUM SERPL-SCNC: 4 MMOL/L — SIGNIFICANT CHANGE UP (ref 3.5–5.3)
PROT SERPL-MCNC: 7.2 G/DL — SIGNIFICANT CHANGE UP (ref 6–8.3)
PROTHROM AB SERPL-ACNC: 13.1 SEC — HIGH (ref 10–12.9)
RBC # BLD: 4.27 M/UL — SIGNIFICANT CHANGE UP (ref 4.2–5.8)
RBC # FLD: 13.4 % — SIGNIFICANT CHANGE UP (ref 10.3–14.5)
SODIUM SERPL-SCNC: 137 MMOL/L — SIGNIFICANT CHANGE UP (ref 135–145)
VALPROATE SERPL-MCNC: 81 UG/ML — SIGNIFICANT CHANGE UP (ref 50–100)
WBC # BLD: 4.56 K/UL — SIGNIFICANT CHANGE UP (ref 3.8–10.5)
WBC # FLD AUTO: 4.56 K/UL — SIGNIFICANT CHANGE UP (ref 3.8–10.5)

## 2019-02-16 PROCEDURE — 99283 EMERGENCY DEPT VISIT LOW MDM: CPT | Mod: 25

## 2019-02-16 PROCEDURE — 85027 COMPLETE CBC AUTOMATED: CPT

## 2019-02-16 PROCEDURE — 80164 ASSAY DIPROPYLACETIC ACD TOT: CPT

## 2019-02-16 PROCEDURE — 80053 COMPREHEN METABOLIC PANEL: CPT

## 2019-02-16 PROCEDURE — 80186 ASSAY OF PHENYTOIN FREE: CPT

## 2019-02-16 PROCEDURE — 85610 PROTHROMBIN TIME: CPT

## 2019-02-16 PROCEDURE — 99284 EMERGENCY DEPT VISIT MOD MDM: CPT

## 2019-02-16 PROCEDURE — 80156 ASSAY CARBAMAZEPINE TOTAL: CPT

## 2019-02-16 PROCEDURE — 71045 X-RAY EXAM CHEST 1 VIEW: CPT

## 2019-02-16 PROCEDURE — 85730 THROMBOPLASTIN TIME PARTIAL: CPT

## 2019-02-16 PROCEDURE — 83690 ASSAY OF LIPASE: CPT

## 2019-02-16 PROCEDURE — 83605 ASSAY OF LACTIC ACID: CPT

## 2019-02-16 PROCEDURE — 71045 X-RAY EXAM CHEST 1 VIEW: CPT | Mod: 26

## 2019-02-16 PROCEDURE — 80177 DRUG SCRN QUAN LEVETIRACETAM: CPT

## 2019-02-16 PROCEDURE — 87040 BLOOD CULTURE FOR BACTERIA: CPT

## 2019-02-16 PROCEDURE — 36415 COLL VENOUS BLD VENIPUNCTURE: CPT

## 2019-02-16 RX ORDER — LEVETIRACETAM 250 MG/1
2000 TABLET, FILM COATED ORAL ONCE
Qty: 0 | Refills: 0 | Status: COMPLETED | OUTPATIENT
Start: 2019-02-16 | End: 2019-02-16

## 2019-02-16 RX ORDER — DIVALPROEX SODIUM 500 MG/1
750 TABLET, DELAYED RELEASE ORAL ONCE
Qty: 0 | Refills: 0 | Status: COMPLETED | OUTPATIENT
Start: 2019-02-16 | End: 2019-02-16

## 2019-02-16 RX ORDER — CLONAZEPAM 1 MG
1 TABLET ORAL ONCE
Qty: 0 | Refills: 0 | Status: DISCONTINUED | OUTPATIENT
Start: 2019-02-16 | End: 2019-02-16

## 2019-02-16 RX ORDER — SODIUM CHLORIDE 9 MG/ML
1000 INJECTION INTRAMUSCULAR; INTRAVENOUS; SUBCUTANEOUS ONCE
Qty: 0 | Refills: 0 | Status: COMPLETED | OUTPATIENT
Start: 2019-02-16 | End: 2019-02-16

## 2019-02-16 RX ADMIN — LEVETIRACETAM 2000 MILLIGRAM(S): 250 TABLET, FILM COATED ORAL at 20:08

## 2019-02-16 RX ADMIN — DIVALPROEX SODIUM 750 MILLIGRAM(S): 500 TABLET, DELAYED RELEASE ORAL at 20:09

## 2019-02-16 RX ADMIN — SODIUM CHLORIDE 1000 MILLILITER(S): 9 INJECTION INTRAMUSCULAR; INTRAVENOUS; SUBCUTANEOUS at 19:55

## 2019-02-16 RX ADMIN — Medication 1 MILLIGRAM(S): at 20:07

## 2019-02-16 NOTE — ED ADULT NURSE NOTE - NSIMPLEMENTINTERV_GEN_ALL_ED
Implemented All Fall Risk Interventions:  [place] to call system. Call bell, personal items and telephone within reach. Instruct patient to call for assistance. Room bathroom lighting operational. Non-slip footwear when patient is off stretcher. Physically safe environment: no spills, clutter or unnecessary equipment. Stretcher in lowest position, wheels locked, appropriate side rails in place. Provide visual cue, wrist band, yellow gown, etc. Monitor gait and stability. Monitor for mental status changes and reorient to person, place, and time. Review medications for side effects contributing to fall risk. Reinforce activity limits and safety measures with patient and family.

## 2019-02-16 NOTE — ED PROVIDER NOTE - PMH
Anemia    Bowel and bladder incontinence    Cataract    Cataract    Diarrhea    Gastritis    GERD (gastroesophageal reflux disease)    Hearing impaired person    Hyperprolactinemia    Hyponatremia    Incontinent of Urine    Intellectual disability    Internal Hemorrhoids    Leg Edema    Mental Retardation, Severe (I.Q. 20-34)    Osteoporosis    Osteoporosis    Prolactin Increased    Seizure Disorder    Seizure disorder

## 2019-02-16 NOTE — ED ADULT NURSE NOTE - OBJECTIVE STATEMENT
54 y/o M patient presents to ED from group home with aide via EMS c/o seizures. As per patient's aide patient has a history of seizures and had 1 episode of seizure while sitting in recliner chair. As per patient, seizure lasted 6 minutes and 40 seconds. As per patient's aide seizure is normally only 1-2 minutes long. Patient A&Ox0 with intellectual disability. non verbal at baseline. lungs CTA. skin warm and intact. abdomen soft, non tender, non distended. Safety and comfort measures provided and maintained. MD bedside.

## 2019-02-16 NOTE — ED PROVIDER NOTE - OBJECTIVE STATEMENT
53 male presents to ER by ambulance with report of 2 seizures. First seizure occurred around noon, lasted 40 seconds, second seizure around 3:30pm and lasted 6min 30 sec, no diastat was given. Patient now at normal mental baseline as per staff at the bedside.

## 2019-02-16 NOTE — ED PROVIDER NOTE - PROGRESS NOTE DETAILS
patient acting at normal baseline in ER, no signs of seizure like activity, labs, chest xray reviwed, no acute findings, dc home and f/u as outpatient

## 2019-02-19 LAB
LEVETIRACETAM SERPL-MCNC: 77.8 MCG/ML — HIGH (ref 12–46)
PHENYTOIN FREE SERPL-MCNC: < 0.5 MG/L — LOW (ref 1–2)

## 2019-02-22 ENCOUNTER — APPOINTMENT (OUTPATIENT)
Dept: NEUROLOGY | Facility: CLINIC | Age: 54
End: 2019-02-22
Payer: MEDICARE

## 2019-02-22 VITALS
HEART RATE: 62 BPM | SYSTOLIC BLOOD PRESSURE: 112 MMHG | DIASTOLIC BLOOD PRESSURE: 70 MMHG | BODY MASS INDEX: 28.17 KG/M2 | WEIGHT: 159 LBS | HEIGHT: 63 IN

## 2019-02-22 DIAGNOSIS — G40.909 EPILEPSY, UNSPECIFIED, NOT INTRACTABLE, W/OUT STATUS EPILEPTICUS: ICD-10-CM

## 2019-02-22 PROCEDURE — 99214 OFFICE O/P EST MOD 30 MIN: CPT

## 2019-02-22 RX ORDER — DIAZEPAM 20 MG/4ML
20 GEL RECTAL
Refills: 0 | Status: ACTIVE | COMMUNITY

## 2019-02-22 RX ORDER — LEVETIRACETAM 500 MG/1
500 TABLET, FILM COATED ORAL
Refills: 0 | Status: ACTIVE | COMMUNITY

## 2019-02-22 NOTE — DATA REVIEWED
[de-identified] :  EXAM:  EEG CONTIN W VIDEO EA 24 HRS     PROCEDURE DATE:  11/27/2018     INTERPRETATION:   VIDEO EEG # 18 - V- 190      DOS : 11/26/18- 11/27/18      AGE :53     S : M    LOCATION :    Referring Physician : DR. MARK Amador                  Reviewing Physician  : Dr. MARK Amador  Clinical history : 53-year-old patient with the history of known seizure  disorder with breakthrough seizures. Medications include Keppra,  carbamazepine, Dilantin, valproic acid, lorazepam, risperidone, Onfi,  benztropine, piperacillin.  Technical information : The EEG was performed utilizing 20-channel since  referential EEG connections using all standard 10-20 electrode  placements. Recording was at the sampling date of 256 samples per second  channel. Time synchronized digital video recording was done  simultaneously with EEG recording. A lower light infrared camera was used  for low light recording. Ken and seizure detection algorithms were  utilized.  Interpretation : Background activity :  Background activity consists of approximately predominant low amplitude  7-8 Hz activity which attenuates with eye opening. Bilaterally diffuse 15  Hz low amplitude activity. Intermittent 5-7 Hz low voltage activity seen.  Background slowing :  Mild to moderate generalized background slowing present.   Focal slowing : None was present.  Sleep background : All phases of sleep was recorded.   Epileptiform activity : None was noted except patient had clinical event during which the  electrodes were dislodged and background activity was disrupted and  unable to evaluate event.   Interictal activity : No epileptiform activity noted.   Events : 2 clinical events were witnessed on video recording but unable to  determine background activity due to electrode abnormalities.  Artifacts : There were electrode and movement and myogenic artifact was noted  intermittently.  Impression : Abnormal 24 hour EEG recording due to the presence of Moderate generalized slowing without any clear epileptiform activity  suggestive of underlying encephalopathy. No intracranial or ictal EEG  abnormalities noted. Clinical correlation recommended. Discussed the  results with Dr. GASPER Feliz.   [de-identified] : EXAM:  CT BRAIN                         PROCEDURE DATE:  11/25/2018      INTERPRETATION:    CT head without IV contrast      CLINICAL INFORMATION:  Lethargy        TECHNIQUE: Contiguous axial 4 mm sections were obtained through the head.   This scan was performed using automatic exposure control (radiation dose  reduction software) to obtain a diagnostic image quality scan with  patient dose as low as reasonably achievable.   FINDINGS:   No previous examinations are available for review.  The brain demonstrates mild periventricular white matter ischemia.    Brain development appears appropriate.  Cortical sulci and gyri appear  unremarkable.  Myelination appears appropriate.  There is no evidence of  infarction.  No abnormal calcifications are found.  No intracranial  hemorrhage is found.  No mass effect is found in the brain.    The ventricles, sulci and basal cisterns appear unremarkable.       The orbits are unremarkable.  The paranasal sinuses demonstrate age  appropriate development and are clear.  The nasal cavity appears intact.   The nasopharynx is prominent but symmetric, not atypcial for age.  The  central skull base, petrous temporal bones and calvarium remain intact.   IMPRESSION:   Mild periventricular white matter ischemia is noted.

## 2019-02-22 NOTE — DISCUSSION/SUMMARY
[FreeTextEntry1] : 54-year-old male history of MR, nonverbal, nonambulatory. Recurrent seizures, on multiple medications.recurrence while hospitalized, apparently due to not taken. Medications as previously prescribed. With tsubtherapeutic serum levels. EEGs while hospitalized, demonstrated mild slowing. CT scan of the head, demonstrated atrophy, periventricular changes.\par Will order serum levels of phenytoin, valproic acid, carbamazepine, clobazam, levetiracetam. Obtain 24 hour laboratory EEG.\par Reevaluate in one month.

## 2019-02-22 NOTE — HISTORY OF PRESENT ILLNESS
[FreeTextEntry1] : 54-year-old male, history of severe mental retardation, non-ambulatory, wheelchair-bound, nonverbal, with a history of epilepsy. Was admitted to [facility] December of 2018, for evaluation of cavitary lung lesion, subsequently, suffer recurrent seizures.\par None since discharge.\par Continues on Tegretol  mg, 1200 mg daily, Keppra 1000 mg twice a day, Dilantin Infatab 50 mg, 4 tablets once a day, Depakote sprinkles 125 mg, 5 tablets twice a day.Onfi 10 mg, 3 tablets nightly, clonazepam 1 mg p.o. twice a day.\par

## 2019-02-22 NOTE — PHYSICAL EXAM
[General Appearance - Alert] : alert [General Appearance - In No Acute Distress] : in no acute distress [Cranial Nerves Oculomotor (III)] : extraocular motion intact [Cranial Nerves Trigeminal (V)] : facial sensation intact symmetrically [Cranial Nerves Facial (VII)] : face symmetrical [Cranial Nerves Glossopharyngeal (IX)] : tongue and palate midline [Cranial Nerves Hypoglossal (XII)] : there was no tongue deviation with protrusion [Involuntary Movements] : no involuntary movements were seen [0] : Ankle jerk left 0 [FreeTextEntry1] : 54-year-old male, sitting in a wheelchair, nonverbal, no eye contact, does not follow commands.\par  [___] : absent on the right [___] : absent on the left [FreeTextEntry5] : opacified left cornea [FreeTextEntry6] : reduced tone bilaterally

## 2019-03-04 ENCOUNTER — APPOINTMENT (OUTPATIENT)
Dept: NEUROLOGY | Facility: CLINIC | Age: 54
End: 2019-03-04
Payer: MEDICARE

## 2019-03-04 PROCEDURE — 95953: CPT

## 2019-03-04 PROCEDURE — 95816 EEG AWAKE AND DROWSY: CPT | Mod: 59

## 2019-03-05 ENCOUNTER — APPOINTMENT (OUTPATIENT)
Dept: NEUROLOGY | Facility: CLINIC | Age: 54
End: 2019-03-05
Payer: MEDICARE

## 2019-03-05 PROCEDURE — 95953: CPT

## 2019-03-29 ENCOUNTER — APPOINTMENT (OUTPATIENT)
Dept: NEUROLOGY | Facility: CLINIC | Age: 54
End: 2019-03-29
Payer: MEDICARE

## 2019-03-29 VITALS
DIASTOLIC BLOOD PRESSURE: 72 MMHG | HEART RATE: 77 BPM | BODY MASS INDEX: 28.35 KG/M2 | WEIGHT: 160 LBS | HEIGHT: 63 IN | SYSTOLIC BLOOD PRESSURE: 132 MMHG

## 2019-03-29 PROCEDURE — 99213 OFFICE O/P EST LOW 20 MIN: CPT

## 2019-03-29 NOTE — DISCUSSION/SUMMARY
[FreeTextEntry1] : 54-year-old man history of severe mental retardation, history of seizures. Symptomatic. On several medicions. Appears to be at baseline.\par Will make no changes of present medications.\par Obtain serum levels next visit.\par Return to office in 4 months.

## 2019-03-29 NOTE — HISTORY OF PRESENT ILLNESS
[FreeTextEntry1] : 54-year-old man, history of mental retardation,cerebral palsy,nonambulatory, wheelchair-bound. Nonverbal. Hisory of epilepsy. Wasn't [facility] December 2018, and while hospitalized for a cavitary lung lesion, developed a seizure. Palate had missed some of his medications.\par Since discharge, his attendant reports one brief seizure about a week or 2 ago. Otherwise, no change in physical state,no fever or chills, no diarrhea, nausea, vomiting. Appears to tolerate the medications well.\par available for review, serum levels obtained March 27 Tegretol was 4.2, valproic acid 33.3, Keppra 34.3.\par Last EEG demonstrated generalized slowing, and intermittent right hemisphere epileptiform discharges.

## 2019-03-29 NOTE — PHYSICAL EXAM
[FreeTextEntry1] : Limited, nonverbal,yelling while in room. Does not follow commands.\par Pupils equal reactive, extraocular movements are grossly intact. Face symmetrical. Tongue is midline\par Motor examination, multiple extremities, tone appears reduced bilaterally.\par Hyperreflexic bilaterally.\par Cerebellar functions unable to evaluate.

## 2019-05-08 ENCOUNTER — OUTPATIENT (OUTPATIENT)
Dept: OUTPATIENT SERVICES | Facility: HOSPITAL | Age: 54
LOS: 1 days | End: 2019-05-08
Payer: MEDICARE

## 2019-05-08 VITALS
WEIGHT: 188.94 LBS | OXYGEN SATURATION: 95 % | TEMPERATURE: 97 F | HEART RATE: 75 BPM | HEIGHT: 63 IN | SYSTOLIC BLOOD PRESSURE: 102 MMHG | RESPIRATION RATE: 20 BRPM | DIASTOLIC BLOOD PRESSURE: 65 MMHG

## 2019-05-08 DIAGNOSIS — Z98.49 CATARACT EXTRACTION STATUS, UNSPECIFIED EYE: Chronic | ICD-10-CM

## 2019-05-08 DIAGNOSIS — K02.62 DENTAL CARIES ON SMOOTH SURFACE PENETRATING INTO DENTIN: ICD-10-CM

## 2019-05-08 DIAGNOSIS — K05.6 PERIODONTAL DISEASE, UNSPECIFIED: ICD-10-CM

## 2019-05-08 DIAGNOSIS — K02.9 DENTAL CARIES, UNSPECIFIED: ICD-10-CM

## 2019-05-08 DIAGNOSIS — R56.9 UNSPECIFIED CONVULSIONS: ICD-10-CM

## 2019-05-08 DIAGNOSIS — Z01.84 ENCOUNTER FOR ANTIBODY RESPONSE EXAMINATION: ICD-10-CM

## 2019-05-08 LAB
ANION GAP SERPL CALC-SCNC: 12 MMOL/L — SIGNIFICANT CHANGE UP (ref 5–17)
BUN SERPL-MCNC: 20 MG/DL — SIGNIFICANT CHANGE UP (ref 7–23)
CALCIUM SERPL-MCNC: 9.1 MG/DL — SIGNIFICANT CHANGE UP (ref 8.4–10.5)
CHLORIDE SERPL-SCNC: 99 MMOL/L — SIGNIFICANT CHANGE UP (ref 96–108)
CO2 SERPL-SCNC: 23 MMOL/L — SIGNIFICANT CHANGE UP (ref 22–31)
CREAT SERPL-MCNC: 0.69 MG/DL — SIGNIFICANT CHANGE UP (ref 0.5–1.3)
GLUCOSE SERPL-MCNC: 98 MG/DL — SIGNIFICANT CHANGE UP (ref 70–99)
HCT VFR BLD CALC: 39.3 % — SIGNIFICANT CHANGE UP (ref 39–50)
HGB BLD-MCNC: 12.9 G/DL — LOW (ref 13–17)
MCHC RBC-ENTMCNC: 29.7 PG — SIGNIFICANT CHANGE UP (ref 27–34)
MCHC RBC-ENTMCNC: 32.8 GM/DL — SIGNIFICANT CHANGE UP (ref 32–36)
MCV RBC AUTO: 90.6 FL — SIGNIFICANT CHANGE UP (ref 80–100)
PLATELET # BLD AUTO: 268 K/UL — SIGNIFICANT CHANGE UP (ref 150–400)
POTASSIUM SERPL-MCNC: 4.7 MMOL/L — SIGNIFICANT CHANGE UP (ref 3.5–5.3)
POTASSIUM SERPL-SCNC: 4.7 MMOL/L — SIGNIFICANT CHANGE UP (ref 3.5–5.3)
RBC # BLD: 4.34 M/UL — SIGNIFICANT CHANGE UP (ref 4.2–5.8)
RBC # FLD: 13.5 % — SIGNIFICANT CHANGE UP (ref 10.3–14.5)
SODIUM SERPL-SCNC: 134 MMOL/L — LOW (ref 135–145)
WBC # BLD: 7.55 K/UL — SIGNIFICANT CHANGE UP (ref 3.8–10.5)
WBC # FLD AUTO: 7.55 K/UL — SIGNIFICANT CHANGE UP (ref 3.8–10.5)

## 2019-05-08 PROCEDURE — 80048 BASIC METABOLIC PNL TOTAL CA: CPT

## 2019-05-08 PROCEDURE — 71045 X-RAY EXAM CHEST 1 VIEW: CPT | Mod: 26

## 2019-05-08 PROCEDURE — 71045 X-RAY EXAM CHEST 1 VIEW: CPT

## 2019-05-08 PROCEDURE — G0463: CPT

## 2019-05-08 PROCEDURE — 85027 COMPLETE CBC AUTOMATED: CPT

## 2019-05-08 RX ORDER — DIVALPROEX SODIUM 500 MG/1
5 TABLET, DELAYED RELEASE ORAL
Qty: 0 | Refills: 0 | COMMUNITY

## 2019-05-08 RX ORDER — SODIUM CHLORIDE 9 MG/ML
1 INJECTION INTRAMUSCULAR; INTRAVENOUS; SUBCUTANEOUS
Qty: 0 | Refills: 0 | COMMUNITY

## 2019-05-08 RX ORDER — CLOBAZAM 10 MG/1
3 TABLET ORAL
Qty: 0 | Refills: 0 | COMMUNITY

## 2019-05-08 RX ORDER — FERROUS SULFATE 325(65) MG
1 TABLET ORAL
Qty: 0 | Refills: 0 | COMMUNITY

## 2019-05-08 RX ORDER — DIVALPROEX SODIUM 500 MG/1
4 TABLET, DELAYED RELEASE ORAL
Qty: 0 | Refills: 0 | COMMUNITY

## 2019-05-08 RX ORDER — OMEPRAZOLE 10 MG/1
1 CAPSULE, DELAYED RELEASE ORAL
Qty: 0 | Refills: 0 | COMMUNITY

## 2019-05-08 RX ORDER — THIOTHIXENE 5 MG
1 CAPSULE ORAL
Qty: 0 | Refills: 0 | COMMUNITY

## 2019-05-08 RX ORDER — RISPERIDONE 4 MG/1
1 TABLET ORAL
Qty: 0 | Refills: 0 | COMMUNITY

## 2019-05-08 RX ORDER — BENZTROPINE MESYLATE 1 MG
1 TABLET ORAL
Qty: 0 | Refills: 0 | COMMUNITY

## 2019-05-08 NOTE — H&P PST ADULT - HISTORY OF PRESENT ILLNESS
This is a 54 year old male with history of intellectual disability and seizure  disorder, pt had a 1 minute seizure today prior to coming to PST. Pt with dental caries.  Now scheduled for comprehensive dental treatment on 5-22-19 This is a 54 year old male with history of intellectual disability and seizure  disorder, pt had a 1 minute seizure today prior to coming to PST. Pt with dental caries.  Now scheduled for comprehensive dental treatment on 5-22-19    addendum 5/9/19  chest X ray results reviewed   IMPRESSION:   Limited study. Elevated left hemidiaphragm and associated left basilar   atelectasis. Grossly stable exam.  Results  fax ed to PMD and Surgeon

## 2019-05-08 NOTE — H&P PST ADULT - NSICDXPROBLEM_GEN_ALL_CORE_FT
PROBLEM DIAGNOSES  Problem: Dental caries  Assessment and Plan: comprehensive dental treatment    Problem: Seizure  Assessment and Plan: pt to see neurologist prior to procedure, last seizure today 5-08-19

## 2019-05-08 NOTE — H&P PST ADULT - NSICDXPASTMEDICALHX_GEN_ALL_CORE_FT
PAST MEDICAL HISTORY:  Anemia     Bowel and bladder incontinence     Cataract     Cataract     Diarrhea     Gastritis     GERD (gastroesophageal reflux disease)     Hearing impaired person     Hyperprolactinemia     Hyponatremia     Incontinent of Urine     Intellectual disability     Internal Hemorrhoids     Leg Edema     Mental Retardation, Severe (I.Q. 20-34)     Osteoporosis     Osteoporosis     Prolactin Increased     Seizure Disorder     Seizure disorder last seizure today 5-08-19 PAST MEDICAL HISTORY:  Anemia     Bowel and bladder incontinence wears diapers    Cataract right extracted    Dental caries current    Gastritis     GERD (gastroesophageal reflux disease)     Hearing impaired person     History of impulse control disorder     Hyperprolactinemia gynocomastia    Hyponatremia salt supplement    Incontinent of Urine     Intellectual disability severe    Internal Hemorrhoids     Leg Edema     OA (osteoarthritis) b/l knee    Osteoporosis     Osteoporosis fosamax    Pneumonia, aspiration     Positive PPD inh 1975    Prolactin Increased     Seizure disorder last seizure today 5-08-19 x 1 minute.  Pt to see neurologist prior to procedure

## 2019-05-09 PROBLEM — E87.1 HYPO-OSMOLALITY AND HYPONATREMIA: Chronic | Status: ACTIVE | Noted: 2017-06-16

## 2019-05-09 PROBLEM — K02.9 DENTAL CARIES, UNSPECIFIED: Chronic | Status: ACTIVE | Noted: 2019-05-08

## 2019-05-09 PROBLEM — F79 UNSPECIFIED INTELLECTUAL DISABILITIES: Chronic | Status: ACTIVE | Noted: 2017-06-16

## 2019-05-09 PROBLEM — G40.909 EPILEPSY, UNSPECIFIED, NOT INTRACTABLE, WITHOUT STATUS EPILEPTICUS: Chronic | Status: ACTIVE | Noted: 2017-05-04

## 2019-05-09 PROBLEM — R76.11 NONSPECIFIC REACTION TO TUBERCULIN SKIN TEST WITHOUT ACTIVE TUBERCULOSIS: Chronic | Status: ACTIVE | Noted: 2019-05-08

## 2019-05-09 PROBLEM — H26.9 UNSPECIFIED CATARACT: Chronic | Status: ACTIVE | Noted: 2017-06-16

## 2019-05-09 PROBLEM — M19.90 UNSPECIFIED OSTEOARTHRITIS, UNSPECIFIED SITE: Chronic | Status: ACTIVE | Noted: 2019-05-08

## 2019-05-09 PROBLEM — R32 UNSPECIFIED URINARY INCONTINENCE: Chronic | Status: ACTIVE | Noted: 2017-06-16

## 2019-05-09 PROBLEM — M81.0 AGE-RELATED OSTEOPOROSIS WITHOUT CURRENT PATHOLOGICAL FRACTURE: Chronic | Status: ACTIVE | Noted: 2017-06-16

## 2019-05-09 PROBLEM — E22.1 HYPERPROLACTINEMIA: Chronic | Status: ACTIVE | Noted: 2017-06-16

## 2019-05-12 PROBLEM — J69.0 PNEUMONITIS DUE TO INHALATION OF FOOD AND VOMIT: Chronic | Status: ACTIVE | Noted: 2019-05-08

## 2019-05-12 PROBLEM — Z86.59 PERSONAL HISTORY OF OTHER MENTAL AND BEHAVIORAL DISORDERS: Chronic | Status: ACTIVE | Noted: 2019-05-08

## 2019-05-13 ENCOUNTER — APPOINTMENT (OUTPATIENT)
Dept: NEUROLOGY | Facility: CLINIC | Age: 54
End: 2019-05-13
Payer: MEDICARE

## 2019-05-13 VITALS
WEIGHT: 159 LBS | BODY MASS INDEX: 28.17 KG/M2 | HEIGHT: 63 IN | SYSTOLIC BLOOD PRESSURE: 120 MMHG | HEART RATE: 67 BPM | DIASTOLIC BLOOD PRESSURE: 78 MMHG

## 2019-05-13 PROCEDURE — 99214 OFFICE O/P EST MOD 30 MIN: CPT

## 2019-05-13 NOTE — HISTORY OF PRESENT ILLNESS
[FreeTextEntry1] : 54-year-old man, history of severe mental retardation, cerebral palsy, nonambulatory wheelchair bound. With history of epilepsy him a here accompanied by  home attendants. One recurrent seizure 2 days ago, was described as stiffening of the extremities, and head twisted to the left, less than a minute, and followed period of drowsiness afterwards, then return to baseline.No clear trigger, no recent illnesses. No fever or chills.\par Previous event was while hospitalized in December, 2018. Previous episode was  was a year before that.\par presently on multiple medications Keppra 2000 mg twice a day,clobazam 20 mg nightly, Dilantin, 50 mg, 2 tablets daily. Depakote sprinkles 125, 6 tablets twice a day. Tegretol- mg in the morning, 800 mg milligrams at night.\par Most recent serum level of Dilantin less than 0.5, clobazam 49, Tegretol 4.2, Depakote 33.3.

## 2019-05-13 NOTE — DISCUSSION/SUMMARY
[FreeTextEntry1] : 54-year-old male history of cerebral palsy, severe MR,nonambulatory, nonverbal, recurrent seizures. On multiple medications.\par Plan will try to simplify treatment.\par Plan: Increase Tegretol- mg twice a day.\par Drop Dilantin to 50 mg daily, eventually, discontinue.\par Continue Keppra 2000 mg twice a day.\par Continue Depakote sprinkles 125 mg, 6 tablets twice a day.\par Continue clobazam 20 mg daily

## 2019-05-13 NOTE — PHYSICAL EXAM
[General Appearance - Alert] : alert [General Appearance - In No Acute Distress] : in no acute distress [Cranial Nerves Oculomotor (III)] : extraocular motion intact [Cranial Nerves Trigeminal (V)] : facial sensation intact symmetrically [Cranial Nerves Facial (VII)] : face symmetrical [Cranial Nerves Glossopharyngeal (IX)] : tongue and palate midline [Cranial Nerves Hypoglossal (XII)] : there was no tongue deviation with protrusion [Involuntary Movements] : no involuntary movements were seen [0] : Ankle jerk left 0 [___] : absent on the right [___] : absent on the left [FreeTextEntry5] : opacified left cornea [FreeTextEntry6] : reduced tone bilaterally

## 2019-06-06 ENCOUNTER — TRANSCRIPTION ENCOUNTER (OUTPATIENT)
Age: 54
End: 2019-06-06

## 2019-06-06 NOTE — ASU DISCHARGE PLAN (ADULT/PEDIATRIC) - CALL YOUR DOCTOR IF YOU HAVE ANY OF THE FOLLOWING:
Bleeding that does not stop/Swelling that gets worse/Wound/Surgical Site with redness, or foul smelling discharge or pus/Pain not relieved by Medications/Fever greater than (need to indicate Fahrenheit or Celsius)

## 2019-06-06 NOTE — ASU DISCHARGE PLAN (ADULT/PEDIATRIC) - NURSING INSTRUCTIONS
Next dose of Tylenol will be on or after __5:35 PM_ ,today/tonight, If needed for pain/cramps. Your first dose of Tylenol was given at _11:35 AM_. Do not exceed more than 4000mg of Tylenol in one 24 hour setting.

## 2019-06-07 ENCOUNTER — OUTPATIENT (OUTPATIENT)
Dept: OUTPATIENT SERVICES | Facility: HOSPITAL | Age: 54
LOS: 1 days | End: 2019-06-07
Payer: MEDICARE

## 2019-06-07 VITALS
DIASTOLIC BLOOD PRESSURE: 82 MMHG | HEART RATE: 62 BPM | OXYGEN SATURATION: 98 % | SYSTOLIC BLOOD PRESSURE: 138 MMHG | RESPIRATION RATE: 16 BRPM

## 2019-06-07 VITALS
HEIGHT: 63 IN | WEIGHT: 188.94 LBS | RESPIRATION RATE: 16 BRPM | DIASTOLIC BLOOD PRESSURE: 81 MMHG | TEMPERATURE: 97 F | OXYGEN SATURATION: 95 % | SYSTOLIC BLOOD PRESSURE: 138 MMHG | HEART RATE: 60 BPM

## 2019-06-07 DIAGNOSIS — K02.62 DENTAL CARIES ON SMOOTH SURFACE PENETRATING INTO DENTIN: ICD-10-CM

## 2019-06-07 DIAGNOSIS — Z98.49 CATARACT EXTRACTION STATUS, UNSPECIFIED EYE: Chronic | ICD-10-CM

## 2019-06-07 DIAGNOSIS — K05.6 PERIODONTAL DISEASE, UNSPECIFIED: ICD-10-CM

## 2019-06-07 PROCEDURE — D4341: CPT

## 2019-06-07 PROCEDURE — D4210: CPT

## 2019-06-07 PROCEDURE — D0210: CPT

## 2019-06-07 RX ORDER — BENZOYL PEROXIDE MICRONIZED 5.8 %
1 TOWELETTE (EA) TOPICAL
Qty: 0 | Refills: 0 | DISCHARGE

## 2019-06-07 RX ORDER — CARBAMAZEPINE 200 MG
2 TABLET ORAL
Qty: 0 | Refills: 0 | DISCHARGE

## 2019-06-07 RX ORDER — SENNOSIDES/DOCUSATE SODIUM 8.6MG-50MG
1 TABLET ORAL
Qty: 0 | Refills: 0 | DISCHARGE

## 2019-06-07 RX ORDER — ASPIRIN/CALCIUM CARB/MAGNESIUM 324 MG
1 TABLET ORAL
Qty: 0 | Refills: 0 | DISCHARGE

## 2019-06-07 RX ORDER — ONDANSETRON 8 MG/1
4 TABLET, FILM COATED ORAL ONCE
Refills: 0 | Status: DISCONTINUED | OUTPATIENT
Start: 2019-06-07 | End: 2019-06-22

## 2019-06-07 RX ORDER — SODIUM CHLORIDE 9 MG/ML
1000 INJECTION, SOLUTION INTRAVENOUS
Refills: 0 | Status: DISCONTINUED | OUTPATIENT
Start: 2019-06-07 | End: 2019-06-22

## 2019-06-07 RX ORDER — BENZTROPINE MESYLATE 1 MG
1 TABLET ORAL
Qty: 0 | Refills: 0 | DISCHARGE

## 2019-06-07 RX ORDER — LEVETIRACETAM 250 MG/1
2 TABLET, FILM COATED ORAL
Qty: 0 | Refills: 0 | DISCHARGE

## 2019-06-07 RX ORDER — DIAZEPAM 5 MG
1 TABLET ORAL
Qty: 0 | Refills: 0 | DISCHARGE

## 2019-06-07 RX ORDER — CLONAZEPAM 1 MG
1 TABLET ORAL
Qty: 0 | Refills: 0 | DISCHARGE

## 2019-06-07 RX ORDER — ALENDRONATE SODIUM 70 MG/1
1 TABLET ORAL
Qty: 0 | Refills: 0 | DISCHARGE

## 2019-06-07 RX ORDER — CARBAMAZEPINE 200 MG
4 TABLET ORAL
Qty: 0 | Refills: 0 | DISCHARGE

## 2019-06-07 RX ORDER — CHOLECALCIFEROL (VITAMIN D3) 125 MCG
1 CAPSULE ORAL
Qty: 0 | Refills: 0 | DISCHARGE

## 2019-06-07 RX ORDER — SODIUM CHLORIDE 9 MG/ML
2 INJECTION INTRAMUSCULAR; INTRAVENOUS; SUBCUTANEOUS
Qty: 0 | Refills: 0 | DISCHARGE

## 2019-06-07 NOTE — PRE-ANESTHESIA EVALUATION ADULT - NSANTHOSAYNRD_GEN_A_CORE
No. HEBER screening performed.  STOP BANG Legend: 0-2 = LOW Risk; 3-4 = INTERMEDIATE Risk; 5-8 = HIGH Risk

## 2019-06-07 NOTE — PRE-ANESTHESIA EVALUATION ADULT - NSANTHADDINFOFT_GEN_ALL_CORE
The patient presented on 5/22 for dental procedure , but he had a productive cough, possible bronchitis, which was treated and resolved as per Dr Ham, PCP, optimal medical condition as per PCP

## 2019-06-07 NOTE — ASU PATIENT PROFILE, ADULT - TEACHING/LEARNING FACTORS IMPACT ABILITY TO LEARN
none cognitive limitations/comprehension/communication limitations/language/learning disabilities/physical limitations/hearing problems

## 2019-06-07 NOTE — ASU PATIENT PROFILE, ADULT - PMH
Anemia    Bowel and bladder incontinence  wears diapers  Cataract  right extracted  Dental caries  current  Gastritis    GERD (gastroesophageal reflux disease)    Hearing impaired person    History of impulse control disorder    Hyperprolactinemia  gynocomastia  Hyponatremia  salt supplement  Incontinent of Urine    Intellectual disability  severe  Internal Hemorrhoids    Leg Edema    OA (osteoarthritis)  b/l knee  Osteoporosis  fosamax  Osteoporosis    Pneumonia, aspiration    Positive PPD  inh 1975  Prolactin Increased    Seizure disorder  last seizure today 5-08-19 x 1 minute.  Pt to see neurologist prior to procedure

## 2019-06-14 ENCOUNTER — APPOINTMENT (OUTPATIENT)
Dept: NEUROLOGY | Facility: CLINIC | Age: 54
End: 2019-06-14
Payer: MEDICARE

## 2019-06-14 PROCEDURE — 99213 OFFICE O/P EST LOW 20 MIN: CPT

## 2019-06-14 RX ORDER — PHENYTOIN 50 MG/1
50 TABLET, CHEWABLE ORAL
Qty: 30 | Refills: 0 | Status: DISCONTINUED | COMMUNITY
Start: 1900-01-01 | End: 2019-06-14

## 2019-06-14 NOTE — REVIEW OF SYSTEMS
[Seizures] : convulsions [Negative] : Constitutional [FreeTextEntry1] : p oralatient unable to answer

## 2019-06-14 NOTE — HISTORY OF PRESENT ILLNESS
[FreeTextEntry1] : 54-year-old man hx of CP/MR, WCB, seizure disorder. here with attendant. On multiple anti seizure medications. Last seizure in [facility] this year. Last visit Tegretol increased to 800 mg BID. Also on Keppra 2000 mg BID, Depakote sprinkles 125 mg, 6 tabs po BID, clobazam 20 mg po Hs.Dilantin 50 mg Daily.\par eports no recent illness, no fever, no chills, sleeping, eating well. No recent change in bowel and bladder function.No episodes of bed wetting at night.\par

## 2019-07-25 ENCOUNTER — APPOINTMENT (OUTPATIENT)
Dept: NEUROLOGY | Facility: CLINIC | Age: 54
End: 2019-07-25

## 2019-07-31 NOTE — ED PROVIDER NOTE - NS ED ATTENDING STATEMENT MOD
Attending Only 30y  at 40w2d with EDC 19 dated by LMP c/w firs trimester sonogram, presents to labor and delivery for contractions that started last night, initially q20min, now occurring every 7min. 8/10 in intensity, associated with vaginal spotting since the AM, requiring 1 pad. Denies leakage of fluid. Good fetal movements. Denies complications this pregnancy including PIH and GDM. GBS pos  Patient had elevated blood pressure in triage, admits to lower extremity swelling in the past 2 days, but denies headache, visual changes, chest pain, SOB.

## 2019-08-19 ENCOUNTER — APPOINTMENT (OUTPATIENT)
Dept: NEUROLOGY | Facility: CLINIC | Age: 54
End: 2019-08-19
Payer: MEDICARE

## 2019-08-19 VITALS
HEIGHT: 63 IN | DIASTOLIC BLOOD PRESSURE: 68 MMHG | SYSTOLIC BLOOD PRESSURE: 120 MMHG | HEART RATE: 66 BPM | BODY MASS INDEX: 31.36 KG/M2 | WEIGHT: 177 LBS

## 2019-08-19 PROCEDURE — 99213 OFFICE O/P EST LOW 20 MIN: CPT

## 2019-08-19 NOTE — HISTORY OF PRESENT ILLNESS
[FreeTextEntry1] : 54 year old man, hx of CP/MR, seizure disorder, non verbal.  Accompanied by attendant. Reports a brief tonic seizure last month, lasting 30 seconds which is short for him. No hospitalizations. No recent fever, chills, no difficulty urinating, no blood in the urine, no diarrhea. No recent falls or trauma.\par Last visit kept on Keppra 1000 mg BID, Tegretol  mg BID, clobazam 10 mg daily, and  Depakote 125 mg sprinkle, 6 tabs po BID.  \par Off Dilantin, appears to tolerate medications well. No N/V. No behavior problems.

## 2019-08-19 NOTE — DATA REVIEWED
[de-identified] : Keppra serum level of 47.1.\par Tegretol. Serum level 7.4.\par clobazam serum level, 31\par valproic acid, serum level 87.2

## 2019-08-19 NOTE — ASSESSMENT
[FreeTextEntry1] : 54 year old man hx of CP/MR, seizures, stable.\par Plan: will leave with present regiment.\par RTO 4 months.

## 2019-10-03 NOTE — SWALLOW BEDSIDE ASSESSMENT ADULT - SWALLOW EVAL: STRUCTURAL ABNORMALITIES
[FreeTextEntry1] : The diabetes is not well controlled\par Advised to exercise and follow the diet\par Will add Tradjenta 5 mg po QD\par He is on Lupron that is rising the BS
none present
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
## 2020-02-14 ENCOUNTER — EMERGENCY (EMERGENCY)
Facility: HOSPITAL | Age: 55
LOS: 0 days | Discharge: HOME | End: 2020-02-15
Attending: EMERGENCY MEDICINE
Payer: MEDICARE

## 2020-02-14 VITALS
SYSTOLIC BLOOD PRESSURE: 161 MMHG | OXYGEN SATURATION: 100 % | HEART RATE: 68 BPM | DIASTOLIC BLOOD PRESSURE: 91 MMHG | WEIGHT: 199.96 LBS | HEIGHT: 68 IN | RESPIRATION RATE: 18 BRPM

## 2020-02-14 DIAGNOSIS — R76.11 NONSPECIFIC REACTION TO TUBERCULIN SKIN TEST WITHOUT ACTIVE TUBERCULOSIS: ICD-10-CM

## 2020-02-14 DIAGNOSIS — R56.9 UNSPECIFIED CONVULSIONS: ICD-10-CM

## 2020-02-14 DIAGNOSIS — S00.531A CONTUSION OF LIP, INITIAL ENCOUNTER: ICD-10-CM

## 2020-02-14 DIAGNOSIS — M81.8 OTHER OSTEOPOROSIS WITHOUT CURRENT PATHOLOGICAL FRACTURE: ICD-10-CM

## 2020-02-14 DIAGNOSIS — M50.30 OTHER CERVICAL DISC DEGENERATION, UNSPECIFIED CERVICAL REGION: ICD-10-CM
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
## 2020-02-14 DIAGNOSIS — Z98.41 CATARACT EXTRACTION STATUS, RIGHT EYE: ICD-10-CM

## 2020-02-14 DIAGNOSIS — Z98.49 CATARACT EXTRACTION STATUS, UNSPECIFIED EYE: Chronic | ICD-10-CM

## 2020-02-14 DIAGNOSIS — M17.0 BILATERAL PRIMARY OSTEOARTHRITIS OF KNEE: ICD-10-CM

## 2020-02-14 DIAGNOSIS — K21.9 GASTRO-ESOPHAGEAL REFLUX DISEASE WITHOUT ESOPHAGITIS: ICD-10-CM

## 2020-02-14 DIAGNOSIS — Z79.82 LONG TERM (CURRENT) USE OF ASPIRIN: ICD-10-CM
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
## 2020-02-14 DIAGNOSIS — F79 UNSPECIFIED INTELLECTUAL DISABILITIES: ICD-10-CM

## 2020-02-14 DIAGNOSIS — Z91.018 ALLERGY TO OTHER FOODS: ICD-10-CM

## 2020-02-14 DIAGNOSIS — K64.8 OTHER HEMORRHOIDS: ICD-10-CM

## 2020-02-14 DIAGNOSIS — D64.9 ANEMIA, UNSPECIFIED: ICD-10-CM

## 2020-02-14 DIAGNOSIS — S09.90XA UNSPECIFIED INJURY OF HEAD, INITIAL ENCOUNTER: ICD-10-CM

## 2020-02-14 DIAGNOSIS — E04.1 NONTOXIC SINGLE THYROID NODULE: ICD-10-CM

## 2020-02-14 PROCEDURE — 70450 CT HEAD/BRAIN W/O DYE: CPT | Mod: 26

## 2020-02-14 PROCEDURE — 76376 3D RENDER W/INTRP POSTPROCES: CPT | Mod: 26

## 2020-02-14 PROCEDURE — 76376 3D RENDER W/INTRP POSTPROCES: CPT

## 2020-02-14 PROCEDURE — 36415 COLL VENOUS BLD VENIPUNCTURE: CPT

## 2020-02-14 PROCEDURE — 72125 CT NECK SPINE W/O DYE: CPT | Mod: 26

## 2020-02-14 PROCEDURE — 80156 ASSAY CARBAMAZEPINE TOTAL: CPT

## 2020-02-14 PROCEDURE — 80177 DRUG SCRN QUAN LEVETIRACETAM: CPT

## 2020-02-14 PROCEDURE — 99285 EMERGENCY DEPT VISIT HI MDM: CPT

## 2020-02-14 PROCEDURE — 70450 CT HEAD/BRAIN W/O DYE: CPT

## 2020-02-14 PROCEDURE — 72125 CT NECK SPINE W/O DYE: CPT

## 2020-02-14 PROCEDURE — 99284 EMERGENCY DEPT VISIT MOD MDM: CPT | Mod: 25

## 2020-02-14 PROCEDURE — 70486 CT MAXILLOFACIAL W/O DYE: CPT

## 2020-02-14 PROCEDURE — 70486 CT MAXILLOFACIAL W/O DYE: CPT | Mod: 26

## 2020-02-14 NOTE — ED PROVIDER NOTE - NSFOLLOWUPINSTRUCTIONS_ED_ALL_ED_FT
Contusion in Adults    WHAT YOU NEED TO KNOW:    A contusion is a bruise that appears on your skin after an injury. A bruise happens when small blood vessels tear but skin does not. When blood vessels tear, blood leaks into nearby tissue, such as soft tissue or muscle.    DISCHARGE INSTRUCTIONS:    Return to the emergency department if:     You have new trouble moving the injured area.      You have tingling or numbness in or near the injured area.      Your hand or foot below the bruise gets cold or turns pale.     Contact your healthcare provider if:     You find a new lump in the injured area.      Your symptoms do not improve with treatment after 4 to 5 days.      You have questions or concerns about your condition or care.    Medicines: You may need any of the following:     NSAIDs help decrease swelling and pain or fever. This medicine is available with or without a doctor's order. NSAIDs can cause stomach bleeding or kidney problems in certain people. If you take blood thinner medicine, always ask your healthcare provider if NSAIDs are safe for you. Always read the medicine label and follow directions.      Prescription pain medicine may be given. Do not wait until the pain is severe before you take your medicine.      Take your medicine as directed. Contact your healthcare provider if you think your medicine is not helping or if you have side effects. Tell him of her if you are allergic to any medicine. Keep a list of the medicines, vitamins, and herbs you take. Include the amounts, and when and why you take them. Bring the list or the pill bottles to follow-up visits. Carry your medicine list with you in case of an emergency.    Follow up with your healthcare provider as directed: You may need to return within a week to check your injury again. Write down your questions so you remember to ask them during your visits.    Help a contusion heal:     Rest the injured area or use it less than usual. If you bruised your leg or foot, you may need crutches or a cane to help you walk. This will help you keep weight off your injured body part.       Apply ice to decrease swelling and pain. Ice may also help prevent tissue damage. Use an ice pack, or put crushed ice in a plastic bag. Cover it with a towel and place it on your bruise for 15 to 20 minutes every hour or as directed.      Use compression to support the area and decrease swelling. Wrap an elastic bandage around the area over the bruised muscle. Make sure the bandage is not too tight. You should be able to fit 1 finger between the bandage and your skin.      Elevate (raise) your injured body part above the level of your heart to help decrease pain and swelling. Use pillows, blankets, or rolled towels to elevate the area as often as you can.      Do not drink alcohol as directed. Alcohol may slow healing.      Do not stretch injured muscles right after your injury. Ask your healthcare provider when and how you may safely stretch after your injury. Gentle stretches can help increase your flexibility.      Do not massage the area or put heating pads on the bruise right after your injury. Heat and massage may slow healing. Your healthcare provider may tell you to apply heat after several days. At that time, heat will start to help the injury heal.    Prevent another contusion:     Stretch and warm up before you play sports or exercise.      Wear protective gear when you play sports. Examples are shin guards and padding.       If you begin a new physical activity, start slowly to give your body a chance to adjust.    Seizure, Adult  When you have a seizure:    Parts of your body may move.  How aware or awake (conscious) you are may change.  You may shake (convulse).    Some people have symptoms right before a seizure happens. These symptoms may include:    Fear.  Worry (anxiety).  Feeling like you are going to throw up (nausea).  Feeling like the room is spinning (vertigo).  Feeling like you saw or heard something before (rosalind vu).  Odd tastes or smells.  Changes in vision, such as seeing flashing lights or spots.    ImageSeizures usually last from 30 seconds to 2 minutes. Usually, they are not harmful unless they last a long time.    Follow these instructions at home:  Medicines     Take over-the-counter and prescription medicines only as told by your doctor.  Avoid anything that may keep your medicine from working, such as alcohol.  Activity     Do not do any activities that would be dangerous if you had another seizure, like driving or swimming. Wait until your doctor approves.  If you live in the [place] ask your local DMV (department of [employer]) when you can drive.  Rest.  Teaching others     Teach friends and family what to do when you have a seizure. They should:    Lay you on the ground.  Protect your head and body.  Loosen any tight clothing around your neck.  Turn you on your side.  Stay with you until you are better.  Not hold you down.  Not put anything in your mouth.  Know whether or not you need emergency care.    General instructions     Contact your doctor each time you have a seizure.  Avoid anything that gives you seizures.  Keep a seizure diary. Write down:    What you think caused each seizure.  What you remember about each seizure.    Keep all follow-up visits as told by your doctor. This is important.  Contact a doctor if:  You have another seizure.  You have seizures more often.  There is any change in what happens during your seizures.  You continue to have seizures with treatment.  You have symptoms of being sick or having an infection.  Get help right away if:  You have a seizure:    That lasts longer than 5 minutes.  That is different than seizures you had before.  That makes it harder to breathe.  After you hurt your head.    After a seizure, you cannot speak or use a part of your body.  After a seizure, you are confused or have a bad headache.  You have two or more seizures in a row.  You are having seizures more often.  You do not wake up right after a seizure.  You get hurt during a seizure.  In an emergency:     These symptoms may be an emergency. Do not wait to see if the symptoms will go away. Get medical help right away. Call your local emergency services (911 in the [place] Do not drive yourself to the hospital.   This information is not intended to replace advice given to you by your health care provider. Make sure you discuss any questions you have with your health care provider.

## 2020-02-14 NOTE — ED PROVIDER NOTE - PROGRESS NOTE DETAILS
53 y/o M brought in my group home for seizure. Pt is nonverbal, reported seizure, pt fell and hit his face on the carpet. last seizure 5/19. taking meds daily. No other complaints at this time  Head: swelling and ecchymosis to lateral upper lip otherwise ncat. -Alonzo Johnson PA-C Results reviewed and discussed with aide, made aware of thyroid nodule and will FU with PMD. Discussed importance of close FU with PMD/neurologist. Pt asked to return to ED immediately for any new or concerning sx or worsening. Pt acknowledges and understands plan -Alonzo Johnson PA-C Results reviewed and discussed with aide, made aware of thyroid nodule and will FU with PMD. Med levels drawn, will call for results. Discussed importance of close FU with PMD/neurologist. Pt asked to return to ED immediately for any new or concerning sx or worsening. Pt acknowledges and understands plan -Alonzo Johnson PA-C

## 2020-02-14 NOTE — ED ADULT TRIAGE NOTE - CHIEF COMPLAINT QUOTE
pt had witnessed seizure at [facility], fell and hit face on the carpeted floor. unknown loc. pt nonverbal. pt on asa. Lip swelling and hematoma and swelling noted on forehead. Dr. Cristina made aware and neuro alert called. Pt taken to CT from Triage. hx MR Aid from Group home coming.

## 2020-02-14 NOTE — ED PROVIDER NOTE - PATIENT PORTAL LINK FT
You can access the FollowMyHealth Patient Portal offered by [facility] by registering at the following website: [email or web address]. By joining [facility]’s FollowMyHealth portal, you will also be able to view your health information using other applications (apps) compatible with our system.

## 2020-02-14 NOTE — ED PROVIDER NOTE - CARE PLAN
Principal Discharge DX:	Seizure  Secondary Diagnosis:	Injury of head, initial encounter  Secondary Diagnosis:	Contusion of lip, initial encounter

## 2020-02-14 NOTE — ED PROVIDER NOTE - ATTENDING CONTRIBUTION TO CARE
I, Margie Cristina MD,  performed the initial face to face bedside interview with this patient regarding history of present illness, review of symptoms and relevant past medical, social and family history.  I completed an independent physical examination.  I was the initial provider who evaluated this patient. I have signed out the follow up of any pending tests (i.e. labs, radiological studies) to the ACP.  I have communicated the patient’s plan of care and disposition with the ACP.  The history, relevant review of systems, past medical and surgical history, medical decision making, and physical examination was documented by the scribe in my presence and I attest to the accuracy of the documentation.

## 2020-02-14 NOTE — ED ADULT NURSE NOTE - CHIEF COMPLAINT QUOTE
pt had witnessed seizure at [employer], fell and hit face on the carpeted floor. unknown loc. pt nonverbal. pt on asa. Lip swelling and hematoma and swelling noted on forehead. Dr. Cristina made aware and neuro alert called. Pt taken to CT from Triage. hx MR Aid from Group home coming.

## 2020-02-14 NOTE — ED PROVIDER NOTE - ENMT, MLM
Airway patent, Nasal mucosa clear. Mouth with normal mucosa. Throat has no vesicles, no oropharyngeal exudates and uvula is midline. + swelling and ecchymoses to lateral upper lip without lac or bleeding

## 2020-02-14 NOTE — ED PROVIDER NOTE - OBJECTIVE STATEMENT
53 yo male with DD, nonverbal and seizure d/o s/p seizure at group home tonight with trauma to face.  last known seizure 5/19.  no missed meds.  has otherwise reportedly been well.

## 2020-02-14 NOTE — ED ADULT NURSE NOTE - NSIMPLEMENTINTERV_GEN_ALL_ED
Implemented All Fall with Harm Risk Interventions:  [place] to call system. Call bell, personal items and telephone within reach. Instruct patient to call for assistance. Room bathroom lighting operational. Non-slip footwear when patient is off stretcher. Physically safe environment: no spills, clutter or unnecessary equipment. Stretcher in lowest position, wheels locked, appropriate side rails in place. Provide visual cue, wrist band, yellow gown, etc. Monitor gait and stability. Monitor for mental status changes and reorient to person, place, and time. Review medications for side effects contributing to fall risk. Reinforce activity limits and safety measures with patient and family. Provide visual clues: red socks.

## 2020-02-14 NOTE — ED ADULT NURSE NOTE - OBJECTIVE STATEMENT
Pt presented to ED from group home s/p fall after having a seizure. Per staff at facility, it was unsure if the pt lost consciousness after the fall. Pt is on blood thinners, + head strike. Hematoma to forehead with mild lip swelling. The chaperone at bedside is not familiar with the pt but states that according to the staff members back at the facility, the pt was acting at baseline mentation prior to coming to the hospital. Neuro alert called, Dr. Cristina made aware. Safety/fall/seizure precautions.

## 2020-02-15 LAB — CARBAMAZEPINE SERPL-MCNC: 9.9 UG/ML — SIGNIFICANT CHANGE UP (ref 4–12)

## 2020-02-17 LAB — LEVETIRACETAM SERPL-MCNC: 51.7 MCG/ML — HIGH (ref 12–46)

## 2020-02-18 ENCOUNTER — APPOINTMENT (OUTPATIENT)
Dept: NEUROLOGY | Facility: CLINIC | Age: 55
End: 2020-02-18
Payer: MEDICARE

## 2020-02-18 VITALS
DIASTOLIC BLOOD PRESSURE: 70 MMHG | SYSTOLIC BLOOD PRESSURE: 122 MMHG | HEART RATE: 64 BPM | HEIGHT: 63 IN | BODY MASS INDEX: 31.36 KG/M2 | WEIGHT: 177 LBS

## 2020-02-18 PROCEDURE — 99214 OFFICE O/P EST MOD 30 MIN: CPT

## 2020-02-18 RX ORDER — CARBAMAZEPINE 400 MG/1
400 TABLET, EXTENDED RELEASE ORAL TWICE DAILY
Qty: 120 | Refills: 2 | Status: ACTIVE | COMMUNITY
Start: 1900-01-01 | End: 1900-01-01

## 2020-02-18 RX ORDER — LEVETIRACETAM 1000 MG/1
1000 TABLET, FILM COATED ORAL TWICE DAILY
Qty: 120 | Refills: 2 | Status: ACTIVE | COMMUNITY
Start: 2019-05-13 | End: 1900-01-01

## 2020-02-18 RX ORDER — CLONAZEPAM 1 MG/1
1 TABLET ORAL
Qty: 60 | Refills: 0 | Status: ACTIVE | COMMUNITY
Start: 1900-01-01 | End: 1900-01-01

## 2020-02-18 RX ORDER — DIVALPROEX SODIUM 125 MG/1
125 CAPSULE, COATED PELLETS ORAL
Qty: 360 | Refills: 3 | Status: ACTIVE | COMMUNITY
Start: 1900-01-01 | End: 1900-01-01

## 2020-02-18 RX ORDER — CLOBAZAM 10 MG/1
10 TABLET ORAL
Qty: 90 | Refills: 3 | Status: ACTIVE | COMMUNITY
Start: 2019-05-13 | End: 1900-01-01

## 2020-02-18 NOTE — PHYSICAL EXAM
[General Appearance - Alert] : alert [General Appearance - In No Acute Distress] : in no acute distress [Cranial Nerves Oculomotor (III)] : extraocular motion intact [Cranial Nerves Facial (VII)] : face symmetrical [Cranial Nerves Trigeminal (V)] : facial sensation intact symmetrically [Cranial Nerves Glossopharyngeal (IX)] : tongue and palate midline [Cranial Nerves Hypoglossal (XII)] : there was no tongue deviation with protrusion [Involuntary Movements] : no involuntary movements were seen [___] : absent on the right [0] : Ankle jerk left 0 [FreeTextEntry5] : opacified left cornea [___] : absent on the left [FreeTextEntry6] : reduced tone bilaterally

## 2020-02-18 NOTE — DATA REVIEWED
[de-identified] : IMPRESSION: \par 1. No acute intracranial hemorrhage, territorial infarct, mass effect or calvarial fracture.\par 2. Moderate soft tissue swelling in the upper lip without evidence of an underlying maxillofacial fracture.\par 3. 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 Small left thyroid lobe nodule. A nonemergent thyroid ultrasound is recommended for further evaluation.\par  [de-identified] : CBZ: 9.9\par Levetiracetam: 51.7

## 2020-02-18 NOTE — HISTORY OF PRESENT ILLNESS
[FreeTextEntry1] : 55-year-old male with a history of severe mental retardation, cerebral palsy, history of seizures, nonverbal. Comes for follow up visit, one reported seizure, [place] in the back, slipped and injured his face, taken to a local hospital. CT scan of the head, cervical spine, face, performed,no evidence of fracture or hemorrhage. Discharge home without change in medications.\par Previous seizure was July of 2019, described as generalized. Continues on Keppra thousand milligrams twice a day, Depakote 125 mg, 6 tablets twice a day, Tegretol- mg twice a day, and clobazam 0 mg at night.\par While in the hospital, serum levels of Keppra were obtained,previous levels in September of 20, 19 we're 34.2, earlier this month was 51.7. Tegretol, serum levels about the same.\par Previously, valproic acid level was 44.2,and clobazam was 77.\par There were no reported preceding illness. No reports of vomiting, diarrhea.\par

## 2020-02-18 NOTE — DISCUSSION/SUMMARY
[FreeTextEntry1] : 55-year-old male with a history of cerebral palsy/retardation,nonverbal, recurrent seizures. Previous electroencephalograph were abnormal with persistent epileptiform discharges originating from the right hemisphere. Reviewed recent findings on blood levels of medication.\par Plan: Increase clobazam 30 mg po HS.\par Maintain all other medications. The same.\par Obtain new serum levels.\par Return to office in 3 months.

## 2020-02-24 NOTE — ED ADULT NURSE NOTE - CCCP TRG CHIEF CMPLNT
fever
Is This A New Presentation, Or A Follow-Up?: Skin Lesions
How Severe Is Your Skin Lesion?: mild
Have Your Skin Lesions Been Treated?: not been treated

## 2020-03-26 NOTE — ED ADULT NURSE NOTE - NS ED NURSE RECORD ANOTHER VITAL SIGN
Yes Protopic Counseling: Patient may experience a mild burning sensation during topical application. Protopic is not approved in children less than 2 years of age. There have been case reports of hematologic and skin malignancies in patients using topical calcineurin inhibitors although causality is questionable.

## 2020-03-28 ENCOUNTER — EMERGENCY (EMERGENCY)
Facility: HOSPITAL | Age: 55
LOS: 1 days | End: 2020-03-28
Attending: EMERGENCY MEDICINE | Admitting: EMERGENCY MEDICINE
Payer: MEDICARE

## 2020-03-28 DIAGNOSIS — Z98.49 CATARACT EXTRACTION STATUS, UNSPECIFIED EYE: Chronic | ICD-10-CM

## 2020-03-28 DIAGNOSIS — I46.9 CARDIAC ARREST, CAUSE UNSPECIFIED: ICD-10-CM

## 2020-03-28 DIAGNOSIS — Z91.018 ALLERGY TO OTHER FOODS: ICD-10-CM

## 2020-03-28 PROCEDURE — 92950 HEART/LUNG RESUSCITATION CPR: CPT

## 2020-03-28 PROCEDURE — 99291 CRITICAL CARE FIRST HOUR: CPT | Mod: 25

## 2020-03-28 PROCEDURE — 99285 EMERGENCY DEPT VISIT HI MDM: CPT | Mod: 25

## 2020-03-28 NOTE — ED PROVIDER NOTE - OBJECTIVE STATEMENT
54 y/o M from group home brought in in cardiac arrest.  Unknown downtime.  pt found unresponsive on the floor this morning in asystole.  Last known well last night.  CPR started 8:13 by EMS.  CPR continued in the ED epi x 3 in total, mottled, remained in asystole, Time of death 8:44 in the ED

## 2020-03-28 NOTE — ED ADULT NURSE NOTE - OBJECTIVE STATEMENT
Pt brought in by ambulance in cardiac arrest. Pt was given 3 epi in the field and 1 in ER. Pt pronounced at 0844 by Dr. Charlton.   Organ donor Reference [id number] person LEIGHA

## 2020-05-11 ENCOUNTER — APPOINTMENT (OUTPATIENT)
Dept: NEUROLOGY | Facility: CLINIC | Age: 55
End: 2020-05-11

## 2020-05-31 NOTE — DISCHARGE NOTE ADULT - PATIENT PORTAL LINK FT
.
“You can access the FollowHealth Patient Portal, offered by [facility], by registering with the following website: [email or web address]”

## 2020-11-23 NOTE — PROGRESS NOTE ADULT - SUBJECTIVE AND OBJECTIVE BOX
SUBJECTIVE     Patient seen in the A.M and non verbal awake   as per the attendant no new events report has ct scan offical report is pending . 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-34)  H/O cataract extraction  S/P Colonoscopy  S/P Endoscopy    OBJECTIVE   Vital Signs Last 24 Hrs  T(C): 37.9 (30 Nov 2018 05:06), Max: 37.9 (30 Nov 2018 05:06)  T(F): 100.3 (30 Nov 2018 05:06), Max: 100.3 (30 Nov 2018 05:06)  HR: 94 (30 Nov 2018 05:06) (84 - 96)  BP: 120/73 (30 Nov 2018 05:06) (115/72 - 141/73)  BP(mean): --  RR: 19 (30 Nov 2018 05:06) (17 - 19)  SpO2: 100% (30 Nov 2018 05:06) (96% - 100%)      PHYSICAL EXAM:  Constitutional: , awake and alert, not in distress   HEENT: Normo cephalic atraumatic  Neck: Soft and supple, No J.V.D   Respiratory: vesicular breathing poor inspiratory effort   Cardiovascular: S1 and S2, regular rate .   Gastrointestinal:  soft, nontender,   Extremities: No  edema or calf tenderness .  Neurological: baseline mental retardation and non verbal     MEDICATIONS  (STANDING):  amoxicillin   80 mG/mL/clavulanate Suspension 400 milliGRAM(s) Oral two times a day  aspirin  chewable 81 milliGRAM(s) Oral daily  benztropine 1 milliGRAM(s) Oral four times a day  calcium carbonate 1250 mG  + Vitamin D (OsCal 500 + D) 1 Tablet(s) Oral two times a day  carBAMazepine Suspension 400 milliGRAM(s) Oral three times a day  cholecalciferol 2000 Unit(s) Oral daily  docusate sodium Liquid 100 milliGRAM(s) Oral three times a day  enoxaparin Injectable 40 milliGRAM(s) SubCutaneous daily  ferrous    sulfate 325 milliGRAM(s) Oral daily  fosphenytoin IVPB 150 milliGRAM(s) PE IV Intermittent every 12 hours  levETIRAcetam  IVPB 2000 milliGRAM(s) IV Intermittent every 12 hours  multivitamin 1 Tablet(s) Oral daily  pantoprazole    Tablet 40 milliGRAM(s) Oral before breakfast  phenytoin   Chewable 200 milliGRAM(s) Chew at bedtime  risperiDONE   Tablet 2 milliGRAM(s) Oral at bedtime  sodium chloride 2 Gram(s) Oral daily  thiothixene 5 milliGRAM(s) Oral <User Schedule>  valproate sodium IVPB 500 milliGRAM(s) IV Intermittent daily  valproate sodium IVPB 625 milliGRAM(s) IV Intermittent at bedtime                Culture - Acid Fast - Sputum w/Smear (collected 27 Nov 2018 06:15)  Source: .Sputum Sputum 22

## 2021-06-22 NOTE — ASU PREOP CHECKLIST - SURGICAL CONSENT
26 y.o.  KD 2021 @ 36.1 weeks presents with c/o ctx q 10 mins 7/10 pain. Pt denies LOF, VB, dysuria. States +FM. Pt is scheduled for repeat cs 2021.     allergies:  NKDA  medications:  prenatal vitamins  zoloft 50 mg PO HS    med/surg hx:  cs x 3    OBGYN hx:  2013    40 week  pLTCS 2/2 cat II FHR 8 lbs  7/10/2014  38 week  rLTCS 8lbs  2019  38 week  rLTCS TIUP, 7lbs twin A, 7lbs twin B    psychosocial hx:  history of anxiety and depression  treated with Paxil , suicidal ideation and alcohol use in pregnancy.  Admitted x2 in this pregnancy ,  (3/30/2021 and 2021) - currently on zoloft 50 mg PO HS, counseling twice weekly
done

## 2021-12-08 NOTE — PATIENT PROFILE ADULT. - PAIN CHRONIC, PROFILE
[FreeTextEntry1] : Dear Dr. Diehl\par \par Thank you so much for the referral to help care for your patient.\par \par Chief Complaint Urinary Urgency\par Date of first visit: 11/15/2021\par \par ALLA MCMILLAN is a 72  year old man with PMHx BPH s/p urolift 2020 and ED s/p IPP who presents for elevated PSA. His PSA is 4.8 ng/mL (8/24/21). MRI on 11/26/21 read as negative but limited due to artifact from urolift. His PSA density is normal 0.12 ng/ml/cc. States he has had three prior negative biopsies at [facility], unsure of physician's name, but all were reportedly negative. Denies family hx of  malignancies.\par \par He is primary focused on REJ associated with his BPH treatment.  He is also having urgency and worried about incomplete emptying.  The PVR was 90 cc.  QMax 8 ml/s VV was 75. Not on alpha blockers. Feels as if Urolift did not work. he reports anejaculation.  He has some hesitancy\par \par Prostate cancer screening: the patient and I spoke at length about prostate cancer screening, its risks and its benefits. The patient has (Age, PSA) 2 risk factors for prostate cancer.  He understands that many men with prostate cancer will die with the disease rather than of it and we also discussed the results large multi-center [place] and  prostate cancer screening trials. He also understands that PSA in and of itself does not diagnose prostate cancer but only assesses risk to a certain degree. The patient understands that to definitively screen for prostate cancer, a biopsy is required and this procedure has risks, including bleeding, infection, ED and urinary retention. The patient opted to hold off on an biopsy.\par \par \par \par PSA Hx:\par 9.68 11/15/21. PSAD 0.12 ng/ml/cc\par 4.8 8/24/21\par 5.7 1/18/21\par 7.4 1/4/21\par 0.1 11/3/20\par 5.8 7/15/20\par 6.2 6/27/20\par \par MRI at [facility] on 11/26/2021. 77 cc prostate with no suspicious prostate lesion, PIRADS 1. Artifact from urolift device limits evaluation of periurethral transition zone. No LAD, No EPE, No Bony Lesions. The images have been reviewed and clinical implications discussed with the patient.\par \par 12/8/2021\par IPSS 19 QOL 6\par SUSANNA\par \par 11/15/2021 \par IPSS NR QOL NR\par SUSANNA NR- IPP \par \par The patient denies fevers, chills, nausea and or vomiting and no unexplained weight loss.\par \par All pertinent laboratory, films and physician notes were reviewed.  Questionnaire results were discussed with patient.\par \par I spent 31 minutes of non-face to face time reviewing the patient's records, chart, uploading processing MR images, transferring MR images from PACS to an independent workstation, reviewing images on independent workstation, speaking with their physician and planning their possible biopsy and preparation of an upcoming visit for 12/08/2021 .  The imaging and planning was highly complex and took this entire time. \par \par The anterior part of the gland exam was limited due to urolift but DCE demonstrated no abnormal enhancement therefore i do agree with the read. no

## 2022-05-03 NOTE — SWALLOW BEDSIDE ASSESSMENT ADULT - ASR SWALLOW REFERRAL
PROGRESS NOTE    2022      PMD:  Duane Chary, MD  DOI:  NT  DOS:  2022 R CTR, exc bony exostosisL CT, L thumb inject. W/C:  no      HISTORY OF PRESENT ILLNESS   Akila Mathur is seen back 10 days post-op right carpal tunnel release, excision exostosis right dorsal wrist, left carpal tunnel and left thumb flexor tendon sheath injection. Doing well. She notes minimal to no pain. She is not taking anything for pain. She notes the numbness is better and has tingling only to the thumb and middle finger. She reports the left hand is feel better in re: to the numbness and pain to the thumb after the injection, however the thumb continues to lock and click. There is no pain with this. .  She does note a new issue with the left thumb. There is pain to the base of her left thumb. She has been doing her home exercises to the right hand without issues. Here today for suture removal.     MEDICATIONS, ALL, PMH, PSH, FH, SOCIAL HISTORY:  Reviewed in Epic, updated, no changes    REVIEW OF SYSTEMS  Reviewed in Epic, updated, no changes    PHYSICAL EXAM  General: Well groomed, in no apparent distress. Neuro: Alert and oriented (A&O) x3. Musculoskeletal: right hand: incision approximated with sutures. No signs of infection. Good motion of the fingers and wrist. Subjective tingling to the thumb and middle fingers. NV intact distally  Left hand: tenderness over the [facility] joint today. Compression grind test illicit pain. Nontender over the flexor tendon sheath. There is clicking with ROM over the flexor tendon. Active locking noted. Mildly positive tinel's/phalen's test. NV intact distally. X-RAY INTERPRETATION:   21: X-rays bilateral hands. There is a prominent dorsal spur dorsally in the right hand, consistent with metacarpal bossing. X-rays are otherwise normal bilaterally. EM22. EMG Mahesh Cuellar, [address]).   There are also some changes suggested that there may be chronic mild C6 cervical radiculopathy due to some chronic reinnervation changes seen in a number of the muscles tested, although the patient reports her neck pain and stiffness is improved since her fall over the summer. IMPRESSION:   1. S/p right carpal tunnel release, excision exostosis right dorsal wrist, left carpal tunnel and left thumb flexor tendon sheath injection. 2. Left carpal tunnel syndrome. 3. Left thumb flexor tenosynovitis  4. Left thumb CMC arthrosis. TREATMENT AND RECOMMENDATIONS:   Sutures removed. Steri-strips applied. Instructed incisional care and scar massage. She may increase activities as tolerated, letting pain be her guide. Continue home exercise program.     As far as the left hand, she would like to hold off on left carpal tunnel release at this time. She feels her hand is somewhat better following the injection. The left thumb options discussed. Discussed flexor tenosynovectomy. She would like to hold off on this. She will call back if she wishes to proceed with left carpal tunnel release and possible left thumb flexor tenosynovectomy. Recommended cortisone injection into base of the thumb today. Patient in agreement. Patient gives verbal consent. Following sterile prep of the skin, 3 mg of betamethasone and 0.5 ml of 1% lidocaine was injected into the left thumb CMC joint. Patient tolerated. She may ice and/or OTC medications, if needed for discomfort. Follow-up as needed. Patient verbalized an understanding and agrees with plan.       Alfredo Camilo Registered Dietitian

## 2022-07-04 NOTE — ED PROVIDER NOTE - CARE PLAN
INPATIENT PROGRESS NOTES    PATIENT NAME: Jojo Brooks  MRN: [id number]  SERVICE DATE:  July 4, 2022   SERVICE TIME:  12:18 PM      PRIMARY SERVICE: Pulmonary Disease    CHIEF COMPLAIN: Drug overdose      INTERVAL HPI: Patient seen and examined at bedside, Interval Notes, orders reviewed. Nursing notes noted  He denies having any Shortness of breath. No chest pain. No fever or chills. No nausea vomiting diarrhea. He is on room air and O2 saturation 100%. Chest x-ray shows left upper lobe atelectasis/pneumonia. He is on po augmentin    OBJECTIVE    Body mass index is 22.55 kg/m². PHYSICAL EXAM:  Vitals:  /82   Pulse 91   Temp 97.5 °F (36.4 °C) (Oral)   Resp 18   Ht 5' 10\" (1.778 m)   Wt 157 lb 3 oz (71.3 kg)   SpO2 100%   BMI 22.55 kg/m²   General: Alert, awake . comfortable in bed, No distress. Head: Atraumatic , Normocephalic   Eyes: PERRL. No sclera icterus. No conjunctival injection. No discharge   ENT: No nasal  discharge. Pharynx clear. Neck:  Trachea midline. No thyromegaly, no JVD, No cervical adenopathy. Chest : Bilaterally symmetrical ,Normal effort,  No accessory muscle use  Lung : . Fair BS bilateral, decreased BS at bases. No Rales. No wheezing. No rhonchi. Heart[de-identified] Normal  rate. Regular rhythm. No mumur ,  Rub or gallop  ABD: Non-tender. Non-distended. No masses. No organmegaly. Normal bowel sounds. No hernia.   Ext : No Pitting both leg , No Cyanosis No clubbing  Neuro: no focal weakness          DATA:   Recent Labs     07/03/22  0450 07/04/22 0458   WBC 12.7* 16.3*   HGB 12.7* 13.7*   HCT 38.3* 40.2*   MCV 85.4 85.2    293     Recent Labs     07/03/22  0450 07/03/22  0450 07/03/22  0951 07/03/22  2152 07/04/22 0458     --   --   --  142   K 3.2*  --   --   --  4.0     --   --   --  106   CO2 24  --   --   --  25   BUN 10  --   --   --  10   CREATININE 0.62*  --   --   --  0.58*   GLUCOSE 104*  --   --   --  109*   CALCIUM 8.9  --   --   --  8.9   PROT 6.1*  --    < > 6.2* 6.0*  6.1*   LABALBU 2.9*  --    < > 3.3* 3.2*  3.2*   BILITOT 0.5  --    < > 0.3 0.4  0.4   ALKPHOS 87  --    < > 93 89  90   *  --    < > 365* 331*  334*   *   < >   < > 659* 610*  606*   LABGLOM >60.0   < >  --   --  >60.0   GFRAA >60.0   < >  --   --  >60.0   GLOB 3.2   < >  --   --  2.9    < > = values in this interval not displayed. MV Settings:          No results for input(s): PHART, IVQ6PPO, PO2ART, FNW6VHH, BEART, X7VNSVAG in the last 72 hours. O2 Device: None (Room air)    ADULT DIET; Full Liquid     MEDICATIONS during current hospitalization:    Continuous Infusions:   lactated ringers 125 mL/hr at 07/04/22 1155    sodium chloride         Scheduled Meds:   LORazepam  1 mg IntraVENous Once    amoxicillin-clavulanate  1 tablet Oral 2 times per day    polyethylene glycol  17 g Oral Daily    lidocaine  1 patch TransDERmal Daily    sodium chloride  1,000 mL IntraVENous Once    sodium chloride flush  5-40 mL IntraVENous 2 times per day    enoxaparin  40 mg SubCUTAneous Daily       PRN Meds:sodium chloride flush, sodium chloride, ondansetron **OR** ondansetron, potassium chloride **OR** potassium alternative oral replacement **OR** potassium chloride    Radiology  CT Head WO Contrast    Result Date: 6/30/2022  CT Brain. Contrast medium:  without contrast.. History: Altered mental status. Technical factors: CT imaging of the brain was obtained and formatted as 5 mm contiguous axial images. 2.5 mm contiguous axial images were obtained through the osseous structures. Sagittal and coronal reconstruction obtained during postprocessing. Comparison:  None. Findings: Extra-axial spaces:  Normal. Intracranial hemorrhage:  None. Ventricular system: [Negative. Basal Cisterns:  Without anomaly.  Cerebral Parenchyma: Bilateral, symmetric areas of decreased attenuation exerting no mass effect measuring approximately 9 mm in size since found within the bilateral globus pallidus (series 2, image 17). Midline Shift:  None. Cerebellum:  No anomaly identified. Paranasal sinuses and mastoid air cells:  No anomaly identified. Visualized Orbits:  Negative. Impression: Round symmetric areas decreased attenuation bilateral globus pallidus. This finding may be seen in patients experiencing hypoxia, i.e., carbon monoxide poisoning. All CT scans at this facility use dose modulation, iterative reconstruction, and/or weight based dosing when appropriate to reduce radiation dose to as low as reasonably achievable. XR CHEST PORTABLE    Result Date: 7/1/2022  EXAMINATION: XR CHEST PORTABLE CLINICAL HISTORY: FEVER COMPARISONS: JANUARY 29, 2018 FINDINGS: Osseous structures are intact. Cardiopericardial silhouette is normal. Pulmonary vasculature is normal. Right lung is clear. Patchy area of increased opacity left upper lung. LEFT UPPER LUNG ATELECTASIS/PNEUMONIA. MRI BRAIN WO CONTRAST    Result Date: 7/1/2022  MRI BRAIN WO CONTRAST : 7/1/2022 CLINICAL HISTORY:  stroke . COMPARISON: Head CT 6/30/2022. TECHNIQUE: Multiplanar MR imaging of the head was performed without contrast. FINDINGS: Areas of restricted diffusion within the globus pallidus, supratentorial white matter and splenium of the corpus callosum are most consistent with carbon monoxide poisoning/hypoxemia and/or other toxic encephalopathy. There is no intracranial hemorrhage, mass effect, midline shift, extra-axial collection, significant cerebral volume loss or other complication identified. FINDINGS CONSISTENT WITH CARBON MONOXIDE POISONING/HYPOXEMIA AND/OR OTHER TOXIC ENCEPHALOPATHY. NO INTRACRANIAL HEMORRHAGE OR COMPLICATION IDENTIFIED. IMPRESSION AND SUGGESTION:  1. Drug overdose, urine screen positive for amphetamine, benzos, and fentanyl  2. Acute encephalopathy, improved   3. Rhabdomyolysis, likely secondary to drug overdose, no history of trauma  4. Abnormal LFT, likely muscle source  5.  EDGAR, mild  6. Leukocytosis  7. Left upper lobe atelectasis/pneumonia      Patient is on room air O2 saturation is 100%. No complaint of shortness of breath. Chest x-ray shows right upper lung atelectasis versus infiltration. He is on poaugmentin. CXR fu today. NOTE: This report was transcribed using voice recognition software. Every effort was made to ensure accuracy; however, inadvertent computerized transcription errors may be present.       Electronically signed by Génesis Cervantes MD, FCCP on 7/4/2022 at 12:18 PM Principal Discharge DX:	RSV (acute bronchiolitis due to respiratory syncytial virus)

## 2022-07-13 NOTE — ED ADULT NURSE NOTE - NSFALLRSKASSESSTYPE_ED_ALL_ED
Initial (On Arrival) Finasteride Male Counseling: Finasteride Counseling:  I discussed with the patient the risks of use of finasteride including but not limited to decreased libido, decreased ejaculate volume, gynecomastia, and depression. Women should not handle medication.  All of the patient's questions and concerns were addressed. Finasteride Counseling:  I discussed with the patient the risks of use of finasteride including but not limited to decreased libido, decreased ejaculate volume, gynecomastia, and depression. Women should not handle medication.  All of the patient's questions and concerns were addressed.

## 2022-09-21 NOTE — ED PROVIDER NOTE - PMH
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 Klisyri Pregnancy And Lactation Text: It is unknown if this medication can harm a developing fetus or if it is excreted in breast milk.

## 2023-01-01 NOTE — PROVIDER CONTACT NOTE (OTHER) - ACTION/TREATMENT ORDERED:
Will call nurse back.  Continue to monitor.
MD hassan
Hold PO seizure meds, until post-ictal phase over and pt is alert.
No

## 2023-01-06 NOTE — PROGRESS NOTE ADULT - SUBJECTIVE AND OBJECTIVE BOX
Patient is a 53y old  Male who presents with a chief complaint of Pneumonia, UTI, (05 May 2018 09:32)    Date of service: 05-07-18 @ 13:49  Patient appears comfortable  ROS: unable to obtain secondary to patient medical condition     MEDICATIONS  (STANDING):  aspirin  chewable 81 milliGRAM(s) Oral daily  benztropine 1 milliGRAM(s) Oral four times a day  carBAMazepine XR Tablet 400 milliGRAM(s) Oral daily  carBAMazepine XR Tablet 800 milliGRAM(s) Oral at bedtime  cholecalciferol 2000 Unit(s) Oral daily  Clobazam 30 milliGRAM(s) Oral at bedtime  clonazePAM Tablet 1 milliGRAM(s) Oral two times a day  diVALproex Sprinkle 500 milliGRAM(s) Oral daily  diVALproex Sprinkle 750 milliGRAM(s) Oral at bedtime  ferrous    sulfate 325 milliGRAM(s) Oral daily  heparin  Injectable 5000 Unit(s) SubCutaneous every 8 hours  levETIRAcetam 2000 milliGRAM(s) Oral two times a day  multivitamin 1 Tablet(s) Oral daily  pantoprazole    Tablet 40 milliGRAM(s) Oral before breakfast  phenytoin   Chewable 200 milliGRAM(s) Chew daily  piperacillin/tazobactam IVPB. 3.375 Gram(s) IV Intermittent every 8 hours  risperiDONE   Tablet 1 milliGRAM(s) Oral daily  risperiDONE   Tablet 3 milliGRAM(s) Oral at bedtime  senna 1 Tablet(s) Oral at bedtime  sodium chloride 2 Gram(s) Oral daily  thiothixene 5 milliGRAM(s) Oral <User Schedule>    MEDICATIONS  (PRN):  acetaminophen   Tablet 650 milliGRAM(s) Oral every 6 hours PRN For Temp greater than 38.5 C (101.3 F)      Vital Signs Last 24 Hrs  T(C): 36.4 (07 May 2018 11:23), Max: 36.7 (06 May 2018 18:11)  T(F): 97.5 (07 May 2018 11:23), Max: 98.1 (06 May 2018 22:59)  HR: 77 (07 May 2018 11:23) (67 - 77)  BP: 122/59 (07 May 2018 11:23) (106/62 - 139/84)  BP(mean): --  RR: 16 (07 May 2018 11:23) (16 - 18)  SpO2: 97% (07 May 2018 11:23) (95% - 98%)    Physical Exam:    PE:    Constitutional: frail looking  HEENT: NC/AT,  ears and nose atraumatic; pharynx clear  Neck: supple  Back: no tenderness  Respiratory: respiratory effort normal; scattered wheeze  Cardiovascular: S1S2 regular, no murmurs  Abdomen: soft, not tender, not distended, positive BS; no liver or spleen organomegaly  Genitourinary: no suprapubic tenderness  Musculoskeletal: no muscle tenderness, no joint swelling or tenderness  Neurological/ Psychiatric:  moving all extremities  Skin: no rashes; no palpable lesions    Labs: all available labs reviewed                          Labs:                        11.9   5.28  )-----------( 206      ( 07 May 2018 06:21 )             34.6     05-07    131<L>  |  97  |  14  ----------------------------<  92  4.4   |  24  |  0.70    Ca    8.5      07 May 2018 06:21             Cultures:       Culture - Urine (collected 05-05-18 @ 00:36)  Source: .Urine None  Final Report (05-06-18 @ 13:55):    <10,000 CFU/ml    Normal Urogenital ric present    Culture - Blood (collected 05-05-18 @ 00:36)  Source: .Blood None  Preliminary Report (05-06-18 @ 08:01):    No growth to date.    Culture - Blood (collected 05-05-18 @ 00:36)  Source: .Blood None  Preliminary Report (05-06-18 @ 08:01):    No growth to date.            < from: Xray Chest 1 View AP/PA. (05.05.18 @ 01:56) >    EXAM:  XR CHEST AP OR PA 1V                            PROCEDURE DATE:  05/05/2018          INTERPRETATION:  History: Fever.    Single view of the chest was performed.    Comparison is made to April 30, 2017.    Impression:    There is unchanged marked elevation of the left hemidiaphragm. There is   adjacent left basilar opacity which which is slightly more prominent   compared to the prior examination and may be related to atelectasis or   pneumonia. Right lung is clear. The heart size is grossly preserved.    < end of copied text >      Radiology: all available radiological tests reviewed    Advanced directives addressed: full resuscitation [de-identified] : Xray of left tib/fib done 1/5/23: No acute or healing injuries noted of tibia or fibula \par \par X-rays of left ankle done 9/25/2022 at urgent care facility have been independently reviewed today.  Bones are normal alignment.  Joint spaces are preserved.  No obvious fracture.

## 2023-01-19 NOTE — ED PROVIDER NOTE - CONDUCTED A DETAILED DISCUSSION WITH PATIENT AND/OR GUARDIAN REGARDING, MDM
SS Note: pt here for SI and is medically cleared for transfer/admission to psychiatric facility. [facility] note from Joe Collins, RN @ 0214 notes packet faxed to [facility]. ROSAURA called [facility]- & message stated they are all busy- can hold or leave message. SW held for 7 minutes & then left VM for return call for update status on pt.   Electronically signed by Elihu Eisenmenger, LSW on 1/19/2023 at 11:32 AM
lab results/return to ED if symptoms worsen, persist or questions arise/need for outpatient follow-up

## 2023-05-17 NOTE — DIETITIAN INITIAL EVALUATION ADULT. - SOURCE
chart; RN/other (specify)
Patient reports previous psychiatric admissions, does not know last one.  Receives psychiatric tx at his facility, currently on Depakote 750mg BID and Zyprexa 7.5mg QAM & 5QHS   Denies hx of SIB/SA/HI

## 2023-05-31 NOTE — ED ADULT NURSE NOTE - OBJECTIVE STATEMENT
Pt presents to the ED seizing, pt had an approx 20 second generalized tonic clonic seizure. EMS states the pt is from a group home and had a seizure at approx 6PM during dinner and after dinner they called an ambulance for the pt. Pt in route was awake and at baseline. Pt airway patent and seizure activity stopped on own. Quinolones Counseling:  I discussed with the patient the risks of fluoroquinolones including but not limited to GI upset, allergic reaction, drug rash, diarrhea, dizziness, photosensitivity, yeast infections, liver function test abnormalities, tendonitis/tendon rupture.

## 2023-06-01 NOTE — ED ADULT NURSE NOTE - NS PRO PASSIVE SMOKE EXP
Unknown Klisyri Pregnancy And Lactation Text: It is unknown if this medication can harm a developing fetus or if it is excreted in breast milk.

## 2023-06-03 NOTE — ED PROVIDER NOTE - PSYCHIATRIC LEVEL OF CONSCIOUSNESS
Attempted Behavioral Health Assessment.  Patient not available at this time.  
Attempted to call patient again to complete Behavioral Health assessment.  Phone just rings.  
Attempting to page on call psychiatrist again  
Bed: 24  Expected date: 6/2/23  Expected time: 3:39 PM  Means of arrival: [facility]  Comments:  48'sM Head Injury + ETOH Chyna  
Behavioral Health Assessment had been restarted and then had to be delayed due to technology issues.  Attempting to reach patient now to complete assessment.  Patient phone just rings.  
Behavioral Health Assessment started on telephone.  Phone call abruptly ends for unknown reasons.  
Bob states they are unable to take the patient tonight.  
Epic secure chat started with ER provider  
Notified by ER-RN that the patient is requesting the leave.     Patient was reassessed for suicide/safety. Patient denies suicidal ideations, stating he was never suicidal.    Denies withdrawal symptoms      Behavioral Health discharge instructions and resources have been added to the After Visit Summary.     
Paging for triage with [facility] on call psychiatrist.  
Patient assessed by Behavioral Health Intake.  Patient requesting Inpatient detox at [place].  Awaiting medical clearance.  
Per 2nd shift, patient was triaged with Dr. Ruiz and  accepted for inpatient treatment at [facility].      Patient to be admitted to bed # 4105A and to Dr. Will     Please call the Behavioral Health Nurse at [phone] for Nurse to Nurse report.       
RN asking patient triage questions pt stating he is suicidal and to \"just give me a knife right now so I can stab myself\". Provider made aware and ANA. Sitter with patient. Security aware  
RN to room to assess patient, patient informed of plan of care. Patient states he is not suicidal and that [facility] not [facility] because he has to take care of some stuff at home. He states he could go after he finishes. Patient informed that it does not work like that.  RN made aware.  RN to assess patient.   
Writer spoke with nursing staff at [facility]. Staff stating that the BHU is at capacity, and patient will hold in ED until the bed opens up on the morning. Charge RN aware patient to hold overnight in ED. Patient remains cooperative, resting in bed.   
alert

## 2023-08-03 NOTE — ED ADULT NURSE NOTE - IS THE PATIENT ABLE TO BE SCREENED?
Assessment and Plan: We reviewed the causes of acne, the “kinds” of acne, and the expected clinical course. We discussed treatment options ranging from over-the-counter products, topical retinoids, antibiotics, BP, hormonal therapies (OCPs/spironolactone), and isotretinoin (Accutane). We reviewed specific over-the-counter interventions and medications. Recommended typical hygiene measures including water-based facial products, washing regularly with mild cleanser, and refraining from picking and popping any pimples. Recommended non-comedogenic sunscreen use daily. Expectations of therapy discussed. Side effects, risks and benefits of medications discussed. A comprehensive handout on Acne was provided. The phone number to call in case of questions or concerns (and instructions to stop medications in such a scenario) was provided. After lengthy discussion of etiology and treatment options, we decided to implement the following personalized treatment plan:    Based on a thorough discussion of this condition and the management approach to it (including a comprehensive discussion of the known risks, side effects and potential benefits of treatment), the patient (family) agrees to implement the following specific plan:    --------------------------------------------------------------------------------------  YOUR PERSONALIZED ACNE ACTION PLAN    “MORNING ROUTINE”    SKIN HYGIENE:  In the shower, wash your face, chest and back gently with Cetaphil moisturizing cleanser or Dove Fragrance-free bar. Do not use a luffa or washcloth as these tend to be too irritating to acne-prone skin. ANTIMICROBIAL BENZOYL PEROXIDE:  None  Neutrogena Clear Pore (Benzoyl Peroxide 3% wash): In the shower, apply this medication to your face, chest and back. Leave this wash on your skin for about 5 minutes and then rinse it off completely while in the shower.  If you do not rinse it off completely, then it will bleach your towels or clothing. This medication is now available without prescription (over-the-counter) in most drug stores or at eigital for about $7 a bottle. PenOxyl wash: Apply on face leave in for 1-2 minutes and completely rinse off    Sulfacetamide sodium-sulfur wash: Apply to face daily  You may use any brand of benzoyl peroxide 10% wash over the counter    ANTIBIOTICS:    Doxycycline Take 1 tablet with a full glass of water and food     “EVENING ROUTINE”    SKIN HYGIENE:  In the shower, wash your face, chest and back gently with Cetaphil moisturizing cleanser or Dove Fragrance-free bar. Do not use a lufa or washcloth as these tend to be too irritating to acne-prone skin. ANTIBIOTICS:    Doxycycline Take 1 tablet with a full glass of water and food     TOPICAL RETINOID:  At 1 hour before bedtime (after washing your face and allowing the skin to completely dry), spread only a single pea-sized amount of this medication evenly over your entire face (avoiding your eyes or mouth):  Retin-A 0.25% gel  Use a pea sized amount to face at night. Start slowly because it will irritate your skin. Start one night a week for a couple weeks, then 2 nights a weeks for a couple weeks, and slowly increase to nightly. REMEMBER:  Always take your acne pills with lots of water! A pill stuck in your throat can cause significant burning and irritation. Drink a full glass of water to ensure the pill gets into your stomach. Avoid “popping” a pill right before bed, and stay upright for at least 1 hour after taking a pill. ACNE:  WHAT ZIT ALL ABOUT? WHY DO I HAVE ACNE/PIMPLES? Your skin is made of layers. To keep the skin from becoming dry and cracked, the skin needs oil. The oil is made in little wells in the deeper layers in the skin. People with acne have glands that make more oil and are more easily plugged, causing the glands to swell.  Hormones, bacteria and your inherited tendency to have acne all play a role.    The medical term for “pimples” is acne or acne vulgaris (vulgaris means “common”). Most people get some acne. Acne does not come from being dirty. Instead, it is an expected consequence of changes that occur during normal growth and development. Hormones, bacteria, and your family's tendency to have acne may all play a role. “Whiteheads” or “blackheads” are openings of the glands (glands are the oil factories) onto the surface of the skin. “Blackheads” are not caused by dirt blocking the pores; instead, they result from the oxidation reaction of oil and skin in the pores with the air (like a “rust” reaction). WHAT ABOUT STRESS? Stress does not “cause” acne but it can make it worse. Make sure you get enough sleep and daily exercise! WHAT ABOUT FOODS/DIET? Try to eat a balanced, healthy diet. Some people feel that certain foods worsen their acne. While there aren't many studies available on this question, severe dietary changes are unlikely to help your acne and may be harmful to the health of your skin. If you find that a certain food seems to aggravate your acne, you may consider avoiding that food. Discuss this with your physician! WHAT CAUSES MY ACNE? There are four contributors to acne--the body's natural oil (sebum), clogged pores, bacteria (with the scientific name Propionibacterium acnes, or P. acnes, for short), and the body's reaction to the bacteria living in the clogged pores (which causes inflammation). Here's what happens:    Sebum is produced in the normal oil-making glands in the deeper layers of the skin and reaches the surface through the skin's pores. An increase in certain hormones occurs around the time of puberty, and these hormones trigger the oil glands to produce increased amounts of sebum. Pores with excess oil tend to become clogged more easily.   At the same time, P. acnes--one of the many types of bacteria that normally live on everyone's skin--thrives in the excess oil and causes a skin reaction (inflammation). If a pore is clogged close to the surface, there is little inflammation. However, this results in the formation of “whiteheads” (closed comedones) or “blackheads” (open comedones) at the surface of the skin. A plug that extends to, or forms a little deeper in the pore, or one that enlarges or ruptures may cause more inflammation. The result is red bumps (papules) and pus-filled pimples (pustules). If plugging happens in the deepest skin layer, the inflammation may be even more severe, resulting in the formation of nodules or cysts. When these types of acne heal, they may leave behind discolored areas or true scars. SKIN HYGIENE:  HOW SHOULD I [address] MY SKIN? Acne does not come from being dirty, however, washing your face is part of taking good care of your skin and will help keep your face clear. Good skin hygiene is, therefore, critical to support any acne treatment plan. Here are several specific suggestions for practicing good skin hygiene and keeping your skin looking its best:    You should wash acne-prone skin TWICE A DAY: Once in the morning and once in the evening. This does include any showers you take that day, so do not overdo it! Do not scrub the skin with a washcloth or loofah as these can irritate and inflame your acne. Acne does not come from “dirt”, so it is not necessary to scrub the skin clean. In fact, scrubbing may lead to dryness and irritation that makes the acne even worse and harder for patients to tolerate acne medications. Use a gentle facial moisturizing cleanser (Cetaphil Moisturizing Cleanser or Dove Fragrance-Free bar). Avoid using soaps like [place], [address], [address], or soft/liquid soaps as these products will dry your skin. Do not use any over-the-counter “acne washes” without your doctor's specific instruction to do so. These products often contain salicylic acid or benzoyl peroxide.  These ingredients can be helpful in clearing oil from the skin and reducing bacteria, but they may also be drying and can add to irritation. Do not use exfoliating products with microbeads or brushes as these can cause irritation to the skin. Facials and other treatments to remove, squeeze, or “clean out” pores are not recommended. Manipulating the skin in this way can make acne worse and can lead to severe infections and/or scarring. It also increases the likelihood that the skin will not be able to tolerate acne medications. Try not to “pop pimples” or pick at your acne as this can delay healing and may result in scarring or skin color changes (“dark spots”) that are often more noticeable than the acne itself. Picking/popping acne can also cause a serious skin infection. Wash or change your pillow case once to twice a week, especially if you use products in your hair. Wash the skin as soon as possible after playing sports or other activities that cause a lot of sweating. Also, pay attention to how your sports equipment (shoulder pads, helmet strap, etc.) might be making your acne worse. When you use makeup, moisturizer, or sunscreen make sure that these products are labeled “non-comedogenic,” or “won't clog pores,” or “won't cause acne.”       SHOULD I TREAT MY ACNE? There are a number of other skin conditions that can look like acne. If there is any question about the diagnosis, then the person should be evaluated by a board certified pediatric and adolescent dermatologist.  A physician should examine any child with acne who is between the ages of 3and 9years of age, as acne in this “mid-childhood” age group is not normal and may signal an underlying problem.    If a “preadolescent” (9to 6years of age) or “adolescent” (15to 25years of age) has mild acne and the condition is not bothersome to the individual, proper and regular skin care (what your doctor may call “skin hygiene”) may be all that is needed at this point  Many people do, however, need specific acne medications to help their skin look and feel its best. Your doctor will tell you if you are one of these people. If so, you may be advised to use an over-the-counter or prescription medication that is applied to the skin (a “topical medication”) or if the addition of an oral medication (a medication “taken by NCR) is needed. The good news is that the medications work well when used properly! Some specific factors that may influence the choice of acne therapy include:    Severity. The number and type of skin lesions (papules or comedones) and the degree of inflammation (mild, moderate or severe). Scarring. Scarring is most common when acne is severe, but it can happen even in children with mild acne. Impact. If a child is experiencing emotional complications because of the acne or is experiencing negative comments from other children. Cost of the acne medications. An acne expert can help to keep “out of pocket” costs to a minimum by utilizing the correct medications and the least expensive options. The patient's skin type (“oily” versus “dry” or “combination skin,” for example). Potential side effects of the medication. The ease or overall complexity of the treatment plan or medication. WHAT ACNE TREATMENTS ARE AVAILABLE? Medications for acne try to stop the formation of new pimples by reducing or removing the oil, bacteria, and other things (like dead skin cells) that clog the pores. They can also decrease the inflammation or irritation response of the skin to bacteria. It may take from 6 to 8 weeks (about 2 months!) before you see any improvement and know if the medication is effective. It takes the layers of skin this long to regenerate. Remember, these medications do not “cure” the condition--the acne improves because of the medication. Therefore, treatment must be continued in order to prevent the return of acne lesions.     There are many types of acne treatments. Some are applied to the skin (“topical” medications) and some are taken by mouth (“oral” medications). In most cases of mild acne, the doctor will start with a topical medication. There are many different topical medications that are helpful for acne. If acne is more severe and it does not respond adequately to a topical medication, or if it covers large body surface areas such as the back and/or chest, oral antibiotics such as Doxycycline or Minocycline and/or oral hormone therapy such as Oral Contraceptive Pills or Spironolactone may be prescribed. In the most severe cases, isotretinoin (Accutane) may be used. In general, it is usually best to start with acne medications that are least likely to cause side effects but are at the same time capable of addressing the specific causes for the acne. Some patients have a good result with just one medication, but many will need to use a combination of treatments: two or more different topical agents or an oral medication plus a topical medication. Another treatment used for acne may include corticosteroid injections, which are used to help relieve pain, decrease the size, and encourage the healing of large, inflamed acne nodules. Also, dermatologists sometimes perform “acne surgery,” using a fine needle, a pointed blade, or an instrument known as a comedone extractor to mechanically clean out clogged pores. One must always weigh the risk for inducing a scar with the potential benefits of any procedure. Prior treatment with topical retinoids can “loosen” whiteheads and blackheads and make it easier to physically remove such lesions. Heat-based devices, and light and laser therapy are being studied to see whether there is any role for such treatments in mild to moderate acne. At this time, there is not enough evidence to make general recommendations about their use. TOPICAL ACNE MEDICATIONS    WHAT KIND OF TOPICALS ARE THERE?   Benzoyl peroxide (BP) helps to fight inflammation and is anti-microbial (kills bacteria, viruses, and other microorganisms) and is believed to help prevent resistance of bacteria to topical antibiotics. A benzoyl peroxide “wash” may be recommended for use on large areas such as the chest and/or back. Mild irritation and dryness are common when first using benzoyl peroxide-containing products. Be careful because benzoyl peroxide can bleach towels and clothing! Retinoids (such as adapalene, tretinoin, or tazarotene) unplug the oil glands by helping peel away the layers of skin and other things plugging the opening of the glands. Mild irritation and dryness are common when first using these products. Facial waxing and other skin procedures can lead to excessive irritation and should be avoided during retinoid therapy. Antibiotics fight bacteria and help decrease inflammation. Topical antibiotics commonly used in acne include clindamycin, erythromycin, and combination agents (such as clindamycin/benzoyl peroxide or erythromycin/benzoyl peroxide). Mild irritation and dryness are common when first using these products. Typically, topical antibiotics should not be used alone as treatment for acne. Other topical agents include salicylic acid, azelaic acid, dapsone, and sulfacetamide. Mild irritation and dryness can also occur when first using these products. USING YOUR TOPICAL TREATMENTS LIKE A PRO  Apply topical medications only to clean, dry skin. Topical medications may lead to significant dryness of the affected areas. To minimize this, wait 15-20 minutes after washing before applying your topical medication. These medications work deep in the skin to prevent new breakouts. “Spot treatment” of individual pimples does not do much. When applying topical medications to the face, use the “5-dot” method. Start by placing a small pea-sized amount of the medication on your finger.  Then, place “dots” in each of five locations of your face: Mid-forehead, each cheek, nose, and chin. Next, rub the medication into the entire area of skin - not just on individual pimples! Try to avoid the delicate skin around your eyes and corners of your mouth. The medications are not magic! They take weeks if not months to work. Be patient and use your medicine on a daily basis or as directed for six weeks before asking if your skin looks better. Try not to miss more than one or two days each week when using your medications. If you are starting a new medication, then try using it “every other night” or even “every third night.” Gradually work up to [employer] a day.”  This will give your skin time to adjust.  The same medications often come in various forms or formulations: Creams, ointments, lotions, gels, microspheres, or foams. Use the formulation that has been recommended and don't switch to other forms unless instructed. Some forms (such as alcohol based gels) may be more drying and less tolerable for certain skin types. Sometimes individual medications are not as effective as a combination of two or more agents. The doctor may need to try several medications or combinations before finding the one that is best for that patient. Moisturizer, sunscreen, and make-up may be used in conjunction with topical acne medications. In general, acne medications are applied first so they may directly contact the skin. Ask your physician to review specific application instructions! It is especially important to always use sunscreen when using a topical retinoid or oral antibiotic. These drugs can make your skin more sensitive to the sun. In general, sunscreen gets applied AFTER any acne medications. Don't stop using your acne medications just because your acne got better. Remember, the acne is better because of the medication, and prevention is the wolf to treatment.       ORAL ACNE MEDICATIONS    ORAL ANTIBIOTICS  Antibiotics include tetracycline-class medicines (which include the most commonly used oral antibiotics for acne, minocycline, and doxycycline), erythromycin, trimethoprim-sulfamethoxazole, and occasionally cephalexin or azithromycin. These drugs may decrease bacteria and inflammation, and they are most effective for moderate-to-severe “inflammatory” acne. A product containing benzoyl peroxide should be used along with these antibiotics to help decrease the possibility of microbial resistance. Always take your acne pills with lots of water! A pill stuck in your throat can cause significant burning and irritation. Drink a full glass of water to ensure the pill gets into your stomach. Avoid “popping” a pill right before bed, and stay upright for at least 1 hour after taking a pill. DOXYCYCLINE   This medication is usually taken ONCE or TWICE per day, as instructed by your physician. NOTE: Always take this medication with lots of water! A pill stuck in the throat can cause significant burning and irritation. Avoid “popping” a pill right before bed & stay upright for at least one hour after taking a pill. WARNING: Doxycycline increases your sensitivity to the sun, so practice excellent sun protection! If you notice any of the following, stop using the medication and notify your health care provider: headaches; blurred vision; dizziness; sun sensitivity; heartburn-stomach pain; irritation of the esophagus; darkening of scars, gums, or teeth (more often with minocycline); nail changes; yellowing of the eyes or skin (indicating possible liver disease); joint pains-and flu-like symptoms. Taking oral antibiotics with food may help with symptoms of upset stomach. COMMON SIDE EFFECTS: Headaches; dizziness; sun sensitivity; irritation of the throat; discoloration of scars, gums, or teeth; nail changes.              ORAL ISOTRETINOIN (used to be called the brand name “ACCUTANE”)  Isotretinoin, a derivative of vitamin A, is a powerful drug with several significant potential side effects. It is reserved for acne which is severe or when other medications have not worked well enough. It used to be sold under the brand name “Accutane” but now several versions exist.      HAVING PROBLEMS WITH ANY OF YOUR TREATMENTS? You should not be able to see any of the medicines on your face. If you can see a white film on your skin after you apply the medication, there is too much medicine in that area and you need to apply a thinner coat and make sure it is spread evenly on your face. If your skin gets too dry, you can apply a light (“non-comedogenic”) moisturizer on top of your medicine or you may switch to using the medicine every other day instead of every day. If your skin is still too irritated, you may need to switch to a milder medication. If your skin is red and very itchy, you may be allergic to the medication and you should stop using it. COMMON POSSIBLE SIDE EFFECTS OF MEDICATIONS    Retinoids - dryness, redness, increased sun sensitivity. Benzoyl peroxide - drying, redness, bleaching of clothes, towels and sheets, allergy. Doxycycline - headaches; dizziness; irritation of the throat; nail changes; discoloration of teeth. Sun sensitivity - even if you have dark skin, this medicine can make you burn more easily. Make sure you protect yourself from the sun, either by avoiding being outside between 11 AM and 3 PM, wearing and reapplying sunscreen/sunblock, or wearing sun protective clothing  Nausea/vomiting - if you experience nausea with this medication, take it with food. WHEN AND WHERE TO CALL WITH CONCERNS  We are here to help! If you experience any unusual symptoms, then stop taking or using the medication and call our office at [phone] (SKIN). It is better to be safe than to be sorry! No

## 2023-09-11 NOTE — ED ADULT NURSE NOTE - NSFALLRSKINDICATORS_ED_ALL_ED
yes Peng Advancement Flap Text: The defect edges were debeveled with a #15 scalpel blade. Given the location of the defect, shape of the defect and the proximity to free margins a Peng advancement flap was deemed most appropriate. Using a sterile surgical marker, an appropriate advancement flap was drawn incorporating the defect and placing the expected incisions within the relaxed skin tension lines where possible. The area thus outlined was incised deep to adipose tissue with a #15 scalpel blade. The skin margins were undermined to an appropriate distance in all directions utilizing iris scissors. Following this, the designed flap was advanced into the primary defect and sutured into place.

## 2023-09-19 NOTE — H&P ADULT. - NS ABD PE RECTAL EXAM
What Type Of Note Output Would You Prefer (Optional)?: Standard Output How Severe Are Your Spot(S)?: mild Have Your Spot(S) Been Treated In The Past?: has not been treated Hpi Title: Evaluation of a Skin Lesion Additional History: Spot on L temple. No mass or lesion

## 2023-12-21 NOTE — ED ADULT NURSE NOTE - PAIN: PRESENCE, MLM
Addended by: ADRIANE CHAPA IV on: 1/18/2017 09:09 AM     Modules accepted: Orders    
with patient
non-verbal indicators of pain/discomfort absent

## 2023-12-26 NOTE — ASU DISCHARGE PLAN (ADULT/PEDIATRIC) - PATIENT BELONGINGS
Patient's belongings returned
Size Of Lesion In Cm (Optional): 0.5
Instructions (Optional): A. Right Mid Ventral Forearm 0.5cm r/o DN\\nC. Left Medial Scapula 0.6cm r/o DN
Introduction Text (Please End With A Colon): The following procedure was deferred:
Detail Level: Detailed
Procedure To Be Performed At Next Visit: Shave Removal
X Size Of Lesion In Cm (Optional): 0
Size Of Lesion In Cm (Optional): 1.2
Procedure To Be Performed At Next Visit: Excision
Instructions (Optional): B. Right Mid PV 1.2cm r/o EIC vs other

## 2024-03-02 NOTE — ED ADULT NURSE NOTE - SEIZURE DURATION
second(s) You can access the FollowMyHealth Patient Portal offered by [facility] by registering at the following website: [email or web address]. By joining [employer]’s FollowMyHealth portal, you will also be able to view your health information using other applications (apps) compatible with our system.

## 2024-05-27 NOTE — H&P ADULT - PSH
S/P Colonoscopy    S/P Endoscopy
Patient requests all Lab, Cardiology, and Radiology Results on their Discharge Instructions

## 2024-06-18 NOTE — ED ADULT NURSE NOTE - SEIZURE DESCRIPTION
Hospitalist Progress Note      Name:  Eugenie Granados /Age/Sex: 1979  (35 y.o. female)   MRN & CSN:  [id number] & [id number] Admission Date/Time: 2020  3:30 AM   Location:  [address] [place] [address] PCP: Mago Santoyo MD       Eugenie Granados is a 35 y.o.  female  who presents with Seizures (started 500 mg keppra today. ) and Dizziness      Assessment and Plan:   Seizures, sounds like [place] Seizure  - Keppra IV load given and on oral Keppra now  - MRI unremarkable. - seizure precautions  - ativan prn  .- Neurology consult. Vertigo/dizziness likely postictal origin.    - Meclizine as needed ordered. HTN uncontrolled   - increase amlodipine to 10  - continue atenolol.   - Follow-up vitals and adjust prn    Hypothyroidism   -continue current Synthroid.    -Check TSH/free T4    Morbid obesity   diet and exercise counseling.             Diet DIET GENERAL;   Code Status Full Code     Medications:   Medications:    amLODIPine  5 mg Oral Daily    atenolol  50 mg Oral Daily    levothyroxine  100 mcg Oral Daily    pantoprazole  40 mg Oral QAM AC    sodium chloride flush  10 mL Intravenous 2 times per day    enoxaparin  40 mg Subcutaneous Daily    levETIRAcetam  500 mg Oral BID      Infusions:   PRN Meds: sodium chloride flush, 10 mL, PRN  acetaminophen, 650 mg, Q6H PRN    Or  acetaminophen, 650 mg, Q6H PRN  magnesium hydroxide, 30 mL, Daily PRN  promethazine, 12.5 mg, Q6H PRN    Or  ondansetron, 4 mg, Q6H PRN  meclizine, 12.5 mg, Q6H PRN  ketorolac, 30 mg, Q6H PRN      Subjective:   Doing ok, no distress    Objective:   No intake or output data in the 24 hours ending 20 1134   Vitals:   Vitals:    20 0800   BP: (!) 162/100   Pulse: 70   Resp: 18   Temp:    SpO2: 94%     Physical Exam:   Gen:  awake, alert, no apparent distress  Head/Eyes:  Normocephalic atraumatic, EOMI   NECK:   symmetrical, trachea midline  LUNGS: Normal Effort   CARDIOVASCULAR:  Normal rate  ABDOMEN:  non Noted.  Please have patient take Toprol-XL 75 mg twice a day instead of 100 b.i.d. for about 1-2 weeks and notify the office if symptoms have improved or not.   distended  MUSCULOSKELETAL:  ROM WNL  NEUROLOGIC: Alert and Oriented,  Cranial nerves II-XII are grossly intact.    SKIN:  no bruising or bleeding, normal skin color,  no redness      Data:       CBC   Recent Labs     11/11/20 0330 11/12/20 0518   WBC 8.9 8.0   HGB 12.7 11.9*   HCT 41.0 37.2    228      BMP   Recent Labs     11/11/20 0330 11/12/20 0518    135   K 3.6 4.3    100   CO2 22 26   BUN 13 11   CREATININE 0.9 0.8         Electronically signed by Estephania Andrade MD on 11/12/2020 at 11:34 AM shaking, generalized

## 2024-06-19 NOTE — PATIENT PROFILE ADULT. - IS PATIENT PREGNANT?
"Omkar "Omkar" Malik was seen and treated in our emergency department on 6/19/2024.  She may return to work on 06/26/2024.       If you have any questions or concerns, please don't hesitate to call.      RN RN    "
"Omkar Cardozo" Malik was seen and treated in our emergency department on 6/19/2024.  She may return to work on 06/26/2024.       If you have any questions or concerns, please don't hesitate to call.       RN    "
not applicable (Male)

## 2024-10-21 NOTE — CONSULT NOTE ADULT - ASSESSMENT
It was my pleasure seeing you in the [facility] today.  We will focus on addressing the following symptoms discussed today: Dizziness, palpitations, cough, post-nasal drainage, GI complaints, concerns for MCAS, increased musculoskeletal pain, poor sleep, fatigue, brain fog, numbness and tingling    My recommendations are as follows:  -dysautonomia labs to evaluate for any reversible causes of POTS, please have these done at any  lab. Some of them require 24hr urine collection and a fasting glucose tolerance test, the lab will help you get these all set up.  -continue to use the conservative measures to help with POTS, also increase propranolol to 20mg twice daily  -I will order an in-lab sleep study to further evaluate your sleep, recommendations to improve sleep are given as well  -list of PCP providers will be emailed to you to help you establish with a PCP  -for suspected MCAS, increase loratadine and famotidine to twice daily and continue to follow the low histamine diet  -referral to [facility] Psychology support group, you will be called to set up this appointment  -continue to work with PT for pain and POTS  -we can consider referral to PM&R provider Dr. Dee Dee Garsia for pain management in light of EDS, another option would be Pain Management provider Dr. Ochoa who offers low dose Ketamine infusions, the [facility] pain clinic offers high-dose Ketamine infusions  -for ADHD concerns, you can consider trying a stimulant medication with your Psychiatrist  -tips given for brain fog and fatigue management, if no improvement will consider referral to virtual OT for cognitive rehab and fatigue management    conservative measures to help with dysautonomia symptoms:  --drinking 1 gallon of water/day (or a mix of fluids, non sugary and minimally caffeinated)   --Eating 6g of table salt (3 tsp) in addition to salt used in cooking of naturally present in food. Avoid salt tablets and monitor BP  closely  --Wearing compression stockings, grade 40-50 mmHg up to the waist every day, to be removed before bedtime  --Sleeping with the head of the bed up 45 degrees, even for short naps. Avoid stacking pillow or wedges under the neck or the back. Bend the mattress in the middle and raise the head part and stack bricks underneath. Or invest  in an adjustable bed.  --Water jogging (ie. running in the shallow part of the pool, water waist or chest level), 1 hour a day or 90 minutes 3 times a week.     Tips to help improve brain fog and fatigue:  --avoid drinking Alcohol while recovering from Long COVID  --Focus on eating whole foods to help support your gut and immune system. Aim to eat 30 different plants per week (vegetables, fruits, beans, nuts, legumes, seeds, whole grains, herbs, spices). Avoid processed foods and beverages. Eliminate added sugars, artificial sweeteners, processed oils, artificial dyes.  --ensure to practice 30 minutes of exercise 7 days per week to keep BNDGF (brain derived neurotrophic growth factor) elevated as this will help in the regeneration of neurons, you may split exercise time up into 5 minute increments if this is better tolerated.   --slowly increase your activity by no more than 10% per week, rest when you feel tired  --utilize pacing techniques to manage fatigue, schedule rest times throughout the day so you do not run out of energy, more information to be found on this here: [email or web address]/health-info-[email or web address]_covid-19_fatigue.pdf  --use the “attention beam” strategy to help you focus on some things while ignoring others. Imagine a flashlight beam illuminating the task that you are trying to focus on while leaving everything else in the dark.  --minimize external distractions to help with attention. For example, turn off the TV, radio, music, heater, and other electrical equipment, and close the window to screen out traffic noise. Complete important or difficult  "tasks in a quiet room if possible. Switch off mobile phones, switch off automatic email notifications. Use ear plugs if necessary or noise-cancelling headphones. Reduce visual distractions by clearing off your work-space, sit opposite to a window. Make sure lighting in the workspace is adequate.  --minimize internal distractions to help with attention. Thoughts, feelings, and physical sensations such as hunger or pain can be distracting. When you notice your attention beam is directed toward a thought or sensation, gently direct your attention back to the central focal point. You can also practice mindfulness and/or meditation to help reduce internal distractions  --games that can be tried to help improve memory and attention are Brain HQ and N-Back games  --mindfulness and meditation can be learned with the smartphone apps “Unwinding Anxiety” and “Headspace”  --Review the  Health Talk on Managing Fatigue and Thinking Changes after COVID-19 here: https://www.hospitals.org/Health-Talks/articles/2022/05/managing-fatigue-and-thinking-changes-after-covid-19  --You can also find many helpful tips and tricks in this book: \"The Long COVID self-help guide, practical ways to manage symptoms\" by [employer] from the Post[facility]COVID [facility] [place]     To help improve sleep:  --try Melatonin children's liquid drops 1-2mg underneath your tongue 30 minutes before you go to bed  --go to bed at the same time each night and get up at the same time each morning, including the weekend  --goal of 7-9 hours of uninterrupted sleep per night  --make sure your bedroom is quiet, dark, relaxing, and at a comfortable temperature  --remove electronic devices, such as TVs, computers, and smart phones, from your bedroom  --avoid caffeine after the morning and large meals before bed  --drink plenty of water throughout the day but avoid drinking fluids two hours before bed  --expose yourself to bright light in the morning  --engage in a calming " bed-time routine of warm bath/shower, gratitude journal, guided meditation, etc.  --do not lay awake in bed for more than 20 minutes, get up and engage in a soothing activity such as reading a boring book until you feel sleepy     We will send a message in [employer] or call you with the results of your tests.  Further recommendations will follow based on testing results and your symptoms.   Please return to COVID Recovery Clinic in 3 months, call [phone] or send a message through your [employer] emmy if needed.    Please also consider attending  PICS support group for long-COVID and post-ICU patients. To contact support group, email [email or web address] or call [phone]    Imp:  ?esophageal diverticulum or neoplasm  Patient would likely not comply with esophagram    Rec:  Favor EGD which we could arrange next week  However, need consent from family -- message left with number on chart

## 2024-11-16 NOTE — ASU PATIENT PROFILE, ADULT - AS SC BRADEN NUTRITION
[facility]  Total Knee Arthroplasty  Patient:Avani Anglin   : 1954  Medical Record TTRZVI:[id number]    Pre-operative Diagnosis:  Unilateral primary osteoarthritis, left knee [M17.12]    Post-operative Diagnosis: Unilateral primary osteoarthritis, left knee [M17.12]    Location: [place] [address]    Date of Procedure: 2019    Surgeon: Dilip Murguia MD    Assistant: Remedios Rashid CFA    Anesthesia: Spinal + adductor nerve block    Procedure:  Left Total Knee Arthroplasty    Tourniquet Time: Tourniquet Not Used    BMI: Body mass index is 47.12 kg/m². EBL: 465XL     Complications: None    Patient Condition upon Completion of Procedure: Stable    Implants:   Implant Name Type Inv. Item Serial No.  Lot No. LRB No. Used   PAT ASYM MTL-BK 10MM SZ A35 -- TRIATHLON - [id number]  PAT ASYM MTL-BK 10MM SZ A35 -- TRIATHLON [id number] [employer] [facility] [id number] Left 1   COMPNT FEM CR TRIATHLN 4 L PA --  - [id number]  COMPNT FEM CR TRIATHLN 4 L PA --  [id number] [employer] [facility] [id number] Left 1   BASEPLT TIB PC TRITNM SZ 3 -- TRIATHLON - [id number]  BASEPLT TIB PC TRITNM SZ 3 -- TRIATHLON [id number] [employer] [facility] [id number] Left 1   INSERT TIB ARTC BRNG SZ3 11MM -- TRIATHLON - [id number]  INSERT TIB ARTC BRNG SZ3 11MM -- TRIATHLON [id number] [employer] [facility] [id number] Left 1       Intra-Operative Findings: Pre-operatively we assess the motion of the patients knee and noted that the knee lacked approximately 5 degrees of extension. Intra-operatively we noted that the articular surfaces were arthritic with cartilage loss of both the medial and lateral compartments. The patella was examined and found to be in poor condition. The patella was  resurfaced. With trial components in place we assessed knee motion and found that the knee was able to fully extend. Flexion was felt to be 120 degrees. Description of Procedure:     The complexity of the total joint surgery requires the use of a first assistant for positioning, retraction and assistance in closure. Win Zhu had undergone adductor canal block in the preoperative holding area. Win Zhu was brought to the operating room, positioned on the operating room table, and after appropriate identification underwent Spinal anesthesia. A colmenares catheter was then placed. IV antibiotics were administered per proticol. The left leg and was then prepped and draped in the usual sterile manner. Timeout was taken identifying the patient, procedure ,operative side and surgeon. Prior to incision one gram of IV transexamic acid was administered intravenously. An anterior longitudinal incision was made just medial to the tibial tubercle and extending proximal.  A subvastas capsular incision was performed. The medial capsular flap was elevated around to the midcoronal plane. The patella was subluxed laterally and patellar fat pat partially excised. The knee was flexed and externally rotated. The articular surface revealed cartilage loss with exposed bone and bone spurs throughout all three compartments. The anterior cruciate ligament was resected. We first addressed our distal femoral resection. Using intramedullary instrumentation we resected 8mm of distal femur using a 5 degree valgus cut angle. Medial and lateral condylar cuts were verified to be level using the capture of the distal femoral cutting jig. We next addressed our proximal tibia. Using extramedullary intrstrumentation we resected 2mm of bone as measured from the more involved compartment. Our cut was verified to be perpendicular to the meachanical axis of the tibia as referenced from the tibial tubercle, the long axis of the tibia, the center of the ankle, and the patients 2nd toe. Our slope was cut with the goal of 0-3 degrees posterior slope.  At this point a spacer block was used to verify that the knee was able to obtain full extension and was balanced in extension. We did  not have to resect additional bone to achieve this goal. Once the knee was felt to fully extend and showed good balance in extension the distal femoral pins were removed and we moved on to finishing our distal femoral preparation. Prior to sizing our distal femur we marked Brewster's Line and our transepicondylar axis. Using sizing instrumentation the femur was sized to a #4 component. The anterior and posterior cuts along with the anterior and posterior chamfer cuts were then made using the 4-in-1 cutting block. Osteophytes were removed from the tibial and femoral surfaces. The PCL was assessed and felt to be in good condition and be very stable. Medial and lateral meniscus' were removed along with any redundant tissue. Any posterior osteophytes were removed. The posteromedial and posterolateral capsule as well as the medial and lateral periosteum of the distal femur and proximal tibia were injected with periarticular cocktail containing local anesthetic and toradol. We then returned our attention to the tibial side. The tibia was then sized to a #3 component. The tibial component was assessed in terms of position and rotation. Once we felt that we had obtained symmetric coverage of the proximal tibia and had rotated the tibial tray to a point where the midline of the component was bisecting the middle and medial 3rd of the tibial tubercle we marked the proximal tibia with bovie cautery to wayne rotation and also pinned the tibial tray into place. We then performed a trial of the knee with trial components in place. We felt that with a 11 mm polyethylene trial in place the knee had acceptable varus and valgus stability throughout arc of motion, was able to fully extend, was not too tight in flexion, and had acceptable stability with anterior  Drawer testing. No additional surgical releases were required. Again we assessed our PCL and felt it was stable and in good condition.       The patella was then everted and examined. The patella was felt to be in poor condition and the decision was made to  resurface the patella. Bone was resected to accomodate a size 35mm patella button. A trial reduction revealed appropriate tracking through the patellofemoral groove with no lateral retinacular release required. Again patellar tracking was assessed and it was felt that the patella was tracking appropriately within the femoral trochlear groove. At this point the patella was irrigated of any debride and the final patellar component was inserted which was a tritanium-backed cementless component. At this point the trial polyethylene component and trial femoral component were removed. We again assessed our tibial tray and verified that we were satisfied with its position. We did not have to adjust its position. The proximal tibia was punched and drilled per protocol for the system. We irrigated and debrided all bony surfaces of residual tissue and bone. We then inserted the tibial and femoral components and noted them to be well seated. We then inserted our final polyethylene component which was a 11mm thick. Ji Jay's knee was placed through a range of motion and noted to be stable as mentioned above with the trial components. In additional we checked patellar tracking one last time which was felt to be appropriate. A lavage of diluted betadine solution of  17.5 ml Betadine in 500 of 0.9% normal saline was allowed to soak in the wound for 3 minutes after implanting of the prosthesis. The wound was irrigated with normal saline again before closure. We then carefully injected periarticular cocktail about and anterior capsule and subcutaneous tissue. The capsular layer was closed using a combination of 0-Stratafix bidirectional barbed suture and #2 Fiberwire. After capsular closure one gram of topical  transexemic acid was injected into the capsule.  The subcutaneous layer was closed using a 2-0 Monocril interrupted suture. The skin was closed using staples. A sterile dressing was applied. The sponge count and needle counts were correct. Patient was transferred to the hospital stretcher and transported to recovery in stable condition.     Signed By: Lilian Ma MD (4) excellent [facility]

## 2024-12-01 NOTE — DISCHARGE NOTE ADULT - PATIENT PORTAL LINK FT
Problem: Adult Inpatient Plan of Care  Goal: Plan of Care Review  Outcome: Progressing  Goal: Patient-Specific Goal (Individualized)  Outcome: Progressing  Goal: Absence of Hospital-Acquired Illness or Injury  Outcome: Progressing  Goal: Optimal Comfort and Wellbeing  Outcome: Progressing  Goal: Readiness for Transition of Care  Outcome: Progressing     Problem: Infection  Goal: Absence of Infection Signs and Symptoms  Outcome: Progressing     Problem: Fall Injury Risk  Goal: Absence of Fall and Fall-Related Injury  Outcome: Progressing      You can access the [facility] Portal, offered by [facility], by registering with the following website: [email or web address]

## 2025-02-03 NOTE — ED ADULT NURSE NOTE - FALLEN IN THE PAST
PROCEDURE NOTE  Date: 2/3/2025   Name: Mariangel Mas  YOB: 1956    Procedures          Date: 2/3/2025  Referring physician:  Dr. Aba Rene  Patient aged 68 y with seizures. EEG done to assess for epileptiform activity.    Introduction  This routine 20-minute EEG was recorded using the International 10-20 System on a [employer] workstation at 256 samples/s. Automated spike and seizure detection algorithms were applied.    Description  During the maximal alert state, a well-regulated, symmetric, and reactive 8 Hz posterior dominant rhythm was seen. Intermittent right temporal focal slowing or interhemispheric asymmetry was noted. Stage I and stage II sleep were observed. There were few right temporal sharps (T4).     Activations  Hyperventilation was not performed. Intermittent photic stimulation was performed and demonstrated no posterior driving response.        Impression  Abnormal awake EEG. The slowing mentioned above suggests mild non specific encephalopathy.     The presence of right temporal sharps suggest increased risk for focal seizures and may represent underlying focal lesion.        EKG lead did not show clear arrhythmia, if still in concern consider formal EKG or correlation with telemetry.       Lynsey Segovia MD  Epilepsy Board Certified.  Neurology Board Certified.    Electronically Signed       no

## 2025-06-26 NOTE — H&P ADULT. - PMH
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